# Patient Record
Sex: FEMALE | Race: WHITE | Employment: OTHER | URBAN - NONMETROPOLITAN AREA
[De-identification: names, ages, dates, MRNs, and addresses within clinical notes are randomized per-mention and may not be internally consistent; named-entity substitution may affect disease eponyms.]

---

## 2019-10-03 ENCOUNTER — PROCEDURE VISIT (OUTPATIENT)
Dept: CARDIOLOGY CLINIC | Age: 69
End: 2019-10-03
Payer: MEDICARE

## 2019-10-03 ENCOUNTER — OFFICE VISIT (OUTPATIENT)
Dept: CARDIOLOGY CLINIC | Age: 69
End: 2019-10-03
Payer: MEDICARE

## 2019-10-03 ENCOUNTER — TELEPHONE (OUTPATIENT)
Dept: CARDIOLOGY CLINIC | Age: 69
End: 2019-10-03

## 2019-10-03 VITALS
HEIGHT: 63 IN | HEART RATE: 80 BPM | DIASTOLIC BLOOD PRESSURE: 80 MMHG | WEIGHT: 202.5 LBS | SYSTOLIC BLOOD PRESSURE: 126 MMHG | BODY MASS INDEX: 35.88 KG/M2

## 2019-10-03 DIAGNOSIS — E78.5 HYPERLIPIDEMIA, UNSPECIFIED HYPERLIPIDEMIA TYPE: ICD-10-CM

## 2019-10-03 DIAGNOSIS — E11.9 TYPE 2 DIABETES MELLITUS WITHOUT COMPLICATION, WITHOUT LONG-TERM CURRENT USE OF INSULIN (HCC): ICD-10-CM

## 2019-10-03 DIAGNOSIS — R94.39 ABNORMAL STRESS TEST: Primary | ICD-10-CM

## 2019-10-03 DIAGNOSIS — R01.1 MURMUR, CARDIAC: Primary | ICD-10-CM

## 2019-10-03 DIAGNOSIS — I10 ESSENTIAL HYPERTENSION: ICD-10-CM

## 2019-10-03 LAB
LV EF: 60 %
LVEF MODALITY: NORMAL

## 2019-10-03 PROCEDURE — 99204 OFFICE O/P NEW MOD 45 MIN: CPT | Performed by: INTERNAL MEDICINE

## 2019-10-03 PROCEDURE — 4040F PNEUMOC VAC/ADMIN/RCVD: CPT | Performed by: INTERNAL MEDICINE

## 2019-10-03 PROCEDURE — G8484 FLU IMMUNIZE NO ADMIN: HCPCS | Performed by: INTERNAL MEDICINE

## 2019-10-03 PROCEDURE — 1036F TOBACCO NON-USER: CPT | Performed by: INTERNAL MEDICINE

## 2019-10-03 PROCEDURE — G8417 CALC BMI ABV UP PARAM F/U: HCPCS | Performed by: INTERNAL MEDICINE

## 2019-10-03 PROCEDURE — 93306 TTE W/DOPPLER COMPLETE: CPT | Performed by: INTERNAL MEDICINE

## 2019-10-03 PROCEDURE — 1123F ACP DISCUSS/DSCN MKR DOCD: CPT | Performed by: INTERNAL MEDICINE

## 2019-10-03 PROCEDURE — G8427 DOCREV CUR MEDS BY ELIG CLIN: HCPCS | Performed by: INTERNAL MEDICINE

## 2019-10-03 PROCEDURE — 2022F DILAT RTA XM EVC RTNOPTHY: CPT | Performed by: INTERNAL MEDICINE

## 2019-10-03 PROCEDURE — 1090F PRES/ABSN URINE INCON ASSESS: CPT | Performed by: INTERNAL MEDICINE

## 2019-10-03 PROCEDURE — 3046F HEMOGLOBIN A1C LEVEL >9.0%: CPT | Performed by: INTERNAL MEDICINE

## 2019-10-03 PROCEDURE — G8400 PT W/DXA NO RESULTS DOC: HCPCS | Performed by: INTERNAL MEDICINE

## 2019-10-03 PROCEDURE — 3017F COLORECTAL CA SCREEN DOC REV: CPT | Performed by: INTERNAL MEDICINE

## 2019-10-03 RX ORDER — MAGNESIUM 200 MG
TABLET ORAL
COMMUNITY

## 2019-10-03 RX ORDER — LISINOPRIL AND HYDROCHLOROTHIAZIDE 12.5; 1 MG/1; MG/1
0.5 TABLET ORAL DAILY
COMMUNITY

## 2019-10-03 RX ORDER — ATORVASTATIN CALCIUM 20 MG/1
20 TABLET, FILM COATED ORAL DAILY
Refills: 3 | COMMUNITY
Start: 2019-08-24

## 2019-10-03 RX ORDER — METFORMIN HYDROCHLORIDE 500 MG/1
500 TABLET, EXTENDED RELEASE ORAL DAILY
COMMUNITY
Start: 2018-12-04

## 2019-10-03 RX ORDER — ALBUTEROL SULFATE 2.5 MG/3ML
2.5 SOLUTION RESPIRATORY (INHALATION) EVERY 6 HOURS PRN
COMMUNITY
Start: 2018-10-08 | End: 2022-06-28

## 2019-10-03 RX ORDER — ASPIRIN 81 MG/1
81 TABLET, CHEWABLE ORAL DAILY
COMMUNITY

## 2019-10-03 RX ORDER — ALBUTEROL SULFATE 90 UG/1
2 AEROSOL, METERED RESPIRATORY (INHALATION) EVERY 4 HOURS PRN
COMMUNITY
Start: 2019-01-01

## 2019-10-03 RX ORDER — LORAZEPAM 0.5 MG/1
0.5 TABLET ORAL EVERY 8 HOURS PRN
COMMUNITY
Start: 2018-07-24

## 2019-10-03 RX ORDER — MONTELUKAST SODIUM 10 MG/1
10 TABLET ORAL NIGHTLY
COMMUNITY

## 2019-10-03 RX ORDER — METOPROLOL SUCCINATE 25 MG/1
25 TABLET, EXTENDED RELEASE ORAL DAILY
Refills: 3 | COMMUNITY
Start: 2019-09-09 | End: 2021-05-13 | Stop reason: SDUPTHER

## 2019-10-03 RX ORDER — FEXOFENADINE HCL 180 MG/1
180 TABLET ORAL DAILY PRN
COMMUNITY
Start: 2019-09-09 | End: 2020-08-14 | Stop reason: ALTCHOICE

## 2019-10-03 RX ORDER — BUPROPION HYDROCHLORIDE 150 MG/1
150 TABLET ORAL DAILY
Refills: 3 | COMMUNITY
Start: 2019-08-16 | End: 2020-08-14

## 2019-10-03 SDOH — HEALTH STABILITY: MENTAL HEALTH: HOW OFTEN DO YOU HAVE A DRINK CONTAINING ALCOHOL?: NEVER

## 2019-10-10 ENCOUNTER — HOSPITAL ENCOUNTER (OUTPATIENT)
Dept: CARDIAC CATH/INVASIVE PROCEDURES | Age: 69
Discharge: HOME | End: 2019-10-10
Attending: INTERNAL MEDICINE | Admitting: INTERNAL MEDICINE
Payer: MEDICARE

## 2019-10-10 VITALS — HEIGHT: 63 IN | WEIGHT: 200 LBS | BODY MASS INDEX: 35.44 KG/M2

## 2019-10-10 LAB
EKG ATRIAL RATE: 110 BPM
EKG DIAGNOSIS: NORMAL
EKG P AXIS: 59 DEGREES
EKG P-R INTERVAL: 152 MS
EKG Q-T INTERVAL: 330 MS
EKG QRS DURATION: 72 MS
EKG QTC CALCULATION (BAZETT): 446 MS
EKG R AXIS: 0 DEGREES
EKG T AXIS: 63 DEGREES
EKG VENTRICULAR RATE: 110 BPM
GFR AFRICAN AMERICAN: >60
GFR NON-AFRICAN AMERICAN: >60
GLUCOSE BLD-MCNC: 130 MG/DL (ref 70–99)
HEMOGLOBIN: 12.7 G/DL (ref 12–16)
PERFORMED ON: ABNORMAL
PLATELET # BLD: 227 K/UL (ref 135–450)
POC BUN: 12 MG/DL (ref 7–18)
POC CREATININE: 0.9 MG/DL (ref 0.6–1.2)
POC HEMATOCRIT: 42 % (ref 36–48)
POC POTASSIUM: 3.9 MMOL/L (ref 3.5–5.1)
POC SAMPLE TYPE: ABNORMAL
POC SODIUM: 141 MMOL/L (ref 136–145)
WBC # BLD: 6.1 K/UL (ref 4–11)

## 2019-10-10 PROCEDURE — 85048 AUTOMATED LEUKOCYTE COUNT: CPT

## 2019-10-10 PROCEDURE — 99152 MOD SED SAME PHYS/QHP 5/>YRS: CPT | Performed by: INTERNAL MEDICINE

## 2019-10-10 PROCEDURE — C1894 INTRO/SHEATH, NON-LASER: HCPCS

## 2019-10-10 PROCEDURE — 6360000004 HC RX CONTRAST MEDICATION: Performed by: INTERNAL MEDICINE

## 2019-10-10 PROCEDURE — 93454 CORONARY ARTERY ANGIO S&I: CPT

## 2019-10-10 PROCEDURE — 36415 COLL VENOUS BLD VENIPUNCTURE: CPT

## 2019-10-10 PROCEDURE — 82947 ASSAY GLUCOSE BLOOD QUANT: CPT

## 2019-10-10 PROCEDURE — 93005 ELECTROCARDIOGRAM TRACING: CPT | Performed by: INTERNAL MEDICINE

## 2019-10-10 PROCEDURE — 85049 AUTOMATED PLATELET COUNT: CPT

## 2019-10-10 PROCEDURE — 99153 MOD SED SAME PHYS/QHP EA: CPT

## 2019-10-10 PROCEDURE — 85018 HEMOGLOBIN: CPT

## 2019-10-10 PROCEDURE — C1769 GUIDE WIRE: HCPCS

## 2019-10-10 PROCEDURE — 82565 ASSAY OF CREATININE: CPT

## 2019-10-10 PROCEDURE — 99152 MOD SED SAME PHYS/QHP 5/>YRS: CPT

## 2019-10-10 PROCEDURE — 6360000002 HC RX W HCPCS

## 2019-10-10 PROCEDURE — 2500000003 HC RX 250 WO HCPCS

## 2019-10-10 PROCEDURE — 84295 ASSAY OF SERUM SODIUM: CPT

## 2019-10-10 PROCEDURE — 84132 ASSAY OF SERUM POTASSIUM: CPT

## 2019-10-10 PROCEDURE — 84520 ASSAY OF UREA NITROGEN: CPT

## 2019-10-10 PROCEDURE — 93454 CORONARY ARTERY ANGIO S&I: CPT | Performed by: INTERNAL MEDICINE

## 2019-10-10 PROCEDURE — 85014 HEMATOCRIT: CPT

## 2019-10-10 RX ADMIN — IOPAMIDOL 40 ML: 755 INJECTION, SOLUTION INTRAVENOUS at 12:54

## 2020-05-19 ENCOUNTER — TELEPHONE (OUTPATIENT)
Dept: CARDIOLOGY CLINIC | Age: 70
End: 2020-05-19

## 2020-05-19 NOTE — TELEPHONE ENCOUNTER
Having chest tightness and left upper arm pain x 3 days. Heart is beating in her ear , 178/78, slight headache, no dizziness or SOB. This is worse than the last time and she is asking to see LES this week .  When can he work her in ?
Needs an appointment this week
Spoke to patient will add her Thusday for OV. [facility] is [place] drive. She states the pains , come and go, she \"feels fine now\" but when they occur they are different from the last time.
no

## 2020-05-22 ENCOUNTER — TELEPHONE (OUTPATIENT)
Dept: CARDIOLOGY CLINIC | Age: 70
End: 2020-05-22

## 2020-05-26 ENCOUNTER — TELEPHONE (OUTPATIENT)
Dept: CARDIOLOGY CLINIC | Age: 70
End: 2020-05-26

## 2020-05-27 NOTE — PROGRESS NOTES
[address]   Cardiac Follow Up     Referring Provider:  GERMAN Seaman     Chief Complaint   Patient presents with    Follow-Up from LUISN[facility] SALDA[facility]A    Hypertension    shortness of breath and chest pain    History of Present Illness:  Karen Murrell is a 65 y.o. female with a history of hypertension, hyperlipidemia, and diabetes. She visited the ED approx a week ago after she had been experiencing episodes of chest pain in the upper arm, back, neck and face. Chest is tender to palpitation today. [facility] 10/2019 Normal coronaries. Valve sounds unchanged on exam today. Was told in the past she had a heart murmur 'many years ago'. Denies  palpitations, light-headedness or edema. Unchanged shortness of breath. Past Medical History:   has a past medical history of Asthma, Diabetes mellitus ([address] Hyperlipidemia, and Hypertension. Surgical History:   has a past surgical history that includes Hysterectomy, total abdominal and Elbow surgery. Social History:   reports that she has never smoked. She has never used smokeless tobacco. She reports that she does not drink alcohol. Family History:  family history includes Heart Disease in her brother and father. Home Medications:  Prior to Admission medications    Medication Sig Start Date End Date Taking?  Authorizing Provider   albuterol sulfate HFA (PROVENTIL HFA) 108 (90 Base) MCG/ACT inhaler Inhale 2 puffs into the lungs every 4 hours as needed 1/1/19  Yes Historical Provider, MD   albuterol (PROVENTIL) (2.5 MG/3ML) 0.083% nebulizer solution Inhale 2.5 mg into the lungs every 6 hours as needed 10/8/18 5/28/20 Yes Historical Provider, MD   aspirin 81 MG chewable tablet Take 81 mg by mouth daily   Yes Historical Provider, MD   atorvastatin (LIPITOR) 20 MG tablet Take 20 mg by mouth daily 8/24/19  Yes Historical Provider, MD   fexofenadine (ALLEGRA) 180 MG tablet Take 180 mg by mouth daily as needed 9/9/19 9/8/20 Yes Historical Provider, MD   LORazepam (ATIVAN) 0.5 MG tablet Take 0.5 mg by mouth every 8 hours as needed. 7/24/18  Yes Historical Provider, MD   metFORMIN (GLUCOPHAGE-XR) 500 MG extended release tablet Take 500 mg by mouth daily 12/4/18  Yes Historical Provider, MD   montelukast (SINGULAIR) 10 MG tablet Take 10 mg by mouth nightly   Yes Historical Provider, MD   buPROPion (WELLBUTRIN XL) 150 MG extended release tablet Take 150 mg by mouth daily 8/16/19  Yes Historical Provider, MD   Cyanocobalamin (VITAMIN B-12) 1000 MCG SUBL Place under the tongue every 30 days   Yes Historical Provider, MD   lisinopril-hydrochlorothiazide (PRINZIDE;ZESTORETIC) 10-12.5 MG per tablet Take 0.5 tablets by mouth daily   Yes Historical Provider, MD   metoprolol succinate (TOPROL XL) 25 MG extended release tablet Take 25 mg by mouth daily 9/9/19   Historical Provider, MD        Allergies:  Penicillins and Sulfa antibiotics     Review of Systems:   · Constitutional: there has been no unanticipated weight loss. There's been no change in energy level, sleep pattern, or activity level. · Eyes: No visual changes or diplopia. No scleral icterus. · ENT: No Headaches, hearing loss or vertigo. No mouth sores or sore throat. · Cardiovascular: Reviewed in HPI  · Respiratory: No cough or wheezing, no sputum production. No hematemesis. · Gastrointestinal: No abdominal pain, appetite loss, blood in stools. No change in bowel or bladder habits. · Genitourinary: No dysuria, trouble voiding, or hematuria. · Musculoskeletal:  No gait disturbance, weakness or joint complaints. · Integumentary: No rash or pruritis. · Neurological: No headache, diplopia, change in muscle strength, numbness or tingling. No change in gait, balance, coordination, mood, affect, memory, mentation, behavior. · Psychiatric: No anxiety, no depression. · Endocrine: No malaise, fatigue or temperature intolerance. No excessive thirst, fluid intake, or urination.  No

## 2020-05-28 ENCOUNTER — OFFICE VISIT (OUTPATIENT)
Dept: CARDIOLOGY CLINIC | Age: 70
End: 2020-05-28
Payer: MEDICARE

## 2020-05-28 VITALS
OXYGEN SATURATION: 96 % | HEART RATE: 84 BPM | DIASTOLIC BLOOD PRESSURE: 88 MMHG | TEMPERATURE: 97 F | RESPIRATION RATE: 18 BRPM | BODY MASS INDEX: 34.66 KG/M2 | SYSTOLIC BLOOD PRESSURE: 158 MMHG | HEIGHT: 64 IN | WEIGHT: 203 LBS

## 2020-05-28 PROCEDURE — 99214 OFFICE O/P EST MOD 30 MIN: CPT | Performed by: INTERNAL MEDICINE

## 2020-05-28 PROCEDURE — 3046F HEMOGLOBIN A1C LEVEL >9.0%: CPT | Performed by: INTERNAL MEDICINE

## 2020-05-28 PROCEDURE — 1036F TOBACCO NON-USER: CPT | Performed by: INTERNAL MEDICINE

## 2020-05-28 PROCEDURE — 2022F DILAT RTA XM EVC RTNOPTHY: CPT | Performed by: INTERNAL MEDICINE

## 2020-05-28 PROCEDURE — G8417 CALC BMI ABV UP PARAM F/U: HCPCS | Performed by: INTERNAL MEDICINE

## 2020-05-28 PROCEDURE — 3017F COLORECTAL CA SCREEN DOC REV: CPT | Performed by: INTERNAL MEDICINE

## 2020-05-28 PROCEDURE — 4040F PNEUMOC VAC/ADMIN/RCVD: CPT | Performed by: INTERNAL MEDICINE

## 2020-05-28 PROCEDURE — G8400 PT W/DXA NO RESULTS DOC: HCPCS | Performed by: INTERNAL MEDICINE

## 2020-05-28 PROCEDURE — 1123F ACP DISCUSS/DSCN MKR DOCD: CPT | Performed by: INTERNAL MEDICINE

## 2020-05-28 PROCEDURE — G8427 DOCREV CUR MEDS BY ELIG CLIN: HCPCS | Performed by: INTERNAL MEDICINE

## 2020-05-28 PROCEDURE — 1090F PRES/ABSN URINE INCON ASSESS: CPT | Performed by: INTERNAL MEDICINE

## 2020-08-13 NOTE — PROGRESS NOTES
Take 20 mg by mouth daily 8/24/19  Yes Historical Provider, MD   LORazepam (ATIVAN) 0.5 MG tablet Take 0.5 mg by mouth every 8 hours as needed. 7/24/18  Yes Historical Provider, MD   metFORMIN (GLUCOPHAGE-XR) 500 MG extended release tablet Take 500 mg by mouth daily Takes 0.5 tablet once a day 12/4/18  Yes Historical Provider, MD   montelukast (SINGULAIR) 10 MG tablet Take 10 mg by mouth nightly   Yes Historical Provider, MD   Cyanocobalamin (VITAMIN B-12) 1000 MCG SUBL Place under the tongue every 30 days   Yes Historical Provider, MD   lisinopril-hydrochlorothiazide (PRINZIDE;ZESTORETIC) 10-12.5 MG per tablet Take 0.5 tablets by mouth daily   Yes Historical Provider, MD   metoprolol succinate (TOPROL XL) 25 MG extended release tablet Take 25 mg by mouth daily 9/9/19   Historical Provider, MD        Allergies:  Penicillins and Sulfa antibiotics     Review of Systems:   · Constitutional: there has been no unanticipated weight loss. There's been no change in energy level, sleep pattern, or activity level. · Eyes: No visual changes or diplopia. No scleral icterus. · ENT: No Headaches, hearing loss or vertigo. No mouth sores or sore throat. · Cardiovascular: Reviewed in HPI  · Respiratory: No cough or wheezing, no sputum production. No hematemesis. +sob unchanged    · Gastrointestinal: No abdominal pain, appetite loss, blood in stools. No change in bowel or bladder habits. · Genitourinary: No dysuria, trouble voiding, or hematuria. · Musculoskeletal:  No gait disturbance, weakness or joint complaints. · Integumentary: No rash or pruritis. · Neurological: No headache, diplopia, change in muscle strength, numbness or tingling. No change in gait, balance, coordination, mood, affect, memory, mentation, behavior. · Psychiatric: No anxiety, no depression. · Endocrine: No malaise, fatigue or temperature intolerance. No excessive thirst, fluid intake, or urination. No tremor.   · Hematologic/Lymphatic: No abnormal

## 2020-08-14 ENCOUNTER — OFFICE VISIT (OUTPATIENT)
Dept: CARDIOLOGY CLINIC | Age: 70
End: 2020-08-14
Payer: MEDICARE

## 2020-08-14 VITALS
HEART RATE: 100 BPM | RESPIRATION RATE: 20 BRPM | HEIGHT: 63 IN | SYSTOLIC BLOOD PRESSURE: 136 MMHG | DIASTOLIC BLOOD PRESSURE: 84 MMHG | BODY MASS INDEX: 35.79 KG/M2 | WEIGHT: 202 LBS | TEMPERATURE: 98.1 F | OXYGEN SATURATION: 96 %

## 2020-08-14 PROCEDURE — 1036F TOBACCO NON-USER: CPT | Performed by: INTERNAL MEDICINE

## 2020-08-14 PROCEDURE — 1123F ACP DISCUSS/DSCN MKR DOCD: CPT | Performed by: INTERNAL MEDICINE

## 2020-08-14 PROCEDURE — G8400 PT W/DXA NO RESULTS DOC: HCPCS | Performed by: INTERNAL MEDICINE

## 2020-08-14 PROCEDURE — 1090F PRES/ABSN URINE INCON ASSESS: CPT | Performed by: INTERNAL MEDICINE

## 2020-08-14 PROCEDURE — G8417 CALC BMI ABV UP PARAM F/U: HCPCS | Performed by: INTERNAL MEDICINE

## 2020-08-14 PROCEDURE — 4040F PNEUMOC VAC/ADMIN/RCVD: CPT | Performed by: INTERNAL MEDICINE

## 2020-08-14 PROCEDURE — G8427 DOCREV CUR MEDS BY ELIG CLIN: HCPCS | Performed by: INTERNAL MEDICINE

## 2020-08-14 PROCEDURE — 2022F DILAT RTA XM EVC RTNOPTHY: CPT | Performed by: INTERNAL MEDICINE

## 2020-08-14 PROCEDURE — 99214 OFFICE O/P EST MOD 30 MIN: CPT | Performed by: INTERNAL MEDICINE

## 2020-08-14 PROCEDURE — 93000 ELECTROCARDIOGRAM COMPLETE: CPT | Performed by: INTERNAL MEDICINE

## 2020-08-14 PROCEDURE — 3046F HEMOGLOBIN A1C LEVEL >9.0%: CPT | Performed by: INTERNAL MEDICINE

## 2020-08-14 PROCEDURE — 3017F COLORECTAL CA SCREEN DOC REV: CPT | Performed by: INTERNAL MEDICINE

## 2020-08-14 RX ORDER — LORATADINE 10 MG/1
10 TABLET ORAL DAILY
COMMUNITY
Start: 2020-01-27 | End: 2021-01-26

## 2020-11-09 NOTE — PROGRESS NOTES
[address]   Cardiac Follow Up     Referring Provider:  GERMAN Tavera     Chief Complaint   Patient presents with    6 Month Follow-Up     Pt had echo today. She states she gets occassional CP due to her heart murmur.  Hyperlipidemia   f/up for aortic stenosis    History of Present Illness:  Dameon Angel is a 65 y.o. female with a history of hypertension, hyperlipidemia, and diabetes. Today, she reports she has been feeling well. Denies exertional chest pain, palpitations, light-headedness, edema. No change in activity tolerance. No cardiac complaints. She has unchanged shortness of breath. She has been evaluated previously for shortness of breath and chest pain and doing better     Past Medical History:   has a past medical history of Asthma, Diabetes mellitus ([address] Hyperlipidemia, and Hypertension. Surgical History:   has a past surgical history that includes Hysterectomy, total abdominal and Elbow surgery. Social History:   reports that she has never smoked. She has never used smokeless tobacco. She reports that she does not drink alcohol. Family History:  family history includes Heart Disease in her brother and father. Home Medications:  Prior to Admission medications    Medication Sig Start Date End Date Taking? Authorizing Provider   loratadine (CLARITIN) 10 MG tablet Take 10 mg by mouth daily 1/27/20 1/26/21 Yes Historical Provider, MD   albuterol sulfate HFA (PROVENTIL HFA) 108 (90 Base) MCG/ACT inhaler Inhale 2 puffs into the lungs every 4 hours as needed 1/1/19  Yes Historical Provider, MD   aspirin 81 MG chewable tablet Take 81 mg by mouth daily   Yes Historical Provider, MD   atorvastatin (LIPITOR) 20 MG tablet Take 20 mg by mouth daily 8/24/19  Yes Historical Provider, MD   LORazepam (ATIVAN) 0.5 MG tablet Take 0.5 mg by mouth every 8 hours as needed.  7/24/18  Yes Historical Provider, MD   metFORMIN (GLUCOPHAGE-XR) 500 MG extended release tablet Take 500 mg by mouth daily Takes 0.5 tablet once a day 12/4/18  Yes Historical Provider, MD   montelukast (SINGULAIR) 10 MG tablet Take 10 mg by mouth nightly   Yes Historical Provider, MD   Cyanocobalamin (VITAMIN B-12) 1000 MCG SUBL Place under the tongue every 30 days   Yes Historical Provider, MD   lisinopril-hydrochlorothiazide (PRINZIDE;ZESTORETIC) 10-12.5 MG per tablet Take 0.5 tablets by mouth daily   Yes Historical Provider, MD   albuterol (PROVENTIL) (2.5 MG/3ML) 0.083% nebulizer solution Inhale 2.5 mg into the lungs every 6 hours as needed 10/8/18 8/14/20  Historical Provider, MD   metoprolol succinate (TOPROL XL) 25 MG extended release tablet Take 25 mg by mouth daily 9/9/19   Historical Provider, MD        Allergies:  Penicillins and Sulfa antibiotics     Review of Systems:   · Constitutional: there has been no unanticipated weight loss. There's been no change in energy level, sleep pattern, or activity level. · Eyes: No visual changes or diplopia. No scleral icterus. · ENT: No Headaches, hearing loss or vertigo. No mouth sores or sore throat. · Cardiovascular: Reviewed in HPI  · Respiratory: No cough or wheezing, no sputum production. No hematemesis. +sob unchanged    · Gastrointestinal: No abdominal pain, appetite loss, blood in stools. No change in bowel or bladder habits. · Genitourinary: No dysuria, trouble voiding, or hematuria. · Musculoskeletal:  No gait disturbance, weakness or joint complaints. · Integumentary: No rash or pruritis. · Neurological: No headache, diplopia, change in muscle strength, numbness or tingling. No change in gait, balance, coordination, mood, affect, memory, mentation, behavior. · Psychiatric: No anxiety, no depression. · Endocrine: No malaise, fatigue or temperature intolerance. No excessive thirst, fluid intake, or urination. No tremor. · Hematologic/Lymphatic: No abnormal bruising or bleeding, blood clots or swollen lymph nodes.   · Allergic/Immunologic: No nasal congestion or hives. Physical Examination:    Vitals:    11/10/20 1404   BP: 138/74   Pulse: 76        Wt Readings from Last 1 Encounters:   11/10/20 193 lb (87.5 kg)     NAD  Skin:good turgor,intact without lesions  HEENT: EOMI ,normal  Neck:no JVD    Constitutional and General Appearance:   Respiratory:  · Normal excursion and expansion without use of accessory muscles  · Resp Auscultation: Normal breath sounds without dullness  Cardiovascular:  · The apical impulses not displaced  · II/VI BRENT   · Cervical veins are not engorged  · The carotid upstroke is normal in amplitude and contour without delay or bruit  · Peripheral pulses are symmetrical and full  · There is no clubbing, cyanosis of the extremities. · BLE edema   · Femoral Arteries: 2+ and equal  · Pedal Pulses: 2+ and equal   Abdomen:  · No masses or tenderness  · Liver/Spleen: No Abnormalities Noted  Neurological/Psychiatric:  · Alert and oriented in all spheres  · Moves all extremities well  · Exhibits normal gait balance and coordination  · No abnormalities of mood, affect, memory, mentation, or behavior are noted    9/11/19  Interpetation Summary:  o Duke Treadmill Score is 3 which indicates intermediate risk. o Negative ECG for ischemia with graded exercise test.   o Hypertensive blood pressure response. (Male >210 mmHg, Female >190 mmHg). Echo 11/10/20  Normal left ventricle size and systolic function with an estimated ejection   fraction of 60%. No regional wall motion abnormalities are seen. There is mild concentric left ventricular hypertrophy. E/e\"= 13. Diastolic filling parameters suggests grade I diastolic   dysfunction. Individual aortic valve leaflets are not clearly visualized. A bicuspid aortic valve cannot be excluded. The aortic valve area is calculated at 1.68 cm2 with a maximum pressure   gradient of 23 mmHg and a mean pressure gradient of 13 mmHg. This is c/w   mild aortic stenosis.    No evidence of aortic valve regurgitation. Trivial tricuspid regurgitation. PASP 24mmHg. IVC size is normal (<2.1cm) and collapses > 50% with respiration consistent   with normal RA pressure (3mmHg). Assessment:         [facility] 10/2019 Normal coronaries. Essential hypertension - Controlled-Blood pressure 138/74, pulse 76, height 5' 3\" (1.6 m), weight 193 lb (87.5 kg). Hyperlipidemia- Controlled- Lipitor 20 mg     Type 2 diabetes mellitus without complication, without long-term current use of insulin ([facility])   9/9/19> A1C 6.4- MetFORMIN 250 mg daily           Systolic murmur with echo showing a likely bicuspid AV- mild AS per Echo     Plan: Echo 11/10/20 read by me in office today and results discussed with the patient. Lab results reviewed this visit and discussed. No med changes. Continue risk factor modifications. Call for any change in symptoms. Return for regular follow up in 6 months. I appreciate the opportunity of cooperating in the care of this individual.    Antonella Gaspar M.D., [facility] [place]      Patient's problem list, medications, allergies, past medical, surgical, social and family histories were reviewed and updated as appropriate. Scribe's attestation: This note was scribed in the presence of Dr. Constantin Gaspar MD, by Sandro Beatty RN. The scribe's documentation has been prepared under my direction and personally reviewed by me in its entirety. I confirm that the note above accurately reflects all work, treatment, procedures, and medical decision making performed by me.

## 2020-11-10 ENCOUNTER — PROCEDURE VISIT (OUTPATIENT)
Dept: CARDIOLOGY CLINIC | Age: 70
End: 2020-11-10
Payer: MEDICARE

## 2020-11-10 ENCOUNTER — OFFICE VISIT (OUTPATIENT)
Dept: CARDIOLOGY CLINIC | Age: 70
End: 2020-11-10
Payer: MEDICARE

## 2020-11-10 VITALS
SYSTOLIC BLOOD PRESSURE: 138 MMHG | HEART RATE: 76 BPM | DIASTOLIC BLOOD PRESSURE: 74 MMHG | BODY MASS INDEX: 34.2 KG/M2 | WEIGHT: 193 LBS | HEIGHT: 63 IN

## 2020-11-10 LAB
LV EF: 60 %
LVEF MODALITY: NORMAL

## 2020-11-10 PROCEDURE — 3046F HEMOGLOBIN A1C LEVEL >9.0%: CPT | Performed by: INTERNAL MEDICINE

## 2020-11-10 PROCEDURE — 1036F TOBACCO NON-USER: CPT | Performed by: INTERNAL MEDICINE

## 2020-11-10 PROCEDURE — G8400 PT W/DXA NO RESULTS DOC: HCPCS | Performed by: INTERNAL MEDICINE

## 2020-11-10 PROCEDURE — G8417 CALC BMI ABV UP PARAM F/U: HCPCS | Performed by: INTERNAL MEDICINE

## 2020-11-10 PROCEDURE — G8427 DOCREV CUR MEDS BY ELIG CLIN: HCPCS | Performed by: INTERNAL MEDICINE

## 2020-11-10 PROCEDURE — 2022F DILAT RTA XM EVC RTNOPTHY: CPT | Performed by: INTERNAL MEDICINE

## 2020-11-10 PROCEDURE — 1090F PRES/ABSN URINE INCON ASSESS: CPT | Performed by: INTERNAL MEDICINE

## 2020-11-10 PROCEDURE — 3017F COLORECTAL CA SCREEN DOC REV: CPT | Performed by: INTERNAL MEDICINE

## 2020-11-10 PROCEDURE — 93306 TTE W/DOPPLER COMPLETE: CPT | Performed by: INTERNAL MEDICINE

## 2020-11-10 PROCEDURE — 1123F ACP DISCUSS/DSCN MKR DOCD: CPT | Performed by: INTERNAL MEDICINE

## 2020-11-10 PROCEDURE — G8484 FLU IMMUNIZE NO ADMIN: HCPCS | Performed by: INTERNAL MEDICINE

## 2020-11-10 PROCEDURE — 99214 OFFICE O/P EST MOD 30 MIN: CPT | Performed by: INTERNAL MEDICINE

## 2020-11-10 PROCEDURE — 4040F PNEUMOC VAC/ADMIN/RCVD: CPT | Performed by: INTERNAL MEDICINE

## 2021-01-19 ENCOUNTER — TELEPHONE (OUTPATIENT)
Dept: CARDIOLOGY CLINIC | Age: 71
End: 2021-01-19

## 2021-01-19 NOTE — TELEPHONE ENCOUNTER
Yes to get the vaccine. Any of our patients that call in with that question should get the vaccine. We are unable to arrange for patients to get the vaccine.

## 2021-01-19 NOTE — TELEPHONE ENCOUNTER
Spoke with the patient and advised her per LES that is okay . She voiced understanding .  Call complete

## 2021-05-13 ENCOUNTER — OFFICE VISIT (OUTPATIENT)
Dept: CARDIOLOGY CLINIC | Age: 71
End: 2021-05-13
Payer: MEDICARE

## 2021-05-13 VITALS
RESPIRATION RATE: 20 BRPM | SYSTOLIC BLOOD PRESSURE: 138 MMHG | HEIGHT: 64 IN | BODY MASS INDEX: 32.61 KG/M2 | DIASTOLIC BLOOD PRESSURE: 66 MMHG | WEIGHT: 191 LBS | OXYGEN SATURATION: 96 % | HEART RATE: 112 BPM

## 2021-05-13 DIAGNOSIS — R00.0 TACHYCARDIA: Primary | ICD-10-CM

## 2021-05-13 PROCEDURE — G8417 CALC BMI ABV UP PARAM F/U: HCPCS | Performed by: INTERNAL MEDICINE

## 2021-05-13 PROCEDURE — 99214 OFFICE O/P EST MOD 30 MIN: CPT | Performed by: INTERNAL MEDICINE

## 2021-05-13 PROCEDURE — 1090F PRES/ABSN URINE INCON ASSESS: CPT | Performed by: INTERNAL MEDICINE

## 2021-05-13 PROCEDURE — 1123F ACP DISCUSS/DSCN MKR DOCD: CPT | Performed by: INTERNAL MEDICINE

## 2021-05-13 PROCEDURE — G8400 PT W/DXA NO RESULTS DOC: HCPCS | Performed by: INTERNAL MEDICINE

## 2021-05-13 PROCEDURE — 3017F COLORECTAL CA SCREEN DOC REV: CPT | Performed by: INTERNAL MEDICINE

## 2021-05-13 PROCEDURE — 4040F PNEUMOC VAC/ADMIN/RCVD: CPT | Performed by: INTERNAL MEDICINE

## 2021-05-13 PROCEDURE — 1036F TOBACCO NON-USER: CPT | Performed by: INTERNAL MEDICINE

## 2021-05-13 PROCEDURE — G8427 DOCREV CUR MEDS BY ELIG CLIN: HCPCS | Performed by: INTERNAL MEDICINE

## 2021-05-13 RX ORDER — METOPROLOL SUCCINATE 25 MG/1
25 TABLET, EXTENDED RELEASE ORAL DAILY
Qty: 10 TABLET | Refills: 1 | Status: SHIPPED | OUTPATIENT
Start: 2021-05-13 | End: 2021-07-16

## 2021-05-13 NOTE — PROGRESS NOTES
(GLUCOPHAGE-XR) 500 MG extended release tablet Take 500 mg by mouth daily Takes 0.5 tablet once a day 12/4/18  Yes Historical Provider, MD   montelukast (SINGULAIR) 10 MG tablet Take 10 mg by mouth nightly   Yes Historical Provider, MD   Cyanocobalamin (VITAMIN B-12) 1000 MCG SUBL Place under the tongue every 30 days   Yes Historical Provider, MD   lisinopril-hydrochlorothiazide (PRINZIDE;ZESTORETIC) 10-12.5 MG per tablet Take 0.5 tablets by mouth daily   Yes Historical Provider, MD   metoprolol succinate (TOPROL XL) 25 MG extended release tablet Take 25 mg by mouth daily 9/9/19   Historical Provider, MD        Allergies:  Penicillins and Sulfa antibiotics     Review of Systems:   · Constitutional: there has been no unanticipated weight loss. There's been no change in energy level, sleep pattern, or activity level. · Eyes: No visual changes or diplopia. No scleral icterus. · ENT: No Headaches, hearing loss or vertigo. No mouth sores or sore throat. · Cardiovascular: Reviewed in HPI  · Respiratory: No cough or wheezing, no sputum production. No hematemesis. +sob unchanged    · Gastrointestinal: No abdominal pain, appetite loss, blood in stools. No change in bowel or bladder habits. · Genitourinary: No dysuria, trouble voiding, or hematuria. · Musculoskeletal:  No gait disturbance, weakness or joint complaints. · Integumentary: No rash or pruritis. · Neurological: No headache, diplopia, change in muscle strength, numbness or tingling. No change in gait, balance, coordination, mood, affect, memory, mentation, behavior. · Psychiatric: No anxiety, no depression. · Endocrine: No malaise, fatigue or temperature intolerance. No excessive thirst, fluid intake, or urination. No tremor. · Hematologic/Lymphatic: No abnormal bruising or bleeding, blood clots or swollen lymph nodes. · Allergic/Immunologic: No nasal congestion or hives.     Physical Examination:    Vitals:    05/13/21 1330   BP: 138/66   Pulse: 112 normal (<2.1cm) and collapses > 50% with respiration consistent   with normal RA pressure (3mmHg). Assessment:         Tachycardia/SOB  Take Toprol XL 25mg daily for 10 days       Essential hypertension - Controlled-Blood pressure 138/66, pulse 112, resp. rate 20, height 5' 3.5\" (1.613 m), weight 191 lb (86.6 kg), SpO2 96 %. [facility] 10/2019 Normal coronaries. Hyperlipidemia- Controlled- Lipitor 20 mg     Type 2 diabetes mellitus without complication, without long-term current use of insulin ([facility])   9/9/19> A1C 6.4- MetFORMIN 250 mg daily               Systolic murmur with echo showing a likely bicuspid AV- mild AS per Echo 11/10/20    Plan:    Lab results reviewed this visit and discussed. Take Toprol XL 25mg daily for 10 days   Continue risk factor modifications. Call for any change in symptoms. Return for regular follow up in 1 month. I appreciate the opportunity of cooperating in the care of this individual.    Osie Corado. Tarik Georges M.D., [facility] [place]      Patient's problem list, medications, allergies, past medical, surgical, social and family histories were reviewed and updated as appropriate. Scribe's attestation: This note was scribed in the presence of Dr. Tarik Georges MD, by Kathleen Collado RN. The scribe's documentation has been prepared under my direction and personally reviewed by me in its entirety. I confirm that the note above accurately reflects all work, treatment, procedures, and medical decision making performed by me.

## 2021-07-13 NOTE — PROGRESS NOTES
[facility]   Cardiac Follow Up     Referring Provider:  GERMAN Walters     Chief Complaint   Patient presents with    Hypertension    Chest Pain   f/up for aortic stenosis    History of Present Illness:  Danie Garcia is a 66 y.o. female with a history of hypertension, hyperlipidemia, and diabetes. Today, she reports she has had increased shortness of breath  and chest pain . Chest Pain- reports more severe today- first started after bending and picking up the remote. Biofreeze did not help. Valve does not sound changed. Her lungs sound clear. She has been evaluated previously for shortness of breath and chest pain, [facility] 10/2019 Normal coronaries. She has been fighting a upper respiratory infection. Recently completed prednisone. Has been having lower extremity edema. Has tried Ibuprofen for pain w/ relief. Past Medical History:   has a past medical history of Asthma, Diabetes mellitus ([address] Hyperlipidemia, and Hypertension. Surgical History:   has a past surgical history that includes Hysterectomy, total abdominal and Elbow surgery. Social History:   reports that she has never smoked. She has never used smokeless tobacco. She reports that she does not drink alcohol. Family History:  family history includes Heart Disease in her brother and father. Home Medications:  Prior to Admission medications    Medication Sig Start Date End Date Taking? Authorizing Provider   albuterol sulfate HFA (PROVENTIL HFA) 108 (90 Base) MCG/ACT inhaler Inhale 2 puffs into the lungs every 4 hours as needed 1/1/19  Yes Historical Provider, MD   aspirin 81 MG chewable tablet Take 81 mg by mouth daily   Yes Historical Provider, MD   atorvastatin (LIPITOR) 20 MG tablet Take 20 mg by mouth daily 8/24/19  Yes Historical Provider, MD   LORazepam (ATIVAN) 0.5 MG tablet Take 0.5 mg by mouth every 8 hours as needed.  7/24/18  Yes Historical Provider, MD   metFORMIN (GLUCOPHAGE-XR) 500 MG extended release tablet Take 500 mg by mouth daily Takes 0.5 tablet once a day 12/4/18  Yes Historical Provider, MD   montelukast (SINGULAIR) 10 MG tablet Take 10 mg by mouth nightly   Yes Historical Provider, MD   Cyanocobalamin (VITAMIN B-12) 1000 MCG SUBL Place under the tongue every 30 days   Yes Historical Provider, MD   lisinopril-hydrochlorothiazide (PRINZIDE;ZESTORETIC) 10-12.5 MG per tablet Take 0.5 tablets by mouth daily Only takes a 1/4 tab. Yes Historical Provider, MD   albuterol (PROVENTIL) (2.5 MG/3ML) 0.083% nebulizer solution Inhale 2.5 mg into the lungs every 6 hours as needed 10/8/18 5/13/21  Historical Provider, MD        Allergies:  Penicillins and Sulfa antibiotics     Review of Systems:   · Constitutional: there has been no unanticipated weight loss. There's been no change in energy level, sleep pattern, or activity level. · Eyes: No visual changes or diplopia. No scleral icterus. · ENT: No Headaches, hearing loss or vertigo. No mouth sores or sore throat. · Cardiovascular: Reviewed in HPI  · Respiratory: No cough or wheezing, no sputum production. No hematemesis. +sob unchanged    · Gastrointestinal: No abdominal pain, appetite loss, blood in stools. No change in bowel or bladder habits. · Genitourinary: No dysuria, trouble voiding, or hematuria. · Musculoskeletal:  No gait disturbance, weakness or joint complaints. · Integumentary: No rash or pruritis. · Neurological: No headache, diplopia, change in muscle strength, numbness or tingling. No change in gait, balance, coordination, mood, affect, memory, mentation, behavior. · Psychiatric: No anxiety, no depression. · Endocrine: No malaise, fatigue or temperature intolerance. No excessive thirst, fluid intake, or urination. No tremor. · Hematologic/Lymphatic: No abnormal bruising or bleeding, blood clots or swollen lymph nodes. · Allergic/Immunologic: No nasal congestion or hives.     Physical Examination:    Vitals:    07/16/21 1140   BP: 138/74   Pulse: 84   SpO2: 97%        Wt Readings from Last 1 Encounters:   07/16/21 196 lb (88.9 kg)     NAD  Skin:good turgor,intact without lesions  HEENT: EOMI ,normal  Neck:no JVD    Constitutional and General Appearance:   Respiratory:  · Normal excursion and expansion without use of accessory muscles  · Resp Auscultation: Normal breath sounds without dullness  Cardiovascular:  · The apical impulses not displaced  · II/VI BRENT   · Cervical veins are not engorged  · The carotid upstroke is normal in amplitude and contour without delay or bruit  · Peripheral pulses are symmetrical and full  · There is no clubbing, cyanosis of the extremities. · BLE edema   · Femoral Arteries: 2+ and equal  · Pedal Pulses: 2+ and equal   Abdomen:  · No masses or tenderness  · Liver/Spleen: No Abnormalities Noted  Neurological/Psychiatric:  · Alert and oriented in all spheres  · Moves all extremities well  · Exhibits normal gait balance and coordination  · No abnormalities of mood, affect, memory, mentation, or behavior are noted    9/11/19  Interpetation Summary:  o Duke Treadmill Score is 3 which indicates intermediate risk. o Negative ECG for ischemia with graded exercise test.   o Hypertensive blood pressure response. (Male >210 mmHg, Female >190 mmHg). Echo 11/10/20  Normal left ventricle size and systolic function with an estimated ejection   fraction of 60%. No regional wall motion abnormalities are seen. There is mild concentric left ventricular hypertrophy. E/e\"= 13. Diastolic filling parameters suggests grade I diastolic   dysfunction. Individual aortic valve leaflets are not clearly visualized. A bicuspid aortic valve cannot be excluded. The aortic valve area is calculated at 1.68 cm2 with a maximum pressure   gradient of 23 mmHg and a mean pressure gradient of 13 mmHg. This is c/w   mild aortic stenosis. No evidence of aortic valve regurgitation. Trivial tricuspid regurgitation.  PASP 24mmHg. IVC size is normal (<2.1cm) and collapses > 50% with respiration consistent   with normal RA pressure (3mmHg). Assessment:         Tachycardia  Took Toprol XL 25mg daily for 10 days       Essential hypertension - Controlled-Blood pressure 138/74, pulse 84, height 5' 3\" (1.6 m), weight 196 lb (88.9 kg), SpO2 97 %. [facility] 10/2019 Normal coronaries. Hyperlipidemia- Controlled- Lipitor 20 mg     Type 2 diabetes mellitus without complication, without long-term current use of insulin ([facility])   9/9/19> A1C 6.4- MetFORMIN 250 mg daily               Systolic murmur with echo showing a likely bicuspid AV- mild AS per Echo 11/10/20    Chest Pain/SOB- reports more severe today- first started after bending and picking up the remote. She has been fighting a upper respiratory  infection. Recently completed prednisone. Plan:    Lab results reviewed this visit and discussed. Flexeril daily for 3 days- then PRN   Continue risk factor modifications. Call for any change in symptoms. Return for regular follow up in 6 months w/echo        I appreciate the opportunity of cooperating in the care of this individual.    Judge Santosh Kumar M.D., [facility] [place] [facility]      Patient's problem list, medications, allergies, past medical, surgical, social and family histories were reviewed and updated as appropriate. Scribe's attestation: This note was scribed in the presence of Dr. Karthik Kumar MD, by Ellie Bobo RN. The scribe's documentation has been prepared under my direction and personally reviewed by me in its entirety. I confirm that the note above accurately reflects all work, treatment, procedures, and medical decision making performed by me.

## 2021-07-16 ENCOUNTER — OFFICE VISIT (OUTPATIENT)
Dept: CARDIOLOGY CLINIC | Age: 71
End: 2021-07-16
Payer: MEDICARE

## 2021-07-16 VITALS
SYSTOLIC BLOOD PRESSURE: 138 MMHG | HEART RATE: 84 BPM | HEIGHT: 63 IN | WEIGHT: 196 LBS | BODY MASS INDEX: 34.73 KG/M2 | DIASTOLIC BLOOD PRESSURE: 74 MMHG | OXYGEN SATURATION: 97 %

## 2021-07-16 DIAGNOSIS — R00.0 TACHYCARDIA: Primary | ICD-10-CM

## 2021-07-16 DIAGNOSIS — I10 ESSENTIAL HYPERTENSION: ICD-10-CM

## 2021-07-16 DIAGNOSIS — R01.1 MURMUR, CARDIAC: ICD-10-CM

## 2021-07-16 DIAGNOSIS — E11.9 TYPE 2 DIABETES MELLITUS WITHOUT COMPLICATION, WITHOUT LONG-TERM CURRENT USE OF INSULIN (HCC): ICD-10-CM

## 2021-07-16 DIAGNOSIS — R06.02 SOB (SHORTNESS OF BREATH): ICD-10-CM

## 2021-07-16 DIAGNOSIS — E78.5 HYPERLIPIDEMIA, UNSPECIFIED HYPERLIPIDEMIA TYPE: ICD-10-CM

## 2021-07-16 PROCEDURE — 4040F PNEUMOC VAC/ADMIN/RCVD: CPT | Performed by: INTERNAL MEDICINE

## 2021-07-16 PROCEDURE — 3017F COLORECTAL CA SCREEN DOC REV: CPT | Performed by: INTERNAL MEDICINE

## 2021-07-16 PROCEDURE — G8400 PT W/DXA NO RESULTS DOC: HCPCS | Performed by: INTERNAL MEDICINE

## 2021-07-16 PROCEDURE — G8417 CALC BMI ABV UP PARAM F/U: HCPCS | Performed by: INTERNAL MEDICINE

## 2021-07-16 PROCEDURE — 99214 OFFICE O/P EST MOD 30 MIN: CPT | Performed by: INTERNAL MEDICINE

## 2021-07-16 PROCEDURE — 2022F DILAT RTA XM EVC RTNOPTHY: CPT | Performed by: INTERNAL MEDICINE

## 2021-07-16 PROCEDURE — G8427 DOCREV CUR MEDS BY ELIG CLIN: HCPCS | Performed by: INTERNAL MEDICINE

## 2021-07-16 PROCEDURE — 3046F HEMOGLOBIN A1C LEVEL >9.0%: CPT | Performed by: INTERNAL MEDICINE

## 2021-07-16 PROCEDURE — 1090F PRES/ABSN URINE INCON ASSESS: CPT | Performed by: INTERNAL MEDICINE

## 2021-07-16 PROCEDURE — 1036F TOBACCO NON-USER: CPT | Performed by: INTERNAL MEDICINE

## 2021-07-16 PROCEDURE — 1123F ACP DISCUSS/DSCN MKR DOCD: CPT | Performed by: INTERNAL MEDICINE

## 2021-07-16 RX ORDER — CYCLOBENZAPRINE HCL 10 MG
10 TABLET ORAL NIGHTLY PRN
Qty: 10 TABLET | Refills: 1 | Status: SHIPPED | OUTPATIENT
Start: 2021-07-16 | End: 2021-07-26

## 2022-02-07 PROBLEM — E78.5 HYPERLIPIDEMIA: Status: ACTIVE | Noted: 2022-02-07

## 2022-03-07 NOTE — PROGRESS NOTES
[address]   Cardiac Follow Up     Referring Provider:  GERMAN Dale     No chief complaint on file. f/up for aortic stenosis    History of Present Illness:  Ninfa Villela is a 66 y.o. female with a history of hypertension, hyperlipidemia, and diabetes. [facility] 10/2019 with normal coronaries. She had covid pna Dec 26, she was admitted twice total- she got up to 11L O2. She currently on room air but does have portable O2 in case she needs it. She was vaccinated, no booster yet. Today she here after her echo. She reports that her memory is worse since having covid. She says that sometimes she is sitting not doing anything exertional and \"gasps for air\" she does not feel short of breath, just has the feeling of gasping. This lasts only a few seconds and she feels better. Past Medical History:   has a past medical history of Asthma, Diabetes mellitus ([address] Hyperlipidemia, and Hypertension. Surgical History:   has a past surgical history that includes Hysterectomy, total abdominal and Elbow surgery. Social History:   reports that she has never smoked. She has never used smokeless tobacco. She reports that she does not drink alcohol. Family History:  family history includes Heart Disease in her brother and father. Home Medications:  Prior to Admission medications    Medication Sig Start Date End Date Taking?  Authorizing Provider   albuterol sulfate HFA (PROVENTIL HFA) 108 (90 Base) MCG/ACT inhaler Inhale 2 puffs into the lungs every 4 hours as needed 1/1/19   Historical Provider, MD   albuterol (PROVENTIL) (2.5 MG/3ML) 0.083% nebulizer solution Inhale 2.5 mg into the lungs every 6 hours as needed 10/8/18 5/13/21  Historical Provider, MD   aspirin 81 MG chewable tablet Take 81 mg by mouth daily    Historical Provider, MD   atorvastatin (LIPITOR) 20 MG tablet Take 20 mg by mouth daily 8/24/19   Historical Provider, MD   LORazepam (ATIVAN) 0.5 MG tablet Take 0.5 mg by mouth every 8 hours as needed. 7/24/18   Historical Provider, MD   metFORMIN (GLUCOPHAGE-XR) 500 MG extended release tablet Take 500 mg by mouth daily Takes 0.5 tablet once a day 12/4/18   Historical Provider, MD   montelukast (SINGULAIR) 10 MG tablet Take 10 mg by mouth nightly    Historical Provider, MD   Cyanocobalamin (VITAMIN B-12) 1000 MCG SUBL Place under the tongue every 30 days    Historical Provider, MD   lisinopril-hydrochlorothiazide (PRINZIDE;ZESTORETIC) 10-12.5 MG per tablet Take 0.5 tablets by mouth daily Only takes a 1/4 tab. Historical Provider, MD        Allergies:  Penicillins and Sulfa antibiotics     Review of Systems:   · Constitutional: there has been no unanticipated weight loss. There's been no change in energy level, sleep pattern, or activity level. · Eyes: No visual changes or diplopia. No scleral icterus. · ENT: No Headaches, hearing loss or vertigo. No mouth sores or sore throat. · Cardiovascular: Reviewed in HPI  · Respiratory: No cough or wheezing, no sputum production. No hematemesis. +sob unchanged    · Gastrointestinal: No abdominal pain, appetite loss, blood in stools. No change in bowel or bladder habits. · Genitourinary: No dysuria, trouble voiding, or hematuria. · Musculoskeletal:  No gait disturbance, weakness or joint complaints. · Integumentary: No rash or pruritis. · Neurological: No headache, diplopia, change in muscle strength, numbness or tingling. No change in gait, balance, coordination, mood, affect, memory, mentation, behavior. · Psychiatric: No anxiety, no depression. · Endocrine: No malaise, fatigue or temperature intolerance. No excessive thirst, fluid intake, or urination. No tremor. · Hematologic/Lymphatic: No abnormal bruising or bleeding, blood clots or swollen lymph nodes. · Allergic/Immunologic: No nasal congestion or hives. Physical Examination:    There were no vitals filed for this visit.      Wt Readings from Last 1 Encounters: 07/16/21 196 lb (88.9 kg)     NAD  Skin:good turgor,intact without lesions  HEENT: EOMI ,normal  Neck:no JVD    Constitutional and General Appearance:   Respiratory:  · Normal excursion and expansion without use of accessory muscles  · Resp Auscultation: Normal breath sounds without dullness  Cardiovascular:  · The apical impulses not displaced  · II/VI BRENT   · Cervical veins are not engorged  · The carotid upstroke is normal in amplitude and contour without delay or bruit  · Peripheral pulses are symmetrical and full  · There is no clubbing, cyanosis of the extremities. · BLE edema   · Femoral Arteries: 2+ and equal  · Pedal Pulses: 2+ and equal   Abdomen:  · No masses or tenderness  · Liver/Spleen: No Abnormalities Noted  Neurological/Psychiatric:  · Alert and oriented in all spheres  · Moves all extremities well  · Exhibits normal gait balance and coordination  · No abnormalities of mood, affect, memory, mentation, or behavior are noted    9/11/19  Interpetation Summary:  o Duke Treadmill Score is 3 which indicates intermediate risk. o Negative ECG for ischemia with graded exercise test.   o Hypertensive blood pressure response. (Male >210 mmHg, Female >190 mmHg). Echo 11/10/20  Normal left ventricle size and systolic function with an estimated ejection fraction of 60%. No regional wall motion abnormalities are seen. There is mild concentric left ventricular hypertrophy. E/e\"= 13. Diastolic filling parameters suggests grade I diastolic   dysfunction. Individual aortic valve leaflets are not clearly visualized. A bicuspid aortic valve cannot be excluded. The aortic valve area is calculated at 1.68 cm2 with a maximum pressure gradient of 23 mmHg and a mean pressure gradient of 13 mmHg. This is c/w mild aortic stenosis. No evidence of aortic valve regurgitation. Trivial tricuspid regurgitation. PASP 24mmHg.    IVC size is normal (<2.1cm) and collapses > 50% with respiration consistent with normal RA pressure (3mmHg). Assessment:     Tachycardia  Took Toprol XL 25mg daily for 10 days       Essential hypertension - Controlled-There were no vitals taken for this visit. [facility] 10/2019 Normal coronaries. Hyperlipidemia- Controlled- Lipitor 20 mg   3/2021   HDL 38 LDL 53      Type 2 diabetes mellitus without complication, without long-term current use of insulin ([facility])   9/9/19> A1C 6.4- MetFORMIN 250 mg daily               Systolic murmur with echo showing a likely bicuspid AV- mild AS per Echo 11/10/20  Echo today- no change in valve              SOB post covid pna- wears O2 as needed. Considerable oxygen requirement at time of acute illness  Has Home care nurse that comes to see her        Rosalee Mondragon has a stable cardiac status. Cardiac test and lab results personally reviewed by me during this office visit and discussed. No med changes. Continue risk factor modifications. Call for any change in symptoms. Return for regular follow up in 3 months. I appreciate the opportunity of cooperating in the care of this individual.    Devan Lee. Samantha Pepper M.D., [facility]      Patient's problem list, medications, allergies, past medical, surgical, social and family histories were reviewed and updated as appropriate. Scribe's Attestation: This note was scribed in the presence of Bre Cadet M.D., [facility] by Yrn Chan RN. The scribe's documentation has been prepared under my direction and personally reviewed by me in its entirety. I confirm that the note above accurately reflects all work, treatment, procedures, and medical decision making performed by me.

## 2022-03-08 ENCOUNTER — OFFICE VISIT (OUTPATIENT)
Dept: CARDIOLOGY CLINIC | Age: 72
End: 2022-03-08
Payer: MEDICARE

## 2022-03-08 ENCOUNTER — PROCEDURE VISIT (OUTPATIENT)
Dept: CARDIOLOGY CLINIC | Age: 72
End: 2022-03-08
Payer: MEDICARE

## 2022-03-08 VITALS
HEIGHT: 63 IN | BODY MASS INDEX: 32.96 KG/M2 | WEIGHT: 186 LBS | DIASTOLIC BLOOD PRESSURE: 88 MMHG | HEART RATE: 117 BPM | SYSTOLIC BLOOD PRESSURE: 132 MMHG | OXYGEN SATURATION: 98 %

## 2022-03-08 DIAGNOSIS — E78.5 HYPERLIPIDEMIA, UNSPECIFIED HYPERLIPIDEMIA TYPE: ICD-10-CM

## 2022-03-08 DIAGNOSIS — R06.02 SOB (SHORTNESS OF BREATH): ICD-10-CM

## 2022-03-08 DIAGNOSIS — I10 ESSENTIAL HYPERTENSION: Primary | ICD-10-CM

## 2022-03-08 DIAGNOSIS — R01.1 MURMUR, CARDIAC: ICD-10-CM

## 2022-03-08 LAB
LV EF: 65 %
LVEF MODALITY: NORMAL

## 2022-03-08 PROCEDURE — 4040F PNEUMOC VAC/ADMIN/RCVD: CPT | Performed by: INTERNAL MEDICINE

## 2022-03-08 PROCEDURE — 99214 OFFICE O/P EST MOD 30 MIN: CPT | Performed by: INTERNAL MEDICINE

## 2022-03-08 PROCEDURE — G8484 FLU IMMUNIZE NO ADMIN: HCPCS | Performed by: INTERNAL MEDICINE

## 2022-03-08 PROCEDURE — G8417 CALC BMI ABV UP PARAM F/U: HCPCS | Performed by: INTERNAL MEDICINE

## 2022-03-08 PROCEDURE — 93306 TTE W/DOPPLER COMPLETE: CPT | Performed by: INTERNAL MEDICINE

## 2022-03-08 PROCEDURE — G8427 DOCREV CUR MEDS BY ELIG CLIN: HCPCS | Performed by: INTERNAL MEDICINE

## 2022-03-08 PROCEDURE — 93000 ELECTROCARDIOGRAM COMPLETE: CPT | Performed by: INTERNAL MEDICINE

## 2022-03-08 PROCEDURE — 1090F PRES/ABSN URINE INCON ASSESS: CPT | Performed by: INTERNAL MEDICINE

## 2022-03-08 PROCEDURE — 1036F TOBACCO NON-USER: CPT | Performed by: INTERNAL MEDICINE

## 2022-03-08 PROCEDURE — 3017F COLORECTAL CA SCREEN DOC REV: CPT | Performed by: INTERNAL MEDICINE

## 2022-03-08 PROCEDURE — G8400 PT W/DXA NO RESULTS DOC: HCPCS | Performed by: INTERNAL MEDICINE

## 2022-03-08 PROCEDURE — 1123F ACP DISCUSS/DSCN MKR DOCD: CPT | Performed by: INTERNAL MEDICINE

## 2022-03-08 NOTE — PATIENT INSTRUCTIONS
Recommend booster 6 months from having Covid  Your echo shows no changes from last echo  Follow up in 3 months

## 2022-06-21 NOTE — PROGRESS NOTES
[facility]   Cardiac Follow Up     Referring Provider:  GERMAN Recinos     Chief Complaint   Patient presents with    Hypertension    Hyperlipidemia    Shortness of Breath    Chest Pain    3 Month Follow-Up   f/up for aortic stenosis    History of Present Illness:  Nancy Carranza is a 74 y.o. female her for a 3 month follow up appointment. She has a history of hypertension, hyperlipidemia, and diabetes. [facility] 10/2019 with normal coronaries. She had covid pna Dec 26, she was admitted twice total- she got up to 11L O2. She currently on room air but does have portable O2 in case she needs it. She was vaccinated, no booster yet. Today she states she has been having a lot of chest pain and shortness of breath. She is excited because her grand daughter has gone into labor with a little girl. She states using her inhaler has not helped. It is sore to the touch, chest wall. She takes Tylenol and it helps some. She states she sits more since this happened. She can have the pain while she is at rest, she has to lay down when it starts. She had to stop exercising for 4 days ago due to the pain. She has been doing the exercises the physical therapist taught her. Past Medical History:   has a past medical history of Asthma, Diabetes mellitus ([address] Hyperlipidemia, and Hypertension. Surgical History:   has a past surgical history that includes Hysterectomy, total abdominal and Elbow surgery. Social History:   reports that she has never smoked. She has never used smokeless tobacco. She reports that she does not drink alcohol and does not use drugs. Family History:  family history includes Heart Disease in her brother and father. Home Medications:  Prior to Admission medications    Medication Sig Start Date End Date Taking?  Authorizing Provider   BIOTIN PO Take by mouth   Yes Historical Provider, MD   Multiple Vitamins-Minerals (STRESS B-COMPLEX/C/ZINC) TABS TAKE 1 TABLET BY MOUTH ONCE DAILY 5/1/22  Yes Historical Provider, MD   loratadine (CLARITIN) 10 MG tablet TAKE 1 TABLET BY MOUTH EVERY DAY 5/5/22  Yes Historical Provider, MD   albuterol sulfate HFA (PROVENTIL HFA) 108 (90 Base) MCG/ACT inhaler Inhale 2 puffs into the lungs every 4 hours as needed 1/1/19  Yes Historical Provider, MD   albuterol (PROVENTIL) (2.5 MG/3ML) 0.083% nebulizer solution Inhale 2.5 mg into the lungs every 6 hours as needed 10/8/18 6/28/22 Yes Historical Provider, MD   aspirin 81 MG chewable tablet Take 81 mg by mouth daily   Yes Historical Provider, MD   atorvastatin (LIPITOR) 20 MG tablet Take 20 mg by mouth daily 8/24/19  Yes Historical Provider, MD   LORazepam (ATIVAN) 0.5 MG tablet Take 0.5 mg by mouth every 8 hours as needed. 7/24/18  Yes Historical Provider, MD   metFORMIN (GLUCOPHAGE-XR) 500 MG extended release tablet Take 500 mg by mouth daily Takes 0.5 tablet once a day 12/4/18  Yes Historical Provider, MD   montelukast (SINGULAIR) 10 MG tablet Take 10 mg by mouth nightly   Yes Historical Provider, MD   Cyanocobalamin (VITAMIN B-12) 1000 MCG SUBL Place under the tongue every 30 days   Yes Historical Provider, MD   lisinopril-hydrochlorothiazide (PRINZIDE;ZESTORETIC) 10-12.5 MG per tablet Take 0.5 tablets by mouth daily Only takes a 1/4 tab. Yes Historical Provider, MD        Allergies:  Penicillins and Sulfa antibiotics     Review of Systems:   · Constitutional: there has been no unanticipated weight loss. There's been no change in energy level, sleep pattern, or activity level. · Eyes: No visual changes or diplopia. No scleral icterus. · ENT: No Headaches, hearing loss or vertigo. No mouth sores or sore throat. · Cardiovascular: Reviewed in HPI  · Respiratory: No cough or wheezing, no sputum production. No hematemesis. +sob unchanged    · Gastrointestinal: No abdominal pain, appetite loss, blood in stools. No change in bowel or bladder habits.   · Genitourinary: No dysuria, trouble voiding, or hematuria. · Musculoskeletal:  No gait disturbance, weakness or joint complaints. · Integumentary: No rash or pruritis. · Neurological: No headache, diplopia, change in muscle strength, numbness or tingling. No change in gait, balance, coordination, mood, affect, memory, mentation, behavior. · Psychiatric: No anxiety, no depression. · Endocrine: No malaise, fatigue or temperature intolerance. No excessive thirst, fluid intake, or urination. No tremor. · Hematologic/Lymphatic: No abnormal bruising or bleeding, blood clots or swollen lymph nodes. · Allergic/Immunologic: No nasal congestion or hives. Physical Examination:    Vitals:    06/28/22 1103   BP: 130/82   Pulse: 95   SpO2: 98%        Wt Readings from Last 1 Encounters:   06/28/22 181 lb 9.6 oz (82.4 kg)     NAD  Skin:good turgor,intact without lesions  HEENT: EOMI ,normal  Neck:no JVD    Constitutional and General Appearance:   Respiratory:  · Normal excursion and expansion without use of accessory muscles  · Resp Auscultation: Normal breath sounds without dullness  Cardiovascular:  · The apical impulses not displaced  · II/VI BRENT   · Cervical veins are not engorged  · The carotid upstroke is normal in amplitude and contour without delay or bruit  · Peripheral pulses are symmetrical and full  · There is no clubbing, cyanosis of the extremities. · BLE edema   · Femoral Arteries: 2+ and equal  · Pedal Pulses: 2+ and equal   Abdomen:  · No masses or tenderness  · Liver/Spleen: No Abnormalities Noted  Neurological/Psychiatric:  · Alert and oriented in all spheres  · Moves all extremities well  · Exhibits normal gait balance and coordination  · No abnormalities of mood, affect, memory, mentation, or behavior are noted    9/11/19  Interpetation Summary:  o Duke Treadmill Score is 3 which indicates intermediate risk. o Negative ECG for ischemia with graded exercise test.   o Hypertensive blood pressure response.  (Male >210 mmHg, Female >190 I appreciate the opportunity of cooperating in the care of this individual.    Sandra Poole. Octavio Fraser M.D., [facility]      Patient's problem list, medications, allergies, past medical, surgical, social and family histories were reviewed and updated as appropriate. Scribe's Attestation: This note was scribed in the presence of Layton Lee M.D., by Racquel Keenan RN. The scribe's documentation has been prepared under my direction and personally reviewed by me in its entirety. I confirm that the note above accurately reflects all work, treatment, procedures, and medical decision making performed by me.

## 2022-06-28 ENCOUNTER — OFFICE VISIT (OUTPATIENT)
Dept: CARDIOLOGY CLINIC | Age: 72
End: 2022-06-28
Payer: MEDICARE

## 2022-06-28 VITALS
HEIGHT: 63 IN | OXYGEN SATURATION: 98 % | DIASTOLIC BLOOD PRESSURE: 82 MMHG | WEIGHT: 181.6 LBS | SYSTOLIC BLOOD PRESSURE: 130 MMHG | BODY MASS INDEX: 32.18 KG/M2 | HEART RATE: 95 BPM

## 2022-06-28 DIAGNOSIS — R00.0 TACHYCARDIA: Primary | ICD-10-CM

## 2022-06-28 DIAGNOSIS — E11.9 TYPE 2 DIABETES MELLITUS WITHOUT COMPLICATION, WITHOUT LONG-TERM CURRENT USE OF INSULIN (HCC): ICD-10-CM

## 2022-06-28 DIAGNOSIS — E78.5 HYPERLIPIDEMIA, UNSPECIFIED HYPERLIPIDEMIA TYPE: ICD-10-CM

## 2022-06-28 DIAGNOSIS — R01.1 HEART MURMUR, SYSTOLIC: ICD-10-CM

## 2022-06-28 DIAGNOSIS — I10 ESSENTIAL HYPERTENSION: ICD-10-CM

## 2022-06-28 PROCEDURE — G8400 PT W/DXA NO RESULTS DOC: HCPCS | Performed by: INTERNAL MEDICINE

## 2022-06-28 PROCEDURE — 1036F TOBACCO NON-USER: CPT | Performed by: INTERNAL MEDICINE

## 2022-06-28 PROCEDURE — 93000 ELECTROCARDIOGRAM COMPLETE: CPT | Performed by: INTERNAL MEDICINE

## 2022-06-28 PROCEDURE — G8427 DOCREV CUR MEDS BY ELIG CLIN: HCPCS | Performed by: INTERNAL MEDICINE

## 2022-06-28 PROCEDURE — G8417 CALC BMI ABV UP PARAM F/U: HCPCS | Performed by: INTERNAL MEDICINE

## 2022-06-28 PROCEDURE — 1090F PRES/ABSN URINE INCON ASSESS: CPT | Performed by: INTERNAL MEDICINE

## 2022-06-28 PROCEDURE — 3017F COLORECTAL CA SCREEN DOC REV: CPT | Performed by: INTERNAL MEDICINE

## 2022-06-28 PROCEDURE — 99214 OFFICE O/P EST MOD 30 MIN: CPT | Performed by: INTERNAL MEDICINE

## 2022-06-28 PROCEDURE — 3046F HEMOGLOBIN A1C LEVEL >9.0%: CPT | Performed by: INTERNAL MEDICINE

## 2022-06-28 PROCEDURE — 1123F ACP DISCUSS/DSCN MKR DOCD: CPT | Performed by: INTERNAL MEDICINE

## 2022-06-28 PROCEDURE — 2022F DILAT RTA XM EVC RTNOPTHY: CPT | Performed by: INTERNAL MEDICINE

## 2022-06-28 RX ORDER — LORATADINE 10 MG/1
TABLET ORAL
COMMUNITY
Start: 2022-05-05

## 2022-06-28 NOTE — PATIENT INSTRUCTIONS
Try Motrin twice a day for 3 days, you could try Bio freeze to help with muscle inflammation. Call for an appointment if the pain gets worst or doesn't improve with the Motrin or Bio Freeze.   Follow up appointment 6 months

## 2022-11-25 NOTE — PROGRESS NOTES
[facility]   Cardiac Follow Up     Referring Provider:  GERMAN Sneed     Chief Complaint   Patient presents with    Hypertension    Hyperlipidemia     f/up for aortic stenosis    History of Present Illness:  Rickey Arrieta is a 74 y.o. female her for a 3 month follow up appointment. She has a history of hypertension, hyperlipidemia, and diabetes. [facility] 10/2019 with normal coronaries. She had covid pna Dec 26, she was admitted twice total- she got up to 11L O2. She currently on room air but does have portable O2 in case she needs it. She was vaccinated, no booster yet. Today she is here for 6 mos follow up. She reports some sob and states she's been diagnosed with a restrictive lung disease, post covid. Pt denies exertional chest pain, PND, palpitations, light-headedness, edema. Past Medical History:   has a past medical history of Asthma, Diabetes mellitus ([address] Hyperlipidemia, and Hypertension. Surgical History:   has a past surgical history that includes Hysterectomy, total abdominal and Elbow surgery. Social History:   reports that she has never smoked. She has never used smokeless tobacco. She reports that she does not drink alcohol and does not use drugs. Family History:  family history includes Heart Disease in her brother and father. Home Medications:  Prior to Admission medications    Medication Sig Start Date End Date Taking?  Authorizing Provider   BIOTIN PO Take by mouth   Yes Historical Provider, MD   Multiple Vitamins-Minerals (STRESS B-COMPLEX/C/ZINC) TABS TAKE 1 TABLET BY MOUTH ONCE DAILY 5/1/22  Yes Historical Provider, MD   loratadine (CLARITIN) 10 MG tablet TAKE 1 TABLET BY MOUTH EVERY DAY 5/5/22  Yes Historical Provider, MD   albuterol sulfate HFA (PROVENTIL;VENTOLIN;PROAIR) 108 (90 Base) MCG/ACT inhaler Inhale 2 puffs into the lungs every 4 hours as needed 1/1/19  Yes Historical Provider, MD   albuterol (PROVENTIL) (2.5 MG/3ML) 0.083% nebulizer solution Inhale 2.5 mg into the lungs every 6 hours as needed 10/8/18 12/13/22 Yes Historical Provider, MD   aspirin 81 MG chewable tablet Take 81 mg by mouth daily   Yes Historical Provider, MD   atorvastatin (LIPITOR) 20 MG tablet Take 20 mg by mouth daily 8/24/19  Yes Historical Provider, MD   LORazepam (ATIVAN) 0.5 MG tablet Take 0.5 mg by mouth every 8 hours as needed. 7/24/18  Yes Historical Provider, MD   metFORMIN (GLUCOPHAGE-XR) 500 MG extended release tablet Take 500 mg by mouth daily Takes 0.5 tablet once a day 12/4/18  Yes Historical Provider, MD   montelukast (SINGULAIR) 10 MG tablet Take 10 mg by mouth nightly   Yes Historical Provider, MD   Cyanocobalamin (VITAMIN B-12) 1000 MCG SUBL Place under the tongue every 30 days   Yes Historical Provider, MD   lisinopril-hydrochlorothiazide (PRINZIDE;ZESTORETIC) 10-12.5 MG per tablet Take 0.5 tablets by mouth daily   Yes Historical Provider, MD        Allergies:  Penicillins and Sulfa antibiotics     Review of Systems:   Constitutional: there has been no unanticipated weight loss. There's been no change in energy level, sleep pattern, or activity level. Eyes: No visual changes or diplopia. No scleral icterus. ENT: No Headaches, hearing loss or vertigo. No mouth sores or sore throat. Cardiovascular: Reviewed in HPI  Respiratory: No cough or wheezing, no sputum production. No hematemesis. +sob unchanged    Gastrointestinal: No abdominal pain, appetite loss, blood in stools. No change in bowel or bladder habits. Genitourinary: No dysuria, trouble voiding, or hematuria. Musculoskeletal:  No gait disturbance, weakness or joint complaints. Integumentary: No rash or pruritis. Neurological: No headache, diplopia, change in muscle strength, numbness or tingling. No change in gait, balance, coordination, mood, affect, memory, mentation, behavior. Psychiatric: No anxiety, no depression. Endocrine: No malaise, fatigue or temperature intolerance.  No excessive thirst, fluid intake, or urination. No tremor. Hematologic/Lymphatic: No abnormal bruising or bleeding, blood clots or swollen lymph nodes. Allergic/Immunologic: No nasal congestion or hives. Physical Examination:    Vitals:    12/13/22 1102   BP: 128/70   Pulse: 90   SpO2: 97%          Wt Readings from Last 1 Encounters:   12/13/22 185 lb (83.9 kg)     NAD  Skin:good turgor,intact without lesions  HEENT: EOMI ,normal  Neck:no JVD    Constitutional and General Appearance:   Respiratory:  Normal excursion and expansion without use of accessory muscles  Resp Auscultation: Normal breath sounds without dullness  Cardiovascular: The apical impulses not displaced  II/VI BRENT   Cervical veins are not engorged  The carotid upstroke is normal in amplitude and contour without delay or bruit  Peripheral pulses are symmetrical and full  There is no clubbing, cyanosis of the extremities. BLE edema   Femoral Arteries: 2+ and equal  Pedal Pulses: 2+ and equal   Abdomen:  No masses or tenderness  Liver/Spleen: No Abnormalities Noted  Neurological/Psychiatric:  Alert and oriented in all spheres  Moves all extremities well  Exhibits normal gait balance and coordination  No abnormalities of mood, affect, memory, mentation, or behavior are noted    9/11/19  Interpetation Summary:  o Duke Treadmill Score is 3 which indicates intermediate risk. o Negative ECG for ischemia with graded exercise test.   o Hypertensive blood pressure response. (Male >210 mmHg, Female >190 mmHg). Echo 11/10/20  Normal left ventricle size and systolic function with an estimated ejection fraction of 60%. No regional wall motion abnormalities are seen. There is mild concentric left ventricular hypertrophy. E/e\"= 13. Diastolic filling parameters suggests grade I diastolic   dysfunction. Individual aortic valve leaflets are not clearly visualized. A bicuspid aortic valve cannot be excluded.    The aortic valve area is calculated at 1.68 cm2 with a maximum pressure gradient of 23 mmHg and a mean pressure gradient of 13 mmHg. This is c/w mild aortic stenosis. No evidence of aortic valve regurgitation. Trivial tricuspid regurgitation. PASP 24mmHg. IVC size is normal (<2.1cm) and collapses > 50% with respiration consistent with normal RA pressure (3mmHg). Echo 3/8/22  Summary  Normal left ventricle size and systolic function with an estimated ejection fraction of 65%. No regional wall motion abnormalities are  seen. There is mild concentric left ventricular hypertrophy. E/e\"= 12. Tachycardia during the exam.  Trivial mitral regurgitation. The aortic valve area is calculated at 1.5 cm2 with a maximum pressure gradient of 22 mmHg and a mean pressure gradient of 13  mmHg. This is c/w mild aortic stenosis. Mild aortic regurgitation. Trivial tricuspid regurgitation. RVSP 28mmHg. IVC size is normal (<2.1cm) and collapses > 50% with respiration consistent with normal RA pressure (3mmHg). Assessment:    1 Tachycardia  HR today 90  Took Toprol XL 25mg daily for 10 days      2 Essential hypertension -   Controlled-  Blood pressure 128/70, pulse 90, height 5' 3\" (1.6 m), weight 185 lb (83.9 kg), SpO2 97 %. [facility] 10/2019 Normal coronaries. 3 Hyperlipidemia-   Controlled  Continue Lipitor 20 mg   3/2021   HDL 38 LDL 53  10/12/21  TG 91 HDL 41 LDL 57    [facility] 10/2019 Normal coronaries. 4 Type 2 diabetes mellitus without complication, without long-term current use of insulin ([facility] [address]   9/9/19> A1C 6.4- MetFORMIN 250 mg daily   7/21/22 A1C 6.4  Continue on MetFORMIN 500 mg daily     5       Systolic murmur with echo showing a likely bicuspid AV-             mild AS per Echo 11/10/20            Echo 3/28/22 > results above, mild aortic stenosis. Mild aortic regurgitation            Echo with next ov     6         SOB post covid pna- wears O2 as needed.  Considerable oxygen requirement at time of acute illness  Has Home care nurse that comes to see her. 7/21/22 CXR >  Significant improvement of pulmonary infiltrates seen on 1/4/2022 exam.    Chronic noncardiac chest pain with flare > motrin prn , biofreeze recommended      Plan  Cardiac test and lab results personally reviewed by me during this office visit and discussed. Echocardiogram with next office visit. Continue risk factor modifications. Call for any change in symptoms, call to report any changes in shortness of breath or development of chest pain with activity. Follow up in 6 mos        I appreciate the opportunity of cooperating in the care of this individual.    Neal Milligan. Any Turcios M.D., [facility]      Patient's problem list, medications, allergies, past medical, surgical, social and family histories were reviewed and updated as appropriate. Scribe's attestation: This note was scribed in the presence of Dr Any Turcios by Skylar Rojas RN. The scribe's documentation has been prepared under my direction and personally reviewed by me in its entirety. I confirm that the note above accurately reflects all work, treatment, procedures, and medical decision making performed by me.

## 2022-12-13 ENCOUNTER — OFFICE VISIT (OUTPATIENT)
Dept: CARDIOLOGY CLINIC | Age: 72
End: 2022-12-13
Payer: MEDICARE

## 2022-12-13 VITALS
HEART RATE: 90 BPM | SYSTOLIC BLOOD PRESSURE: 128 MMHG | DIASTOLIC BLOOD PRESSURE: 70 MMHG | BODY MASS INDEX: 32.78 KG/M2 | WEIGHT: 185 LBS | OXYGEN SATURATION: 97 % | HEIGHT: 63 IN

## 2022-12-13 DIAGNOSIS — R00.0 TACHYCARDIA: Primary | ICD-10-CM

## 2022-12-13 DIAGNOSIS — R06.02 SOB (SHORTNESS OF BREATH): ICD-10-CM

## 2022-12-13 DIAGNOSIS — I10 ESSENTIAL HYPERTENSION: ICD-10-CM

## 2022-12-13 DIAGNOSIS — R01.1 HEART MURMUR, SYSTOLIC: ICD-10-CM

## 2022-12-13 DIAGNOSIS — E11.9 TYPE 2 DIABETES MELLITUS WITHOUT COMPLICATION, WITHOUT LONG-TERM CURRENT USE OF INSULIN (HCC): ICD-10-CM

## 2022-12-13 DIAGNOSIS — E78.5 HYPERLIPIDEMIA, UNSPECIFIED HYPERLIPIDEMIA TYPE: ICD-10-CM

## 2022-12-13 PROCEDURE — 1036F TOBACCO NON-USER: CPT | Performed by: INTERNAL MEDICINE

## 2022-12-13 PROCEDURE — 2022F DILAT RTA XM EVC RTNOPTHY: CPT | Performed by: INTERNAL MEDICINE

## 2022-12-13 PROCEDURE — G8484 FLU IMMUNIZE NO ADMIN: HCPCS | Performed by: INTERNAL MEDICINE

## 2022-12-13 PROCEDURE — G8417 CALC BMI ABV UP PARAM F/U: HCPCS | Performed by: INTERNAL MEDICINE

## 2022-12-13 PROCEDURE — 3074F SYST BP LT 130 MM HG: CPT | Performed by: INTERNAL MEDICINE

## 2022-12-13 PROCEDURE — G8400 PT W/DXA NO RESULTS DOC: HCPCS | Performed by: INTERNAL MEDICINE

## 2022-12-13 PROCEDURE — 3078F DIAST BP <80 MM HG: CPT | Performed by: INTERNAL MEDICINE

## 2022-12-13 PROCEDURE — G8427 DOCREV CUR MEDS BY ELIG CLIN: HCPCS | Performed by: INTERNAL MEDICINE

## 2022-12-13 PROCEDURE — 1090F PRES/ABSN URINE INCON ASSESS: CPT | Performed by: INTERNAL MEDICINE

## 2022-12-13 PROCEDURE — 3046F HEMOGLOBIN A1C LEVEL >9.0%: CPT | Performed by: INTERNAL MEDICINE

## 2022-12-13 PROCEDURE — 3017F COLORECTAL CA SCREEN DOC REV: CPT | Performed by: INTERNAL MEDICINE

## 2022-12-13 PROCEDURE — 99214 OFFICE O/P EST MOD 30 MIN: CPT | Performed by: INTERNAL MEDICINE

## 2022-12-13 PROCEDURE — 1123F ACP DISCUSS/DSCN MKR DOCD: CPT | Performed by: INTERNAL MEDICINE

## 2022-12-13 NOTE — PATIENT INSTRUCTIONS
Echocardiogram with next office visit  Continue risk factor modifications. Call for any change in symptoms, call to report any changes in shortness of breath or development of chest pain with activity.     Follow up in 6 mos with echo

## 2023-02-08 ENCOUNTER — TELEPHONE (OUTPATIENT)
Dept: CARDIOLOGY CLINIC | Age: 73
End: 2023-02-08

## 2023-02-08 NOTE — TELEPHONE ENCOUNTER
Pt called to schedule an appt w/les as Pt is experiencing chest pain, sob,  The pain is everyday and it feels different. Pt does not want to wait until 03/14 in [place]. Please see where the Pt can be scheduled in at. Please advise.   Thank you

## 2023-02-08 NOTE — TELEPHONE ENCOUNTER
On Sunday, she had diarrhea, nausea,dry heaving, associated with fever and chills. This has since resolved. Since she has been having more frequent  deep, sharp,midsternal chest pain, lasting a few minutes, alleviated by standing up, changing position, and walking around. Unchanged exertional shortness of breath,no rapid heart rate, palpitations, radiation, diaphoresis, dizziness, or syncope. Not exercising due to weather constraint. Does not feel dehydrated has been drinking a lot. This is the \"same thing that has been going on for years but is more pronounced\"   ( she had a documented angiogram that was normal)  Advised only way to be sure is to be evaluated in the ER.  Patient does not want to go to er, but if it comes back will go to the ER   Will send to Dr Aldo Aguayo as chencho Bullock

## 2023-02-09 NOTE — TELEPHONE ENCOUNTER
I spoke to her . Most likely flair of chronic chest pain,costochondritis.  She will see Dr Caroline Eli next week

## 2023-02-13 NOTE — PROGRESS NOTES
[address]  2/16/23  Referring: Dr. Esmer Blankenship CONSULT/CHIEF COMPLAINT/HPI     Reason for visit/ Chief complaint  New Patient  Chest Pain   HPI Forest Kurtz is a 74 y.o. seen for chest pain. She is a patient of Dr Tammy Puente, last seen 12/13/22. LHC done 10/2019 revealed normal coronaries. She has been diagnosed with restrictive lung disease post Covid and is following with pulmonologist. She saw pulmonologist 2/10/23 and uses inhalers. She has diabetes, HTN, HLD. Sarah Montenegro enjoys gardening and spending time with her children and grandchildren. She states she had been having chest pain across her chest. Dr Tammy Puente had recommended Ibuprofen previously. She states the left side of her chest is sore and tender. She has pain when she sneezes, coughs, bends over. She denies any rash. She cannot lie on her left side due to soreness. She has been taking 800mg Ibuprofen daily without much relief of pain. Sarah Montenegro denies palpitations, dizziness, or edema. She states she does not have a lot of energy which has been ongoing for a few weeks. Patient is adherent with medications and is tolerating them well without side effects     HISTORY/ALLERGIES/ROS     MedHx:  has a past medical history of Asthma, Diabetes mellitus ([address] Hyperlipidemia, and Hypertension. SurgHx:  has a past surgical history that includes Hysterectomy, total abdominal and Elbow surgery. SocHx:  reports that she has never smoked. She has never used smokeless tobacco. She reports that she does not drink alcohol and does not use drugs. FamHx: No family history of premature coronary artery disease, sudden death, or aneurysm  Allergies: Penicillins and Sulfa antibiotics   ROS:   Review of Systems   Constitutional:  Positive for fatigue. Cardiovascular:  Positive for chest pain. All other systems reviewed and are negative. MEDICATIONS      Prior to Admission medications    Medication Sig Start Date End Date Taking? Authorizing Provider   BIOTIN PO Take by mouth   Yes Historical Provider, MD   Multiple Vitamins-Minerals (STRESS B-COMPLEX/C/ZINC) TABS TAKE 1 TABLET BY MOUTH ONCE DAILY 5/1/22  Yes Historical Provider, MD   loratadine (CLARITIN) 10 MG tablet TAKE 1 TABLET BY MOUTH EVERY DAY 5/5/22  Yes Historical Provider, MD   aspirin 81 MG chewable tablet Take 81 mg by mouth daily   Yes Historical Provider, MD   atorvastatin (LIPITOR) 20 MG tablet Take 20 mg by mouth daily 8/24/19  Yes Historical Provider, MD   LORazepam (ATIVAN) 0.5 MG tablet Take 0.5 mg by mouth every 8 hours as needed.  7/24/18  Yes Historical Provider, MD   metFORMIN (GLUCOPHAGE-XR) 500 MG extended release tablet Take 500 mg by mouth daily Takes 0.5 tablet once a day 12/4/18  Yes Historical Provider, MD   montelukast (SINGULAIR) 10 MG tablet Take 10 mg by mouth nightly   Yes Historical Provider, MD   lisinopril-hydrochlorothiazide (PRINZIDE;ZESTORETIC) 10-12.5 MG per tablet Take 0.5 tablets by mouth daily   Yes Historical Provider, MD   albuterol sulfate HFA (PROVENTIL;VENTOLIN;PROAIR) 108 (90 Base) MCG/ACT inhaler Inhale 2 puffs into the lungs every 4 hours as needed 1/1/19   Historical Provider, MD   albuterol (PROVENTIL) (2.5 MG/3ML) 0.083% nebulizer solution Inhale 2.5 mg into the lungs every 6 hours as needed 10/8/18 12/13/22  Historical Provider, MD   Cyanocobalamin (VITAMIN B-12) 1000 MCG SUBL Place under the tongue every 30 days    Historical Provider, MD       PHYSICAL EXAM        Vitals:    02/16/23 1311   BP: 130/80   Pulse: 99   SpO2: 97%    Weight: 185 lb (83.9 kg)     Gen Alert, cooperative, no distress Heart  Regular rate and rhythm,  1/6 systolic murmur   Head Normocephalic, atraumatic, no abnormalities Abd  Soft, NT, +BS, no mass, no OM   Eyes PERRLA, conj/corn clear Ext  Ext nl, AT, no C/C, no edema   Nose Nares normal, no drain age, Non-tender Pulse 2+ and symmetric   Throat Lips, mucosa, tongue normal Skin Color/text/turg nl, no rash/lesions   Neck S/S, TM, NT, no bruit Psych Nl mood and affect   Lung CTA-B, unlabored, no DTP     Ch wall +tender       LABS and Imaging     Relevant and available CV data reviewed  Echo 3/8/22:  Normal left ventricle size and systolic function with an estimated ejection fraction of 65%. No regional wall motion abnormalities are seen. There is mild concentric left ventricular hypertrophy. E/e\"= 12. Tachycardia during the exam.Trivial mitral regurgitation. The aortic valve area is calculated at 1.5 cm2 with a maximum pressure gradient of 22 mmHg and a mean pressure gradient of 13mmHg. This is c/w mild aortic stenosis. Mild aortic regurgitation. Trivial tricuspid regurgitation. RVSP 28mmHg. IVC size is normal (<2.1cm) and collapses > 50% with respiration consistent with normal RA pressure (3mmHg). Cath: 10/2019: normal coronaries    Holter:none  EKG personally interpreted: sinus rhythm, left riley axis, nonspecific st-t wave changes  Stress 9/11/19:  Interpetation Summary:  o Duke Treadmill Score is 3 which indicates intermediate risk. o Negative ECG for ischemia with graded exercise test.   o Hypertensive blood pressure response. (Male >210 mmHg, Female >190 mmHg). Moderate Risk  Moderate Complexity/Medical Decision Making  Outside/Care everywhere records Reviewed  Labs Reviewed  Prior Imaging, ekg, cath, echo reviewed when available  Medications reviewed  Old Notes reviewed  ASSESSMENT AND PLAN     1. Non-cardiac chest pain  - positional, reproducible  - new problem  Plan: recommend lidocaine patches OTC, ice, and Meloxicam daily x7 days     2. Essential HTN  ~b/p 130/80  - new problem  Plan:  -continue Lisinopril. HCT 0.5 tab 10/12.5mg      3.mixed Hyperlipidemia  ~ Lipitor 20mg daily  -stable  Plan: continue statin    Patient counseled on lifestyle modification, diet, and exercise. Follow Up with Dr Rose Smith in June     Dr. Mary Crow attestation:  This note was scribed in the presence of Dr Donaldo Lr MD by Shaunna Stewart RN. Physician Attestation  The scribe for and in the presence of allie Sharif DO). The scribe may have prepopulated components of this note with my historical  intellectual property under my direct supervision. Any additions to this intellectual property were performed in my presence and at my direction.   Furthermore, the content and accuracy of this note have been reviewed by allie Sharif DO).  2/16/2023 1:28 PM

## 2023-02-16 ENCOUNTER — OFFICE VISIT (OUTPATIENT)
Dept: CARDIOLOGY CLINIC | Age: 73
End: 2023-02-16

## 2023-02-16 VITALS
BODY MASS INDEX: 32.78 KG/M2 | SYSTOLIC BLOOD PRESSURE: 130 MMHG | WEIGHT: 185 LBS | HEIGHT: 63 IN | DIASTOLIC BLOOD PRESSURE: 80 MMHG | OXYGEN SATURATION: 97 % | HEART RATE: 99 BPM

## 2023-02-16 DIAGNOSIS — R07.2 PRECORDIAL PAIN: ICD-10-CM

## 2023-02-16 DIAGNOSIS — E78.2 MIXED HYPERLIPIDEMIA: ICD-10-CM

## 2023-02-16 DIAGNOSIS — I10 ESSENTIAL HYPERTENSION: Primary | ICD-10-CM

## 2023-02-16 PROBLEM — R07.89 OTHER CHEST PAIN: Status: ACTIVE | Noted: 2023-02-16

## 2023-02-16 RX ORDER — MELOXICAM 15 MG/1
15 TABLET ORAL DAILY PRN
Qty: 30 TABLET | Refills: 0 | Status: SHIPPED | OUTPATIENT
Start: 2023-02-16

## 2023-02-16 NOTE — Clinical Note
I saw burt while you were out. I gave her a short course of meloxicam and lidocaine patches for reproducible chest pain. Thanks!   Vibha Gallo

## 2023-02-16 NOTE — LETTER
[address] [facility]  [address]  Phone: [phone]  Fax: [address], DO    February 26, 2023     [address] [place] [address]    Patient: Jere Varner   MR Number: [id number]   YOB: 1950   Date of Visit: 2/16/2023       Dear [address] [place] [address]: Thank you for referring Jere Varner to me for evaluation/treatment. Below are the relevant portions of my assessment and plan of care. If you have questions, please do not hesitate to call me. I look forward to following Marcela Wong along with you.     Sincerely,      Gema Pettit, DO

## 2023-04-26 ENCOUNTER — HOSPITAL ENCOUNTER (OUTPATIENT)
Dept: PHYSICAL THERAPY | Age: 73
Setting detail: THERAPIES SERIES
Discharge: HOME | End: 2023-04-26

## 2023-04-26 NOTE — FLOWSHEET NOTE
[facility]    Physical Therapy  Cancellation/No-show Note  Patient Name:  Jeremías Lyon  :  1950   Date:  2023  Cancelled visits to date: 1  No-shows to date: 0    For today's appointment patient:  []  Cancelled  [x]  Rescheduled appointment  []  No-show     Reason given by patient:  [x]  Patient ill  []  Conflicting appointment  []  No transportation    []  Conflict with work  []  No reason given  []  Other:     Comments:      Electronically signed by:  Michelle Calhoun PT

## 2023-06-27 ENCOUNTER — PROCEDURE VISIT (OUTPATIENT)
Dept: CARDIOLOGY CLINIC | Age: 73
End: 2023-06-27

## 2023-06-27 ENCOUNTER — OFFICE VISIT (OUTPATIENT)
Dept: CARDIOLOGY CLINIC | Age: 73
End: 2023-06-27
Payer: MEDICARE

## 2023-06-27 VITALS
BODY MASS INDEX: 33.08 KG/M2 | SYSTOLIC BLOOD PRESSURE: 138 MMHG | WEIGHT: 186.7 LBS | OXYGEN SATURATION: 97 % | HEIGHT: 63 IN | DIASTOLIC BLOOD PRESSURE: 78 MMHG | HEART RATE: 91 BPM

## 2023-06-27 DIAGNOSIS — R00.0 TACHYCARDIA: Primary | ICD-10-CM

## 2023-06-27 DIAGNOSIS — R01.1 HEART MURMUR, SYSTOLIC: ICD-10-CM

## 2023-06-27 DIAGNOSIS — E11.9 TYPE 2 DIABETES MELLITUS WITHOUT COMPLICATION, WITHOUT LONG-TERM CURRENT USE OF INSULIN (HCC): ICD-10-CM

## 2023-06-27 DIAGNOSIS — R06.02 SOB (SHORTNESS OF BREATH): ICD-10-CM

## 2023-06-27 DIAGNOSIS — E78.2 MIXED HYPERLIPIDEMIA: ICD-10-CM

## 2023-06-27 DIAGNOSIS — I10 ESSENTIAL HYPERTENSION: ICD-10-CM

## 2023-06-27 LAB
LV EF: 60 %
LVEF MODALITY: NORMAL

## 2023-06-27 PROCEDURE — G8427 DOCREV CUR MEDS BY ELIG CLIN: HCPCS | Performed by: INTERNAL MEDICINE

## 2023-06-27 PROCEDURE — 1036F TOBACCO NON-USER: CPT | Performed by: INTERNAL MEDICINE

## 2023-06-27 PROCEDURE — 3078F DIAST BP <80 MM HG: CPT | Performed by: INTERNAL MEDICINE

## 2023-06-27 PROCEDURE — 2022F DILAT RTA XM EVC RTNOPTHY: CPT | Performed by: INTERNAL MEDICINE

## 2023-06-27 PROCEDURE — G8417 CALC BMI ABV UP PARAM F/U: HCPCS | Performed by: INTERNAL MEDICINE

## 2023-06-27 PROCEDURE — G8400 PT W/DXA NO RESULTS DOC: HCPCS | Performed by: INTERNAL MEDICINE

## 2023-06-27 PROCEDURE — 3074F SYST BP LT 130 MM HG: CPT | Performed by: INTERNAL MEDICINE

## 2023-06-27 PROCEDURE — 3046F HEMOGLOBIN A1C LEVEL >9.0%: CPT | Performed by: INTERNAL MEDICINE

## 2023-06-27 PROCEDURE — 1123F ACP DISCUSS/DSCN MKR DOCD: CPT | Performed by: INTERNAL MEDICINE

## 2023-06-27 PROCEDURE — 3017F COLORECTAL CA SCREEN DOC REV: CPT | Performed by: INTERNAL MEDICINE

## 2023-06-27 PROCEDURE — 1090F PRES/ABSN URINE INCON ASSESS: CPT | Performed by: INTERNAL MEDICINE

## 2023-06-27 PROCEDURE — 99214 OFFICE O/P EST MOD 30 MIN: CPT | Performed by: INTERNAL MEDICINE

## 2023-11-20 PROBLEM — R06.02 SOB (SHORTNESS OF BREATH): Status: ACTIVE | Noted: 2023-11-20

## 2023-12-15 ENCOUNTER — OFFICE VISIT (OUTPATIENT)
Dept: CARDIOLOGY CLINIC | Age: 73
End: 2023-12-15

## 2023-12-15 VITALS
SYSTOLIC BLOOD PRESSURE: 138 MMHG | OXYGEN SATURATION: 98 % | HEART RATE: 108 BPM | WEIGHT: 187.9 LBS | BODY MASS INDEX: 33.29 KG/M2 | HEIGHT: 63 IN | DIASTOLIC BLOOD PRESSURE: 72 MMHG

## 2023-12-15 DIAGNOSIS — E78.2 MIXED HYPERLIPIDEMIA: ICD-10-CM

## 2023-12-15 DIAGNOSIS — R06.02 SOB (SHORTNESS OF BREATH): ICD-10-CM

## 2023-12-15 DIAGNOSIS — I10 ESSENTIAL HYPERTENSION: ICD-10-CM

## 2023-12-15 DIAGNOSIS — R01.1 HEART MURMUR, SYSTOLIC: Primary | ICD-10-CM

## 2023-12-15 DIAGNOSIS — E11.9 TYPE 2 DIABETES MELLITUS WITHOUT COMPLICATION, WITHOUT LONG-TERM CURRENT USE OF INSULIN (HCC): ICD-10-CM

## 2023-12-15 DIAGNOSIS — R00.0 TACHYCARDIA: ICD-10-CM

## 2024-04-15 ENCOUNTER — APPOINTMENT (OUTPATIENT)
Dept: CT IMAGING | Age: 74
DRG: 326 | End: 2024-04-15
Payer: MEDICARE

## 2024-04-15 ENCOUNTER — HOSPITAL ENCOUNTER (INPATIENT)
Age: 74
LOS: 21 days | Discharge: NURSING, REHAB OR LONG-TERM CARE | DRG: 326 | End: 2024-05-07
Attending: EMERGENCY MEDICINE | Admitting: STUDENT IN AN ORGANIZED HEALTH CARE EDUCATION/TRAINING PROGRAM
Payer: MEDICARE

## 2024-04-15 DIAGNOSIS — K44.9 HIATAL HERNIA: Primary | ICD-10-CM

## 2024-04-15 DIAGNOSIS — R10.84 GENERALIZED ABDOMINAL PAIN: ICD-10-CM

## 2024-04-15 DIAGNOSIS — K80.20 GALL STONES: ICD-10-CM

## 2024-04-15 DIAGNOSIS — Z87.19 S/P HERNIA REPAIR: ICD-10-CM

## 2024-04-15 DIAGNOSIS — R82.71 BACTERIURIA: ICD-10-CM

## 2024-04-15 DIAGNOSIS — Z98.890 S/P HERNIA REPAIR: ICD-10-CM

## 2024-04-15 PROBLEM — E66.9 OBESITY: Status: ACTIVE | Noted: 2024-04-15

## 2024-04-15 PROBLEM — K57.90 DIVERTICULOSIS: Status: ACTIVE | Noted: 2024-04-15

## 2024-04-15 PROBLEM — Z90.710 HISTORY OF HYSTERECTOMY: Status: ACTIVE | Noted: 2024-04-15

## 2024-04-15 PROBLEM — R00.0 TACHYCARDIA: Status: RESOLVED | Noted: 2022-06-28 | Resolved: 2024-04-15

## 2024-04-15 PROBLEM — R07.89 OTHER CHEST PAIN: Status: RESOLVED | Noted: 2023-02-16 | Resolved: 2024-04-15

## 2024-04-15 PROBLEM — R06.02 SOB (SHORTNESS OF BREATH): Status: RESOLVED | Noted: 2023-11-20 | Resolved: 2024-04-15

## 2024-04-15 LAB
ALBUMIN SERPL-MCNC: 3.8 G/DL (ref 3.4–5)
ALBUMIN/GLOB SERPL: 1.2 {RATIO} (ref 1.1–2.2)
ALP SERPL-CCNC: 79 U/L (ref 40–129)
ALT SERPL-CCNC: 15 U/L (ref 10–40)
ANION GAP SERPL CALCULATED.3IONS-SCNC: 14 MMOL/L (ref 3–16)
AST SERPL-CCNC: 19 U/L (ref 15–37)
BACTERIA URNS QL MICRO: ABNORMAL /HPF
BASOPHILS # BLD: 0 K/UL (ref 0–0.2)
BASOPHILS NFR BLD: 0.4 %
BILIRUB SERPL-MCNC: 0.5 MG/DL (ref 0–1)
BILIRUB UR QL STRIP.AUTO: NEGATIVE
BUN SERPL-MCNC: 13 MG/DL (ref 7–20)
CALCIUM SERPL-MCNC: 9.6 MG/DL (ref 8.3–10.6)
CHLORIDE SERPL-SCNC: 104 MMOL/L (ref 99–110)
CLARITY UR: CLEAR
CO2 SERPL-SCNC: 20 MMOL/L (ref 21–32)
COLOR UR: YELLOW
CREAT SERPL-MCNC: 0.8 MG/DL (ref 0.6–1.2)
DEPRECATED RDW RBC AUTO: 14.7 % (ref 12.4–15.4)
EOSINOPHIL # BLD: 0 K/UL (ref 0–0.6)
EOSINOPHIL NFR BLD: 0.6 %
EPI CELLS #/AREA URNS AUTO: 1 /HPF (ref 0–5)
GFR SERPLBLD CREATININE-BSD FMLA CKD-EPI: 76 ML/MIN/{1.73_M2}
GLUCOSE SERPL-MCNC: 110 MG/DL (ref 70–99)
GLUCOSE UR STRIP.AUTO-MCNC: NEGATIVE MG/DL
HCT VFR BLD AUTO: 41.9 % (ref 36–48)
HGB BLD-MCNC: 13.9 G/DL (ref 12–16)
HGB UR QL STRIP.AUTO: NEGATIVE
HYALINE CASTS #/AREA URNS AUTO: 0 /LPF (ref 0–8)
KETONES UR STRIP.AUTO-MCNC: NEGATIVE MG/DL
LACTATE BLDV-SCNC: 1.5 MMOL/L (ref 0.4–1.9)
LEUKOCYTE ESTERASE UR QL STRIP.AUTO: ABNORMAL
LIPASE SERPL-CCNC: 59 U/L (ref 13–60)
LYMPHOCYTES # BLD: 2.1 K/UL (ref 1–5.1)
LYMPHOCYTES NFR BLD: 27.9 %
MCH RBC QN AUTO: 28.8 PG (ref 26–34)
MCHC RBC AUTO-ENTMCNC: 33.2 G/DL (ref 31–36)
MCV RBC AUTO: 86.7 FL (ref 80–100)
MONOCYTES # BLD: 0.5 K/UL (ref 0–1.3)
MONOCYTES NFR BLD: 7 %
NEUTROPHILS # BLD: 4.9 K/UL (ref 1.7–7.7)
NEUTROPHILS NFR BLD: 64.1 %
NITRITE UR QL STRIP.AUTO: POSITIVE
PH UR STRIP.AUTO: 6 [PH] (ref 5–8)
PLATELET # BLD AUTO: 245 K/UL (ref 135–450)
PMV BLD AUTO: 7.9 FL (ref 5–10.5)
POTASSIUM SERPL-SCNC: 3.8 MMOL/L (ref 3.5–5.1)
PROT SERPL-MCNC: 7 G/DL (ref 6.4–8.2)
PROT UR STRIP.AUTO-MCNC: NEGATIVE MG/DL
RBC # BLD AUTO: 4.84 M/UL (ref 4–5.2)
RBC CLUMPS #/AREA URNS AUTO: 2 /HPF (ref 0–4)
SODIUM SERPL-SCNC: 138 MMOL/L (ref 136–145)
SP GR UR STRIP.AUTO: 1.01 (ref 1–1.03)
UA COMPLETE W REFLEX CULTURE PNL UR: ABNORMAL
UA DIPSTICK W REFLEX MICRO PNL UR: YES
URN SPEC COLLECT METH UR: ABNORMAL
UROBILINOGEN UR STRIP-ACNC: 1 E.U./DL
WBC # BLD AUTO: 7.7 K/UL (ref 4–11)
WBC #/AREA URNS AUTO: 6 /HPF (ref 0–5)

## 2024-04-15 PROCEDURE — 96365 THER/PROPH/DIAG IV INF INIT: CPT

## 2024-04-15 PROCEDURE — 93005 ELECTROCARDIOGRAM TRACING: CPT | Performed by: EMERGENCY MEDICINE

## 2024-04-15 PROCEDURE — 83605 ASSAY OF LACTIC ACID: CPT

## 2024-04-15 PROCEDURE — 83690 ASSAY OF LIPASE: CPT

## 2024-04-15 PROCEDURE — 99285 EMERGENCY DEPT VISIT HI MDM: CPT

## 2024-04-15 PROCEDURE — 74177 CT ABD & PELVIS W/CONTRAST: CPT

## 2024-04-15 PROCEDURE — 81001 URINALYSIS AUTO W/SCOPE: CPT

## 2024-04-15 PROCEDURE — 85025 COMPLETE CBC W/AUTO DIFF WBC: CPT

## 2024-04-15 PROCEDURE — 2580000003 HC RX 258: Performed by: PHYSICIAN ASSISTANT

## 2024-04-15 PROCEDURE — 6360000004 HC RX CONTRAST MEDICATION: Performed by: PHYSICIAN ASSISTANT

## 2024-04-15 PROCEDURE — 6360000002 HC RX W HCPCS: Performed by: PHYSICIAN ASSISTANT

## 2024-04-15 PROCEDURE — 80053 COMPREHEN METABOLIC PANEL: CPT

## 2024-04-15 RX ORDER — SODIUM CHLORIDE 9 MG/ML
INJECTION, SOLUTION INTRAVENOUS CONTINUOUS
Status: DISCONTINUED | OUTPATIENT
Start: 2024-04-15 | End: 2024-04-20 | Stop reason: ALTCHOICE

## 2024-04-15 RX ADMIN — IOPAMIDOL 75 ML: 755 INJECTION, SOLUTION INTRAVENOUS at 19:48

## 2024-04-15 RX ADMIN — CEFTRIAXONE SODIUM 1000 MG: 1 INJECTION, POWDER, FOR SOLUTION INTRAMUSCULAR; INTRAVENOUS at 22:33

## 2024-04-15 ASSESSMENT — PAIN - FUNCTIONAL ASSESSMENT: PAIN_FUNCTIONAL_ASSESSMENT: 0-10

## 2024-04-15 ASSESSMENT — PAIN SCALES - GENERAL: PAINLEVEL_OUTOF10: 5

## 2024-04-15 NOTE — ED PROVIDER NOTES
[facility] EMERGENCY DEPARTMENT  EMERGENCY DEPARTMENT ENCOUNTER        Pt Name: Sonja Mcmahan  MRN: [id number]  Birthdate 1950  Date of evaluation: 4/15/2024  Provider: Muna Zhao PA-C  PCP: Abiodun Amanda PA  Note Started: 5:48 PM EDT 4/15/24       I have seen and evaluated this patient with my supervising physician Molly Moncada,*.      CHIEF COMPLAINT       Chief Complaint   Patient presents with    Abdominal Pain     Pt here with daughter, per daughter pt was seen at GI doctor today (Dr. Marin) and told pt to go to ER for direct admit for hiatal hernia, also per daughter, states that \"the top of her stomach is twisted\" pt aslo c/o SOB        HISTORY OF PRESENT ILLNESS: 1 or more Elements     History From: patient, daughter   Limitations to history : None    Sonja Mcmahan is a 73 y.o. female who presents after being sent in by GI for admission.  Patient was diagnosed with a large sliding hiatal hernia a couple of weeks ago, has followed up with PCP and has seen GI.  They are concerned that her stomach may be twisted and she may have a bowel obstruction.  Daughter reports that she has not been able to eat anything and has had weight loss as well.  Has been complaining of upper abdominal pain for about 4 weeks.  GI was wanting additional imaging and admission.  No other new or worsening symptoms.    Nursing Notes were all reviewed and agreed with or any disagreements were addressed in the HPI.    REVIEW OF SYSTEMS :      Review of Systems   Constitutional:  Positive for appetite change. Negative for chills and fever.   HENT:  Negative for congestion and rhinorrhea.    Respiratory:  Negative for cough, shortness of breath and wheezing.    Cardiovascular:  Negative for chest pain.   Gastrointestinal:  Positive for abdominal pain and nausea. Negative for diarrhea and vomiting.   Genitourinary:  Negative for difficulty urinating, dysuria and hematuria.  of Asthma, Diabetes mellitus (HCC), Hyperlipidemia, and Hypertension.    CONSULTS: (Who and What was discussed)  None      Social Determinants Significantly Affecting Health : None    Records Reviewed (External and Source) GI note from earlier today, ER note from last week with results and PCP note    CC/HPI Summary, DDx, ED Course, and Reassessment: Patient presents after being sent in by GI for admission.  On exam, she is resting comfortably in the bed in no acute distress toxic.  Vitals are stable and she is afebrile.  Lungs are clear to auscultation bilaterally.  She does have some epigastric abdominal tenderness with no rigidity or distention.  No peritoneal signs.  Please see attending note for EKG interpretation.  She will be reevaluated.    Disposition Considerations (tests considered but not done, Admit vs D/C, Shared Decision Making, Pt Expectation of Test or Tx.): CBC CMP are unremarkable.  No white count.  Lipase 59.  Urinalysis is positive for nitrates, 4+ bacteria 6 white blood cells.  Note is Rocephin.  CT abdomen pelvis with IV and oral contrast was ordered and results pending at end of shift.  He is attending outpatient information regarding his results and reevaluation patient disposition      I am the Primary Clinician of Record.  FINAL IMPRESSION      1. Hiatal hernia          DISPOSITION/PLAN     DISPOSITION        PATIENT REFERRED TO:  No follow-up provider specified.    DISCHARGE MEDICATIONS:  New Prescriptions    No medications on file       DISCONTINUED MEDICATIONS:  Discontinued Medications    No medications on file              (Please note that portions of this note were completed with a voice recognition program.  Efforts were made to edit the dictations but occasionally words are mis-transcribed.)    Muna Zhao PA-C (electronically signed)            Muna Zhao PA-C  04/15/24 2040

## 2024-04-15 NOTE — ED PROVIDER NOTES
[facility] Emergency Department      Pt Name: Sonja Mcmahan  MRN: [id number]  Birthdate 1950  Date of evaluation: 4/15/2024  Provider: PHILLIP EDWARDS MD  I personally saw Sonja Mcmahan and made and approved the management plan with the advanced practice provider.  I take responsibility for the patient management.     HPI: Sonja Mcmahan presented with   Chief Complaint   Patient presents with    Abdominal Pain     Pt here with daughter, per daughter pt was seen at GI doctor today (Dr. Marin) and told pt to go to ER for direct admit for hiatal hernia, also per daughter, states that \"the top of her stomach is twisted\" pt aslo c/o SOB      Sonja Mcmahan has a past medical history of Asthma, Diabetes mellitus (HCC), Hyperlipidemia, and Hypertension.  She has a past surgical history that includes Hysterectomy, total abdominal and Elbow surgery.    No current facility-administered medications on file prior to encounter.     Current Outpatient Medications on File Prior to Encounter   Medication Sig Dispense Refill    BIOTIN PO Take by mouth      Multiple Vitamins-Minerals (STRESS B-COMPLEX/C/ZINC) TABS TAKE 1 TABLET BY MOUTH ONCE DAILY      loratadine (CLARITIN) 10 MG tablet TAKE 1 TABLET BY MOUTH EVERY DAY      albuterol sulfate HFA (PROVENTIL;VENTOLIN;PROAIR) 108 (90 Base) MCG/ACT inhaler Inhale 2 puffs into the lungs every 4 hours as needed      albuterol (PROVENTIL) (2.5 MG/3ML) 0.083% nebulizer solution Inhale 3 mLs into the lungs every 6 hours as needed      aspirin 81 MG chewable tablet Take 1 tablet by mouth daily      atorvastatin (LIPITOR) 20 MG tablet Take 1 tablet by mouth daily  3    LORazepam (ATIVAN) 0.5 MG tablet Take 1 tablet by mouth every 8 hours as needed.      montelukast (SINGULAIR) 10 MG tablet Take 1 tablet by mouth nightly      Cyanocobalamin (VITAMIN B-12) 1000 MCG SUBL Place under the tongue every 30 days      lisinopril-hydrochlorothiazide (PRINZIDE;ZESTORETIC) 10-12.5 MG per

## 2024-04-16 ENCOUNTER — ANESTHESIA EVENT (OUTPATIENT)
Dept: ENDOSCOPY | Age: 74
End: 2024-04-16
Payer: MEDICARE

## 2024-04-16 PROBLEM — R10.9 INTRACTABLE ABDOMINAL PAIN: Status: ACTIVE | Noted: 2024-04-16

## 2024-04-16 LAB
DEPRECATED RDW RBC AUTO: 14.3 % (ref 12.4–15.4)
EKG ATRIAL RATE: 104 BPM
EKG DIAGNOSIS: NORMAL
EKG P AXIS: 56 DEGREES
EKG P-R INTERVAL: 154 MS
EKG Q-T INTERVAL: 326 MS
EKG QRS DURATION: 74 MS
EKG QTC CALCULATION (BAZETT): 428 MS
EKG R AXIS: -12 DEGREES
EKG T AXIS: 93 DEGREES
EKG VENTRICULAR RATE: 104 BPM
GLUCOSE BLD-MCNC: 101 MG/DL (ref 70–99)
GLUCOSE BLD-MCNC: 107 MG/DL (ref 70–99)
GLUCOSE BLD-MCNC: 108 MG/DL (ref 70–99)
GLUCOSE BLD-MCNC: 110 MG/DL (ref 70–99)
GLUCOSE BLD-MCNC: 86 MG/DL (ref 70–99)
HCT VFR BLD AUTO: 35.3 % (ref 36–48)
HGB BLD-MCNC: 12 G/DL (ref 12–16)
MCH RBC QN AUTO: 29 PG (ref 26–34)
MCHC RBC AUTO-ENTMCNC: 34 G/DL (ref 31–36)
MCV RBC AUTO: 85.5 FL (ref 80–100)
PERFORMED ON: ABNORMAL
PERFORMED ON: NORMAL
PLATELET # BLD AUTO: 198 K/UL (ref 135–450)
PMV BLD AUTO: 7.6 FL (ref 5–10.5)
RBC # BLD AUTO: 4.13 M/UL (ref 4–5.2)
WBC # BLD AUTO: 6.9 K/UL (ref 4–11)

## 2024-04-16 PROCEDURE — A9502 TC99M TETROFOSMIN: HCPCS | Performed by: INTERNAL MEDICINE

## 2024-04-16 PROCEDURE — 85027 COMPLETE CBC AUTOMATED: CPT

## 2024-04-16 PROCEDURE — 3430000000 HC RX DIAGNOSTIC RADIOPHARMACEUTICAL: Performed by: INTERNAL MEDICINE

## 2024-04-16 PROCEDURE — 93017 CV STRESS TEST TRACING ONLY: CPT | Performed by: INTERNAL MEDICINE

## 2024-04-16 PROCEDURE — 6370000000 HC RX 637 (ALT 250 FOR IP): Performed by: STUDENT IN AN ORGANIZED HEALTH CARE EDUCATION/TRAINING PROGRAM

## 2024-04-16 PROCEDURE — 93010 ELECTROCARDIOGRAM REPORT: CPT | Performed by: INTERNAL MEDICINE

## 2024-04-16 PROCEDURE — 1200000000 HC SEMI PRIVATE

## 2024-04-16 PROCEDURE — 78452 HT MUSCLE IMAGE SPECT MULT: CPT

## 2024-04-16 PROCEDURE — 36415 COLL VENOUS BLD VENIPUNCTURE: CPT

## 2024-04-16 PROCEDURE — 6360000002 HC RX W HCPCS: Performed by: INTERNAL MEDICINE

## 2024-04-16 PROCEDURE — 2580000003 HC RX 258: Performed by: PHYSICIAN ASSISTANT

## 2024-04-16 PROCEDURE — 94760 N-INVAS EAR/PLS OXIMETRY 1: CPT

## 2024-04-16 PROCEDURE — 2580000003 HC RX 258: Performed by: STUDENT IN AN ORGANIZED HEALTH CARE EDUCATION/TRAINING PROGRAM

## 2024-04-16 PROCEDURE — 99223 1ST HOSP IP/OBS HIGH 75: CPT | Performed by: INTERNAL MEDICINE

## 2024-04-16 PROCEDURE — 6360000002 HC RX W HCPCS: Performed by: PHYSICIAN ASSISTANT

## 2024-04-16 RX ORDER — ONDANSETRON 4 MG/1
4 TABLET, ORALLY DISINTEGRATING ORAL EVERY 8 HOURS PRN
Status: DISCONTINUED | OUTPATIENT
Start: 2024-04-16 | End: 2024-05-07 | Stop reason: HOSPADM

## 2024-04-16 RX ORDER — ACETAMINOPHEN 650 MG/1
650 SUPPOSITORY RECTAL EVERY 6 HOURS PRN
Status: DISCONTINUED | OUTPATIENT
Start: 2024-04-16 | End: 2024-05-07 | Stop reason: HOSPADM

## 2024-04-16 RX ORDER — REGADENOSON 0.08 MG/ML
0.4 INJECTION, SOLUTION INTRAVENOUS
Status: COMPLETED | OUTPATIENT
Start: 2024-04-16 | End: 2024-04-16

## 2024-04-16 RX ORDER — PANTOPRAZOLE SODIUM 40 MG/1
40 TABLET, DELAYED RELEASE ORAL NIGHTLY
Status: DISCONTINUED | OUTPATIENT
Start: 2024-04-16 | End: 2024-04-17

## 2024-04-16 RX ORDER — AMINOPHYLLINE 25 MG/ML
100 INJECTION, SOLUTION INTRAVENOUS ONCE
Status: COMPLETED | OUTPATIENT
Start: 2024-04-16 | End: 2024-04-16

## 2024-04-16 RX ORDER — NEPHROCAP 1 MG
1 CAP ORAL DAILY
Status: DISCONTINUED | OUTPATIENT
Start: 2024-04-16 | End: 2024-05-07 | Stop reason: HOSPADM

## 2024-04-16 RX ORDER — LISINOPRIL AND HYDROCHLOROTHIAZIDE 12.5; 1 MG/1; MG/1
0.5 TABLET ORAL DAILY
Status: DISCONTINUED | OUTPATIENT
Start: 2024-04-16 | End: 2024-04-16 | Stop reason: CLARIF

## 2024-04-16 RX ORDER — SODIUM CHLORIDE 0.9 % (FLUSH) 0.9 %
5-40 SYRINGE (ML) INJECTION EVERY 12 HOURS SCHEDULED
Status: DISCONTINUED | OUTPATIENT
Start: 2024-04-16 | End: 2024-05-07 | Stop reason: HOSPADM

## 2024-04-16 RX ORDER — LISINOPRIL 10 MG/1
10 TABLET ORAL NIGHTLY
Status: DISCONTINUED | OUTPATIENT
Start: 2024-04-16 | End: 2024-05-02

## 2024-04-16 RX ORDER — ATORVASTATIN CALCIUM 20 MG/1
20 TABLET, FILM COATED ORAL NIGHTLY
Status: DISCONTINUED | OUTPATIENT
Start: 2024-04-16 | End: 2024-05-07 | Stop reason: HOSPADM

## 2024-04-16 RX ORDER — HYDROCHLOROTHIAZIDE 25 MG/1
12.5 TABLET ORAL DAILY
Status: DISCONTINUED | OUTPATIENT
Start: 2024-04-16 | End: 2024-04-16

## 2024-04-16 RX ORDER — POLYETHYLENE GLYCOL 3350 17 G/17G
17 POWDER, FOR SOLUTION ORAL DAILY PRN
Status: DISCONTINUED | OUTPATIENT
Start: 2024-04-16 | End: 2024-05-07 | Stop reason: HOSPADM

## 2024-04-16 RX ORDER — MONTELUKAST SODIUM 10 MG/1
10 TABLET ORAL NIGHTLY
Status: DISCONTINUED | OUTPATIENT
Start: 2024-04-16 | End: 2024-05-07 | Stop reason: HOSPADM

## 2024-04-16 RX ORDER — LISINOPRIL 10 MG/1
10 TABLET ORAL DAILY
Status: DISCONTINUED | OUTPATIENT
Start: 2024-04-16 | End: 2024-04-16

## 2024-04-16 RX ORDER — ATORVASTATIN CALCIUM 20 MG/1
20 TABLET, FILM COATED ORAL DAILY
Status: DISCONTINUED | OUTPATIENT
Start: 2024-04-16 | End: 2024-04-16

## 2024-04-16 RX ORDER — ACETAMINOPHEN 325 MG/1
650 TABLET ORAL EVERY 6 HOURS PRN
Status: DISCONTINUED | OUTPATIENT
Start: 2024-04-16 | End: 2024-05-07 | Stop reason: HOSPADM

## 2024-04-16 RX ORDER — PANTOPRAZOLE SODIUM 40 MG/1
40 TABLET, DELAYED RELEASE ORAL
Status: DISCONTINUED | OUTPATIENT
Start: 2024-04-16 | End: 2024-04-16

## 2024-04-16 RX ORDER — ONDANSETRON 2 MG/ML
4 INJECTION INTRAMUSCULAR; INTRAVENOUS EVERY 6 HOURS PRN
Status: DISCONTINUED | OUTPATIENT
Start: 2024-04-16 | End: 2024-05-07 | Stop reason: HOSPADM

## 2024-04-16 RX ORDER — SODIUM CHLORIDE 9 MG/ML
INJECTION, SOLUTION INTRAVENOUS PRN
Status: DISCONTINUED | OUTPATIENT
Start: 2024-04-16 | End: 2024-05-07 | Stop reason: HOSPADM

## 2024-04-16 RX ORDER — OXYCODONE HYDROCHLORIDE 5 MG/1
5 TABLET ORAL EVERY 4 HOURS PRN
Status: DISCONTINUED | OUTPATIENT
Start: 2024-04-16 | End: 2024-05-07 | Stop reason: HOSPADM

## 2024-04-16 RX ORDER — MAGNESIUM 200 MG
1 TABLET ORAL
Status: DISCONTINUED | OUTPATIENT
Start: 2024-04-16 | End: 2024-04-16

## 2024-04-16 RX ORDER — MAGNESIUM SULFATE IN WATER 40 MG/ML
2000 INJECTION, SOLUTION INTRAVENOUS PRN
Status: DISCONTINUED | OUTPATIENT
Start: 2024-04-16 | End: 2024-05-07 | Stop reason: HOSPADM

## 2024-04-16 RX ORDER — POTASSIUM CHLORIDE 20 MEQ/1
40 TABLET, EXTENDED RELEASE ORAL PRN
Status: DISCONTINUED | OUTPATIENT
Start: 2024-04-16 | End: 2024-05-07 | Stop reason: HOSPADM

## 2024-04-16 RX ORDER — HYDROCHLOROTHIAZIDE 25 MG/1
12.5 TABLET ORAL DAILY
Status: DISCONTINUED | OUTPATIENT
Start: 2024-04-16 | End: 2024-04-30

## 2024-04-16 RX ORDER — LORAZEPAM 0.5 MG/1
0.5 TABLET ORAL EVERY 8 HOURS PRN
Status: DISCONTINUED | OUTPATIENT
Start: 2024-04-16 | End: 2024-05-07 | Stop reason: HOSPADM

## 2024-04-16 RX ORDER — DIPHENHYDRAMINE HYDROCHLORIDE 25 MG/1
1 TABLET ORAL DAILY
Status: DISCONTINUED | OUTPATIENT
Start: 2024-04-16 | End: 2024-04-16 | Stop reason: RX

## 2024-04-16 RX ORDER — POTASSIUM CHLORIDE 7.45 MG/ML
10 INJECTION INTRAVENOUS PRN
Status: DISCONTINUED | OUTPATIENT
Start: 2024-04-16 | End: 2024-05-07 | Stop reason: HOSPADM

## 2024-04-16 RX ORDER — SODIUM CHLORIDE 0.9 % (FLUSH) 0.9 %
5-40 SYRINGE (ML) INJECTION PRN
Status: DISCONTINUED | OUTPATIENT
Start: 2024-04-16 | End: 2024-05-07 | Stop reason: HOSPADM

## 2024-04-16 RX ADMIN — TETROFOSMIN 10 MILLICURIE: 1.38 INJECTION, POWDER, LYOPHILIZED, FOR SOLUTION INTRAVENOUS at 11:41

## 2024-04-16 RX ADMIN — ATORVASTATIN CALCIUM 20 MG: 20 TABLET, FILM COATED ORAL at 20:24

## 2024-04-16 RX ADMIN — ATORVASTATIN CALCIUM 20 MG: 20 TABLET, FILM COATED ORAL at 01:38

## 2024-04-16 RX ADMIN — ACETAMINOPHEN 650 MG: 325 TABLET ORAL at 01:38

## 2024-04-16 RX ADMIN — TETROFOSMIN 30 MILLICURIE: 1.38 INJECTION, POWDER, LYOPHILIZED, FOR SOLUTION INTRAVENOUS at 12:43

## 2024-04-16 RX ADMIN — HYDROCHLOROTHIAZIDE 12.5 MG: 25 TABLET ORAL at 20:24

## 2024-04-16 RX ADMIN — SODIUM CHLORIDE: 9 INJECTION, SOLUTION INTRAVENOUS at 01:23

## 2024-04-16 RX ADMIN — PANTOPRAZOLE SODIUM 40 MG: 40 TABLET, DELAYED RELEASE ORAL at 01:38

## 2024-04-16 RX ADMIN — AMINOPHYLLINE 100 MG: 25 INJECTION, SOLUTION INTRAVENOUS at 12:35

## 2024-04-16 RX ADMIN — LISINOPRIL 10 MG: 10 TABLET ORAL at 01:38

## 2024-04-16 RX ADMIN — MONTELUKAST SODIUM 10 MG: 10 TABLET, FILM COATED ORAL at 20:24

## 2024-04-16 RX ADMIN — Medication 10 ML: at 20:30

## 2024-04-16 RX ADMIN — ACETAMINOPHEN 650 MG: 325 TABLET ORAL at 20:29

## 2024-04-16 RX ADMIN — LORAZEPAM 0.5 MG: 0.5 TABLET ORAL at 20:24

## 2024-04-16 RX ADMIN — CEFTRIAXONE SODIUM 1000 MG: 1 INJECTION, POWDER, FOR SOLUTION INTRAMUSCULAR; INTRAVENOUS at 20:34

## 2024-04-16 RX ADMIN — Medication 10 ML: at 08:15

## 2024-04-16 RX ADMIN — MONTELUKAST SODIUM 10 MG: 10 TABLET, FILM COATED ORAL at 01:38

## 2024-04-16 RX ADMIN — LORAZEPAM 0.5 MG: 0.5 TABLET ORAL at 01:38

## 2024-04-16 RX ADMIN — REGADENOSON 0.4 MG: 0.08 INJECTION, SOLUTION INTRAVENOUS at 12:27

## 2024-04-16 RX ADMIN — HYDROCHLOROTHIAZIDE 12.5 MG: 25 TABLET ORAL at 01:38

## 2024-04-16 RX ADMIN — NEPHROCAP 1 MG: 1 CAP ORAL at 08:15

## 2024-04-16 RX ADMIN — PANTOPRAZOLE SODIUM 40 MG: 40 TABLET, DELAYED RELEASE ORAL at 20:24

## 2024-04-16 RX ADMIN — LISINOPRIL 10 MG: 10 TABLET ORAL at 20:25

## 2024-04-16 ASSESSMENT — PAIN DESCRIPTION - DESCRIPTORS
DESCRIPTORS: DISCOMFORT
DESCRIPTORS: DISCOMFORT

## 2024-04-16 ASSESSMENT — PAIN DESCRIPTION - LOCATION
LOCATION: ABDOMEN
LOCATION: ABDOMEN
LOCATION: ABDOMEN;CHEST

## 2024-04-16 ASSESSMENT — PAIN DESCRIPTION - ORIENTATION
ORIENTATION: MID
ORIENTATION: MID

## 2024-04-16 ASSESSMENT — PAIN SCALES - GENERAL
PAINLEVEL_OUTOF10: 5
PAINLEVEL_OUTOF10: 6
PAINLEVEL_OUTOF10: 7
PAINLEVEL_OUTOF10: 5
PAINLEVEL_OUTOF10: 3

## 2024-04-16 ASSESSMENT — PAIN DESCRIPTION - PAIN TYPE: TYPE: ACUTE PAIN

## 2024-04-16 ASSESSMENT — PAIN SCALES - WONG BAKER: WONGBAKER_NUMERICALRESPONSE: HURTS A LITTLE BIT

## 2024-04-16 NOTE — CONSULTS
Gastroenterology Consult Note        Patient: Sonja Mcmahan  : 1950  Acct#:      Date:  2024      1. Hiatal hernia    2. Bacteriuria    3. Gall stones        Subjective:       History of Present Illness  Patient is a 73 y.o.  female admitted with Hiatal hernia [K44.9]  Gall stones [K80.20]  Bacteriuria [R82.71]  Intractable abdominal pain [R10.9] who is seen in consult for hiatal hernia.   History of asthma, hypertension, hyperlipidemia, DM, GERD.  She has had 3-week history of lower chest pain and upper abdominal pain.  The abdominal pain is in the epigastrium and radiates to the bilateral sides.  The lower chest pain feels like a bloating sensation.  Both pains are constant but get worse with eating.  Has been eating less due to this and has lost 12 pounds unintentionally.  Also has shortness of breath which she feels is related to the pain but this gets worse with walking across the room.  Does report some dysphagia.  Has to chew her food really well and then can swallow okay.  Otherwise feels like things will sit in the lower chest with eating.  Has had nausea in the mornings.  No vomiting.  Was started on Prilosec 3 weeks ago which has helped her symptoms somewhat.  No black or bloody bowel movements.  Has had a change in her bowel movements lately after her p.o. intake has decreased..  Stools can be runny or soft.    Takes baby aspirin daily.  Takes ibuprofen as needed, maybe once a week or so.      Past Medical History:   Diagnosis Date    Asthma     Diabetes mellitus (HCC)     Hyperlipidemia     Hypertension       Past Surgical History:   Procedure Laterality Date    ELBOW SURGERY      HYSTERECTOMY, TOTAL ABDOMINAL (CERVIX REMOVED)        Past Endoscopic History: Remote colonoscopy.  No prior EGD.      Admission Meds  No current facility-administered medications on file prior to encounter.     Current Outpatient Medications on File Prior to Encounter   Medication Sig Dispense

## 2024-04-16 NOTE — PROGRESS NOTES
V2.0    [facility] [place]      Name:  Sonja Mcmahan /Age/Sex: 1950  (73 y.o. female)   MRN & CSN:  [id number] & [id number] Encounter Date/Time: 2024 3:08 PM EDT   Location:  [facility]-5568/5568-01 PCP: Abiodun Amanda PA     Attending:Kemi Rosa MD       Hospital Day: 2    Assessment and Recommendations   Sonja Mcmahan is a 73 y.o. female who presents with Hiatal hernia      Plan:     Hital hernia   With abdominal pain   Npo  Ppi  EGD planned once cardiac opitimized    Gallstone without any cholecystitis  Gen surg consult    HTN    Diverticulosis of sigmoid colon   No signs of infection     Hx of biscupid valve and  moder AS  With chest pain on exertion  Planned stress test per cardiology for medical optimization        Diet Diet NPO  Diet NPO Exceptions are: Sips of Water with Meds   DVT Prophylaxis    Code Status Full Code   Disposition          Personally reviewed Lab Studies and Imaging         Subjective:     Chief Complaint:     Sonja Mcmahan is a 73 y.o. female who presents with       Review of Systems:      Pertinent positives and negatives discussed in HPI    Objective:     Intake/Output Summary (Last 24 hours) at 2024 1508  Last data filed at 2024 1016  Gross per 24 hour   Intake 70 ml   Output 300 ml   Net -230 ml      Vitals:   Vitals:    24 0147 24 0415 24 0730 24 1430   BP:  (!) 105/54 114/72 112/78   Pulse:  86 73 79   Resp:  16 17 18   Temp:  97.9 °F (36.6 °C) 98 °F (36.7 °C) 98.3 °F (36.8 °C)   TempSrc:  Oral Oral Oral   SpO2:  94% 92% 92%   Weight: 79.2 kg (174 lb 9.6 oz)      Height: 1.6 m (5' 3\")            Physical Exam:      General: NAD  Eyes: EOMI  ENT: neck supple  Cardiovascular: Regular rate.  Respiratory: Clear to auscultation  Gastrointestinal: Soft, non tender  Genitourinary: no suprapubic tenderness  Musculoskeletal: No edema  Skin: warm, dry  Neuro: Alert.  Psych: Mood appropriate.         Medications:   Medications:

## 2024-04-16 NOTE — H&P
Hospitalist History and Physical      PCP: Abiodun Amanda PA    Date of Admission: 4/15/2024     Anticipated Discharge Day/Date - 4/17/24    Anticipated Discharge Location:  [x]Home  []HHC  []SNF  []Acute Rehab  []Hospice    Assessment/Plan:      Hiatal hernia    Large hiatal hernia. Intractable abd pain and CT shows large hiatal hernia, diffuse diverticulosis especially of sigmoid. History hysterectomy. GI and surgery in consult.  Preoperative cardiac risk is low for abdominal procedure per RCRI 0. EF 60 on echo 2023.  Obesity Body mass index is 33.13 kg/m².    Other chronic medical conditions reviewed and managed.    DVT Prophylaxis:  []PPx LMWH  []SQ Heparin  [x]IPC/SCDs  []Eliquis  []Xarelto  []Coumadin  []Other -        Diet: ADULT DIET; Full Liquid  Code Status: Full Code      75 Minutes were spent solely for medical decision making for this patient including documentation, ordering and interpreting labs imaging and studies, independently reviewing the chart as well as discussing plan of care with the family patient ancillary staff and coordinating their care.    Chief Complaint, History of Present Illness, Review of Systems       Chief Complaint   intractable abdominal pain    History of Present Illness   Sonja Mcmahan is a 73 y.o. female with a history of hiatal hernia, obesity, GERD who presented by bequest of GI Dr Marin after c/o dyspnea and intractable abdominal pain. Has been unable to tolerate po and patient and daughter think weight loss of unknown amount. Abdominal pain present over the past month. GI sent her to ED for abdominal imaging. Patient denies vomiting, last BM unformed this am related to poor po intake. Admitted for further eval and care.  Patient and daughter endorse frustration with ED weight, request something for GI pain and that Protonix and Tylenol usually help.  States that she may leave AMA.  I recommended against this given her primary complaint.  Has  details.    Infusion Medications    sodium chloride      sodium chloride       Scheduled Medications    atorvastatin  20 mg Oral Daily    Vitamin B-12  1 capsule SubLINGual Q30 Days    lisinopril-hydroCHLOROthiazide  0.5 tablet Oral Daily    montelukast  10 mg Oral Nightly    Stress B-Complex/C/Zinc  1 tablet Oral Daily    sodium chloride flush  5-40 mL IntraVENous 2 times per day    cefTRIAXone (ROCEPHIN) IV  1,000 mg IntraVENous Q24H     PRN Meds:       Intake/Output Summary (Last 24 hours) at 4/16/2024 0044  Last data filed at 4/15/2024 2303  Gross per 24 hour   Intake 50 ml   Output --   Net 50 ml       Physical Exam Performed:      BP (!) 117/93   Pulse 91   Temp 97.4 °F (36.3 °C) (Oral)   Resp 18   Wt 84.8 kg (187 lb)   SpO2 97%   BMI 33.13 kg/m²     General appearance:  Well appearing  female. No apparent distress, on room air  Respiratory:  Normal respiratory effort. No increased work of breathing. No wheezes rhonchi rales.  Cardiovascular:  Regular rate and rhythm. No murmurs gallops rubs.  Abdomen: Obese soft, non-tender.  Proximal epigastric distention. normoactive bowel sounds.  Musculoskelatal:  No edema. Normal ROM.  Neurologic:  Non-focal. A&O x3  Psychiatric:  Alert and oriented      Labs:  Personally reviewed and interpreted for clinical significance.     Recent Labs     04/15/24  1756   WBC 7.7   HGB 13.9   HCT 41.9        Recent Labs     04/15/24  1756      K 3.8      CO2 20*   BUN 13   CREATININE 0.8   CALCIUM 9.6     Recent Labs     04/15/24  1756   AST 19   ALT 15   BILITOT 0.5   ALKPHOS 79     No results for input(s): \"INR\" in the last 72 hours.  No results for input(s): \"CKTOTAL\", \"TROPHS\" in the last 72 hours.    Imaging: Personally reviewed and interpreted for clinical significance.     CT ABDOMEN PELVIS W IV CONTRAST Additional Contrast? Oral    Result Date: 4/15/2024  EXAMINATION: CT OF THE ABDOMEN AND PELVIS WITH CONTRAST 4/15/2024 7:43 pm TECHNIQUE: CT

## 2024-04-16 NOTE — PROGRESS NOTES
4 Eyes Skin Assessment     NAME:  Sonja Mcmahan  YOB: 1950  MEDICAL RECORD NUMBER:  [id number]    The patient is being assessed for  Admission    I agree that at least one RN has performed a thorough Head to Toe Skin Assessment on the patient. ALL assessment sites listed below have been assessed.      Areas assessed by both nurses:    Head, Face, Ears, Shoulders, Back, Chest, Arms, Elbows, Hands, Sacrum. Buttock, Coccyx, Ischium, and Legs. Feet and Heels        Does the Patient have a Wound? No noted wound(s)       Rakesh Prevention initiated by RN: Yes  Wound Care Orders initiated by RN: No    Pressure Injury (Stage 3,4, Unstageable, DTI, NWPT, and Complex wounds) if present, place Wound referral order by RN under : No    New Ostomies, if present place, Ostomy referral order under : No     Nurse 1 eSignature: Electronically signed by ROSARIO MALDONADO RN on 4/16/24 at 5:48 AM EDT    **SHARE this note so that the co-signing nurse can place an eSignature**    Nurse 2 eSignature: Electronically signed by JA CRUZ RN on 4/16/24 at 7:05 AM EDT

## 2024-04-16 NOTE — CONSULTS
[facility]  Cardiology Note  [phone]    Chief Complaint   Patient presents with    Abdominal Pain     Pt here with daughter, per daughter pt was seen at GI doctor today (Dr. Marin) and told pt to go to ER for direct admit for hiatal hernia, also per daughter, states that \"the top of her stomach is twisted\" pt aslo c/o SOB       History of Present Illness:  Sonja Mcmahan is a 73 y.o. patient PMHx likely bicuspid AV, moderate AS, HTN who presented to the hospital with complaints of dyspnea and abdominal pain.     Patient has upcoming EGD for further evaluation and cardiac risk assessment is requested.     Patient has limited exertional capacity. She can walk approximately one hundred yards before stopping to rest due to chest discomfort and shortness of breath which she attributes to her hernia. She does in place exercises occasionally at home for leg strength but does so for only a few minutes at a time. Ambulates with the assistance of a walker with a seat that she uses to rest on while walking.     Past Medical History:   has a past medical history of Asthma, Diabetes mellitus (HCC), Hyperlipidemia, and Hypertension.    Surgical History:   has a past surgical history that includes Hysterectomy, total abdominal and Elbow surgery.     Social History:   reports that she has never smoked. She has never used smokeless tobacco. She reports that she does not drink alcohol and does not use drugs.     Family History:  Family History   Problem Relation Age of Onset    Heart Disease Father     Heart Disease Brother      Home Medications:  Were reviewed and are listed in nursing record. and/or listed below  Prior to Admission medications    Medication Sig Start Date End Date Taking? Authorizing Provider   BIOTIN PO Take by mouth    Meenakshi Martinez MD   Multiple Vitamins-Minerals (STRESS B-COMPLEX/C/ZINC) TABS TAKE 1 TABLET BY MOUTH ONCE DAILY 5/1/22   Meenakshi Martinez MD   loratadine (CLARITIN) 10  oz)       GEN:   AxO x3  ENT:   normal ocular range of motion  LUNGS:  clear bilaterally  CV:   Regular rate and rhythm  II/VI BRENT at RSB  No rubs, murmurs, gallops  No edema  No JVD  Peripheral pulses 2+  ABD:   Non-tender, non-distended  EXTRM:  atraumatic  SKIN:   normal color, turgur  PSYCH:  normal mood and affect  NEURO:  Normal gross motor function and sensation     Labs  CBC:   Lab Results   Component Value Date/Time    WBC 6.9 04/16/2024 05:34 AM    RBC 4.13 04/16/2024 05:34 AM    HGB 12.0 04/16/2024 05:34 AM    HCT 35.3 04/16/2024 05:34 AM    MCV 85.5 04/16/2024 05:34 AM    RDW 14.3 04/16/2024 05:34 AM     04/16/2024 05:34 AM     CMP:    Lab Results   Component Value Date/Time     04/15/2024 05:56 PM    K 3.8 04/15/2024 05:56 PM     04/15/2024 05:56 PM    CO2 20 04/15/2024 05:56 PM    BUN 13 04/15/2024 05:56 PM    CREATININE 0.8 04/15/2024 05:56 PM    GFRAA >60 10/10/2019 10:01 AM    AGRATIO 1.2 04/15/2024 05:56 PM    LABGLOM 76 04/15/2024 05:56 PM    GLUCOSE 110 04/15/2024 05:56 PM    PROT 7.0 04/15/2024 05:56 PM    CALCIUM 9.6 04/15/2024 05:56 PM    BILITOT 0.5 04/15/2024 05:56 PM    ALKPHOS 79 04/15/2024 05:56 PM    AST 19 04/15/2024 05:56 PM    ALT 15 04/15/2024 05:56 PM     PT/INR:  No results found for: \"PTINR\"  No results found for: \"CKTOTAL\", \"CKMB\", \"CKMBINDEX\", \"TROPONINI\"    EKG:  I have reviewed EKG with the following interpretation:  Impression:    NSR non-specific ST changes    Echo:   6/2023  Summary   Normal left ventricle size and systolic function with an estimated ejection   fraction of 60%. No regional wall motion abnormalities are seen.   There is mild concentric left ventricular hypertrophy.   E/e\"= 17. Diastolic filling parameters suggests grade I diastolic   dysfunction.   The aortic valve area is calculated at 1.3 cm2 with a maximum pressure   gradient of 25 mmHg and a mean pressure gradient of 16 mmHg. This is c/w   mild aortic stenosis.   Mild aortic

## 2024-04-16 NOTE — CARE COORDINATION
Discharge Planning:     (CM) reviewed the patient's chart to assess needs. Patient's Readmission Risk Score is 9%. Patient's medical insurance is Medicare Part A and B and [employer]. Patient's PCP is Abiodun Aamnda. No needs anticipated, at this time. CM team to follow. Staff to inform CM if additional discharge needs arise.          Electronically signed by ERNA Dodge on 4/16/2024 at 8:17 AM

## 2024-04-16 NOTE — ED NOTES
How does patient ambulate?   []Low Fall Risk (ambulates by themselves without support)  [x]Stand by assist   []Contact Guard   []Front wheel walker  []Wheelchair   []Steady  []Bed bound  []History of Lower Extremity Amputation  []Unknown, did not assess in the emergency department   How does patient take pills?  []Whole with Water  []Crushed in applesauce  []Crushed in pudding  []Other  [x]Unknown no oral medications were given in the ED  Is patient alert?   [x]Alert  []Drowsy but responds to voice  []Doesn't respond to voice but responds to painful stimuli  []Unresponsive  Is patient oriented?   [x]To person  [x]To place  [x]To time  [x]To situation  []Confused  []Agitated  [x]Follows commands  If patient is disoriented or from a Skill Nursing Facility has family been notified of admission?   []Yes   []No  Patient belongings?   [x]Cell phone  []Wallet   []Dentures  [x]Clothing  Any specific patient or family belongings/needs/dynamics?   N/a    Miscellaneous comments/pending orders?  N/a     If there are any additional questions please reach out to the Emergency Department.

## 2024-04-16 NOTE — PLAN OF CARE
Problem: Pain  Goal: Verbalizes/displays adequate comfort level or baseline comfort level  4/16/2024 0814 by Mohini Rey, RN  Outcome: Progressing  4/16/2024 0548 by Lilly Russ, RN  Outcome: Progressing

## 2024-04-17 ENCOUNTER — ANESTHESIA (OUTPATIENT)
Dept: ENDOSCOPY | Age: 74
End: 2024-04-17
Payer: MEDICARE

## 2024-04-17 LAB
ANION GAP SERPL CALCULATED.3IONS-SCNC: 9 MMOL/L (ref 3–16)
BUN SERPL-MCNC: 12 MG/DL (ref 7–20)
CALCIUM SERPL-MCNC: 9.2 MG/DL (ref 8.3–10.6)
CHLORIDE SERPL-SCNC: 106 MMOL/L (ref 99–110)
CO2 SERPL-SCNC: 24 MMOL/L (ref 21–32)
CREAT SERPL-MCNC: 0.9 MG/DL (ref 0.6–1.2)
DEPRECATED RDW RBC AUTO: 14.9 % (ref 12.4–15.4)
GFR SERPLBLD CREATININE-BSD FMLA CKD-EPI: 66 ML/MIN/{1.73_M2}
GLUCOSE BLD-MCNC: 101 MG/DL (ref 70–99)
GLUCOSE BLD-MCNC: 101 MG/DL (ref 70–99)
GLUCOSE BLD-MCNC: 112 MG/DL (ref 70–99)
GLUCOSE BLD-MCNC: 80 MG/DL (ref 70–99)
GLUCOSE BLD-MCNC: 95 MG/DL (ref 70–99)
GLUCOSE SERPL-MCNC: 99 MG/DL (ref 70–99)
HCT VFR BLD AUTO: 36.1 % (ref 36–48)
HGB BLD-MCNC: 11.7 G/DL (ref 12–16)
MCH RBC QN AUTO: 27.9 PG (ref 26–34)
MCHC RBC AUTO-ENTMCNC: 32.5 G/DL (ref 31–36)
MCV RBC AUTO: 85.9 FL (ref 80–100)
PERFORMED ON: ABNORMAL
PERFORMED ON: NORMAL
PERFORMED ON: NORMAL
PLATELET # BLD AUTO: 202 K/UL (ref 135–450)
PMV BLD AUTO: 8 FL (ref 5–10.5)
POTASSIUM SERPL-SCNC: 3.9 MMOL/L (ref 3.5–5.1)
RBC # BLD AUTO: 4.2 M/UL (ref 4–5.2)
SODIUM SERPL-SCNC: 139 MMOL/L (ref 136–145)
WBC # BLD AUTO: 7.1 K/UL (ref 4–11)

## 2024-04-17 PROCEDURE — 80048 BASIC METABOLIC PNL TOTAL CA: CPT

## 2024-04-17 PROCEDURE — 6370000000 HC RX 637 (ALT 250 FOR IP): Performed by: STUDENT IN AN ORGANIZED HEALTH CARE EDUCATION/TRAINING PROGRAM

## 2024-04-17 PROCEDURE — 1200000000 HC SEMI PRIVATE

## 2024-04-17 PROCEDURE — 36415 COLL VENOUS BLD VENIPUNCTURE: CPT

## 2024-04-17 PROCEDURE — 2580000003 HC RX 258: Performed by: PHYSICIAN ASSISTANT

## 2024-04-17 PROCEDURE — 99232 SBSQ HOSP IP/OBS MODERATE 35: CPT | Performed by: INTERNAL MEDICINE

## 2024-04-17 PROCEDURE — 93306 TTE W/DOPPLER COMPLETE: CPT

## 2024-04-17 PROCEDURE — 2580000003 HC RX 258: Performed by: STUDENT IN AN ORGANIZED HEALTH CARE EDUCATION/TRAINING PROGRAM

## 2024-04-17 PROCEDURE — 6360000002 HC RX W HCPCS: Performed by: PHYSICIAN ASSISTANT

## 2024-04-17 PROCEDURE — 6370000000 HC RX 637 (ALT 250 FOR IP): Performed by: PHYSICIAN ASSISTANT

## 2024-04-17 PROCEDURE — 85027 COMPLETE CBC AUTOMATED: CPT

## 2024-04-17 PROCEDURE — 6360000002 HC RX W HCPCS: Performed by: STUDENT IN AN ORGANIZED HEALTH CARE EDUCATION/TRAINING PROGRAM

## 2024-04-17 RX ORDER — PANTOPRAZOLE SODIUM 40 MG/1
40 TABLET, DELAYED RELEASE ORAL
Status: DISCONTINUED | OUTPATIENT
Start: 2024-04-17 | End: 2024-04-24

## 2024-04-17 RX ADMIN — OXYCODONE HYDROCHLORIDE 5 MG: 5 TABLET ORAL at 01:53

## 2024-04-17 RX ADMIN — MONTELUKAST SODIUM 10 MG: 10 TABLET, FILM COATED ORAL at 20:15

## 2024-04-17 RX ADMIN — LORAZEPAM 0.5 MG: 0.5 TABLET ORAL at 21:47

## 2024-04-17 RX ADMIN — HYDROCHLOROTHIAZIDE 12.5 MG: 25 TABLET ORAL at 20:15

## 2024-04-17 RX ADMIN — PANTOPRAZOLE SODIUM 40 MG: 40 TABLET, DELAYED RELEASE ORAL at 13:13

## 2024-04-17 RX ADMIN — NEPHROCAP 1 MG: 1 CAP ORAL at 13:13

## 2024-04-17 RX ADMIN — ONDANSETRON 4 MG: 2 INJECTION INTRAMUSCULAR; INTRAVENOUS at 06:10

## 2024-04-17 RX ADMIN — LORAZEPAM 0.5 MG: 0.5 TABLET ORAL at 09:49

## 2024-04-17 RX ADMIN — LISINOPRIL 10 MG: 10 TABLET ORAL at 20:15

## 2024-04-17 RX ADMIN — CEFTRIAXONE SODIUM 1000 MG: 1 INJECTION, POWDER, FOR SOLUTION INTRAMUSCULAR; INTRAVENOUS at 20:22

## 2024-04-17 RX ADMIN — Medication 10 ML: at 19:40

## 2024-04-17 RX ADMIN — SODIUM CHLORIDE: 9 INJECTION, SOLUTION INTRAVENOUS at 19:40

## 2024-04-17 RX ADMIN — ATORVASTATIN CALCIUM 20 MG: 20 TABLET, FILM COATED ORAL at 20:15

## 2024-04-17 RX ADMIN — Medication 10 ML: at 09:50

## 2024-04-17 ASSESSMENT — PAIN DESCRIPTION - LOCATION: LOCATION: ABDOMEN

## 2024-04-17 ASSESSMENT — PAIN DESCRIPTION - ORIENTATION: ORIENTATION: MID

## 2024-04-17 ASSESSMENT — PAIN SCALES - GENERAL
PAINLEVEL_OUTOF10: 8
PAINLEVEL_OUTOF10: 2

## 2024-04-17 ASSESSMENT — PAIN DESCRIPTION - DESCRIPTORS: DESCRIPTORS: DISCOMFORT

## 2024-04-17 ASSESSMENT — PAIN SCALES - WONG BAKER: WONGBAKER_NUMERICALRESPONSE: HURTS A LITTLE BIT

## 2024-04-17 NOTE — PROGRESS NOTES
[facility]  Cardiology Note  [phone]    Chief Complaint   Patient presents with    Abdominal Pain     Pt here with daughter, per daughter pt was seen at GI doctor today (Dr. Marin) and told pt to go to ER for direct admit for hiatal hernia, also per daughter, states that \"the top of her stomach is twisted\" pt aslo c/o SOB       History of Present Illness:  Sonja Mcmahan is a 73 y.o. patient PMHx likely bicuspid AV, moderate AS, HTN who presented to the hospital with complaints of dyspnea and abdominal pain.     No acute events overnight. Stress test negative yesterday    Past Medical History:   has a past medical history of Asthma, Diabetes mellitus (HCC), Hyperlipidemia, and Hypertension.    Surgical History:   has a past surgical history that includes Hysterectomy, total abdominal and Elbow surgery.     Social History:   reports that she has never smoked. She has never used smokeless tobacco. She reports that she does not drink alcohol and does not use drugs.     Family History:  Family History   Problem Relation Age of Onset    Heart Disease Father     Heart Disease Brother      Home Medications:  Were reviewed and are listed in nursing record. and/or listed below  Prior to Admission medications    Medication Sig Start Date End Date Taking? Authorizing Provider   BIOTIN PO Take by mouth    Meenakshi Martinez MD   Multiple Vitamins-Minerals (STRESS B-COMPLEX/C/ZINC) TABS TAKE 1 TABLET BY MOUTH ONCE DAILY 5/1/22   Meenakshi Martinez MD   loratadine (CLARITIN) 10 MG tablet TAKE 1 TABLET BY MOUTH EVERY DAY 5/5/22   Meenakshi Martinez MD   albuterol sulfate HFA (PROVENTIL;VENTOLIN;PROAIR) 108 (90 Base) MCG/ACT inhaler Inhale 2 puffs into the lungs every 4 hours as needed for Shortness of Breath 1/1/19   Meenakshi Martinez MD   albuterol (PROVENTIL) (2.5 MG/3ML) 0.083% nebulizer solution Inhale 3 mLs into the lungs every 6 hours as needed 10/8/18 12/15/23  Meenakshi Martinez MD  ondansetron (ZOFRAN) injection 4 mg  4 mg IntraVENous Q6H PRN Christy, Kamilah H, DO   4 mg at 04/17/24 0610    polyethylene glycol (GLYCOLAX) packet 17 g  17 g Oral Daily PRN Christy, Kamilah H, DO        acetaminophen (TYLENOL) tablet 650 mg  650 mg Oral Q6H PRN Christy, Kamilah H, DO   650 mg at 04/16/24 2029    Or    acetaminophen (TYLENOL) suppository 650 mg  650 mg Rectal Q6H PRN Christy, Kamilah H, DO        oxyCODONE (ROXICODONE) immediate release tablet 5 mg  5 mg Oral Q4H PRN Christy, Kamilah H, DO   5 mg at 04/17/24 0153    pantoprazole (PROTONIX) tablet 40 mg  40 mg Oral Nightly Christy, Kamilah H, DO   40 mg at 04/16/24 2024    atorvastatin (LIPITOR) tablet 20 mg  20 mg Oral Nightly Christy, Kamilah H, DO   20 mg at 04/16/24 2024    lisinopril (PRINIVIL;ZESTRIL) tablet 10 mg  10 mg Oral Nightly Christy, Kamilah H, DO   10 mg at 04/16/24 2025    And    hydroCHLOROthiazide (HYDRODIURIL) tablet 12.5 mg  12.5 mg Oral Daily Christy, Kamilah H, DO   12.5 mg at 04/16/24 2024    cefTRIAXone (ROCEPHIN) 1,000 mg in sodium chloride 0.9 % 50 mL IVPB (mini-bag)  1,000 mg IntraVENous Q24H Muna Zhao PA-C   Stopped at 04/16/24 2110    0.9 % sodium chloride infusion   IntraVENous Continuous Christy, Kamilah H, DO 75 mL/hr at 04/16/24 0123 New Bag at 04/16/24 0123      Allergies:  Penicillins and Sulfa antibiotics     Review of Systems:   14 point ROS negative unless otherwise noted in HPI    Physical Examination:    Vitals:    04/17/24 0823   BP: 119/75   Pulse: 77   Resp: 14   Temp: 97.7 °F (36.5 °C)   SpO2: 95%    Weight - Scale: 79.2 kg (174 lb 9.6 oz)       GEN:   AxO x3  ENT:   normal ocular range of motion  LUNGS:  clear bilaterally  CV:   Regular rate and rhythm  II/VI BRENT at RSB  No rubs, murmurs, gallops  No edema  No JVD  Peripheral pulses 2+  ABD:   Non-tender, non-distended  EXTRM:  atraumatic  SKIN:   normal color, turgur  PSYCH:  normal mood and affect  NEURO:  Normal gross motor function and sensation

## 2024-04-17 NOTE — PROGRESS NOTES
Gastroenterology Progress Note      Sonja Mcmahan is a 73 y.o. female patient.  1. Hiatal hernia    2. Bacteriuria    3. Gall stones        SUBJECTIVE: Has less chest and epigastric pain since she has been n.p.o.        Physical    VITALS:  /67   Pulse 81   Temp 97.5 °F (36.4 °C) (Oral)   Resp 18   Ht 1.6 m (5' 3\")   Wt 79.2 kg (174 lb 9.6 oz)   SpO2 97%   BMI 30.93 kg/m²   TEMPERATURE:  Current - Temp: 97.5 °F (36.4 °C); Max - Temp  Av °F (36.7 °C)  Min: 97.3 °F (36.3 °C)  Max: 99 °F (37.2 °C)    NAD  RRR, Nl s1s2  Lungs CTA Bilaterally, normal effort  Abdomen soft, ND, NT, no HSM, Bowel sounds normal       Data    Data Review:    Recent Labs     04/15/24  1756 24  0534 24  0550   WBC 7.7 6.9 7.1   HGB 13.9 12.0 11.7*   HCT 41.9 35.3* 36.1   MCV 86.7 85.5 85.9    198 202     Recent Labs     04/15/24  1756 24  0550    139   K 3.8 3.9    106   CO2 20* 24   BUN 13 12   CREATININE 0.8 0.9     Recent Labs     04/15/24  1756   AST 19   ALT 15   BILITOT 0.5   ALKPHOS 79     Recent Labs     04/15/24  1756   LIPASE 59.0     No results for input(s): \"PROTIME\", \"INR\" in the last 72 hours.  No results for input(s): \"PTT\" in the last 72 hours.           ASSESSMENT / PLAN      Lower chest and upper abdominal pain -for 3 weeks.  Pain is constant but worse with p.o. intake.  Also some shortness of breath.  Labs okay as above.  No cardiac source of her pain per cardiology eval.  CT showed large hiatal hernia as well as distended gallbladder with gallstones.  Her symptoms may be from her hiatal hernia.  Gallstones potentially could cause the symptoms.  Has been n.p.o. today and had less pain while n.p.o.  -Full liquid diet today  -N.p.o. midnight  -PPI  -EGD tomorrow     Discussed with Dr. Markos Garcia, PA-C  [facility]astro [facility]    I have personally performed a face to face diagnostic evaluation on this patient.  I have interviewed and examined the patient and

## 2024-04-17 NOTE — PROGRESS NOTES
ADVANCED CARE PLANNING    Name:Sonja Mcmahan       :  1950              MRN:  [id number]      Purpose of Encounter: Advanced care planning in light of problem listed above   Parties in attendance: :Kemi Foss MD, Family members:  Decisional Capacity:Yes    Diagnosis: Principal Problem:    Hiatal hernia  Active Problems:    Gastroesophageal reflux disease with esophagitis    Hyperlipidemia    Obesity    Diverticulosis    History of hysterectomy    Intractable abdominal pain  Resolved Problems:    * No resolved hospital problems. *    Patients Medical Story:Presented with worsening symptom of dx above. With at risk for life threatening event. Procedure and testing as noted in progress noted. We discussed patient long term goal and also wishes and aggressive care. Discussed in detail about code status and what it means with detailed explanation.   Goals of Care Determinations: Patient wishes to focus on full code with aggressive care, CPR, intubation long term vent and facility as well.   Plan: Will notify Abiodun Amanda PA of change in care plan. Will look at further interventions as needed.   Code Status: At this time patient wishes to be Full Code  Time Spent with Patient: 21 minutes      Electronically signed by Kemi Rosa MD on 2024 at 2:06 PM  Thank you Abiodun Amanda PA for the opportunity to be involved in this patient's care.

## 2024-04-17 NOTE — PLAN OF CARE
Problem: Chronic Conditions and Co-morbidities  Goal: Patient's chronic conditions and co-morbidity symptoms are monitored and maintained or improved  Outcome: Not Progressing     Problem: Pain  Goal: Verbalizes/displays adequate comfort level or baseline comfort level  Outcome: Progressing     Problem: Chronic Conditions and Co-morbidities  Goal: Patient's chronic conditions and co-morbidity symptoms are monitored and maintained or improved  Outcome: Not Progressing

## 2024-04-17 NOTE — PROGRESS NOTES
V2.0    [facility] [place]      Name:  Sonja Mcmahan /Age/Sex: 1950  (73 y.o. female)   MRN & CSN:  [id number] & [id number] Encounter Date/Time: 2024 3:08 PM EDT   Location:  [facility]-5568/5568-01 PCP: Abiodun Amanda PA     Attending:Kemi Rosa MD       Hospital Day: 3    Assessment and Recommendations   Sonja Mcmahan is a 73 y.o. female who presents with Hiatal hernia      Plan:     Hital hernia   With abdominal pain   Npo  Ppi  EGD       Gallstone without any cholecystitis  Gen surg consult    HTN    Diverticulosis of sigmoid colon   No signs of infection     Hx of biscupid valve and  moder AS  With chest pain on exertion  Optimized for procedure           Diet Diet NPO Exceptions are: Sips of Water with Meds  Diet NPO Exceptions are: Sips of Water with Meds   DVT Prophylaxis    Code Status Full Code   Disposition          Personally reviewed Lab Studies and Imaging         Subjective:     Chief Complaint:     Sonja Mcmahan is a 73 y.o. female who presents with       Review of Systems:      Pertinent positives and negatives discussed in HPI    Objective:     Intake/Output Summary (Last 24 hours) at 2024 1405  Last data filed at 2024 0950  Gross per 24 hour   Intake 20 ml   Output 300 ml   Net -280 ml      Vitals:   Vitals:    24 0900 24 1224 24 1251 24 1330   BP: (!) 144/66 (!) 183/74 123/77 111/67   Pulse: 84 85 83 81   Resp: 18 18 18    Temp: 98.3 °F (36.8 °C) 97.7 °F (36.5 °C)  97.5 °F (36.4 °C)   TempSrc: Oral Oral  Oral   SpO2: 94%  95% 97%   Weight:       Height:             Physical Exam:      General: NAD  Eyes: EOMI  ENT: neck supple  Cardiovascular: Regular rate.  Respiratory: Clear to auscultation  Gastrointestinal: Soft, non tender  Genitourinary: no suprapubic tenderness  Musculoskeletal: No edema  Skin: warm, dry  Neuro: Alert.  Psych: Mood appropriate.         Medications:   Medications:    pantoprazole  40 mg Oral QAM AC    montelukast   10 mg Oral Nightly    Virt-Caps  1 capsule Oral Daily    sodium chloride flush  5-40 mL IntraVENous 2 times per day    atorvastatin  20 mg Oral Nightly    lisinopril  10 mg Oral Nightly    And    hydroCHLOROthiazide  12.5 mg Oral Daily    cefTRIAXone (ROCEPHIN) IV  1,000 mg IntraVENous Q24H      Infusions:    sodium chloride      sodium chloride 75 mL/hr at 04/16/24 0123     PRN Meds: LORazepam, 0.5 mg, Q8H PRN  sodium chloride flush, 5-40 mL, PRN  sodium chloride, , PRN  potassium chloride, 40 mEq, PRN   Or  potassium alternative oral replacement, 40 mEq, PRN   Or  potassium chloride, 10 mEq, PRN  magnesium sulfate, 2,000 mg, PRN  ondansetron, 4 mg, Q8H PRN   Or  ondansetron, 4 mg, Q6H PRN  polyethylene glycol, 17 g, Daily PRN  acetaminophen, 650 mg, Q6H PRN   Or  acetaminophen, 650 mg, Q6H PRN  oxyCODONE, 5 mg, Q4H PRN        Labs and Imaging   CT ABDOMEN PELVIS W IV CONTRAST Additional Contrast? Oral    Result Date: 4/15/2024  EXAMINATION: CT OF THE ABDOMEN AND PELVIS WITH CONTRAST 4/15/2024 7:43 pm TECHNIQUE: CT of the abdomen and pelvis was performed with the administration of intravenous contrast. Multiplanar reformatted images are provided for review. Automated exposure control, iterative reconstruction, and/or weight based adjustment of the mA/kV was utilized to reduce the radiation dose to as low as reasonably achievable. COMPARISON: None. HISTORY: ORDERING SYSTEM PROVIDED HISTORY: eval for obstruction TECHNOLOGIST PROVIDED HISTORY: Reason for exam:->eval for obstruction Additional Contrast?->Oral Decision Support Exception - unselect if not a suspected or confirmed emergency medical condition->Emergency Medical Condition (MA) Reason for Exam: eval for obstruction FINDINGS: Lower Chest: There are linear reticulations peripherally in the lung bases most likely representing a combination of scarring and atelectasis.  There is no pleural effusion. Organs: The liver is normal in size with smooth contours and

## 2024-04-18 PROBLEM — K80.20 GALL STONES: Status: ACTIVE | Noted: 2024-04-18

## 2024-04-18 LAB
ANION GAP SERPL CALCULATED.3IONS-SCNC: 10 MMOL/L (ref 3–16)
BUN SERPL-MCNC: 8 MG/DL (ref 7–20)
CALCIUM SERPL-MCNC: 9.3 MG/DL (ref 8.3–10.6)
CHLORIDE SERPL-SCNC: 108 MMOL/L (ref 99–110)
CO2 SERPL-SCNC: 24 MMOL/L (ref 21–32)
CREAT SERPL-MCNC: 0.8 MG/DL (ref 0.6–1.2)
DEPRECATED RDW RBC AUTO: 14.9 % (ref 12.4–15.4)
GFR SERPLBLD CREATININE-BSD FMLA CKD-EPI: 76 ML/MIN/{1.73_M2}
GLUCOSE BLD-MCNC: 105 MG/DL (ref 70–99)
GLUCOSE BLD-MCNC: 120 MG/DL (ref 70–99)
GLUCOSE BLD-MCNC: 75 MG/DL (ref 70–99)
GLUCOSE BLD-MCNC: 94 MG/DL (ref 70–99)
GLUCOSE BLD-MCNC: 99 MG/DL (ref 70–99)
GLUCOSE SERPL-MCNC: 107 MG/DL (ref 70–99)
HCT VFR BLD AUTO: 36.8 % (ref 36–48)
HGB BLD-MCNC: 12 G/DL (ref 12–16)
MCH RBC QN AUTO: 28 PG (ref 26–34)
MCHC RBC AUTO-ENTMCNC: 32.7 G/DL (ref 31–36)
MCV RBC AUTO: 85.8 FL (ref 80–100)
PERFORMED ON: ABNORMAL
PERFORMED ON: ABNORMAL
PERFORMED ON: NORMAL
PLATELET # BLD AUTO: 192 K/UL (ref 135–450)
PMV BLD AUTO: 8 FL (ref 5–10.5)
POTASSIUM SERPL-SCNC: 3.8 MMOL/L (ref 3.5–5.1)
RBC # BLD AUTO: 4.29 M/UL (ref 4–5.2)
SODIUM SERPL-SCNC: 142 MMOL/L (ref 136–145)
WBC # BLD AUTO: 6.1 K/UL (ref 4–11)

## 2024-04-18 PROCEDURE — 6370000000 HC RX 637 (ALT 250 FOR IP): Performed by: STUDENT IN AN ORGANIZED HEALTH CARE EDUCATION/TRAINING PROGRAM

## 2024-04-18 PROCEDURE — 0W3P8ZZ CONTROL BLEEDING IN GASTROINTESTINAL TRACT, VIA NATURAL OR ARTIFICIAL OPENING ENDOSCOPIC: ICD-10-PCS | Performed by: INTERNAL MEDICINE

## 2024-04-18 PROCEDURE — 3700000000 HC ANESTHESIA ATTENDED CARE: Performed by: INTERNAL MEDICINE

## 2024-04-18 PROCEDURE — 2709999900 HC NON-CHARGEABLE SUPPLY: Performed by: INTERNAL MEDICINE

## 2024-04-18 PROCEDURE — 3609017700 HC EGD DILATION GASTRIC/DUODENAL STRICTURE: Performed by: INTERNAL MEDICINE

## 2024-04-18 PROCEDURE — 3700000001 HC ADD 15 MINUTES (ANESTHESIA): Performed by: INTERNAL MEDICINE

## 2024-04-18 PROCEDURE — 0DB58ZX EXCISION OF ESOPHAGUS, VIA NATURAL OR ARTIFICIAL OPENING ENDOSCOPIC, DIAGNOSTIC: ICD-10-PCS | Performed by: INTERNAL MEDICINE

## 2024-04-18 PROCEDURE — 6360000002 HC RX W HCPCS: Performed by: NURSE ANESTHETIST, CERTIFIED REGISTERED

## 2024-04-18 PROCEDURE — 36415 COLL VENOUS BLD VENIPUNCTURE: CPT

## 2024-04-18 PROCEDURE — 2580000003 HC RX 258: Performed by: INTERNAL MEDICINE

## 2024-04-18 PROCEDURE — 2720000010 HC SURG SUPPLY STERILE: Performed by: INTERNAL MEDICINE

## 2024-04-18 PROCEDURE — 6370000000 HC RX 637 (ALT 250 FOR IP): Performed by: INTERNAL MEDICINE

## 2024-04-18 PROCEDURE — 7100000000 HC PACU RECOVERY - FIRST 15 MIN: Performed by: INTERNAL MEDICINE

## 2024-04-18 PROCEDURE — 85027 COMPLETE CBC AUTOMATED: CPT

## 2024-04-18 PROCEDURE — 2580000003 HC RX 258: Performed by: STUDENT IN AN ORGANIZED HEALTH CARE EDUCATION/TRAINING PROGRAM

## 2024-04-18 PROCEDURE — 1200000000 HC SEMI PRIVATE

## 2024-04-18 PROCEDURE — 0DB68ZZ EXCISION OF STOMACH, VIA NATURAL OR ARTIFICIAL OPENING ENDOSCOPIC: ICD-10-PCS | Performed by: INTERNAL MEDICINE

## 2024-04-18 PROCEDURE — 99222 1ST HOSP IP/OBS MODERATE 55: CPT | Performed by: SURGERY

## 2024-04-18 PROCEDURE — 3609012400 HC EGD TRANSORAL BIOPSY SINGLE/MULTIPLE: Performed by: INTERNAL MEDICINE

## 2024-04-18 PROCEDURE — APPNB30 APP NON BILLABLE TIME 0-30 MINS: Performed by: NURSE PRACTITIONER

## 2024-04-18 PROCEDURE — 3609013500 HC EGD REMOVAL TUMOR POLYP/OTHER LESION SNARE TECH: Performed by: INTERNAL MEDICINE

## 2024-04-18 PROCEDURE — 2500000003 HC RX 250 WO HCPCS: Performed by: NURSE ANESTHETIST, CERTIFIED REGISTERED

## 2024-04-18 PROCEDURE — 80048 BASIC METABOLIC PNL TOTAL CA: CPT

## 2024-04-18 PROCEDURE — 7100000001 HC PACU RECOVERY - ADDTL 15 MIN: Performed by: INTERNAL MEDICINE

## 2024-04-18 PROCEDURE — 0DB68ZX EXCISION OF STOMACH, VIA NATURAL OR ARTIFICIAL OPENING ENDOSCOPIC, DIAGNOSTIC: ICD-10-PCS | Performed by: INTERNAL MEDICINE

## 2024-04-18 PROCEDURE — 88305 TISSUE EXAM BY PATHOLOGIST: CPT

## 2024-04-18 PROCEDURE — 6360000002 HC RX W HCPCS: Performed by: INTERNAL MEDICINE

## 2024-04-18 RX ORDER — LIDOCAINE HYDROCHLORIDE 20 MG/ML
INJECTION, SOLUTION INFILTRATION; PERINEURAL PRN
Status: DISCONTINUED | OUTPATIENT
Start: 2024-04-18 | End: 2024-04-18 | Stop reason: SDUPTHER

## 2024-04-18 RX ORDER — PROPOFOL 10 MG/ML
INJECTION, EMULSION INTRAVENOUS CONTINUOUS PRN
Status: DISCONTINUED | OUTPATIENT
Start: 2024-04-18 | End: 2024-04-18 | Stop reason: SDUPTHER

## 2024-04-18 RX ORDER — DEXMEDETOMIDINE HYDROCHLORIDE 100 UG/ML
INJECTION, SOLUTION INTRAVENOUS PRN
Status: DISCONTINUED | OUTPATIENT
Start: 2024-04-18 | End: 2024-04-18 | Stop reason: SDUPTHER

## 2024-04-18 RX ADMIN — MONTELUKAST SODIUM 10 MG: 10 TABLET, FILM COATED ORAL at 20:41

## 2024-04-18 RX ADMIN — LORAZEPAM 0.5 MG: 0.5 TABLET ORAL at 22:01

## 2024-04-18 RX ADMIN — NEPHROCAP 1 MG: 1 CAP ORAL at 12:31

## 2024-04-18 RX ADMIN — SODIUM CHLORIDE: 9 INJECTION, SOLUTION INTRAVENOUS at 18:21

## 2024-04-18 RX ADMIN — Medication 10 ML: at 20:41

## 2024-04-18 RX ADMIN — HYDROCHLOROTHIAZIDE 12.5 MG: 25 TABLET ORAL at 20:41

## 2024-04-18 RX ADMIN — DEXMEDETOMIDINE HYDROCHLORIDE 10 MCG: 100 INJECTION, SOLUTION INTRAVENOUS at 08:10

## 2024-04-18 RX ADMIN — PROPOFOL 60 MG: 10 INJECTION, EMULSION INTRAVENOUS at 08:13

## 2024-04-18 RX ADMIN — LIDOCAINE HYDROCHLORIDE 100 MG: 20 INJECTION, SOLUTION INFILTRATION; PERINEURAL at 08:12

## 2024-04-18 RX ADMIN — CEFTRIAXONE SODIUM 1000 MG: 1 INJECTION, POWDER, FOR SOLUTION INTRAMUSCULAR; INTRAVENOUS at 20:45

## 2024-04-18 RX ADMIN — DEXMEDETOMIDINE HYDROCHLORIDE 6 MCG: 100 INJECTION, SOLUTION INTRAVENOUS at 08:18

## 2024-04-18 RX ADMIN — Medication 10 ML: at 12:33

## 2024-04-18 RX ADMIN — PROPOFOL 20 MG: 10 INJECTION, EMULSION INTRAVENOUS at 08:30

## 2024-04-18 RX ADMIN — PROPOFOL 120 MCG/KG/MIN: 10 INJECTION, EMULSION INTRAVENOUS at 08:12

## 2024-04-18 RX ADMIN — ATORVASTATIN CALCIUM 20 MG: 20 TABLET, FILM COATED ORAL at 20:41

## 2024-04-18 RX ADMIN — DEXMEDETOMIDINE HYDROCHLORIDE 4 MCG: 100 INJECTION, SOLUTION INTRAVENOUS at 08:14

## 2024-04-18 ASSESSMENT — PAIN DESCRIPTION - DESCRIPTORS: DESCRIPTORS: DISCOMFORT

## 2024-04-18 ASSESSMENT — PAIN SCALES - GENERAL: PAINLEVEL_OUTOF10: 0

## 2024-04-18 ASSESSMENT — PAIN DESCRIPTION - LOCATION: LOCATION: THROAT

## 2024-04-18 ASSESSMENT — PAIN - FUNCTIONAL ASSESSMENT: PAIN_FUNCTIONAL_ASSESSMENT: ACTIVITIES ARE NOT PREVENTED

## 2024-04-18 ASSESSMENT — PAIN DESCRIPTION - PAIN TYPE: TYPE: ACUTE PAIN

## 2024-04-18 NOTE — PROGRESS NOTES
Shift assessment completed. Routine vitals obtained and stable. Scheduled medications given. Patient is awake, alert and oriented. Respirations are easy and unlabored. Ambulates independently in room. Patient does not appear to be in distress, resting comfortably in bed at this time. Call light within reach.

## 2024-04-18 NOTE — PROGRESS NOTES
Nutrition Note    RECOMMENDATIONS  PO Diet: Advancement per MD  ONS: Offer Ensure Clear BID  Nutrition Support: None    Nursing: Please obtain actual weight     ASSESSMENT   Pt unavailable upon RD visit x 2 today. All information obtained from chart review. Pt triggered positive nursing nutrition screen for MST 2. Pt with abdominal pain that was worse with eating over the past 3 weeks and reports decreased PO intake during that time. Pt reports 12 lb unintentional weight loss. Current weight is stated. Do note 13 lb (7%) loss since last documented weight of 187 lbs in December 2023 if current weight is correct. Recommend to obtain actual weight. Pt s/p EGD this morning and is currently on a clear liquid diet. Will offer Ensure Clear BID for additional nutrition.       Malnutrition Status: Insufficient data    NUTRITION DIAGNOSIS   Inadequate protein-energy intake related to inadequate protein-energy intake as evidenced by NPO or clear liquid status due to medical condition    Goals: PO intake 50% or greater, prior to discharge     NUTRITION RELATED FINDINGS  Objective: No edema noted. LBM 4/16.  Wounds: None    CURRENT NUTRITION THERAPIES  ADULT DIET; Clear Liquid     PO Intake: Unable to assess   PO Supplement Intake:None Ordered    ANTHROPOMETRICS  Current Height: 160 cm (5' 3\")  Current Weight - Scale: 78.5 kg (173 lb)    Ideal Body Weight (IBW): 115 lbs  (52 kg)        BMI: 30.7    EDUCATION  Education not indicated     The patient will be monitored per nutrition standards of care. Consult dietitian if additional nutrition interventions are needed prior to RD reassessment.     Camelia Powers MS, RD, LD    Contact: [phone]

## 2024-04-18 NOTE — PROGRESS NOTES
Pt stable and able to be transferred from PACU to room 5568. A&O , VSS, with no complaints at this time. 5T called and notified that pt is being transferred out of PACU and to room.

## 2024-04-18 NOTE — PLAN OF CARE
Problem: Pain  Goal: Verbalizes/displays adequate comfort level or baseline comfort level  Outcome: Progressing     Problem: Chronic Conditions and Co-morbidities  Goal: Patient's chronic conditions and co-morbidity symptoms are monitored and maintained or improved  Outcome: Progressing     Problem: Nutrition Deficit:  Goal: Optimize nutritional status  Outcome: Progressing

## 2024-04-18 NOTE — ANESTHESIA PRE PROCEDURE
H, DO   0.5 mg at 04/16/24 0138   • montelukast (SINGULAIR) tablet 10 mg  10 mg Oral Nightly ChristyKamilah moran H, DO   10 mg at 04/16/24 0138   • Virt-Caps 1 mg  1 capsule Oral Daily ChristyKamilah moran H, DO   1 mg at 04/16/24 0815   • sodium chloride flush 0.9 % injection 5-40 mL  5-40 mL IntraVENous 2 times per day ChristyKamilah moran H, DO   10 mL at 04/16/24 0815   • sodium chloride flush 0.9 % injection 5-40 mL  5-40 mL IntraVENous PRN Christy, Kamilah H, DO       • 0.9 % sodium chloride infusion   IntraVENous PRN Kamilah Harrison H, DO       • potassium chloride (KLOR-CON M) extended release tablet 40 mEq  40 mEq Oral PRN Christy, Kamilah H, DO        Or   • potassium bicarb-citric acid (EFFER-K) effervescent tablet 40 mEq  40 mEq Oral PRN Christy, Kamilah H, DO        Or   • potassium chloride 10 mEq/100 mL IVPB (Peripheral Line)  10 mEq IntraVENous PRN Christy, Kamilah H, DO       • magnesium sulfate 2000 mg in 50 mL IVPB premix  2,000 mg IntraVENous PRN Christy, Kamilah H, DO       • ondansetron (ZOFRAN-ODT) disintegrating tablet 4 mg  4 mg Oral Q8H PRN Christy, Kamilah H, DO        Or   • ondansetron (ZOFRAN) injection 4 mg  4 mg IntraVENous Q6H PRN Christy, Kamilah H, DO       • polyethylene glycol (GLYCOLAX) packet 17 g  17 g Oral Daily PRN Christy, Kamilah H, DO       • acetaminophen (TYLENOL) tablet 650 mg  650 mg Oral Q6H PRN Christy, Kamilah H, DO   650 mg at 04/16/24 0138    Or   • acetaminophen (TYLENOL) suppository 650 mg  650 mg Rectal Q6H PRN Christy, Kamilah H, DO       • oxyCODONE (ROXICODONE) immediate release tablet 5 mg  5 mg Oral Q4H PRN Christy, Kamilah H, DO       • pantoprazole (PROTONIX) tablet 40 mg  40 mg Oral Nightly Christy, Kamilah H, DO   40 mg at 04/16/24 0138   • atorvastatin (LIPITOR) tablet 20 mg  20 mg Oral Nightly Kamilah Harrison, DO   20 mg at 04/16/24 0138   • lisinopril (PRINIVIL;ZESTRIL) tablet 10 mg  10 mg Oral Nightly Kamilah Harrison, DO   10 mg at 04/16/24 0138    And   • hydroCHLOROthiazide 
    BMI:   Wt Readings from Last 3 Encounters:   04/16/24 79.2 kg (174 lb 9.6 oz)   12/15/23 85.2 kg (187 lb 14.4 oz)   06/27/23 84.7 kg (186 lb 11.2 oz)     Body mass index is 30.93 kg/m².    CBC:   Lab Results   Component Value Date/Time    WBC 6.1 04/18/2024 05:25 AM    RBC 4.29 04/18/2024 05:25 AM    HGB 12.0 04/18/2024 05:25 AM    HCT 36.8 04/18/2024 05:25 AM    MCV 85.8 04/18/2024 05:25 AM    RDW 14.9 04/18/2024 05:25 AM     04/18/2024 05:25 AM       CMP:   Lab Results   Component Value Date/Time     04/18/2024 05:25 AM    K 3.8 04/18/2024 05:25 AM     04/18/2024 05:25 AM    CO2 24 04/18/2024 05:25 AM    BUN 8 04/18/2024 05:25 AM    CREATININE 0.8 04/18/2024 05:25 AM    GFRAA >60 10/10/2019 10:01 AM    AGRATIO 1.2 04/15/2024 05:56 PM    LABGLOM 76 04/18/2024 05:25 AM    GLUCOSE 107 04/18/2024 05:25 AM    PROT 7.0 04/15/2024 05:56 PM    CALCIUM 9.3 04/18/2024 05:25 AM    BILITOT 0.5 04/15/2024 05:56 PM    ALKPHOS 79 04/15/2024 05:56 PM    AST 19 04/15/2024 05:56 PM    ALT 15 04/15/2024 05:56 PM       POC Tests:   Recent Labs     04/17/24 1922   POCGLU 101*       Coags: No results found for: \"PROTIME\", \"INR\", \"APTT\"    HCG (If Applicable): No results found for: \"PREGTESTUR\", \"PREGSERUM\", \"HCG\", \"HCGQUANT\"     ABGs: No results found for: \"PHART\", \"PO2ART\", \"ODO8ULF\", \"DAW1KCG\", \"BEART\", \"B3ONMRWW\"     Type & Screen (If Applicable):  No results found for: \"LABABO\", \"LABRH\"    Drug/Infectious Status (If Applicable):  No results found for: \"HIV\", \"HEPCAB\"    COVID-19 Screening (If Applicable): No results found for: \"COVID19\"        Anesthesia Evaluation  Patient summary reviewed and Nursing notes reviewed   no history of anesthetic complications:   Airway: Mallampati: III  TM distance: <3 FB   Neck ROM: full  Mouth opening: > = 3 FB   Dental:    (+) upper dentures      Pulmonary:normal exam  breath sounds clear to auscultation  (+)           asthma (prn inh, no recent issues): seasonal asthma,

## 2024-04-18 NOTE — PROGRESS NOTES
9:25 am: PACU nurseAna, called report to writer following EGD. VS obtained and stable. Patient re-oriented to unit, room and staff. No needs expressed at this time. Call light in reach.

## 2024-04-18 NOTE — CONSULTS
[facility] GENERAL AND LAPAROSCOPIC SURGERY                       PATIENT NAME: Sonja Mcmahan     ADMISSION DATE: 4/15/2024  5:23 PM      TODAY'S DATE: 4/18/2024    Reason for Consult:  Abd pain    Requesting Physician:  Dr. STEVEN Burrows    HISTORY OF PRESENT ILLNESS:              The patient is a 73 y.o. female who presents with epigastric apin, sharp, focal, more with pressure and eating. Several weeks duration. Not eating well, losing weight over same time frame. Feels bloated and nauseated with eating. Has known long standing hiatal hernia. EGD today to eval, concern of twisting in mid to distal stomach along with polyp removed, and a second larger mass noted and biopsied.    Past Medical History:        Diagnosis Date    Asthma     Diabetes mellitus (HCC)     Hyperlipidemia     Hypertension        Past Surgical History:        Procedure Laterality Date    ELBOW SURGERY      HYSTERECTOMY, TOTAL ABDOMINAL (CERVIX REMOVED)      UPPER GASTROINTESTINAL ENDOSCOPY N/A 4/18/2024    ESOPHAGOGASTRODUODENOSCOPY DILATION BALLOON performed by Cortez Burrows MD at [facility] ENDOSCOPY    UPPER GASTROINTESTINAL ENDOSCOPY N/A 4/18/2024    ESOPHAGOGASTRODUODENOSCOPY BIOPSY performed by Cortez Burrows MD at [facility] ENDOSCOPY    UPPER GASTROINTESTINAL ENDOSCOPY N/A 4/18/2024    ESOPHAGOGASTRODUODENOSCOPY POLYP SNARE performed by Cortez Burrows MD at [facility] ENDOSCOPY       Current Medications:   Current Facility-Administered Medications: pantoprazole (PROTONIX) tablet 40 mg, 40 mg, Oral, QAM AC  LORazepam (ATIVAN) tablet 0.5 mg, 0.5 mg, Oral, Q8H PRN  montelukast (SINGULAIR) tablet 10 mg, 10 mg, Oral, Nightly  Virt-Caps 1 mg, 1 capsule, Oral, Daily  sodium chloride flush 0.9 % injection 5-40 mL, 5-40 mL, IntraVENous, 2 times per day  sodium chloride flush 0.9 % injection 5-40 mL, 5-40 mL, IntraVENous, PRN  0.9 % sodium chloride infusion, , IntraVENous, PRN  potassium chloride (KLOR-CON M) extended release tablet  endplate  sclerosis, and a disc osteophyte complex.  There is no acute osseous  abnormality.     IMPRESSION:  1. No acute intra-abdominal or pelvic process.  2. Large hiatal hernia.  3. Cholelithiasis without evidence of acute cholecystitis.  4. Diverticulosis of the sigmoid colon without inflammatory change.            IMPRESSION/RECOMMENDATIONS:    Hiatal hernia - large and likely symptomatic  Obstructive symptomatology as opposed to reflux issues overall, so the mass and twist area may be more relevant    Gastric mass - mid / distal lesion biopsied, body lesion removed - path pending    Follow up pathology  Check UGI  Has gallstones    Will recommend surgery: HH repair, lap antonio, possible gastric resection  Check studies, check pathology and plan accordingly    Thank you,    David Mack MD

## 2024-04-18 NOTE — PROGRESS NOTES
Pt transferred to room 5568 at this time. A&O with no signs of distress. Report given to Sonia ANTONIO. V/u and denies questions or further needs at this time.

## 2024-04-18 NOTE — ANESTHESIA POSTPROCEDURE EVALUATION
Department of Anesthesiology  Postprocedure Note    Patient: Sonja Mcmahan  MRN: [id number]  YOB: 1950  Date of evaluation: 4/18/2024    Procedure Summary       Date: 04/18/24 Room / Location: [place] [address] / [facility]    Anesthesia Start: 0809 Anesthesia Stop: 0901    Procedures:       ESOPHAGOGASTRODUODENOSCOPY DILATION BALLOON (Abdomen)      ESOPHAGOGASTRODUODENOSCOPY BIOPSY (Abdomen)      ESOPHAGOGASTRODUODENOSCOPY POLYP SNARE (Abdomen) Diagnosis:       Generalized abdominal pain      (Generalized abdominal pain [R10.84])    Surgeons: Cortez Burrows MD Responsible Provider: Alfredo Jones MD    Anesthesia Type: MAC ASA Status: 3            Anesthesia Type: No value filed.    Jessika Phase I: Jessika Score: 7    Jessika Phase II:      Anesthesia Post Evaluation    Patient location during evaluation: PACU  Patient participation: complete - patient participated  Level of consciousness: awake and alert  Airway patency: patent  Nausea & Vomiting: no vomiting and no nausea  Cardiovascular status: hemodynamically stable  Respiratory status: acceptable  Hydration status: stable  Pain management: adequate    No notable events documented.

## 2024-04-18 NOTE — PROGRESS NOTES
V2.0    [facility] [place] Note      Name:  Sonja Mcmahan /Age/Sex: 1950  (73 y.o. female)   MRN & CSN:  [id number] & [id number] Encounter Date/Time: 2024 3:08 PM EDT   Location:  [facility]-5568/5568-01 PCP: Abiodun Amanda PA     Attending:Kemi Rosa MD       Hospital Day: 4    Assessment and Recommendations   Sonja Mcmahan is a 73 y.o. female who presents with Hiatal hernia      Plan:     Hital hernia   With abdominal pain   Patient underwent EGD.  Had polyp that was removed.  Dilated.  Follow-up on biopsy.  General surgery has been consulted.  Further recommendation to follow depending on surgery if surgically manage      Gallstone without any cholecystitis  Gen surg consult    HTN    Diverticulosis of sigmoid colon   No signs of infection     Hx of biscupid valve and  moder AS  With chest pain on exertion  Optimized for procedure           Diet ADULT DIET; Clear Liquid   DVT Prophylaxis    Code Status Full Code   Disposition          Personally reviewed Lab Studies and Imaging         Subjective:     Chief Complaint:     Sonja Mcmahan is a 73 y.o. female who presents with       Review of Systems:      Pertinent positives and negatives discussed in HPI    Objective:     Intake/Output Summary (Last 24 hours) at 2024 1152  Last data filed at 2024 0856  Gross per 24 hour   Intake 700 ml   Output --   Net 700 ml      Vitals:   Vitals:    24 0915 24 0928 24 0930 24 1118   BP: (!) 106/59 117/66 117/66 137/62   Pulse: 83 74 73 74   Resp: 15 14 12 14   Temp:  98.1 °F (36.7 °C) 98.1 °F (36.7 °C) 97.6 °F (36.4 °C)   TempSrc:  Temporal Temporal Oral   SpO2: 99% 97% 96% 96%   Weight:       Height:             Physical Exam:      General: NAD  Eyes: EOMI  ENT: neck supple  Cardiovascular: Regular rate.  Respiratory: Clear to auscultation  Gastrointestinal: Soft, non tender  Genitourinary: no suprapubic tenderness  Musculoskeletal: No edema  Skin: warm, dry  Neuro:

## 2024-04-18 NOTE — PROGRESS NOTES
Egd on chart, large hh, polyp removed, esoph dilated, mid body ?torsion vs level of diaphragm, antrum ? Mass biopsied.  Rec  follow up biopsies  surgery consult (dr acosta visualized findings at endoscopy today)  npo  at this point mgmt largely surgical. please call back if needed  discussed with pt and family

## 2024-04-19 ENCOUNTER — ANESTHESIA EVENT (OUTPATIENT)
Dept: OPERATING ROOM | Age: 74
End: 2024-04-19
Payer: MEDICARE

## 2024-04-19 ENCOUNTER — APPOINTMENT (OUTPATIENT)
Dept: GENERAL RADIOLOGY | Age: 74
DRG: 326 | End: 2024-04-19
Payer: MEDICARE

## 2024-04-19 LAB
ANION GAP SERPL CALCULATED.3IONS-SCNC: 12 MMOL/L (ref 3–16)
BUN SERPL-MCNC: 6 MG/DL (ref 7–20)
CALCIUM SERPL-MCNC: 9.3 MG/DL (ref 8.3–10.6)
CHLORIDE SERPL-SCNC: 111 MMOL/L (ref 99–110)
CO2 SERPL-SCNC: 22 MMOL/L (ref 21–32)
CREAT SERPL-MCNC: 0.8 MG/DL (ref 0.6–1.2)
GFR SERPLBLD CREATININE-BSD FMLA CKD-EPI: 76 ML/MIN/{1.73_M2}
GLUCOSE BLD-MCNC: 100 MG/DL (ref 70–99)
GLUCOSE BLD-MCNC: 108 MG/DL (ref 70–99)
GLUCOSE BLD-MCNC: 111 MG/DL (ref 70–99)
GLUCOSE BLD-MCNC: 131 MG/DL (ref 70–99)
GLUCOSE SERPL-MCNC: 112 MG/DL (ref 70–99)
MAGNESIUM SERPL-MCNC: 1.6 MG/DL (ref 1.8–2.4)
PERFORMED ON: ABNORMAL
POTASSIUM SERPL-SCNC: 3.5 MMOL/L (ref 3.5–5.1)
SODIUM SERPL-SCNC: 145 MMOL/L (ref 136–145)

## 2024-04-19 PROCEDURE — APPNB30 APP NON BILLABLE TIME 0-30 MINS: Performed by: NURSE PRACTITIONER

## 2024-04-19 PROCEDURE — 83036 HEMOGLOBIN GLYCOSYLATED A1C: CPT

## 2024-04-19 PROCEDURE — 2580000003 HC RX 258: Performed by: INTERNAL MEDICINE

## 2024-04-19 PROCEDURE — 6370000000 HC RX 637 (ALT 250 FOR IP): Performed by: INTERNAL MEDICINE

## 2024-04-19 PROCEDURE — 36415 COLL VENOUS BLD VENIPUNCTURE: CPT

## 2024-04-19 PROCEDURE — 1200000000 HC SEMI PRIVATE

## 2024-04-19 PROCEDURE — 74240 X-RAY XM UPR GI TRC 1CNTRST: CPT

## 2024-04-19 PROCEDURE — 83735 ASSAY OF MAGNESIUM: CPT

## 2024-04-19 PROCEDURE — 80048 BASIC METABOLIC PNL TOTAL CA: CPT

## 2024-04-19 PROCEDURE — 6360000002 HC RX W HCPCS: Performed by: INTERNAL MEDICINE

## 2024-04-19 PROCEDURE — 6360000002 HC RX W HCPCS: Performed by: HOSPITALIST

## 2024-04-19 PROCEDURE — APPSS15 APP SPLIT SHARED TIME 0-15 MINUTES: Performed by: NURSE PRACTITIONER

## 2024-04-19 PROCEDURE — 99232 SBSQ HOSP IP/OBS MODERATE 35: CPT | Performed by: SURGERY

## 2024-04-19 RX ORDER — INSULIN LISPRO 100 [IU]/ML
0-4 INJECTION, SOLUTION INTRAVENOUS; SUBCUTANEOUS NIGHTLY
Status: DISCONTINUED | OUTPATIENT
Start: 2024-04-19 | End: 2024-05-07 | Stop reason: HOSPADM

## 2024-04-19 RX ORDER — MAGNESIUM SULFATE IN WATER 40 MG/ML
2000 INJECTION, SOLUTION INTRAVENOUS ONCE
Status: COMPLETED | OUTPATIENT
Start: 2024-04-19 | End: 2024-04-19

## 2024-04-19 RX ORDER — DEXTROSE MONOHYDRATE 100 MG/ML
INJECTION, SOLUTION INTRAVENOUS CONTINUOUS PRN
Status: DISCONTINUED | OUTPATIENT
Start: 2024-04-19 | End: 2024-05-07 | Stop reason: HOSPADM

## 2024-04-19 RX ORDER — INSULIN LISPRO 100 [IU]/ML
0-4 INJECTION, SOLUTION INTRAVENOUS; SUBCUTANEOUS
Status: DISCONTINUED | OUTPATIENT
Start: 2024-04-19 | End: 2024-05-07 | Stop reason: HOSPADM

## 2024-04-19 RX ORDER — GLUCAGON 1 MG/ML
1 KIT INJECTION PRN
Status: DISCONTINUED | OUTPATIENT
Start: 2024-04-19 | End: 2024-05-07 | Stop reason: HOSPADM

## 2024-04-19 RX ADMIN — POLYETHYLENE GLYCOL 3350 17 G: 17 POWDER, FOR SOLUTION ORAL at 11:19

## 2024-04-19 RX ADMIN — Medication 10 ML: at 22:14

## 2024-04-19 RX ADMIN — NEPHROCAP 1 MG: 1 CAP ORAL at 10:18

## 2024-04-19 RX ADMIN — MONTELUKAST SODIUM 10 MG: 10 TABLET, FILM COATED ORAL at 22:14

## 2024-04-19 RX ADMIN — LORAZEPAM 0.5 MG: 0.5 TABLET ORAL at 22:14

## 2024-04-19 RX ADMIN — MAGNESIUM SULFATE HEPTAHYDRATE 2000 MG: 40 INJECTION, SOLUTION INTRAVENOUS at 11:15

## 2024-04-19 RX ADMIN — CEFTRIAXONE SODIUM 1000 MG: 1 INJECTION, POWDER, FOR SOLUTION INTRAMUSCULAR; INTRAVENOUS at 22:23

## 2024-04-19 RX ADMIN — ATORVASTATIN CALCIUM 20 MG: 20 TABLET, FILM COATED ORAL at 22:14

## 2024-04-19 RX ADMIN — Medication 10 ML: at 11:16

## 2024-04-19 RX ADMIN — HYDROCHLOROTHIAZIDE 12.5 MG: 25 TABLET ORAL at 22:14

## 2024-04-19 RX ADMIN — LORAZEPAM 0.5 MG: 0.5 TABLET ORAL at 15:09

## 2024-04-19 ASSESSMENT — PAIN DESCRIPTION - ORIENTATION: ORIENTATION: LOWER

## 2024-04-19 ASSESSMENT — PAIN DESCRIPTION - PAIN TYPE: TYPE: ACUTE PAIN

## 2024-04-19 ASSESSMENT — PAIN DESCRIPTION - LOCATION
LOCATION: ABDOMEN
LOCATION: ABDOMEN

## 2024-04-19 ASSESSMENT — PAIN DESCRIPTION - DESCRIPTORS: DESCRIPTORS: DISCOMFORT;CRAMPING

## 2024-04-19 ASSESSMENT — PAIN - FUNCTIONAL ASSESSMENT: PAIN_FUNCTIONAL_ASSESSMENT: ACTIVITIES ARE NOT PREVENTED

## 2024-04-19 ASSESSMENT — PAIN SCALES - GENERAL
PAINLEVEL_OUTOF10: 5
PAINLEVEL_OUTOF10: 6
PAINLEVEL_OUTOF10: 3

## 2024-04-19 ASSESSMENT — PAIN SCALES - WONG BAKER: WONGBAKER_NUMERICALRESPONSE: HURTS A LITTLE BIT

## 2024-04-19 NOTE — PROGRESS NOTES
V2.0    [facility] [place] Progress Note      Name:  Sonja Mcmahan /Age/Sex: 1950  (73 y.o. female)   MRN & CSN:  [id number] & [id number] Encounter Date/Time: 2024 3:08 PM EDT   Location:  5T-5568/5568-01 PCP: Abiodun Amanda PA     Attending:Kemi Rosa MD       Hospital Day: 5    Assessment and Recommendations   Sonja Mcmahan is a 73 y.o. female who presents with Hiatal hernia      Plan:     Hital hernia   With abdominal pain   Patient underwent EGD.  Had polyp that was removed.  Dilated.  Follow-up on biopsy.  General surgery has been consulted.  Tentatively plan for hernia repair and cholecystectomy on Monday.  Further recommendation to follow      Gallstone without any cholecystitis  Gen surg consult    HTN    Diverticulosis of sigmoid colon   No signs of infection     Hx of biscupid valve and  moder AS  With chest pain on exertion  Optimized for procedure           Diet ADULT DIET; Clear Liquid  ADULT ORAL NUTRITION SUPPLEMENT; Lunch, Breakfast; Clear Liquid Oral Supplement   DVT Prophylaxis    Code Status Full Code   Disposition          Personally reviewed Lab Studies and Imaging         Subjective:     Chief Complaint:     Sonja Mcmahan is a 73 y.o. female who presents with       Review of Systems:      Pertinent positives and negatives discussed in HPI    Objective:   No intake or output data in the 24 hours ending 24 1453     Vitals:   Vitals:    24 2030 24 2351 24 0545 24 0817   BP: 128/82 102/64 (!) 154/84 (!) 162/69   Pulse: 73 74 94 83   Resp: 16 16 16 16   Temp: 98.5 °F (36.9 °C) 98.2 °F (36.8 °C) 98.4 °F (36.9 °C) 98.7 °F (37.1 °C)   TempSrc: Oral Oral Oral Oral   SpO2: 95% 97% 95% 95%   Weight:       Height:             Physical Exam:      General: NAD  Eyes: EOMI  ENT: neck supple  Cardiovascular: Regular rate.  Respiratory: Clear to auscultation  Gastrointestinal: Soft, non tender  Genitourinary: no suprapubic tenderness  Musculoskeletal: No  unselect if not a suspected or confirmed emergency medical condition->Emergency Medical Condition (MA) Reason for Exam: eval for obstruction FINDINGS: Lower Chest: There are linear reticulations peripherally in the lung bases most likely representing a combination of scarring and atelectasis.  There is no pleural effusion. Organs: The liver is normal in size with smooth contours and homogeneous enhancement.  There are scattered calcified granulomas throughout the liver. No biliary ductal dilatation is present and the gallbladder is physiologically distended with multiple layering radiopaque calculi.  There is no gallbladder wall thickening or pericholecystic edema.  The spleen is normal in size with diffuse calcified granulomas.  The adrenal glands are normal.  The pancreas is normal.  There is symmetric enhancement of the kidneys.  The right kidney is normal in size without hydronephrosis.  There is a 1.3 simple cyst of the right lower pole.  There are postsurgical changes of prior partial nephrectomy of the left lower pole.  There is cortical scarring of the left upper pole.  There is no left-sided hydronephrosis or contour deforming mass. GI/Bowel: There is a large hiatal hernia.  The stomach is under distended. The small and large bowel are normal in caliber without wall thickening or inflammation.  There is diffuse diverticulosis of the sigmoid colon without inflammatory change.  The appendix is not seen. Pelvis: The bladder is distended without wall thickening.The uterus is surgically absent and there is no adnexal mass. Peritoneum/Retroperitoneum: No free fluid or free air. The abdominal aorta is patent and normal in caliber. No lymphadenopathy is present. Bones/Soft Tissues: Vertebral body heights are maintained.  There are chronic fracture deformities of left-sided ribs.  There are degenerative changes of the spine were sent L3-L4 where there is vacuum disc phenomenon, endplate sclerosis, and a disc

## 2024-04-19 NOTE — PROGRESS NOTES
[facility]        Progress Note    Patient Name: Sonja Mcmahan  MRN: [id number]  YOB: 1950  Date of Evaluation: 2024    Chief Complaint: Abdominal pain    Subjective:  No acute events overnight  Pain mild, well controlled without narcotics  No nausea or vomiting, tolerating clear liquid diet  Passing flatus and stool, had been feeling constipated prior to BM today  Resting in bed at this time      Vital Signs:  Patient Vitals for the past 24 hrs:   BP Temp Temp src Pulse Resp SpO2   24 0817 (!) 162/69 98.7 °F (37.1 °C) Oral 83 16 95 %   24 0545 (!) 154/84 98.4 °F (36.9 °C) Oral 94 16 95 %   24 2351 102/64 98.2 °F (36.8 °C) Oral 74 16 97 %   24 2030 128/82 98.5 °F (36.9 °C) Oral 73 16 95 %   24 1621 138/62 97.8 °F (36.6 °C) Oral 74 14 96 %      TEMPERATURE HISTORY 24H: Temp (24hrs), Av.3 °F (36.8 °C), Min:97.8 °F (36.6 °C), Max:98.7 °F (37.1 °C)    BLOOD PRESSURE HISTORY: Systolic (36hrs), Av , Min:70 , Max:162    Diastolic (36hrs), Av, Min:47, Max:84      Intake/Output:  I/O last 3 completed shifts:  In: 710 [I.V.:710]  Out: -   No intake/output data recorded.  Drain/tube Output:       Physical Exam:  General: awake, alert, oriented to person, place, time  Cardiovascular:  regular rate and rhythm and no murmur noted  Lungs: clear to auscultation  Abdomen: soft, non-distended, mild epigastric tenderness only, bowel sounds active     Labs:  CBC:    Recent Labs     24  0550 24  0525   WBC 7.1 6.1   HGB 11.7* 12.0   HCT 36.1 36.8    192     BMP:    Recent Labs     24  0550 24  0525 24  0556    142 145   K 3.9 3.8 3.5    108 111*   CO2 24 24 22   BUN 12 8 6*   CREATININE 0.9 0.8 0.8   GLUCOSE 99 107* 112*     Hepatic:  No results for input(s): \"AST\", \"ALT\", \"ALB\", \"BILITOT\", \"ALKPHOS\" in the last 72 hours.  Amylase:  No results found for: \"AMYLASE\"  Lipase:    Lab

## 2024-04-20 LAB
EST. AVERAGE GLUCOSE BLD GHB EST-MCNC: 122.6 MG/DL
GLUCOSE BLD-MCNC: 103 MG/DL (ref 70–99)
GLUCOSE BLD-MCNC: 114 MG/DL (ref 70–99)
GLUCOSE BLD-MCNC: 90 MG/DL (ref 70–99)
GLUCOSE BLD-MCNC: 97 MG/DL (ref 70–99)
HBA1C MFR BLD: 5.9 %
PERFORMED ON: ABNORMAL
PERFORMED ON: ABNORMAL
PERFORMED ON: NORMAL
PERFORMED ON: NORMAL

## 2024-04-20 PROCEDURE — 2580000003 HC RX 258: Performed by: INTERNAL MEDICINE

## 2024-04-20 PROCEDURE — 1200000000 HC SEMI PRIVATE

## 2024-04-20 PROCEDURE — 6360000002 HC RX W HCPCS: Performed by: INTERNAL MEDICINE

## 2024-04-20 PROCEDURE — 6370000000 HC RX 637 (ALT 250 FOR IP): Performed by: INTERNAL MEDICINE

## 2024-04-20 RX ADMIN — Medication 10 ML: at 09:06

## 2024-04-20 RX ADMIN — NEPHROCAP 1 MG: 1 CAP ORAL at 09:06

## 2024-04-20 RX ADMIN — Medication 10 ML: at 20:42

## 2024-04-20 RX ADMIN — LISINOPRIL 2.5 MG: 10 TABLET ORAL at 20:42

## 2024-04-20 RX ADMIN — PANTOPRAZOLE SODIUM 40 MG: 40 TABLET, DELAYED RELEASE ORAL at 05:08

## 2024-04-20 RX ADMIN — MONTELUKAST SODIUM 10 MG: 10 TABLET, FILM COATED ORAL at 20:41

## 2024-04-20 RX ADMIN — CEFTRIAXONE SODIUM 1000 MG: 1 INJECTION, POWDER, FOR SOLUTION INTRAMUSCULAR; INTRAVENOUS at 20:47

## 2024-04-20 RX ADMIN — SODIUM CHLORIDE: 9 INJECTION, SOLUTION INTRAVENOUS at 13:59

## 2024-04-20 RX ADMIN — LORAZEPAM 0.5 MG: 0.5 TABLET ORAL at 22:03

## 2024-04-20 RX ADMIN — ATORVASTATIN CALCIUM 20 MG: 20 TABLET, FILM COATED ORAL at 20:40

## 2024-04-20 RX ADMIN — HYDROCHLOROTHIAZIDE 12.5 MG: 25 TABLET ORAL at 20:41

## 2024-04-20 NOTE — PROGRESS NOTES
obstruction Additional Contrast?->Oral Decision Support Exception - unselect if not a suspected or confirmed emergency medical condition->Emergency Medical Condition (MA) Reason for Exam: eval for obstruction FINDINGS: Lower Chest: There are linear reticulations peripherally in the lung bases most likely representing a combination of scarring and atelectasis.  There is no pleural effusion. Organs: The liver is normal in size with smooth contours and homogeneous enhancement.  There are scattered calcified granulomas throughout the liver. No biliary ductal dilatation is present and the gallbladder is physiologically distended with multiple layering radiopaque calculi.  There is no gallbladder wall thickening or pericholecystic edema.  The spleen is normal in size with diffuse calcified granulomas.  The adrenal glands are normal.  The pancreas is normal.  There is symmetric enhancement of the kidneys.  The right kidney is normal in size without hydronephrosis.  There is a 1.3 simple cyst of the right lower pole.  There are postsurgical changes of prior partial nephrectomy of the left lower pole.  There is cortical scarring of the left upper pole.  There is no left-sided hydronephrosis or contour deforming mass. GI/Bowel: There is a large hiatal hernia.  The stomach is under distended. The small and large bowel are normal in caliber without wall thickening or inflammation.  There is diffuse diverticulosis of the sigmoid colon without inflammatory change.  The appendix is not seen. Pelvis: The bladder is distended without wall thickening.The uterus is surgically absent and there is no adnexal mass. Peritoneum/Retroperitoneum: No free fluid or free air. The abdominal aorta is patent and normal in caliber. No lymphadenopathy is present. Bones/Soft Tissues: Vertebral body heights are maintained.  There are chronic fracture deformities of left-sided ribs.  There are degenerative changes of the spine were sent L3-L4 where

## 2024-04-20 NOTE — PROGRESS NOTES
Pt's PIV infiltrated. Patient has maintain adequate fluid intake.Okay, to discontinue continuous fluids, per provider. This RN, will attempt to replace PIV, shortly.   Lui Solorzano RN

## 2024-04-20 NOTE — PROGRESS NOTES
This RN attempted PIV insertion x2 and unsuccessful. Notified uncoming PM Nurse.   Lui Solorzano RN

## 2024-04-21 LAB
ALBUMIN SERPL-MCNC: 3.2 G/DL (ref 3.4–5)
ALBUMIN/GLOB SERPL: 1.1 {RATIO} (ref 1.1–2.2)
ALP SERPL-CCNC: 63 U/L (ref 40–129)
ALT SERPL-CCNC: 16 U/L (ref 10–40)
ANION GAP SERPL CALCULATED.3IONS-SCNC: 12 MMOL/L (ref 3–16)
AST SERPL-CCNC: 23 U/L (ref 15–37)
BASOPHILS # BLD: 0.1 K/UL (ref 0–0.2)
BASOPHILS NFR BLD: 1.2 %
BILIRUB SERPL-MCNC: 0.5 MG/DL (ref 0–1)
BUN SERPL-MCNC: 4 MG/DL (ref 7–20)
CALCIUM SERPL-MCNC: 9.3 MG/DL (ref 8.3–10.6)
CHLORIDE SERPL-SCNC: 108 MMOL/L (ref 99–110)
CO2 SERPL-SCNC: 23 MMOL/L (ref 21–32)
CREAT SERPL-MCNC: 0.8 MG/DL (ref 0.6–1.2)
DEPRECATED RDW RBC AUTO: 15 % (ref 12.4–15.4)
EOSINOPHIL # BLD: 0.3 K/UL (ref 0–0.6)
EOSINOPHIL NFR BLD: 4.2 %
GFR SERPLBLD CREATININE-BSD FMLA CKD-EPI: 76 ML/MIN/{1.73_M2}
GLUCOSE BLD-MCNC: 110 MG/DL (ref 70–99)
GLUCOSE BLD-MCNC: 131 MG/DL (ref 70–99)
GLUCOSE BLD-MCNC: 69 MG/DL (ref 70–99)
GLUCOSE BLD-MCNC: 73 MG/DL (ref 70–99)
GLUCOSE BLD-MCNC: 89 MG/DL (ref 70–99)
GLUCOSE SERPL-MCNC: 105 MG/DL (ref 70–99)
HCT VFR BLD AUTO: 36.8 % (ref 36–48)
HGB BLD-MCNC: 12.3 G/DL (ref 12–16)
LYMPHOCYTES # BLD: 2.1 K/UL (ref 1–5.1)
LYMPHOCYTES NFR BLD: 35.2 %
MCH RBC QN AUTO: 28.8 PG (ref 26–34)
MCHC RBC AUTO-ENTMCNC: 33.4 G/DL (ref 31–36)
MCV RBC AUTO: 86.1 FL (ref 80–100)
MONOCYTES # BLD: 0.4 K/UL (ref 0–1.3)
MONOCYTES NFR BLD: 6.9 %
NEUTROPHILS # BLD: 3.2 K/UL (ref 1.7–7.7)
NEUTROPHILS NFR BLD: 52.5 %
PERFORMED ON: ABNORMAL
PERFORMED ON: NORMAL
PERFORMED ON: NORMAL
PLATELET # BLD AUTO: 188 K/UL (ref 135–450)
PMV BLD AUTO: 7.6 FL (ref 5–10.5)
POTASSIUM SERPL-SCNC: 3.4 MMOL/L (ref 3.5–5.1)
PROT SERPL-MCNC: 6 G/DL (ref 6.4–8.2)
RBC # BLD AUTO: 4.27 M/UL (ref 4–5.2)
SODIUM SERPL-SCNC: 143 MMOL/L (ref 136–145)
WBC # BLD AUTO: 6 K/UL (ref 4–11)

## 2024-04-21 PROCEDURE — 6370000000 HC RX 637 (ALT 250 FOR IP): Performed by: INTERNAL MEDICINE

## 2024-04-21 PROCEDURE — 6360000002 HC RX W HCPCS: Performed by: INTERNAL MEDICINE

## 2024-04-21 PROCEDURE — 80053 COMPREHEN METABOLIC PANEL: CPT

## 2024-04-21 PROCEDURE — APPSS15 APP SPLIT SHARED TIME 0-15 MINUTES: Performed by: NURSE PRACTITIONER

## 2024-04-21 PROCEDURE — 36415 COLL VENOUS BLD VENIPUNCTURE: CPT

## 2024-04-21 PROCEDURE — 85025 COMPLETE CBC W/AUTO DIFF WBC: CPT

## 2024-04-21 PROCEDURE — 2580000003 HC RX 258: Performed by: INTERNAL MEDICINE

## 2024-04-21 PROCEDURE — 1200000000 HC SEMI PRIVATE

## 2024-04-21 PROCEDURE — 2580000003 HC RX 258: Performed by: SURGERY

## 2024-04-21 PROCEDURE — APPNB30 APP NON BILLABLE TIME 0-30 MINS: Performed by: NURSE PRACTITIONER

## 2024-04-21 RX ORDER — SODIUM CHLORIDE 9 MG/ML
INJECTION, SOLUTION INTRAVENOUS CONTINUOUS
Status: DISCONTINUED | OUTPATIENT
Start: 2024-04-21 | End: 2024-04-26

## 2024-04-21 RX ADMIN — CEFTRIAXONE SODIUM 1000 MG: 1 INJECTION, POWDER, FOR SOLUTION INTRAMUSCULAR; INTRAVENOUS at 20:38

## 2024-04-21 RX ADMIN — ATORVASTATIN CALCIUM 20 MG: 20 TABLET, FILM COATED ORAL at 20:35

## 2024-04-21 RX ADMIN — Medication 10 ML: at 07:42

## 2024-04-21 RX ADMIN — HYDROCHLOROTHIAZIDE 12.5 MG: 25 TABLET ORAL at 20:35

## 2024-04-21 RX ADMIN — Medication 10 ML: at 20:36

## 2024-04-21 RX ADMIN — PANTOPRAZOLE SODIUM 40 MG: 40 TABLET, DELAYED RELEASE ORAL at 05:12

## 2024-04-21 RX ADMIN — POTASSIUM CHLORIDE 40 MEQ: 1500 TABLET, EXTENDED RELEASE ORAL at 07:49

## 2024-04-21 RX ADMIN — SODIUM CHLORIDE: 9 INJECTION, SOLUTION INTRAVENOUS at 22:18

## 2024-04-21 RX ADMIN — NEPHROCAP 1 MG: 1 CAP ORAL at 07:41

## 2024-04-21 RX ADMIN — LORAZEPAM 0.5 MG: 0.5 TABLET ORAL at 22:17

## 2024-04-21 RX ADMIN — MONTELUKAST SODIUM 10 MG: 10 TABLET, FILM COATED ORAL at 20:35

## 2024-04-21 NOTE — PROGRESS NOTES
V2.0    [facility] Progress Note      Name:  Sonja Mcmahan /Age/Sex: 1950  (73 y.o. female)   MRN & CSN:  [id number] & [id number] Encounter Date/Time: 2024 3:08 PM EDT   Location:  [facility]-5568/5568-01 PCP: Abiodun Amanda PA     Attending:Kemi Rosa MD       Hospital Day: 7    Assessment and Recommendations   Sonja Mcmahan is a 73 y.o. female who presents with Hiatal hernia      Plan:     Hital hernia   With abdominal pain   Patient underwent EGD.  Had polyp that was removed.  Dilated.  Follow-up on biopsy.  General surgery has been consulted.    Plan for cholecystectomy and hernia repair tomorrow keep n.p.o. after midnight      Gallstone without any cholecystitis  Gen surg consult    HTN    Diverticulosis of sigmoid colon   No signs of infection     Hx of biscupid valve and  moder AS  With chest pain on exertion  Optimized for procedure No further recommendation          Diet ADULT DIET; Clear Liquid  ADULT ORAL NUTRITION SUPPLEMENT; Lunch, Breakfast; Clear Liquid Oral Supplement   DVT Prophylaxis    Code Status Full Code   Disposition Pending hernia repair and cholecystectomy         Personally reviewed Lab Studies and Imaging         Subjective:     Chief Complaint:     Sonja Mcmahan is a 73 y.o. female who presents with       Review of Systems:      Pertinent positives and negatives discussed in HPI    Objective:     Intake/Output Summary (Last 24 hours) at 2024 1006  Last data filed at 2024 1047  Gross per 24 hour   Intake 300 ml   Output --   Net 300 ml      Vitals:   Vitals:    24 1955 24 2322 24 0512 24 0739   BP: (!) 153/83 124/80 129/77 119/75   Pulse: 78 74 83 79   Resp: 14 16 14 16   Temp: 98 °F (36.7 °C) 97.8 °F (36.6 °C) 97.6 °F (36.4 °C) 97.7 °F (36.5 °C)   TempSrc: Oral Oral Oral Oral   SpO2: 97% 99% 96% 97%   Weight:       Height:             Physical Exam:      General: NAD  Eyes: EOMI  ENT: neck supple  Cardiovascular: Regular

## 2024-04-21 NOTE — PROGRESS NOTES
[facility]        Progress Note    Patient Name: Sonja Mcmahan  MRN: [id number]  YOB: 1950  Date of Evaluation: 2024    Chief Complaint: Abdominal pain    Subjective:  No acute events overnight  Pain mild, well controlled without narcotics  No nausea or vomiting, tolerating clear liquid diet  Resting in bed at this time      Vital Signs:  Patient Vitals for the past 24 hrs:   BP Temp Temp src Pulse Resp SpO2   24 1151 133/72 98 °F (36.7 °C) Oral 85 16 96 %   24 0739 119/75 97.7 °F (36.5 °C) Oral 79 16 97 %   24 0512 129/77 97.6 °F (36.4 °C) Oral 83 14 96 %   24 2322 124/80 97.8 °F (36.6 °C) Oral 74 16 99 %   24 1955 (!) 153/83 98 °F (36.7 °C) Oral 78 14 97 %   24 1658 138/65 97.8 °F (36.6 °C) Oral 73 16 94 %        TEMPERATURE HISTORY 24H: Temp (24hrs), Av.8 °F (36.6 °C), Min:97.6 °F (36.4 °C), Max:98 °F (36.7 °C)    BLOOD PRESSURE HISTORY: Systolic (36hrs), Av , Min:119 , Max:153    Diastolic (36hrs), Av, Min:65, Max:83      Intake/Output:  I/O last 3 completed shifts:  In: 300 [P.O.:300]  Out: -   No intake/output data recorded.  Drain/tube Output:       Physical Exam:  General: awake, alert, oriented to person, place, time  Cardiovascular:  regular rate and rhythm and no murmur noted  Lungs: clear to auscultation  Abdomen: soft, non-distended, mild epigastric tenderness only, bowel sounds active     Labs:  CBC:    Recent Labs     24  0549   WBC 6.0   HGB 12.3   HCT 36.8          BMP:    Recent Labs     24  0556 24  0549    143   K 3.5 3.4*   * 108   CO2 22 23   BUN 6* 4*   CREATININE 0.8 0.8   GLUCOSE 112* 105*       Hepatic:    Recent Labs     24  0549   AST 23   ALT 16   BILITOT 0.5   ALKPHOS 63     Amylase:  No results found for: \"AMYLASE\"  Lipase:    Lab Results   Component Value Date/Time    LIPASE 59.0 04/15/2024 05:56 PM      Mag:    Lab Results

## 2024-04-21 NOTE — PROGRESS NOTES
Head to toe assessment complete.  Vital signs obtained. Pt resting in bed at this time. AM medication administered. Pt tolerated well.  Pt denies pain. Pt denies further needs at this time. Call light in hand.  Pt verbalizes correct use.

## 2024-04-22 ENCOUNTER — ANESTHESIA (OUTPATIENT)
Dept: OPERATING ROOM | Age: 74
End: 2024-04-22
Payer: MEDICARE

## 2024-04-22 ENCOUNTER — APPOINTMENT (OUTPATIENT)
Dept: GENERAL RADIOLOGY | Age: 74
DRG: 326 | End: 2024-04-22
Payer: MEDICARE

## 2024-04-22 PROBLEM — K31.89 GASTRIC MASS: Status: ACTIVE | Noted: 2024-04-22

## 2024-04-22 PROBLEM — K80.10 CALCULUS OF GALLBLADDER WITH CHRONIC CHOLECYSTITIS WITHOUT OBSTRUCTION: Status: ACTIVE | Noted: 2024-04-22

## 2024-04-22 LAB
ABO + RH BLD: NORMAL
ALBUMIN SERPL-MCNC: 3.1 G/DL (ref 3.4–5)
ALBUMIN/GLOB SERPL: 1.2 {RATIO} (ref 1.1–2.2)
ALP SERPL-CCNC: 60 U/L (ref 40–129)
ALT SERPL-CCNC: 16 U/L (ref 10–40)
ANION GAP SERPL CALCULATED.3IONS-SCNC: 12 MMOL/L (ref 3–16)
AST SERPL-CCNC: 23 U/L (ref 15–37)
BASOPHILS # BLD: 0.1 K/UL (ref 0–0.2)
BASOPHILS NFR BLD: 1 %
BILIRUB SERPL-MCNC: 0.5 MG/DL (ref 0–1)
BLD GP AB SCN SERPL QL: NORMAL
BUN SERPL-MCNC: 4 MG/DL (ref 7–20)
CALCIUM SERPL-MCNC: 9.1 MG/DL (ref 8.3–10.6)
CHLORIDE SERPL-SCNC: 109 MMOL/L (ref 99–110)
CO2 SERPL-SCNC: 21 MMOL/L (ref 21–32)
CREAT SERPL-MCNC: 0.7 MG/DL (ref 0.6–1.2)
DEPRECATED RDW RBC AUTO: 15 % (ref 12.4–15.4)
EOSINOPHIL # BLD: 0.2 K/UL (ref 0–0.6)
EOSINOPHIL NFR BLD: 4 %
GFR SERPLBLD CREATININE-BSD FMLA CKD-EPI: 90 ML/MIN/{1.73_M2}
GLUCOSE BLD-MCNC: 109 MG/DL (ref 70–99)
GLUCOSE BLD-MCNC: 159 MG/DL (ref 70–99)
GLUCOSE BLD-MCNC: 90 MG/DL (ref 70–99)
GLUCOSE SERPL-MCNC: 113 MG/DL (ref 70–99)
HCT VFR BLD AUTO: 37.3 % (ref 36–48)
HGB BLD-MCNC: 12.4 G/DL (ref 12–16)
LYMPHOCYTES # BLD: 1.6 K/UL (ref 1–5.1)
LYMPHOCYTES NFR BLD: 29.2 %
MCH RBC QN AUTO: 28.6 PG (ref 26–34)
MCHC RBC AUTO-ENTMCNC: 33.1 G/DL (ref 31–36)
MCV RBC AUTO: 86.5 FL (ref 80–100)
MONOCYTES # BLD: 0.4 K/UL (ref 0–1.3)
MONOCYTES NFR BLD: 7.2 %
NEUTROPHILS # BLD: 3.2 K/UL (ref 1.7–7.7)
NEUTROPHILS NFR BLD: 58.6 %
PERFORMED ON: ABNORMAL
PERFORMED ON: ABNORMAL
PERFORMED ON: NORMAL
PLATELET # BLD AUTO: 185 K/UL (ref 135–450)
PMV BLD AUTO: 8.5 FL (ref 5–10.5)
POTASSIUM SERPL-SCNC: 4 MMOL/L (ref 3.5–5.1)
PROT SERPL-MCNC: 5.7 G/DL (ref 6.4–8.2)
RBC # BLD AUTO: 4.32 M/UL (ref 4–5.2)
SODIUM SERPL-SCNC: 142 MMOL/L (ref 136–145)
WBC # BLD AUTO: 5.5 K/UL (ref 4–11)

## 2024-04-22 PROCEDURE — 0D160ZA BYPASS STOMACH TO JEJUNUM, OPEN APPROACH: ICD-10-PCS | Performed by: SURGERY

## 2024-04-22 PROCEDURE — 3700000000 HC ANESTHESIA ATTENDED CARE: Performed by: SURGERY

## 2024-04-22 PROCEDURE — 6360000002 HC RX W HCPCS: Performed by: SURGERY

## 2024-04-22 PROCEDURE — 0FT44ZZ RESECTION OF GALLBLADDER, PERCUTANEOUS ENDOSCOPIC APPROACH: ICD-10-PCS | Performed by: SURGERY

## 2024-04-22 PROCEDURE — 3600000005 HC SURGERY LEVEL 5 BASE: Performed by: SURGERY

## 2024-04-22 PROCEDURE — 2580000003 HC RX 258: Performed by: SURGERY

## 2024-04-22 PROCEDURE — 43282 LAP PARAESOPH HER RPR W/MESH: CPT | Performed by: SURGERY

## 2024-04-22 PROCEDURE — 6370000000 HC RX 637 (ALT 250 FOR IP): Performed by: INTERNAL MEDICINE

## 2024-04-22 PROCEDURE — 2580000003 HC RX 258: Performed by: NURSE ANESTHETIST, CERTIFIED REGISTERED

## 2024-04-22 PROCEDURE — 88302 TISSUE EXAM BY PATHOLOGIST: CPT

## 2024-04-22 PROCEDURE — 47562 LAPAROSCOPIC CHOLECYSTECTOMY: CPT | Performed by: SURGERY

## 2024-04-22 PROCEDURE — 86850 RBC ANTIBODY SCREEN: CPT

## 2024-04-22 PROCEDURE — 49905 OMENTAL FLAP INTRA-ABDOM: CPT | Performed by: SURGERY

## 2024-04-22 PROCEDURE — 3700000001 HC ADD 15 MINUTES (ANESTHESIA): Performed by: SURGERY

## 2024-04-22 PROCEDURE — 2700000000 HC OXYGEN THERAPY PER DAY

## 2024-04-22 PROCEDURE — 88309 TISSUE EXAM BY PATHOLOGIST: CPT

## 2024-04-22 PROCEDURE — 86900 BLOOD TYPING SEROLOGIC ABO: CPT

## 2024-04-22 PROCEDURE — 0BUT4JZ SUPPLEMENT DIAPHRAGM WITH SYNTHETIC SUBSTITUTE, PERCUTANEOUS ENDOSCOPIC APPROACH: ICD-10-PCS | Performed by: SURGERY

## 2024-04-22 PROCEDURE — 6360000002 HC RX W HCPCS: Performed by: NURSE ANESTHETIST, CERTIFIED REGISTERED

## 2024-04-22 PROCEDURE — 2580000003 HC RX 258: Performed by: ANESTHESIOLOGY

## 2024-04-22 PROCEDURE — 36415 COLL VENOUS BLD VENIPUNCTURE: CPT

## 2024-04-22 PROCEDURE — 7100000000 HC PACU RECOVERY - FIRST 15 MIN: Performed by: SURGERY

## 2024-04-22 PROCEDURE — 2580000003 HC RX 258: Performed by: INTERNAL MEDICINE

## 2024-04-22 PROCEDURE — 43632 REMOVAL OF STOMACH PARTIAL: CPT | Performed by: SURGERY

## 2024-04-22 PROCEDURE — 2720000010 HC SURG SUPPLY STERILE: Performed by: SURGERY

## 2024-04-22 PROCEDURE — 2500000003 HC RX 250 WO HCPCS: Performed by: SURGERY

## 2024-04-22 PROCEDURE — 0DB60ZZ EXCISION OF STOMACH, OPEN APPROACH: ICD-10-PCS | Performed by: SURGERY

## 2024-04-22 PROCEDURE — A4217 STERILE WATER/SALINE, 500 ML: HCPCS | Performed by: SURGERY

## 2024-04-22 PROCEDURE — 80053 COMPREHEN METABOLIC PANEL: CPT

## 2024-04-22 PROCEDURE — C1781 MESH (IMPLANTABLE): HCPCS | Performed by: SURGERY

## 2024-04-22 PROCEDURE — 1200000000 HC SEMI PRIVATE

## 2024-04-22 PROCEDURE — C9290 INJ, BUPIVACAINE LIPOSOME: HCPCS | Performed by: SURGERY

## 2024-04-22 PROCEDURE — 2500000003 HC RX 250 WO HCPCS: Performed by: NURSE ANESTHETIST, CERTIFIED REGISTERED

## 2024-04-22 PROCEDURE — 2709999900 HC NON-CHARGEABLE SUPPLY: Performed by: SURGERY

## 2024-04-22 PROCEDURE — 85025 COMPLETE CBC W/AUTO DIFF WBC: CPT

## 2024-04-22 PROCEDURE — 86901 BLOOD TYPING SEROLOGIC RH(D): CPT

## 2024-04-22 PROCEDURE — 6360000002 HC RX W HCPCS: Performed by: ANESTHESIOLOGY

## 2024-04-22 PROCEDURE — 88304 TISSUE EXAM BY PATHOLOGIST: CPT

## 2024-04-22 PROCEDURE — 7100000001 HC PACU RECOVERY - ADDTL 15 MIN: Performed by: SURGERY

## 2024-04-22 PROCEDURE — 3600000015 HC SURGERY LEVEL 5 ADDTL 15MIN: Performed by: SURGERY

## 2024-04-22 DEVICE — BIO-A TISSUE REINFORCEMENT 7CMX10CM
Type: IMPLANTABLE DEVICE | Site: ABDOMEN | Status: FUNCTIONAL
Brand: GORE BIO-A TISSUE REINFORCEMENT

## 2024-04-22 RX ORDER — LIDOCAINE HYDROCHLORIDE 20 MG/ML
INJECTION, SOLUTION EPIDURAL; INFILTRATION; INTRACAUDAL; PERINEURAL PRN
Status: DISCONTINUED | OUTPATIENT
Start: 2024-04-22 | End: 2024-04-22 | Stop reason: SDUPTHER

## 2024-04-22 RX ORDER — SODIUM CHLORIDE 0.9 % (FLUSH) 0.9 %
5-40 SYRINGE (ML) INJECTION EVERY 12 HOURS SCHEDULED
Status: DISCONTINUED | OUTPATIENT
Start: 2024-04-22 | End: 2024-04-22 | Stop reason: HOSPADM

## 2024-04-22 RX ORDER — ACETAMINOPHEN 500 MG
1000 TABLET ORAL EVERY 8 HOURS SCHEDULED
Status: DISCONTINUED | OUTPATIENT
Start: 2024-04-22 | End: 2024-04-25

## 2024-04-22 RX ORDER — KETOROLAC TROMETHAMINE 15 MG/ML
15 INJECTION, SOLUTION INTRAMUSCULAR; INTRAVENOUS EVERY 6 HOURS
Status: COMPLETED | OUTPATIENT
Start: 2024-04-22 | End: 2024-04-24

## 2024-04-22 RX ORDER — FLUCONAZOLE 2 MG/ML
200 INJECTION, SOLUTION INTRAVENOUS EVERY 24 HOURS
Status: COMPLETED | OUTPATIENT
Start: 2024-04-22 | End: 2024-04-23

## 2024-04-22 RX ORDER — SODIUM CHLORIDE, SODIUM LACTATE, POTASSIUM CHLORIDE, AND CALCIUM CHLORIDE .6; .31; .03; .02 G/100ML; G/100ML; G/100ML; G/100ML
IRRIGANT IRRIGATION
Status: COMPLETED | OUTPATIENT
Start: 2024-04-22 | End: 2024-04-22

## 2024-04-22 RX ORDER — ESMOLOL HYDROCHLORIDE 10 MG/ML
INJECTION, SOLUTION INTRAVENOUS PRN
Status: DISCONTINUED | OUTPATIENT
Start: 2024-04-22 | End: 2024-04-22 | Stop reason: SDUPTHER

## 2024-04-22 RX ORDER — LIDOCAINE HYDROCHLORIDE 10 MG/ML
1 INJECTION, SOLUTION EPIDURAL; INFILTRATION; INTRACAUDAL; PERINEURAL
Status: DISCONTINUED | OUTPATIENT
Start: 2024-04-22 | End: 2024-04-22 | Stop reason: HOSPADM

## 2024-04-22 RX ORDER — LABETALOL HYDROCHLORIDE 5 MG/ML
INJECTION, SOLUTION INTRAVENOUS PRN
Status: DISCONTINUED | OUTPATIENT
Start: 2024-04-22 | End: 2024-04-22 | Stop reason: SDUPTHER

## 2024-04-22 RX ORDER — ONDANSETRON 2 MG/ML
4 INJECTION INTRAMUSCULAR; INTRAVENOUS
Status: DISCONTINUED | OUTPATIENT
Start: 2024-04-22 | End: 2024-04-22 | Stop reason: HOSPADM

## 2024-04-22 RX ORDER — SODIUM CHLORIDE 0.9 % (FLUSH) 0.9 %
5-40 SYRINGE (ML) INJECTION PRN
Status: DISCONTINUED | OUTPATIENT
Start: 2024-04-22 | End: 2024-04-22 | Stop reason: HOSPADM

## 2024-04-22 RX ORDER — BUPIVACAINE HYDROCHLORIDE 5 MG/ML
INJECTION, SOLUTION EPIDURAL; INTRACAUDAL
Status: COMPLETED | OUTPATIENT
Start: 2024-04-22 | End: 2024-04-22

## 2024-04-22 RX ORDER — FENTANYL CITRATE 50 UG/ML
INJECTION, SOLUTION INTRAMUSCULAR; INTRAVENOUS PRN
Status: DISCONTINUED | OUTPATIENT
Start: 2024-04-22 | End: 2024-04-22 | Stop reason: SDUPTHER

## 2024-04-22 RX ORDER — PROPOFOL 10 MG/ML
INJECTION, EMULSION INTRAVENOUS PRN
Status: DISCONTINUED | OUTPATIENT
Start: 2024-04-22 | End: 2024-04-22 | Stop reason: SDUPTHER

## 2024-04-22 RX ORDER — DEXMEDETOMIDINE HYDROCHLORIDE 100 UG/ML
INJECTION, SOLUTION INTRAVENOUS PRN
Status: DISCONTINUED | OUTPATIENT
Start: 2024-04-22 | End: 2024-04-22 | Stop reason: SDUPTHER

## 2024-04-22 RX ORDER — MAGNESIUM HYDROXIDE 1200 MG/15ML
LIQUID ORAL CONTINUOUS PRN
Status: COMPLETED | OUTPATIENT
Start: 2024-04-22 | End: 2024-04-22

## 2024-04-22 RX ORDER — HYDROMORPHONE HYDROCHLORIDE 2 MG/ML
INJECTION, SOLUTION INTRAMUSCULAR; INTRAVENOUS; SUBCUTANEOUS PRN
Status: DISCONTINUED | OUTPATIENT
Start: 2024-04-22 | End: 2024-04-22 | Stop reason: SDUPTHER

## 2024-04-22 RX ORDER — METOCLOPRAMIDE HYDROCHLORIDE 5 MG/ML
10 INJECTION INTRAMUSCULAR; INTRAVENOUS EVERY 6 HOURS
Status: COMPLETED | OUTPATIENT
Start: 2024-04-22 | End: 2024-04-24

## 2024-04-22 RX ORDER — LABETALOL HYDROCHLORIDE 5 MG/ML
5 INJECTION, SOLUTION INTRAVENOUS
Status: DISCONTINUED | OUTPATIENT
Start: 2024-04-22 | End: 2024-04-22 | Stop reason: HOSPADM

## 2024-04-22 RX ORDER — DEXAMETHASONE SODIUM PHOSPHATE 4 MG/ML
INJECTION, SOLUTION INTRA-ARTICULAR; INTRALESIONAL; INTRAMUSCULAR; INTRAVENOUS; SOFT TISSUE PRN
Status: DISCONTINUED | OUTPATIENT
Start: 2024-04-22 | End: 2024-04-22 | Stop reason: SDUPTHER

## 2024-04-22 RX ORDER — ONDANSETRON 2 MG/ML
INJECTION INTRAMUSCULAR; INTRAVENOUS PRN
Status: DISCONTINUED | OUTPATIENT
Start: 2024-04-22 | End: 2024-04-22 | Stop reason: SDUPTHER

## 2024-04-22 RX ORDER — ROCURONIUM BROMIDE 10 MG/ML
INJECTION, SOLUTION INTRAVENOUS PRN
Status: DISCONTINUED | OUTPATIENT
Start: 2024-04-22 | End: 2024-04-22 | Stop reason: SDUPTHER

## 2024-04-22 RX ORDER — SODIUM CHLORIDE 9 MG/ML
INJECTION, SOLUTION INTRAVENOUS PRN
Status: DISCONTINUED | OUTPATIENT
Start: 2024-04-22 | End: 2024-04-22 | Stop reason: HOSPADM

## 2024-04-22 RX ORDER — HYDROMORPHONE HYDROCHLORIDE 2 MG/ML
0.5 INJECTION, SOLUTION INTRAMUSCULAR; INTRAVENOUS; SUBCUTANEOUS EVERY 5 MIN PRN
Status: DISCONTINUED | OUTPATIENT
Start: 2024-04-22 | End: 2024-04-22 | Stop reason: HOSPADM

## 2024-04-22 RX ORDER — MORPHINE SULFATE 2 MG/ML
2 INJECTION, SOLUTION INTRAMUSCULAR; INTRAVENOUS
Status: DISCONTINUED | OUTPATIENT
Start: 2024-04-22 | End: 2024-05-07

## 2024-04-22 RX ORDER — METRONIDAZOLE 500 MG/100ML
500 INJECTION, SOLUTION INTRAVENOUS EVERY 8 HOURS
Status: COMPLETED | OUTPATIENT
Start: 2024-04-22 | End: 2024-04-23

## 2024-04-22 RX ORDER — NALOXONE HYDROCHLORIDE 0.4 MG/ML
INJECTION, SOLUTION INTRAMUSCULAR; INTRAVENOUS; SUBCUTANEOUS PRN
Status: DISCONTINUED | OUTPATIENT
Start: 2024-04-22 | End: 2024-04-22 | Stop reason: HOSPADM

## 2024-04-22 RX ORDER — MORPHINE SULFATE 4 MG/ML
4 INJECTION, SOLUTION INTRAMUSCULAR; INTRAVENOUS
Status: DISCONTINUED | OUTPATIENT
Start: 2024-04-22 | End: 2024-05-07

## 2024-04-22 RX ORDER — SODIUM CHLORIDE 9 MG/ML
INJECTION, SOLUTION INTRAVENOUS CONTINUOUS PRN
Status: DISCONTINUED | OUTPATIENT
Start: 2024-04-22 | End: 2024-04-22 | Stop reason: SDUPTHER

## 2024-04-22 RX ORDER — HYDROMORPHONE HYDROCHLORIDE 2 MG/ML
0.25 INJECTION, SOLUTION INTRAMUSCULAR; INTRAVENOUS; SUBCUTANEOUS EVERY 5 MIN PRN
Status: DISCONTINUED | OUTPATIENT
Start: 2024-04-22 | End: 2024-04-22 | Stop reason: HOSPADM

## 2024-04-22 RX ORDER — SODIUM CHLORIDE 9 MG/ML
INJECTION, SOLUTION INTRAVENOUS CONTINUOUS
Status: DISCONTINUED | OUTPATIENT
Start: 2024-04-22 | End: 2024-04-22 | Stop reason: HOSPADM

## 2024-04-22 RX ADMIN — FENTANYL CITRATE 25 MCG: 50 INJECTION, SOLUTION INTRAMUSCULAR; INTRAVENOUS at 14:11

## 2024-04-22 RX ADMIN — SODIUM CHLORIDE: 9 INJECTION, SOLUTION INTRAVENOUS at 14:02

## 2024-04-22 RX ADMIN — MORPHINE SULFATE 4 MG: 4 INJECTION, SOLUTION INTRAMUSCULAR; INTRAVENOUS at 21:00

## 2024-04-22 RX ADMIN — METRONIDAZOLE 500 MG: 500 INJECTION, SOLUTION INTRAVENOUS at 14:16

## 2024-04-22 RX ADMIN — METOCLOPRAMIDE 10 MG: 5 INJECTION, SOLUTION INTRAMUSCULAR; INTRAVENOUS at 20:06

## 2024-04-22 RX ADMIN — SODIUM CHLORIDE: 9 INJECTION, SOLUTION INTRAVENOUS at 14:40

## 2024-04-22 RX ADMIN — CEFAZOLIN 2000 MG: 2 INJECTION, POWDER, FOR SOLUTION INTRAMUSCULAR; INTRAVENOUS at 14:00

## 2024-04-22 RX ADMIN — ESMOLOL HYDROCHLORIDE 10 MG: 10 INJECTION INTRAVENOUS at 15:11

## 2024-04-22 RX ADMIN — CEFAZOLIN 2000 MG: 2 INJECTION, POWDER, FOR SOLUTION INTRAMUSCULAR; INTRAVENOUS at 22:18

## 2024-04-22 RX ADMIN — ONDANSETRON 4 MG: 2 INJECTION INTRAMUSCULAR; INTRAVENOUS at 17:53

## 2024-04-22 RX ADMIN — Medication 10 ML: at 20:06

## 2024-04-22 RX ADMIN — CEFAZOLIN 2000 MG: 2 INJECTION, POWDER, FOR SOLUTION INTRAMUSCULAR; INTRAVENOUS at 17:54

## 2024-04-22 RX ADMIN — ROCURONIUM BROMIDE 10 MG: 10 INJECTION, SOLUTION INTRAVENOUS at 16:24

## 2024-04-22 RX ADMIN — CEFTRIAXONE SODIUM 1000 MG: 1 INJECTION, POWDER, FOR SOLUTION INTRAMUSCULAR; INTRAVENOUS at 20:12

## 2024-04-22 RX ADMIN — HYDROMORPHONE HYDROCHLORIDE 0.4 MG: 2 INJECTION, SOLUTION INTRAMUSCULAR; INTRAVENOUS; SUBCUTANEOUS at 15:07

## 2024-04-22 RX ADMIN — LIDOCAINE HYDROCHLORIDE 60 MG: 20 INJECTION, SOLUTION EPIDURAL; INFILTRATION; INTRACAUDAL; PERINEURAL at 14:11

## 2024-04-22 RX ADMIN — DEXMEDETOMIDINE HYDROCHLORIDE 2 MCG: 100 INJECTION, SOLUTION INTRAVENOUS at 15:00

## 2024-04-22 RX ADMIN — DEXMEDETOMIDINE HYDROCHLORIDE 2 MCG: 100 INJECTION, SOLUTION INTRAVENOUS at 16:32

## 2024-04-22 RX ADMIN — DEXMEDETOMIDINE HYDROCHLORIDE 4 MCG: 100 INJECTION, SOLUTION INTRAVENOUS at 17:22

## 2024-04-22 RX ADMIN — DEXAMETHASONE SODIUM PHOSPHATE 4 MG: 4 INJECTION, SOLUTION INTRAMUSCULAR; INTRAVENOUS at 14:45

## 2024-04-22 RX ADMIN — DEXMEDETOMIDINE HYDROCHLORIDE 2 MCG: 100 INJECTION, SOLUTION INTRAVENOUS at 14:40

## 2024-04-22 RX ADMIN — FENTANYL CITRATE 25 MCG: 50 INJECTION, SOLUTION INTRAMUSCULAR; INTRAVENOUS at 14:55

## 2024-04-22 RX ADMIN — SUGAMMADEX 200 MG: 100 INJECTION, SOLUTION INTRAVENOUS at 18:03

## 2024-04-22 RX ADMIN — ROCURONIUM BROMIDE 10 MG: 10 INJECTION, SOLUTION INTRAVENOUS at 16:55

## 2024-04-22 RX ADMIN — ROCURONIUM BROMIDE 50 MG: 10 INJECTION, SOLUTION INTRAVENOUS at 14:11

## 2024-04-22 RX ADMIN — LABETALOL HYDROCHLORIDE 5 MG: 5 INJECTION, SOLUTION INTRAVENOUS at 15:25

## 2024-04-22 RX ADMIN — PROPOFOL 20 MG: 10 INJECTION, EMULSION INTRAVENOUS at 14:16

## 2024-04-22 RX ADMIN — ROCURONIUM BROMIDE 10 MG: 10 INJECTION, SOLUTION INTRAVENOUS at 15:53

## 2024-04-22 RX ADMIN — KETOROLAC TROMETHAMINE 15 MG: 15 INJECTION, SOLUTION INTRAMUSCULAR; INTRAVENOUS at 20:06

## 2024-04-22 RX ADMIN — HYDROMORPHONE HYDROCHLORIDE 0.2 MG: 2 INJECTION, SOLUTION INTRAMUSCULAR; INTRAVENOUS; SUBCUTANEOUS at 14:44

## 2024-04-22 RX ADMIN — SODIUM CHLORIDE: 9 INJECTION, SOLUTION INTRAVENOUS at 20:11

## 2024-04-22 RX ADMIN — METRONIDAZOLE 500 MG: 500 INJECTION, SOLUTION INTRAVENOUS at 22:22

## 2024-04-22 RX ADMIN — ROCURONIUM BROMIDE 10 MG: 10 INJECTION, SOLUTION INTRAVENOUS at 15:12

## 2024-04-22 RX ADMIN — FENTANYL CITRATE 25 MCG: 50 INJECTION, SOLUTION INTRAMUSCULAR; INTRAVENOUS at 14:16

## 2024-04-22 RX ADMIN — PANTOPRAZOLE SODIUM 40 MG: 40 TABLET, DELAYED RELEASE ORAL at 05:03

## 2024-04-22 RX ADMIN — PROPOFOL 100 MG: 10 INJECTION, EMULSION INTRAVENOUS at 14:11

## 2024-04-22 RX ADMIN — HYDROMORPHONE HYDROCHLORIDE 0.4 MG: 2 INJECTION, SOLUTION INTRAMUSCULAR; INTRAVENOUS; SUBCUTANEOUS at 16:42

## 2024-04-22 RX ADMIN — HYDROMORPHONE HYDROCHLORIDE 0.2 MG: 2 INJECTION, SOLUTION INTRAMUSCULAR; INTRAVENOUS; SUBCUTANEOUS at 17:22

## 2024-04-22 RX ADMIN — HYDROMORPHONE HYDROCHLORIDE 0.5 MG: 2 INJECTION, SOLUTION INTRAMUSCULAR; INTRAVENOUS; SUBCUTANEOUS at 18:46

## 2024-04-22 RX ADMIN — NEPHROCAP 1 MG: 1 CAP ORAL at 08:43

## 2024-04-22 RX ADMIN — FENTANYL CITRATE 25 MCG: 50 INJECTION, SOLUTION INTRAMUSCULAR; INTRAVENOUS at 16:32

## 2024-04-22 RX ADMIN — Medication 10 ML: at 08:45

## 2024-04-22 RX ADMIN — DEXAMETHASONE SODIUM PHOSPHATE 6 MG: 4 INJECTION, SOLUTION INTRAMUSCULAR; INTRAVENOUS at 16:02

## 2024-04-22 RX ADMIN — ROCURONIUM BROMIDE 10 MG: 10 INJECTION, SOLUTION INTRAVENOUS at 17:37

## 2024-04-22 RX ADMIN — MORPHINE SULFATE 4 MG: 4 INJECTION, SOLUTION INTRAMUSCULAR; INTRAVENOUS at 23:27

## 2024-04-22 RX ADMIN — FLUCONAZOLE 200 MG: 200 INJECTION, SOLUTION INTRAVENOUS at 14:35

## 2024-04-22 RX ADMIN — HYDROMORPHONE HYDROCHLORIDE 0.4 MG: 2 INJECTION, SOLUTION INTRAMUSCULAR; INTRAVENOUS; SUBCUTANEOUS at 17:45

## 2024-04-22 ASSESSMENT — PAIN SCALES - GENERAL
PAINLEVEL_OUTOF10: 9
PAINLEVEL_OUTOF10: 9
PAINLEVEL_OUTOF10: 7
PAINLEVEL_OUTOF10: 7

## 2024-04-22 ASSESSMENT — PAIN SCALES - WONG BAKER
WONGBAKER_NUMERICALRESPONSE: HURTS A LITTLE BIT

## 2024-04-22 ASSESSMENT — PAIN DESCRIPTION - DESCRIPTORS: DESCRIPTORS: ACHING

## 2024-04-22 ASSESSMENT — PAIN - FUNCTIONAL ASSESSMENT: PAIN_FUNCTIONAL_ASSESSMENT: 0-10

## 2024-04-22 ASSESSMENT — PAIN DESCRIPTION - LOCATION: LOCATION: ABDOMEN

## 2024-04-22 NOTE — BRIEF OP NOTE
Brief Postoperative Note      Patient: Sonja Mcmahan  YOB: 1950  MRN: [id number]    Date of Procedure: 4/22/2024    Pre-Op Diagnosis Codes:     * Hiatal hernia [K44.9]  Gastric mass  Cholelithiasis with chronic cholecystitis    Post-Op Diagnosis: Same       Procedure(s):  LAPAROSCOPIC HERNIA HIATAL REPAIR  LAPAROSCOPIC CHOLECYSTECTOMY  HEMIGASTRECTOMY WITH GASTROJEJUNOSTOMY    Surgeon(s):  David Mack MD    Assistant:  Surgical Assistant: Miller Astudillo; Rebecca Sen    Anesthesia: General    Estimated Blood Loss (mL): less than 50     Complications: None    Specimens:   ID Type Source Tests Collected by Time Destination   A : A) GALLBLADDER Tissue Gallbladder SURGICAL PATHOLOGY David Mack MD 4/22/2024 1548    B : B) HERNIA SAC Tissue Tissue SURGICAL PATHOLOGY David Mack MD 4/22/2024 1553    C : C) SUBTOTAL GASTRECTOMY Tissue Tissue SURGICAL PATHOLOGY David Mack MD 4/22/2024 1648        Implants:  Implant Name Type Inv. Item Serial No.  Lot No. LRB No. Used Action   MESH ELIZABETH [id number] SYN TISS REINF BIOABSRB DISP BIO-A - [id number]  MESH ELIZABETH [id number] SYN TISS REINF BIOABSRB DISP BIO-A [id number]  [employer]-  N/A 1 Implanted         Drains:   NG/OG/NJ/NE Tube Nasogastric 18 fr Left nostril (Active)       Urinary Catheter 04/22/24 Mclean (Active)       [REMOVED] NG/OG/NJ/NE Tube Orogastric 18 fr Center mouth (Removed)       Findings:  Infection Present At Time Of Surgery (PATOS) (choose all levels that have infection present):  No infection present  Other Findings: Large HH repair completed, GB removed, hemigastrectomy with retrocolic, retrogastric gastrojejunostomy completed    Electronically signed by David Mack MD on 4/22/2024 at 6:19 PM

## 2024-04-22 NOTE — ANESTHESIA POSTPROCEDURE EVALUATION
Department of Anesthesiology  Postprocedure Note    Patient: Sonja Mcmahan  MRN: [id number]  YOB: 1950  Date of evaluation: 4/22/2024    Procedure Summary       Date: 04/22/24 Room / Location: [address]    Anesthesia Start: 1403 Anesthesia Stop: 1829    Procedures:       LAPAROSCOPIC HERNIA HIATAL REPAIR (Abdomen)      LAPAROSCOPIC CHOLECYSTECTOMY (Abdomen)      HEMIGASTRECTOMY WITH RETROCOLIC RETROGASTIC GASTROJEJUNOSTOMY (Abdomen) Diagnosis:       Hiatal hernia      (Hiatal hernia [K44.9])    Surgeons: David Mack MD Responsible Provider: Danilo Brush MD    Anesthesia Type: general ASA Status: 3            Anesthesia Type: No value filed.    Jessika Phase I: Jessika Score: 8    Jessika Phase II:      Anesthesia Post Evaluation    Patient location during evaluation: PACU  Patient participation: complete - patient participated  Level of consciousness: awake and alert  Pain score: 3  Airway patency: patent  Nausea & Vomiting: no nausea  Cardiovascular status: blood pressure returned to baseline  Respiratory status: acceptable  Hydration status: euvolemic  Pain management: adequate    No notable events documented.

## 2024-04-22 NOTE — PROGRESS NOTES
Pt arrived from OR to PACU, awakens to voice, drowsy. VSS, O2 sats 99% on 6 L simple mask. Dressings to abdomen CDI, abdomen soft, DEQUAN dressing in place. NG in place at 55, clamped at this time. Mclean draining clear yellow urine. Will monitor.

## 2024-04-22 NOTE — PROGRESS NOTES
Pt resting quietly in bed with eyes closed, awakens to voice, states pain is improved and falls back asleep easily. VSS, O2 sats 96% on 3 L NC. Dressing to abdomen remains CDI, abdomen soft, ice pack in place. NG and wiggins in place. Pt seen by anesthesia, phase 1 criteria met. Will transfer pt back to 5T.

## 2024-04-22 NOTE — PROGRESS NOTES
Hospitalist Progress Note    Name:  Sonja Mcmahan    /Age/Sex: 1950  (73 y.o. female)  MRN & CSN:  [id number] & [id number]    PCP: Abiodun Amanda PA    Date of Admission: 4/15/2024    Patient Status:  Inpatient     Chief Complaint:   Chief Complaint   Patient presents with    Abdominal Pain     Pt here with daughter, per daughter pt was seen at GI doctor today (Dr. Marin) and told pt to go to ER for direct admit for hiatal hernia, also per daughter, states that \"the top of her stomach is twisted\" pt aslo c/o SOB        Hospital Course:   Admitted for abdominal pain.  Imaging indicative of hiatal hernia and cholelithiasis.  EGD on  showed polyps with hiatal hernia.  Esophagus dilated at that time.  GI consulted.  General surgery consulted for cholecystectomy and hiatal hernia repair.    Subjective:  Today is:  Hospital Day: 8.  Patient seen and examined in OR/NONE.     Lying in bed.  Denies pain.      Medications:  Reviewed    Infusion Medications    sodium chloride 50 mL/hr at 24 1402    sodium chloride 80 mL/hr at 24 2218    dextrose      sodium chloride       Scheduled Medications    sodium chloride flush  5-40 mL IntraVENous 2 times per day    ceFAZolin  2,000 mg IntraVENous Q8H    metroNIDAZOLE  500 mg IntraVENous Q8H    fluconazole  200 mg IntraVENous Q24H    insulin lispro  0-4 Units SubCUTAneous TID WC    insulin lispro  0-4 Units SubCUTAneous Nightly    pantoprazole  40 mg Oral QAM AC    montelukast  10 mg Oral Nightly    Virt-Caps  1 capsule Oral Daily    sodium chloride flush  5-40 mL IntraVENous 2 times per day    atorvastatin  20 mg Oral Nightly    lisinopril  10 mg Oral Nightly    And    hydroCHLOROthiazide  12.5 mg Oral Daily    cefTRIAXone (ROCEPHIN) IV  1,000 mg IntraVENous Q24H     PRN Meds: lidocaine 1 % injection, sodium chloride flush, sodium chloride, dextrose bolus **OR** dextrose bolus, glucagon (rDNA), dextrose, LORazepam, sodium chloride flush, sodium

## 2024-04-22 NOTE — PROGRESS NOTES
Nutrition Note    RECOMMENDATIONS  PO Diet: Recommend advance as tolerated to low fat diet s/p surgery  ONS: Reorder Ensure Clear or order Ensure Plus High Protein BID depending on what diet is resumed  Nursing: RD ordered wt, please obtain actual wt of the pt and document wt method     ASSESSMENT   Pt triggered for follow-up. Pt has been NPO or on liquid diet throughout admission now x 6 days, specifically on clear liquid diet snice 4/18. NPO today for hiatal hernia repair, hemigastrectomy, and cholecystectomy. Upon visiting, reported was tolerating but sick of clear liquid diet; asking when diet will be advanced s/p surgery. Was receiving Ensure Clear BID and accepting at times. RD will continue to monitor for diet to be resumed with advancement and tolerance.     Malnutrition Status: Insufficient data    NUTRITION DIAGNOSIS   Inadequate protein-energy intake related to altered GI function as evidenced by NPO or clear liquid status due to medical condition    Goals: PO intake 50% or greater, by next RD assessment (RD advancement with tolerance)     NUTRITION RELATED FINDINGS  Objective: NS at 80 mL/hr. Labs reviewed. LBM 4/20 per pt report. No edema noted.  Wounds: None    CURRENT NUTRITION THERAPIES  Diet NPO Exceptions are: Sips of Water with Meds     PO Intake: NPO   PO Supplement Intake:NPO    ANTHROPOMETRICS  Current Height: 160 cm (5' 3\")  Current Weight - Scale: 78.5 kg (173 lb)    Ideal Body Weight (IBW): 115 lbs  (52 kg)        BMI: 30.7    COMPARATIVE STANDARDS  Total Energy Requirements (kcals/day): unable to calculate estimated energy needs until actual wt of the pt is obtained       EDUCATION  Education not indicated     The patient will be monitored per nutrition standards of care. Consult dietitian if additional nutrition interventions are needed prior to RD reassessment.     Flaquita Rey, MS, RD, LD    Contact: [phone]

## 2024-04-22 NOTE — ANESTHESIA PRE PROCEDURE
Department of Anesthesiology  Preprocedure Note       Name:  Sonja Mcmahan   Age:  73 y.o.  :  1950                                          MRN:  [id number]         Date:  2024      Surgeon: Surgeon(s):  David Mack MD    Procedure: Procedure(s):  LAPAROSCOPIC HERNIA HIATAL REPAIR  LAPAROSCOPIC CHOLECYSTECTOMY WITH CHOLANGIOGRAM  HEMIGASTRECTOMY WITH GASTROJEJUNOSTOMY    Medications prior to admission:   Prior to Admission medications    Medication Sig Start Date End Date Taking? Authorizing Provider   BIOTIN PO Take by mouth    Meenakshi Martinez MD   Multiple Vitamins-Minerals (STRESS B-COMPLEX/C/ZINC) TABS TAKE 1 TABLET BY MOUTH ONCE DAILY 22   Meenakshi Martinez MD   loratadine (CLARITIN) 10 MG tablet TAKE 1 TABLET BY MOUTH EVERY DAY 22   Meenakshi Martinez MD   albuterol sulfate HFA (PROVENTIL;VENTOLIN;PROAIR) 108 (90 Base) MCG/ACT inhaler Inhale 2 puffs into the lungs every 4 hours as needed for Shortness of Breath 19   Meenakshi Martinez MD   albuterol (PROVENTIL) (2.5 MG/3ML) 0.083% nebulizer solution Inhale 3 mLs into the lungs every 6 hours as needed 10/8/18 12/15/23  Meenakshi Martinez MD   aspirin 81 MG chewable tablet Take 1 tablet by mouth daily    Meenakshi Martinez MD   atorvastatin (LIPITOR) 20 MG tablet Take 1 tablet by mouth daily 19   Meenakshi Martinez MD   LORazepam (ATIVAN) 0.5 MG tablet Take 1 tablet by mouth every 8 hours as needed. 18   Meenakshi Martinez MD   montelukast (SINGULAIR) 10 MG tablet Take 1 tablet by mouth nightly    Meenakshi Martinez MD   Cyanocobalamin (VITAMIN B-12) 1000 MCG SUBL Place under the tongue every 30 days    Meenakshi Martinez MD   lisinopril-hydrochlorothiazide (PRINZIDE;ZESTORETIC) 10-12.5 MG per tablet Take 0.5 tablets by mouth daily    Meenakshi Martinez MD       Current medications:    Current Facility-Administered Medications   Medication Dose Route Frequency Provider Last 
Prophylactic antiemetics administered.  Anesthetic plan and risks discussed with patient.      Plan discussed with attending.                  AVA Tran - DIANA   4/19/2024

## 2024-04-22 NOTE — H&P
[facility]      I have reviewed the history and physical and examined the patient and find no relevant changes.    I have reviewed with the patient and/or family the risks, benefits, and alternatives to the procedure.    David Mack MD  4/22/2024

## 2024-04-23 LAB
ALBUMIN SERPL-MCNC: 3.2 G/DL (ref 3.4–5)
ALBUMIN/GLOB SERPL: 1.2 {RATIO} (ref 1.1–2.2)
ALP SERPL-CCNC: 64 U/L (ref 40–129)
ALT SERPL-CCNC: 28 U/L (ref 10–40)
ANION GAP SERPL CALCULATED.3IONS-SCNC: 13 MMOL/L (ref 3–16)
AST SERPL-CCNC: 53 U/L (ref 15–37)
BASOPHILS # BLD: 0 K/UL (ref 0–0.2)
BASOPHILS NFR BLD: 0.2 %
BILIRUB SERPL-MCNC: 0.3 MG/DL (ref 0–1)
BUN SERPL-MCNC: 9 MG/DL (ref 7–20)
CALCIUM SERPL-MCNC: 8.6 MG/DL (ref 8.3–10.6)
CHLORIDE SERPL-SCNC: 111 MMOL/L (ref 99–110)
CO2 SERPL-SCNC: 19 MMOL/L (ref 21–32)
CREAT SERPL-MCNC: 0.9 MG/DL (ref 0.6–1.2)
DEPRECATED RDW RBC AUTO: 14.8 % (ref 12.4–15.4)
EOSINOPHIL # BLD: 0 K/UL (ref 0–0.6)
EOSINOPHIL NFR BLD: 0 %
GFR SERPLBLD CREATININE-BSD FMLA CKD-EPI: 66 ML/MIN/{1.73_M2}
GLUCOSE BLD-MCNC: 116 MG/DL (ref 70–99)
GLUCOSE BLD-MCNC: 121 MG/DL (ref 70–99)
GLUCOSE BLD-MCNC: 123 MG/DL (ref 70–99)
GLUCOSE BLD-MCNC: 133 MG/DL (ref 70–99)
GLUCOSE SERPL-MCNC: 162 MG/DL (ref 70–99)
HCT VFR BLD AUTO: 40 % (ref 36–48)
HGB BLD-MCNC: 12.8 G/DL (ref 12–16)
LIPASE SERPL-CCNC: 18 U/L (ref 13–60)
LYMPHOCYTES # BLD: 0.6 K/UL (ref 1–5.1)
LYMPHOCYTES NFR BLD: 4.6 %
MCH RBC QN AUTO: 28 PG (ref 26–34)
MCHC RBC AUTO-ENTMCNC: 32.1 G/DL (ref 31–36)
MCV RBC AUTO: 87.3 FL (ref 80–100)
MONOCYTES # BLD: 0.6 K/UL (ref 0–1.3)
MONOCYTES NFR BLD: 4.6 %
NEUTROPHILS # BLD: 12.2 K/UL (ref 1.7–7.7)
NEUTROPHILS NFR BLD: 90.6 %
PERFORMED ON: ABNORMAL
PLATELET # BLD AUTO: 213 K/UL (ref 135–450)
PMV BLD AUTO: 8.1 FL (ref 5–10.5)
POTASSIUM SERPL-SCNC: 4.5 MMOL/L (ref 3.5–5.1)
PROT SERPL-MCNC: 5.8 G/DL (ref 6.4–8.2)
RBC # BLD AUTO: 4.58 M/UL (ref 4–5.2)
SODIUM SERPL-SCNC: 143 MMOL/L (ref 136–145)
WBC # BLD AUTO: 13.5 K/UL (ref 4–11)

## 2024-04-23 PROCEDURE — 97530 THERAPEUTIC ACTIVITIES: CPT

## 2024-04-23 PROCEDURE — 6360000002 HC RX W HCPCS: Performed by: NURSE PRACTITIONER

## 2024-04-23 PROCEDURE — 97165 OT EVAL LOW COMPLEX 30 MIN: CPT

## 2024-04-23 PROCEDURE — 97535 SELF CARE MNGMENT TRAINING: CPT

## 2024-04-23 PROCEDURE — 36415 COLL VENOUS BLD VENIPUNCTURE: CPT

## 2024-04-23 PROCEDURE — 6360000002 HC RX W HCPCS: Performed by: SURGERY

## 2024-04-23 PROCEDURE — 2580000003 HC RX 258: Performed by: SURGERY

## 2024-04-23 PROCEDURE — APPNB30 APP NON BILLABLE TIME 0-30 MINS: Performed by: NURSE PRACTITIONER

## 2024-04-23 PROCEDURE — 1200000000 HC SEMI PRIVATE

## 2024-04-23 PROCEDURE — 83690 ASSAY OF LIPASE: CPT

## 2024-04-23 PROCEDURE — 99024 POSTOP FOLLOW-UP VISIT: CPT | Performed by: SURGERY

## 2024-04-23 PROCEDURE — 97161 PT EVAL LOW COMPLEX 20 MIN: CPT

## 2024-04-23 PROCEDURE — APPSS15 APP SPLIT SHARED TIME 0-15 MINUTES: Performed by: NURSE PRACTITIONER

## 2024-04-23 PROCEDURE — 85025 COMPLETE CBC W/AUTO DIFF WBC: CPT

## 2024-04-23 PROCEDURE — 80053 COMPREHEN METABOLIC PANEL: CPT

## 2024-04-23 RX ORDER — DICYCLOMINE HYDROCHLORIDE 10 MG/ML
10 INJECTION INTRAMUSCULAR ONCE
Status: COMPLETED | OUTPATIENT
Start: 2024-04-23 | End: 2024-04-23

## 2024-04-23 RX ADMIN — METOCLOPRAMIDE 10 MG: 5 INJECTION, SOLUTION INTRAMUSCULAR; INTRAVENOUS at 13:53

## 2024-04-23 RX ADMIN — METOCLOPRAMIDE 10 MG: 5 INJECTION, SOLUTION INTRAMUSCULAR; INTRAVENOUS at 01:40

## 2024-04-23 RX ADMIN — MORPHINE SULFATE 4 MG: 4 INJECTION, SOLUTION INTRAMUSCULAR; INTRAVENOUS at 10:17

## 2024-04-23 RX ADMIN — KETOROLAC TROMETHAMINE 15 MG: 15 INJECTION, SOLUTION INTRAMUSCULAR; INTRAVENOUS at 13:53

## 2024-04-23 RX ADMIN — ONDANSETRON 4 MG: 2 INJECTION INTRAMUSCULAR; INTRAVENOUS at 11:05

## 2024-04-23 RX ADMIN — Medication 10 ML: at 07:25

## 2024-04-23 RX ADMIN — KETOROLAC TROMETHAMINE 15 MG: 15 INJECTION, SOLUTION INTRAMUSCULAR; INTRAVENOUS at 20:18

## 2024-04-23 RX ADMIN — MORPHINE SULFATE 2 MG: 2 INJECTION, SOLUTION INTRAMUSCULAR; INTRAVENOUS at 05:53

## 2024-04-23 RX ADMIN — DICYCLOMINE HYDROCHLORIDE 10 MG: 10 INJECTION, SOLUTION INTRAMUSCULAR at 01:16

## 2024-04-23 RX ADMIN — METRONIDAZOLE 500 MG: 500 INJECTION, SOLUTION INTRAVENOUS at 05:47

## 2024-04-23 RX ADMIN — FLUCONAZOLE 200 MG: 200 INJECTION, SOLUTION INTRAVENOUS at 14:01

## 2024-04-23 RX ADMIN — METOCLOPRAMIDE 10 MG: 5 INJECTION, SOLUTION INTRAMUSCULAR; INTRAVENOUS at 07:25

## 2024-04-23 RX ADMIN — Medication 10 ML: at 20:19

## 2024-04-23 RX ADMIN — METOCLOPRAMIDE 10 MG: 5 INJECTION, SOLUTION INTRAMUSCULAR; INTRAVENOUS at 20:19

## 2024-04-23 RX ADMIN — CEFTRIAXONE SODIUM 1000 MG: 1 INJECTION, POWDER, FOR SOLUTION INTRAMUSCULAR; INTRAVENOUS at 20:26

## 2024-04-23 RX ADMIN — SODIUM CHLORIDE: 9 INJECTION, SOLUTION INTRAVENOUS at 13:25

## 2024-04-23 RX ADMIN — MORPHINE SULFATE 4 MG: 4 INJECTION, SOLUTION INTRAMUSCULAR; INTRAVENOUS at 16:02

## 2024-04-23 RX ADMIN — SODIUM CHLORIDE: 9 INJECTION, SOLUTION INTRAVENOUS at 00:39

## 2024-04-23 RX ADMIN — MORPHINE SULFATE 4 MG: 4 INJECTION, SOLUTION INTRAMUSCULAR; INTRAVENOUS at 18:47

## 2024-04-23 RX ADMIN — MORPHINE SULFATE 4 MG: 4 INJECTION, SOLUTION INTRAMUSCULAR; INTRAVENOUS at 03:32

## 2024-04-23 RX ADMIN — KETOROLAC TROMETHAMINE 15 MG: 15 INJECTION, SOLUTION INTRAMUSCULAR; INTRAVENOUS at 07:25

## 2024-04-23 RX ADMIN — MORPHINE SULFATE 4 MG: 4 INJECTION, SOLUTION INTRAMUSCULAR; INTRAVENOUS at 23:53

## 2024-04-23 RX ADMIN — KETOROLAC TROMETHAMINE 15 MG: 15 INJECTION, SOLUTION INTRAMUSCULAR; INTRAVENOUS at 01:40

## 2024-04-23 RX ADMIN — MORPHINE SULFATE 2 MG: 2 INJECTION, SOLUTION INTRAMUSCULAR; INTRAVENOUS at 12:34

## 2024-04-23 RX ADMIN — MORPHINE SULFATE 2 MG: 2 INJECTION, SOLUTION INTRAMUSCULAR; INTRAVENOUS at 08:23

## 2024-04-23 ASSESSMENT — PAIN SCALES - WONG BAKER
WONGBAKER_NUMERICALRESPONSE: HURTS A LITTLE BIT

## 2024-04-23 ASSESSMENT — PAIN SCALES - GENERAL
PAINLEVEL_OUTOF10: 7
PAINLEVEL_OUTOF10: 5
PAINLEVEL_OUTOF10: 5
PAINLEVEL_OUTOF10: 7
PAINLEVEL_OUTOF10: 5
PAINLEVEL_OUTOF10: 7
PAINLEVEL_OUTOF10: 7
PAINLEVEL_OUTOF10: 8
PAINLEVEL_OUTOF10: 7
PAINLEVEL_OUTOF10: 8

## 2024-04-23 ASSESSMENT — PAIN - FUNCTIONAL ASSESSMENT: PAIN_FUNCTIONAL_ASSESSMENT: ACTIVITIES ARE NOT PREVENTED

## 2024-04-23 ASSESSMENT — PAIN DESCRIPTION - ORIENTATION: ORIENTATION: LOWER

## 2024-04-23 ASSESSMENT — PAIN DESCRIPTION - DESCRIPTORS: DESCRIPTORS: ACHING

## 2024-04-23 ASSESSMENT — PAIN DESCRIPTION - PAIN TYPE: TYPE: SURGICAL PAIN

## 2024-04-23 ASSESSMENT — PAIN DESCRIPTION - LOCATION: LOCATION: ABDOMEN

## 2024-04-23 NOTE — PROGRESS NOTES
[facility] - Inpatient Rehabilitation Department   Phone: [phone]    Physical Therapy    [x] Initial Evaluation            [] Daily Treatment Note         [] Discharge Summary      Patient: Sonja Mcmahan   : 1950   MRN: [id number]   Date of Service:  2024  Admitting Diagnosis: Hiatal hernia  Current Admission Summary:  Admitted for abdominal pain on 4/15. Imaging indicative of hiatal hernia and cholelithiasis. EGD on  showed polyps with hiatal hernia. Esophagus dilated at that time. To OR  for below:  1. Laparoscopic hiatal hernia repair with primary closure and Bowman Bio-A mesh reinforcement.  2. Laparoscopic cholecystectomy.  3. Open hemigastrectomy with gastrojejunostomy and closure of duodenal stump and overlying omental flap for duodenal stump reinforcement.  Past Medical History:  has a past medical history of Asthma, Diabetes mellitus (HCC), Hyperlipidemia, and Hypertension.  Past Surgical History:  has a past surgical history that includes Hysterectomy, total abdominal; Elbow surgery; Upper gastrointestinal endoscopy (N/A, 2024); Upper gastrointestinal endoscopy (N/A, 2024); Upper gastrointestinal endoscopy (N/A, 2024); hiatal hernia repair (N/A, 2024); Cholecystectomy, laparoscopic (N/A, 2024); and gastrectomy (N/A, 2024).    Discharge Recommendations: Sonja Mcmahan scored a 16/24 on the AM-PAC short mobility form. Current research shows that an AM-PAC score of 18 or greater is typically associated with a discharge to the patient's home setting. Based on the patient's AM-PAC score and their current functional mobility deficits, it is recommended that the patient have 2-3 sessions per week of Physical Therapy at d/c to increase the patient's independence.  At this time, this patient demonstrates the endurance and safety to discharge home with initial  assist and home PT and a follow up treatment frequency of 2-3x/wk.  Please see  support  Dynamic Sitting Balance: fair (-): maintains balance at SBA with use of UE support  Static Standing Balance: fair (-): maintains balance at CGA with use of UE support  Dynamic Standing Balance: poor (+): requires min (A) to maintain balance  Comments: Pt unsteady initially upon standing requiring Min A and unilateral UE support for stability. Balance improved on 2nd standing trial and with use of (B) UE support on RW.     Other Therapeutic Interventions  Pt completed ADLs once seated in chair, see OT note for assist.     Discussed laying flat occasionally to allow stretching of scar tissue as she heals. Discussed use of RW for energy conservation and stability post-op. Discussed importance of getting up with staff in addition to therapy. Ice 20 minutes on/off. Pt and family verbalized understanding.     Functional Outcomes  AM-PAC Inpatient Mobility Raw Score : 16              Cognition  WFL  Orientation:    alert and oriented x 4  Command Following:   WFL    Education  Barriers To Learning: none  Patient Education: patient educated on goals, PT role and benefits, plan of care, general safety, functional mobility training, proper use of assistive device/equipment, transfer training, discharge recommendations  Learning Assessment:  patient verbalizes understanding, would benefit from continued reinforcement    Assessment  Activity Tolerance: Pt fatigued following transfer, but tolerated activity with less pain than she anticipated. 1 bout of dizziness after supine>sit and nausea after transfer, both resolved with seated rest.   Impairments Requiring Therapeutic Intervention: decreased functional mobility, decreased ADL status, decreased strength, decreased endurance, decreased balance, increased pain, decreased posture  Prognosis: good  Clinical Assessment: The patient is a 72 yo female admitted to [facility] for abdominal pain, now seen s/p hernia repair and lap antonio on 4/22. The patient is independent at

## 2024-04-23 NOTE — PROGRESS NOTES
V2.0    [facility] [place]      Name:  Sonja Mcmahan /Age/Sex: 1950  (73 y.o. female)   MRN & CSN:  [id number] & [id number] Encounter Date/Time: 2024 1:29 PM EDT   Location:  [facility]-5568/5568-01 PCP: Abiodun Amanda PA     Attending:Kemi Rosa MD       Hospital Day: 9    Assessment and Recommendations   Sonja Mcmahan is a 73 y.o. female who presents with Hiatal hernia      Plan:     Abdominal pain  Likely secondary to sliding hiatal hernia with gastric mass with high grade dysplasia  Patient is status post laparoscopic hernia repair with open hemigastrectomy with gastrojejunostomy.  Further surgical recommendation per general surgery    Cholelithiasis with chronic cholecystitis patient is status post laparoscopic Mylene cystectomy.    HTN  HLD        Diet Diet NPO Exceptions are: Sips of Water with Meds, Ice Chips   DVT Prophylaxis    Code Status Full Code   Disposition Per suegery team          Personally reviewed Lab Studies and Imaging         Subjective:     Chief Complaint:     Sonja Mcmahan is a 73 y.o. female who presents with post surgery  Denies any cp       Review of Systems:      Pertinent positives and negatives discussed in HPI    Objective:     Intake/Output Summary (Last 24 hours) at 2024 1329  Last data filed at 2024 0551  Gross per 24 hour   Intake 1700 ml   Output 900 ml   Net 800 ml      Vitals:   Vitals:    24 0853 24 1047 24 1059 24 1234   BP:   137/84    Pulse:   80    Resp: 16 16 16    Temp:   96.8 °F (36 °C)    TempSrc:   Tympanic    SpO2:   94% 91%   Weight:       Height:             Physical Exam:      General: NAD  Eyes: EOMI  ENT: neck supple  Cardiovascular: Regular rate.  Respiratory: Clear to auscultation  Gastrointestinal: Soft, non tender  Genitourinary: no suprapubic tenderness  Musculoskeletal: No edema  Skin: warm, dry  Neuro: Alert.  Psych: Mood appropriate.         Medications:   Medications:    fluconazole  200 mg

## 2024-04-23 NOTE — PROGRESS NOTES
[facility] - Inpatient Rehabilitation Department   Phone: [phone]    Occupational Therapy    [] Initial Evaluation            [] Daily Treatment Note         [] Discharge Summary      Patient: Sonja Mcmahan   : 1950   MRN: [id number]   Date of Service:  2024    Admitting Diagnosis:  Hiatal hernia  Current Admission Summary: Admitted for abdominal pain. Imaging indicative of hiatal hernia and cholelithiasis. EGD on  showed polyps with hiatal hernia. Esophagus dilated at that time. To OR  for below:  1. Laparoscopic hiatal hernia repair with primary closure and [place] Bio-A mesh reinforcement.  2. Laparoscopic cholecystectomy.  3. Open hemigastrectomy with gastrojejunostomy and closure of duodenal stump and overlying omental flap for duodenal stump reinforcement.  Past Medical History:  has a past medical history of Asthma, Diabetes mellitus (HCC), Hyperlipidemia, and Hypertension.  Past Surgical History:  has a past surgical history that includes Hysterectomy, total abdominal; Elbow surgery; Upper gastrointestinal endoscopy (N/A, 2024); Upper gastrointestinal endoscopy (N/A, 2024); Upper gastrointestinal endoscopy (N/A, 2024); hiatal hernia repair (N/A, 2024); Cholecystectomy, laparoscopic (N/A, 2024); and gastrectomy (N/A, 2024).    Discharge Recommendations: Sonja Mcmahan scored a 16/24 on the AM-PAC ADL Inpatient form. Current research shows that an AM-PAC score of 18 or greater is typically associated with a discharge to the patient's home setting. Based on the patient's AM-PAC score, and their current ADL deficits, it is recommended that the patient have 2-3 sessions per week of Occupational Therapy at d/c to increase the patient's independence.  At this time, this patient demonstrates the endurance and safety to discharge home with home services (home vs OP services) and a follow up treatment frequency of 2-3x/wk.   Please see assessment  strategies, IADL safety, transfer training, discharge recommendations  Learning Assessment:  patient verbalizes understanding, would benefit from continued reinforcement    Assessment  Activity Tolerance: Tolerated well. Reports dizziness EOB, /92. /81 once in recliner. Dizziness resolves with increased time  Impairments Requiring Therapeutic Intervention: decreased functional mobility, decreased ADL status, decreased safety awareness, decreased endurance, decreased balance, decreased IADL  Prognosis: good  Clinical Assessment: The patient is a 73 y.o. female who presents below their baseline level of function due to above deficits, associated with Hiatal hernia. Typically, pt is IND. Currently, pt is requiring min A for transfers only. Pt may have family available to stay with her at home upon discharge per discussion with dtr. Continued OT indicated in order to promote return to PLOF    Safety Interventions: patient left in chair, chair alarm in place, call light within reach, gait belt, nurse notified, and family/caregiver present    Plan  Frequency: 3-5 x/per week  Current Treatment Recommendations: strengthening, balance training, functional mobility training, transfer training, endurance training, patient/caregiver education, ADL/self-care training, IADL training, home management training, and equipment evaluation/education    Goals    Short Term Goals:  Time Frame: by discharge  Patient will complete upper body ADL at supervision   Patient will complete lower body ADL at supervision   Patient will complete toileting at modified independent   Patient will complete grooming at Independent   Patient will complete functional transfers at modified independent   Patient will complete functional mobility at modified independent     Above goals reviewed on 4/23/2024.  All goals are ongoing at this time unless indicated above.       Therapy Session Time     Individual Group Co-treatment   Time In    1000

## 2024-04-23 NOTE — PROGRESS NOTES
Pain improved overnight, A/Ox4, perfectserve sent to NP for abdominal cramping/spasms, medication given per MAR, no other needs at this time. Fall precautions in place, call light in reach.   Vitals:    04/23/24 0332   BP: 130/87   Pulse: 89   Resp: 14   Temp: 97.2 °F (36.2 °C)   SpO2: 97%

## 2024-04-23 NOTE — PROGRESS NOTES
Pt back in room from PACU, responds to voice, able to follow commands, respirations even/unlabored on 3L O2, DEQUAN dressing C/D/I, NG connected to LIWS, wiggins draining, IVF started, pain 9/10, medicated per MAR, SCDs placed, family and patient updated on plan of care, no other needs at this time.

## 2024-04-23 NOTE — PLAN OF CARE
Problem: Pain  Goal: Verbalizes/displays adequate comfort level or baseline comfort level  Outcome: Progressing     Problem: Chronic Conditions and Co-morbidities  Goal: Patient's chronic conditions and co-morbidity symptoms are monitored and maintained or improved  Outcome: Progressing     Problem: Nutrition Deficit:  Goal: Optimize nutritional status  Outcome: Progressing     Problem: Discharge Planning  Goal: Discharge to home or other facility with appropriate resources  Outcome: Progressing     Problem: Safety - Adult  Goal: Free from fall injury  Outcome: Progressing

## 2024-04-23 NOTE — OP NOTE
[address] [address]                            OPERATIVE REPORT      PATIENT NAME: GIANCARLO CARNEY              : 1950  MED REC NO: [id number]                      ROOM: [address] [place] [address]  ACCOUNT NO: [id number]                       ADMIT DATE: 04/15/2024  PROVIDER: David Mack MD      DATE OF PROCEDURE:  2024    SURGEON:  David Mack MD    PREOPERATIVE DIAGNOSES:    1. Hiatal hernia sliding with paraesophageal component.  2. Gastroesophageal reflux disease.  3. Cholelithiasis with chronic cholecystitis.  4. 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    POSTOPERATIVE DIAGNOSES:    1. Hiatal hernia sliding with paraesophageal component.  2. Gastroesophageal reflux disease.  3. Cholelithiasis with chronic cholecystitis.  4. 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    PROCEDURES:    1. Laparoscopic hiatal hernia repair with primary closure and [place] Bio-A mesh reinforcement.  2. Laparoscopic cholecystectomy.  3. Open hemigastrectomy with gastrojejunostomy and closure of duodenal stump and overlying omental flap for duodenal stump reinforcement.    ANESTHESIA:  General plus local.    ESTIMATED BLOOD LOSS:  Minimal.    COMPLICATIONS:  None.    SPECIMENS:    1. Hiatal hernia sac.  2. Gallbladder and contents.  3. Hemigastrectomy with resection of the entire body of the stomach through the antrum, pylorus to the 1st section of the duodenum.    FINDINGS OF SURGERY:  Included large complex hiatal hernia which was taken down.  Hernia sac was taken down.  Hernia was reduced and closed.  This was symptomatic for the patient and also necessary to mobilize the esophagus and stomach down in the abdomen for planning and anastomosis for her gastrojejunostomy.  The patient had gallstones, and laparoscopically the hiatal hernia repair was completed and the gallbladder was  along with the hernia sac was excised and removed.  The anterior and posterior vagus nerves were both identified and preserved.  The GE junction was elevated and repair of the hiatal hernia was performed.  This was done with interrupted 0 Ethibond sutures, securing each suture with a tie knot clip.  This was done to size a 56-[place]yr[place] bougie and positioned in the esophagus hiatus with the ability to admit a laparoscopic grasper along with the bougie for the size of the repair.  The repair was then reinforced with [place] Bio-A mesh.  This was cut to size and sutured to the common crural closure, right crura, left crura with an 0 Ethibond secured with a tie knot clip at each site.  The upper portion of the fundus and the short gastrics were taken down in this area to mobilize the stomach.  The patient's repair appeared complete.  A wrap was not performed.  We then moved around the instruments and retraction and exposed the gallbladder.    The fundus of the gallbladder was elevated.  Duodenal adhesions and scar tissue from the omentum were then taken down off the gallbladder, all consistent with chronic inflammatory change and the gallbladder infundibulum was elevated and retracted inferolaterally to the right.  Triangle of Calot then dissected.  The critical angle view technique was used to visualize cystic artery, cystic duct, and node of Calot.  Once the anatomy was clear, the gallbladder was widely dissected in this area.  The cystic duct was clipped with 3 clips proximally, 1 clip distally and transected.  The harmonic scalpel was used for dissection in addition to a couple clips placed on the cystic artery.  The gallbladder was completely removed off the hepatic fossa hemostatically with the harmonic scalpel.  The hernia sac and gallbladder were removed and sent to pathology.    Next, we evaluated the stomach.  I could identify a large lobulated mass within the stomach and at this point, the mass appeared in the mid

## 2024-04-23 NOTE — PROGRESS NOTES
[facility]        Progress Note    Patient Name: Sonja Mcmahan  MRN: [id number]  YOB: 1950  Date of Evaluation: 2024    Postoperative Day #1    Subjective:  No acute events overnight  Pain controlled  No nausea or vomiting, NGT in place  No flatus or stool  Resting in bed at this time      Vital Signs:  Patient Vitals for the past 24 hrs:   BP Temp Temp src Pulse Resp SpO2   24 1304 -- -- -- -- 16 --   24 1234 -- -- -- -- -- 91 %   24 1059 137/84 96.8 °F (36 °C) Tympanic 80 16 94 %   24 1047 -- -- -- -- 16 --   24 0853 -- -- -- -- 16 --   24 0715 118/80 97.8 °F (36.6 °C) Temporal 84 16 95 %   24 0332 130/87 97.2 °F (36.2 °C) Temporal 89 14 97 %   246 (!) 150/89 97 °F (36.1 °C) Temporal 91 16 99 %   24 194 (!) 168/82 97.3 °F (36.3 °C) Oral 90 -- 96 %   24 191 (!) 160/86 -- -- 89 12 98 %   24 1900 (!) 142/90 -- -- 91 14 95 %   24 1845 (!) 128/101 -- -- 93 16 99 %   24 183 (!) 175/77 97 °F (36.1 °C) Temporal 90 14 98 %   24 183 (!) 168/76 -- -- 89 14 99 %   24 182 (!) 162/74 -- -- 93 17 99 %   24 182 (!) 165/75 97 °F (36.1 °C) Temporal 95 16 99 %        TEMPERATURE HISTORY 24H: Temp (24hrs), Av.2 °F (36.2 °C), Min:96.8 °F (36 °C), Max:97.8 °F (36.6 °C)    BLOOD PRESSURE HISTORY: Systolic (36hrs), Av , Min:116 , Max:175    Diastolic (36hrs), Av, Min:73, Max:101      Intake/Output:  I/O last 3 completed shifts:  In: 1700 [I.V.:1700]  Out: 900 [Urine:550; Emesis/NG output:300; Blood:50]  No intake/output data recorded.  Drain/tube Output:       Physical Exam:  General: awake, alert, oriented to person, place, time  Lungs: unlabored respirations  Abdomen: soft, non-distended, incisional tenderness only, bowel sounds present  Skin/Wound: DEQUAN dressing in place, seal/suction intact    Labs:  CBC:    Recent Labs     24  0549 24  0601

## 2024-04-24 LAB
ANION GAP SERPL CALCULATED.3IONS-SCNC: 11 MMOL/L (ref 3–16)
BASOPHILS # BLD: 0 K/UL (ref 0–0.2)
BASOPHILS NFR BLD: 0.2 %
BUN SERPL-MCNC: 13 MG/DL (ref 7–20)
CALCIUM SERPL-MCNC: 8.7 MG/DL (ref 8.3–10.6)
CHLORIDE SERPL-SCNC: 110 MMOL/L (ref 99–110)
CO2 SERPL-SCNC: 19 MMOL/L (ref 21–32)
CREAT SERPL-MCNC: 0.9 MG/DL (ref 0.6–1.2)
DEPRECATED RDW RBC AUTO: 16.3 % (ref 12.4–15.4)
EOSINOPHIL # BLD: 0 K/UL (ref 0–0.6)
EOSINOPHIL NFR BLD: 0.1 %
GFR SERPLBLD CREATININE-BSD FMLA CKD-EPI: 66 ML/MIN/{1.73_M2}
GLUCOSE BLD-MCNC: 104 MG/DL (ref 70–99)
GLUCOSE BLD-MCNC: 112 MG/DL (ref 70–99)
GLUCOSE BLD-MCNC: 90 MG/DL (ref 70–99)
GLUCOSE BLD-MCNC: 99 MG/DL (ref 70–99)
GLUCOSE SERPL-MCNC: 126 MG/DL (ref 70–99)
HCT VFR BLD AUTO: 38.6 % (ref 36–48)
HGB BLD-MCNC: 12 G/DL (ref 12–16)
LYMPHOCYTES # BLD: 1.1 K/UL (ref 1–5.1)
LYMPHOCYTES NFR BLD: 7.8 %
MCH RBC QN AUTO: 27.8 PG (ref 26–34)
MCHC RBC AUTO-ENTMCNC: 31.2 G/DL (ref 31–36)
MCV RBC AUTO: 89 FL (ref 80–100)
MONOCYTES # BLD: 0.7 K/UL (ref 0–1.3)
MONOCYTES NFR BLD: 5.4 %
NEUTROPHILS # BLD: 12 K/UL (ref 1.7–7.7)
NEUTROPHILS NFR BLD: 86.5 %
PERFORMED ON: ABNORMAL
PERFORMED ON: ABNORMAL
PERFORMED ON: NORMAL
PERFORMED ON: NORMAL
PLATELET # BLD AUTO: 214 K/UL (ref 135–450)
PMV BLD AUTO: 7.9 FL (ref 5–10.5)
POTASSIUM SERPL-SCNC: 4.3 MMOL/L (ref 3.5–5.1)
RBC # BLD AUTO: 4.33 M/UL (ref 4–5.2)
SODIUM SERPL-SCNC: 140 MMOL/L (ref 136–145)
WBC # BLD AUTO: 13.9 K/UL (ref 4–11)

## 2024-04-24 PROCEDURE — 97535 SELF CARE MNGMENT TRAINING: CPT

## 2024-04-24 PROCEDURE — 85025 COMPLETE CBC W/AUTO DIFF WBC: CPT

## 2024-04-24 PROCEDURE — 1200000000 HC SEMI PRIVATE

## 2024-04-24 PROCEDURE — 6360000002 HC RX W HCPCS: Performed by: NURSE PRACTITIONER

## 2024-04-24 PROCEDURE — 97530 THERAPEUTIC ACTIVITIES: CPT

## 2024-04-24 PROCEDURE — 99024 POSTOP FOLLOW-UP VISIT: CPT | Performed by: SURGERY

## 2024-04-24 PROCEDURE — 80048 BASIC METABOLIC PNL TOTAL CA: CPT

## 2024-04-24 PROCEDURE — 6360000002 HC RX W HCPCS: Performed by: SURGERY

## 2024-04-24 PROCEDURE — A4216 STERILE WATER/SALINE, 10 ML: HCPCS | Performed by: HOSPITALIST

## 2024-04-24 PROCEDURE — 36415 COLL VENOUS BLD VENIPUNCTURE: CPT

## 2024-04-24 PROCEDURE — 2580000003 HC RX 258: Performed by: HOSPITALIST

## 2024-04-24 PROCEDURE — APPNB30 APP NON BILLABLE TIME 0-30 MINS: Performed by: NURSE PRACTITIONER

## 2024-04-24 PROCEDURE — 6360000002 HC RX W HCPCS: Performed by: HOSPITALIST

## 2024-04-24 PROCEDURE — 2580000003 HC RX 258: Performed by: SURGERY

## 2024-04-24 PROCEDURE — 6370000000 HC RX 637 (ALT 250 FOR IP): Performed by: HOSPITALIST

## 2024-04-24 PROCEDURE — APPSS15 APP SPLIT SHARED TIME 0-15 MINUTES: Performed by: NURSE PRACTITIONER

## 2024-04-24 PROCEDURE — C9113 INJ PANTOPRAZOLE SODIUM, VIA: HCPCS | Performed by: NURSE PRACTITIONER

## 2024-04-24 RX ORDER — PANTOPRAZOLE SODIUM 40 MG/10ML
40 INJECTION, POWDER, LYOPHILIZED, FOR SOLUTION INTRAVENOUS DAILY
Status: DISCONTINUED | OUTPATIENT
Start: 2024-04-24 | End: 2024-05-03

## 2024-04-24 RX ORDER — LABETALOL HYDROCHLORIDE 5 MG/ML
10 INJECTION, SOLUTION INTRAVENOUS EVERY 6 HOURS PRN
Status: DISCONTINUED | OUTPATIENT
Start: 2024-04-24 | End: 2024-04-25

## 2024-04-24 RX ORDER — ENOXAPARIN SODIUM 100 MG/ML
40 INJECTION SUBCUTANEOUS DAILY
Status: DISCONTINUED | OUTPATIENT
Start: 2024-04-24 | End: 2024-05-07 | Stop reason: HOSPADM

## 2024-04-24 RX ORDER — GUAIFENESIN/DEXTROMETHORPHAN 100-10MG/5
5 SYRUP ORAL EVERY 4 HOURS PRN
Status: DISCONTINUED | OUTPATIENT
Start: 2024-04-24 | End: 2024-05-07 | Stop reason: HOSPADM

## 2024-04-24 RX ORDER — HYDRALAZINE HYDROCHLORIDE 20 MG/ML
10 INJECTION INTRAMUSCULAR; INTRAVENOUS EVERY 6 HOURS PRN
Status: DISCONTINUED | OUTPATIENT
Start: 2024-04-24 | End: 2024-05-07 | Stop reason: HOSPADM

## 2024-04-24 RX ADMIN — Medication 10 ML: at 10:07

## 2024-04-24 RX ADMIN — MORPHINE SULFATE 4 MG: 4 INJECTION, SOLUTION INTRAMUSCULAR; INTRAVENOUS at 19:23

## 2024-04-24 RX ADMIN — PANTOPRAZOLE SODIUM 40 MG: 40 INJECTION, POWDER, FOR SOLUTION INTRAVENOUS at 10:18

## 2024-04-24 RX ADMIN — METOCLOPRAMIDE 10 MG: 5 INJECTION, SOLUTION INTRAMUSCULAR; INTRAVENOUS at 10:07

## 2024-04-24 RX ADMIN — GUAIFENESIN AND DEXTROMETHORPHAN 5 ML: 100; 10 SYRUP ORAL at 17:09

## 2024-04-24 RX ADMIN — KETOROLAC TROMETHAMINE 15 MG: 15 INJECTION, SOLUTION INTRAMUSCULAR; INTRAVENOUS at 10:07

## 2024-04-24 RX ADMIN — KETOROLAC TROMETHAMINE 15 MG: 15 INJECTION, SOLUTION INTRAMUSCULAR; INTRAVENOUS at 02:03

## 2024-04-24 RX ADMIN — MORPHINE SULFATE 4 MG: 4 INJECTION, SOLUTION INTRAMUSCULAR; INTRAVENOUS at 22:12

## 2024-04-24 RX ADMIN — LABETALOL HYDROCHLORIDE 10 MG: 5 INJECTION, SOLUTION INTRAVENOUS at 15:04

## 2024-04-24 RX ADMIN — MORPHINE SULFATE 4 MG: 4 INJECTION, SOLUTION INTRAMUSCULAR; INTRAVENOUS at 07:48

## 2024-04-24 RX ADMIN — Medication 10 ML: at 22:12

## 2024-04-24 RX ADMIN — METOCLOPRAMIDE 10 MG: 5 INJECTION, SOLUTION INTRAMUSCULAR; INTRAVENOUS at 15:04

## 2024-04-24 RX ADMIN — SODIUM CHLORIDE 0.5 MG: 9 INJECTION INTRAMUSCULAR; INTRAVENOUS; SUBCUTANEOUS at 17:04

## 2024-04-24 RX ADMIN — METOCLOPRAMIDE 10 MG: 5 INJECTION, SOLUTION INTRAMUSCULAR; INTRAVENOUS at 02:03

## 2024-04-24 RX ADMIN — GUAIFENESIN AND DEXTROMETHORPHAN 5 ML: 100; 10 SYRUP ORAL at 22:12

## 2024-04-24 RX ADMIN — MORPHINE SULFATE 2 MG: 2 INJECTION, SOLUTION INTRAMUSCULAR; INTRAVENOUS at 12:06

## 2024-04-24 RX ADMIN — KETOROLAC TROMETHAMINE 15 MG: 15 INJECTION, SOLUTION INTRAMUSCULAR; INTRAVENOUS at 15:04

## 2024-04-24 RX ADMIN — ENOXAPARIN SODIUM 40 MG: 100 INJECTION SUBCUTANEOUS at 17:11

## 2024-04-24 ASSESSMENT — PAIN - FUNCTIONAL ASSESSMENT
PAIN_FUNCTIONAL_ASSESSMENT: ACTIVITIES ARE NOT PREVENTED
PAIN_FUNCTIONAL_ASSESSMENT: PREVENTS OR INTERFERES SOME ACTIVE ACTIVITIES AND ADLS

## 2024-04-24 ASSESSMENT — PAIN DESCRIPTION - DESCRIPTORS
DESCRIPTORS: ACHING
DESCRIPTORS: SHARP
DESCRIPTORS: CRAMPING
DESCRIPTORS: SHARP
DESCRIPTORS: DISCOMFORT
DESCRIPTORS: DISCOMFORT;ACHING

## 2024-04-24 ASSESSMENT — PAIN DESCRIPTION - ORIENTATION
ORIENTATION: MID

## 2024-04-24 ASSESSMENT — PAIN DESCRIPTION - ONSET: ONSET: ON-GOING

## 2024-04-24 ASSESSMENT — PAIN DESCRIPTION - LOCATION
LOCATION: ABDOMEN

## 2024-04-24 ASSESSMENT — PAIN SCALES - GENERAL
PAINLEVEL_OUTOF10: 5
PAINLEVEL_OUTOF10: 8
PAINLEVEL_OUTOF10: 8
PAINLEVEL_OUTOF10: 6
PAINLEVEL_OUTOF10: 5
PAINLEVEL_OUTOF10: 6
PAINLEVEL_OUTOF10: 8

## 2024-04-24 ASSESSMENT — PAIN DESCRIPTION - PAIN TYPE: TYPE: SURGICAL PAIN

## 2024-04-24 ASSESSMENT — PAIN DESCRIPTION - FREQUENCY: FREQUENCY: CONTINUOUS

## 2024-04-24 NOTE — PROGRESS NOTES
Brief Nutrition Note    RECOMMENDATIONS  PO Diet: Diet per general surgery  Nutrition Support:   Recommend Clinimix 5/20 starting at 40 mL/hr  Recommend 250 mL 20% lipids 2 times per week    Pt is s/p lap hiatal hernia repair with primary closure, lap antonio, open hemigastrectomy with gastrojejunostomy and closure of duodenal stump and overlying omental flap 4/22. Continues NPO with NG tube in place for decompression. Pt has been NPO/on liquid diet throughout admission now x 9 days. Would likely benefit from initiation of TPN at this time. RD will place TPN recommendations above and continue to monitor.     Current Parenteral Nutrition Recs:  Goal PN Orders Provides: Clinimix 5/20 at 83 mL/hr provides 1992 mL total volume, 1755 calories, 100 grams of protein, and dextrose load of 3.49 mg/kg/min    COMPARATIVE STANDARDS  Total Energy Requirements (kcals/day): 5979-2091     Protein (g):         Fluid (mL/day):  5118-4935    Electronically signed by Flaquita Rey MS, RD, LD on 4/24/2024 at 11:22 AM  Contact: [phone]

## 2024-04-24 NOTE — CARE COORDINATION
Case Management Assessment  Initial Evaluation    Date/Time of Evaluation: 4/24/2024 4:21 PM  Assessment Completed by: Zabrina Guzman    If patient is discharged prior to next notation, then this note serves as note for discharge by case management.    Patient Name: Sonja Mcmahan                   YOB: 1950  Diagnosis: Hiatal hernia [K44.9]  Gall stones [K80.20]  Bacteriuria [R82.71]  Intractable abdominal pain [R10.9]                   Date / Time: 4/15/2024  5:23 PM    Patient Admission Status: Inpatient   Readmission Risk (Low < 19, Mod (19-27), High > 27): Readmission Risk Score: 11.4    Current PCP: Abiodun Amanda PA  PCP verified by CM? Yes    Chart Reviewed: Yes      History Provided by: Patient  Patient Orientation: Alert and Oriented    Patient Cognition: Alert    Hospitalization in the last 30 days (Readmission):  No    If yes, Readmission Assessment in CM Navigator will be completed.    Advance Directives:      Code Status: Full Code   Patient's Primary Decision Maker is: Legal Next of Kin      Discharge Planning:    Patient lives with: Alone Type of Home: Apartment  Primary Care Giver: Self  Patient Support Systems include: Children, Family Members   Current Financial resources: Medicare  Current community resources: None  Current services prior to admission: None            Current DME:              Type of Home Care services:  None    ADLS  Prior functional level: Independent in ADLs/IADLs, Bathing, Dressing, Toileting, Feeding, Cooking, Housework, Shopping, Mobility  Current functional level: Assistance with the following:, Bathing, Dressing, Toileting, Mobility    PT AM-PAC: 16 /24  OT AM-PAC: 15 /24    Family can provide assistance at DC: Yes (daughter lives very close per patient)  Would you like Case Management to discuss the discharge plan with any other family members/significant others, and if so, who? Yes  Plans to Return to Present Housing: Yes  Other Identified  the discharge plan. Freedom of choice list with basic dialogue that supports the patient's individualized plan of care/goals and shares the quality data associated with the providers was provided to: Patient   Patient Representative Name:       The Patient and/or Patient Representative Agree with the Discharge Plan? Yes    Zabrina Guzman  Case Management Department  Ph: [phone]

## 2024-04-24 NOTE — PROGRESS NOTES
telemonitor      Subjective  General: Pt seated in bed upon arrival, agreeable to OT session. RN present to disconnect/reconnect pt to suction  Pain: 4/10.  Location: abdomen  Pain Interventions: pain medication in place prior to arrival, RN notified of patient request for pain medication, repositioned , and therapy activities modified        Activities of Daily Living  Basic Activities of Daily Living  Grooming: stand by assistance oral care in stance at sink, washes face and hands seated on BSC  Upper Extremity Dressing: minimal assistance A for lines seated in recliner  Lower Extremity Dressing: dependent socks seated EOB  Instrumental Activities of Daily Living  No IADL completed on this date.    Functional Mobility  Bed Mobility:  Supine to Sit: minimal assistance  Scooting: stand by assistance  Comments: HOB elevated, A to elevate trunk  Transfers:  Sit to stand transfer:contact guard assistance  Stand to sit transfer: contact guard assistance  Comments: STS from EOB and BSC to HHA  Functional Mobility  Functional Mobility Activity: to/from bathroom  Required Assistance: contact guard assistance, minimal assistance  Comment: ambulates to bathroom no device min A with HHA and reaching for external support. Ambulates back from bathroom with FWW and CGA, improvements in stability noted, pt reports fatigue and pain  Balance:  Static Sitting Balance: fair (+): maintains balance at SBA/supervision without use of UE support  Dynamic Sitting Balance: fair: maintains balance at CGA without use of UE support  Static Standing Balance: poor (+): requires min (A) to maintain balance  Comments:    Other Therapeutic Interventions    Functional Outcomes  AM-PAC Inpatient Daily Activity Raw Score: 15                                    Cognition  WFL  Orientation:    alert and oriented x 4  Command Following:   WFL     Education  Barriers To Learning: none  Patient Education: patient educated on goals, OT role and benefits,  plan of care, ADL adaptive strategies, IADL safety, transfer training, discharge recommendations  Learning Assessment:  patient verbalizes understanding, would benefit from continued reinforcement    Assessment  Activity Tolerance: fair, pt reports dizziness EOB but recovers with rest break. -150 with activity and only recovers to 130-140 with seated rest breaks. Pt denies further dizziness/lightheadedness. RN notified  Impairments Requiring Therapeutic Intervention: decreased functional mobility, decreased ADL status, decreased safety awareness, decreased endurance, decreased balance, decreased IADL  Prognosis: good  Clinical Assessment: The patient is a 73 y.o. female who presents below their baseline level of function due to above deficits, associated with Hiatal hernia. Typically, pt is IND. Currently, pt is requiring min A- CGA with FWW with ambulation and standing ADLs. Pt limited by decreased activity tolerance and pain. Pt may have family available to stay with her at home upon discharge per discussion with dtr. Continued OT indicated in order to promote return to PLOF, recommend 24/7 supervision/assist    Safety Interventions: patient left in chair, chair alarm in place, call light within reach, gait belt, and nurse notified    Plan  Frequency: 3-5 x/per week  Current Treatment Recommendations: strengthening, balance training, functional mobility training, transfer training, endurance training, patient/caregiver education, ADL/self-care training, IADL training, home management training, and equipment evaluation/education    Goals    Short Term Goals:  Time Frame: by discharge  Patient will complete upper body ADL at supervision   Patient will complete lower body ADL at supervision   Patient will complete toileting at modified independent   Patient will complete grooming at Independent   Patient will complete functional transfers at modified independent   Patient will complete functional mobility at

## 2024-04-24 NOTE — PLAN OF CARE
Problem: Pain  Goal: Verbalizes/displays adequate comfort level or baseline comfort level  Outcome: Progressing     Problem: Chronic Conditions and Co-morbidities  Goal: Patient's chronic conditions and co-morbidity symptoms are monitored and maintained or improved  Outcome: Progressing  Flowsheets (Taken 4/24/2024 0746)  Care Plan - Patient's Chronic Conditions and Co-Morbidity Symptoms are Monitored and Maintained or Improved:   Monitor and assess patient's chronic conditions and comorbid symptoms for stability, deterioration, or improvement   Collaborate with multidisciplinary team to address chronic and comorbid conditions and prevent exacerbation or deterioration     Problem: Nutrition Deficit:  Goal: Optimize nutritional status  Outcome: Progressing     Problem: Discharge Planning  Goal: Discharge to home or other facility with appropriate resources  Outcome: Progressing  Flowsheets (Taken 4/24/2024 0746)  Discharge to home or other facility with appropriate resources:   Identify barriers to discharge with patient and caregiver   Arrange for needed discharge resources and transportation as appropriate     Problem: Safety - Adult  Goal: Free from fall injury  Outcome: Progressing

## 2024-04-24 NOTE — PROGRESS NOTES
V2.0    [facility] [place]      Name:  Sonja Mcmahan /Age/Sex: 1950  (73 y.o. female)   MRN & CSN:  [id number] & [id number] Encounter Date/Time: 2024 1:29 PM EDT   Location:  [facility]-5568/5568-01 PCP: Abiodun Amanda PA     Attending:Kemi Rosa MD       Hospital Day: 10    Assessment and Recommendations   Sonja Mcmahan is a 73 y.o. female who presents with Hiatal hernia      Plan:     Abdominal pain  Likely secondary to sliding hiatal hernia with gastric mass with high grade dysplasia  Patient is status post laparoscopic hernia repair with open hemigastrectomy with gastrojejunostomy.  Further surgical recommendation per general surgery  Continue NG tube.  No flatus.  Pain control    Cholelithiasis with chronic cholecystitis patient is status post laparoscopic Mylene cystectomy.    HTN  HLD        Diet Diet NPO Exceptions are: Sips of Water with Meds, Ice Chips   DVT Prophylaxis    Code Status Full Code   Disposition Per [facility] team          Personally reviewed Lab Studies and Imaging         Subjective:     Chief Complaint:     Sonja Mcmahan is a 73 y.o. female who presents with post surgery  Denies any cp   NG tube in place    Review of Systems:      Pertinent positives and negatives discussed in HPI    Objective:     Intake/Output Summary (Last 24 hours) at 2024 0928  Last data filed at 2024 1631  Gross per 24 hour   Intake --   Output 1050 ml   Net -1050 ml      Vitals:   Vitals:    24 0019 24 0023 24 0746 24 0748   BP: 125/78  137/83    Pulse: 97  99    Resp: 16 16 20 16   Temp:   97.9 °F (36.6 °C)    TempSrc: Temporal  Oral    SpO2:   96%    Weight:       Height:             Physical Exam:      General: NAD  Eyes: EOMI  ENT: neck supple  Cardiovascular: Regular rate.  Respiratory: Clear to auscultation  Gastrointestinal: Soft, non tender  Genitourinary: no suprapubic tenderness  Musculoskeletal: No edema  Skin: warm, dry  Neuro: Alert.  Psych: Mood

## 2024-04-24 NOTE — PROGRESS NOTES
[facility]        Progress Note    Patient Name: Sonja Mcmahan  MRN: [id number]  YOB: 1950  Date of Evaluation: 2024    Postoperative Day #2    Subjective:  No acute events overnight  Pain controlled  No nausea or vomiting, NGT in place  No flatus or stool  Voiding since Mclean removed  Resting in bed at this time      Vital Signs:  Patient Vitals for the past 24 hrs:   BP Temp Temp src Pulse Resp SpO2   24 1206 -- -- -- -- 16 --   24 1148 (!) 141/78 97.5 °F (36.4 °C) Oral (!) 105 20 90 %   24 0748 -- -- -- -- 16 --   24 0746 137/83 97.9 °F (36.6 °C) Oral 99 20 96 %   24 0023 -- -- -- -- 16 --   24 0019 125/78 -- Temporal 97 16 --   24 126/85 96.8 °F (36 °C) Temporal 88 18 93 %   24 1917 -- -- -- -- 16 --   24 1547 136/80 97.9 °F (36.6 °C) Temporal 76 16 94 %        TEMPERATURE HISTORY 24H: Temp (24hrs), Av.5 °F (36.4 °C), Min:96.8 °F (36 °C), Max:97.9 °F (36.6 °C)    BLOOD PRESSURE HISTORY: Systolic (36hrs), Av , Min:118 , Max:141    Diastolic (36hrs), Av, Min:78, Max:87      Intake/Output:  I/O last 3 completed shifts:  In: 100 [I.V.:100]  Out: 1700 [Urine:1000; Emesis/NG output:700]  I/O this shift:  In: -   Out: 50 [Emesis/NG output:50]  Drain/tube Output:       Physical Exam:  General: awake, alert, oriented to person, place, time  Lungs: unlabored respirations  Abdomen: soft, non-distended, incisional tenderness only, bowel sounds present  Skin/Wound: DEQUAN dressing in place, seal/suction intact    Labs:  CBC:    Recent Labs     24  0601 24  0559 24  0827   WBC 5.5 13.5* 13.9*   HGB 12.4 12.8 12.0   HCT 37.3 40.0 38.6    213 214       BMP:    Recent Labs     24  0601 24  0559 24  0827    143 140   K 4.0 4.5 4.3    111* 110   CO2 21 19* 19*   BUN 4* 9 13   CREATININE 0.7 0.9 0.9   GLUCOSE 113* 162* 126*       Hepatic:

## 2024-04-25 ENCOUNTER — APPOINTMENT (OUTPATIENT)
Dept: GENERAL RADIOLOGY | Age: 74
DRG: 326 | End: 2024-04-25
Payer: MEDICARE

## 2024-04-25 LAB
ANION GAP SERPL CALCULATED.3IONS-SCNC: 10 MMOL/L (ref 3–16)
BASOPHILS # BLD: 0 K/UL (ref 0–0.2)
BASOPHILS NFR BLD: 0.2 %
BUN SERPL-MCNC: 13 MG/DL (ref 7–20)
CALCIUM SERPL-MCNC: 8.5 MG/DL (ref 8.3–10.6)
CHLORIDE SERPL-SCNC: 115 MMOL/L (ref 99–110)
CO2 SERPL-SCNC: 20 MMOL/L (ref 21–32)
CREAT SERPL-MCNC: 0.7 MG/DL (ref 0.6–1.2)
DEPRECATED RDW RBC AUTO: 16.1 % (ref 12.4–15.4)
EKG ATRIAL RATE: 114 BPM
EKG DIAGNOSIS: NORMAL
EKG P AXIS: 54 DEGREES
EKG P-R INTERVAL: 150 MS
EKG Q-T INTERVAL: 342 MS
EKG QRS DURATION: 116 MS
EKG QTC CALCULATION (BAZETT): 471 MS
EKG R AXIS: -12 DEGREES
EKG T AXIS: 130 DEGREES
EKG VENTRICULAR RATE: 114 BPM
EOSINOPHIL # BLD: 0.1 K/UL (ref 0–0.6)
EOSINOPHIL NFR BLD: 0.8 %
GFR SERPLBLD CREATININE-BSD FMLA CKD-EPI: 90 ML/MIN/{1.73_M2}
GLUCOSE BLD-MCNC: 105 MG/DL (ref 70–99)
GLUCOSE BLD-MCNC: 112 MG/DL (ref 70–99)
GLUCOSE BLD-MCNC: 112 MG/DL (ref 70–99)
GLUCOSE BLD-MCNC: 98 MG/DL (ref 70–99)
GLUCOSE SERPL-MCNC: 113 MG/DL (ref 70–99)
HCT VFR BLD AUTO: 35.4 % (ref 36–48)
HGB BLD-MCNC: 11.5 G/DL (ref 12–16)
LYMPHOCYTES # BLD: 1 K/UL (ref 1–5.1)
LYMPHOCYTES NFR BLD: 7.9 %
MAGNESIUM SERPL-MCNC: 1.8 MG/DL (ref 1.8–2.4)
MCH RBC QN AUTO: 28.6 PG (ref 26–34)
MCHC RBC AUTO-ENTMCNC: 32.5 G/DL (ref 31–36)
MCV RBC AUTO: 88 FL (ref 80–100)
MONOCYTES # BLD: 0.9 K/UL (ref 0–1.3)
MONOCYTES NFR BLD: 7 %
NEUTROPHILS # BLD: 11 K/UL (ref 1.7–7.7)
NEUTROPHILS NFR BLD: 84.1 %
PERFORMED ON: ABNORMAL
PERFORMED ON: NORMAL
PLATELET # BLD AUTO: 214 K/UL (ref 135–450)
PMV BLD AUTO: 7.8 FL (ref 5–10.5)
POTASSIUM SERPL-SCNC: 4.6 MMOL/L (ref 3.5–5.1)
RBC # BLD AUTO: 4.02 M/UL (ref 4–5.2)
SODIUM SERPL-SCNC: 145 MMOL/L (ref 136–145)
WBC # BLD AUTO: 13 K/UL (ref 4–11)

## 2024-04-25 PROCEDURE — A4216 STERILE WATER/SALINE, 10 ML: HCPCS | Performed by: HOSPITALIST

## 2024-04-25 PROCEDURE — 2500000003 HC RX 250 WO HCPCS: Performed by: HOSPITALIST

## 2024-04-25 PROCEDURE — C9113 INJ PANTOPRAZOLE SODIUM, VIA: HCPCS | Performed by: NURSE PRACTITIONER

## 2024-04-25 PROCEDURE — 1200000000 HC SEMI PRIVATE

## 2024-04-25 PROCEDURE — 36415 COLL VENOUS BLD VENIPUNCTURE: CPT

## 2024-04-25 PROCEDURE — 71045 X-RAY EXAM CHEST 1 VIEW: CPT

## 2024-04-25 PROCEDURE — 93005 ELECTROCARDIOGRAM TRACING: CPT | Performed by: HOSPITALIST

## 2024-04-25 PROCEDURE — 83735 ASSAY OF MAGNESIUM: CPT

## 2024-04-25 PROCEDURE — 93010 ELECTROCARDIOGRAM REPORT: CPT | Performed by: INTERNAL MEDICINE

## 2024-04-25 PROCEDURE — 94760 N-INVAS EAR/PLS OXIMETRY 1: CPT

## 2024-04-25 PROCEDURE — 74240 X-RAY XM UPR GI TRC 1CNTRST: CPT

## 2024-04-25 PROCEDURE — 6370000000 HC RX 637 (ALT 250 FOR IP): Performed by: HOSPITALIST

## 2024-04-25 PROCEDURE — 6360000002 HC RX W HCPCS: Performed by: HOSPITALIST

## 2024-04-25 PROCEDURE — 99024 POSTOP FOLLOW-UP VISIT: CPT | Performed by: SURGERY

## 2024-04-25 PROCEDURE — 6360000002 HC RX W HCPCS: Performed by: NURSE PRACTITIONER

## 2024-04-25 PROCEDURE — 2580000003 HC RX 258: Performed by: SURGERY

## 2024-04-25 PROCEDURE — 85025 COMPLETE CBC W/AUTO DIFF WBC: CPT

## 2024-04-25 PROCEDURE — 2580000003 HC RX 258: Performed by: HOSPITALIST

## 2024-04-25 PROCEDURE — 80048 BASIC METABOLIC PNL TOTAL CA: CPT

## 2024-04-25 PROCEDURE — 2700000000 HC OXYGEN THERAPY PER DAY

## 2024-04-25 PROCEDURE — 6360000002 HC RX W HCPCS: Performed by: SURGERY

## 2024-04-25 PROCEDURE — 6360000004 HC RX CONTRAST MEDICATION

## 2024-04-25 RX ORDER — FUROSEMIDE 10 MG/ML
20 INJECTION INTRAMUSCULAR; INTRAVENOUS ONCE
Status: COMPLETED | OUTPATIENT
Start: 2024-04-25 | End: 2024-04-25

## 2024-04-25 RX ORDER — METOPROLOL TARTRATE 1 MG/ML
2.5 INJECTION, SOLUTION INTRAVENOUS EVERY 6 HOURS PRN
Status: DISCONTINUED | OUTPATIENT
Start: 2024-04-25 | End: 2024-05-07 | Stop reason: HOSPADM

## 2024-04-25 RX ADMIN — Medication 10 ML: at 21:46

## 2024-04-25 RX ADMIN — MORPHINE SULFATE 4 MG: 4 INJECTION, SOLUTION INTRAMUSCULAR; INTRAVENOUS at 14:49

## 2024-04-25 RX ADMIN — METOPROLOL TARTRATE 2.5 MG: 5 INJECTION INTRAVENOUS at 21:46

## 2024-04-25 RX ADMIN — Medication 10 ML: at 08:58

## 2024-04-25 RX ADMIN — IOHEXOL 50 ML: 350 INJECTION, SOLUTION INTRAVENOUS at 10:40

## 2024-04-25 RX ADMIN — MORPHINE SULFATE 4 MG: 4 INJECTION, SOLUTION INTRAMUSCULAR; INTRAVENOUS at 01:51

## 2024-04-25 RX ADMIN — MORPHINE SULFATE 4 MG: 4 INJECTION, SOLUTION INTRAMUSCULAR; INTRAVENOUS at 10:58

## 2024-04-25 RX ADMIN — GUAIFENESIN AND DEXTROMETHORPHAN 5 ML: 100; 10 SYRUP ORAL at 09:47

## 2024-04-25 RX ADMIN — METOPROLOL TARTRATE 2.5 MG: 5 INJECTION INTRAVENOUS at 09:48

## 2024-04-25 RX ADMIN — PANTOPRAZOLE SODIUM 40 MG: 40 INJECTION, POWDER, FOR SOLUTION INTRAVENOUS at 08:57

## 2024-04-25 RX ADMIN — MORPHINE SULFATE 4 MG: 4 INJECTION, SOLUTION INTRAMUSCULAR; INTRAVENOUS at 06:00

## 2024-04-25 RX ADMIN — MORPHINE SULFATE 4 MG: 4 INJECTION, SOLUTION INTRAMUSCULAR; INTRAVENOUS at 21:46

## 2024-04-25 RX ADMIN — ENOXAPARIN SODIUM 40 MG: 100 INJECTION SUBCUTANEOUS at 08:57

## 2024-04-25 RX ADMIN — MORPHINE SULFATE 4 MG: 4 INJECTION, SOLUTION INTRAMUSCULAR; INTRAVENOUS at 18:41

## 2024-04-25 RX ADMIN — SODIUM CHLORIDE 0.5 MG: 9 INJECTION INTRAMUSCULAR; INTRAVENOUS; SUBCUTANEOUS at 01:52

## 2024-04-25 RX ADMIN — FUROSEMIDE 20 MG: 10 INJECTION, SOLUTION INTRAMUSCULAR; INTRAVENOUS at 12:36

## 2024-04-25 ASSESSMENT — PAIN DESCRIPTION - ORIENTATION
ORIENTATION: MID

## 2024-04-25 ASSESSMENT — PAIN - FUNCTIONAL ASSESSMENT
PAIN_FUNCTIONAL_ASSESSMENT: PREVENTS OR INTERFERES SOME ACTIVE ACTIVITIES AND ADLS

## 2024-04-25 ASSESSMENT — PAIN DESCRIPTION - LOCATION
LOCATION: ABDOMEN

## 2024-04-25 ASSESSMENT — PAIN SCALES - WONG BAKER
WONGBAKER_NUMERICALRESPONSE: HURTS A LITTLE BIT

## 2024-04-25 ASSESSMENT — PAIN SCALES - GENERAL
PAINLEVEL_OUTOF10: 8

## 2024-04-25 ASSESSMENT — PAIN DESCRIPTION - DESCRIPTORS
DESCRIPTORS: ACHING

## 2024-04-25 ASSESSMENT — PAIN DESCRIPTION - PAIN TYPE
TYPE: SURGICAL PAIN

## 2024-04-25 NOTE — PROGRESS NOTES
[facility]        Progress Note    Patient Name: Sonja Mcmahan  MRN: [id number]  YOB: 1950  Date of Evaluation: 2024    Postoperative Day # 3  Subjective:  No acute events overnight  Pain controlled  No nausea or vomiting, NGT in place  No flatus or stool  Voiding since Mclean removed  Resting in bed at this time      Vital Signs:  Patient Vitals for the past 24 hrs:   BP Temp Temp src Pulse Resp SpO2   24 1128 -- -- -- -- 20 --   24 0845 (!) 156/88 97.8 °F (36.6 °C) Oral (!) 125 22 96 %   24 0630 -- -- -- -- 18 --   24 0558 (!) 152/81 97.9 °F (36.6 °C) Oral (!) 113 20 96 %   24 0221 -- -- -- -- 18 --   24 0145 (!) 142/67 97.7 °F (36.5 °C) Oral (!) 119 20 93 %   24 2242 -- -- -- -- 18 --   24 2200 -- -- -- (!) 109 -- --   24 137/83 97.4 °F (36.3 °C) Oral (!) 113 20 94 %   24 1953 -- -- -- -- 20 --   24 1923 -- -- -- -- 20 --   24 1724 107/72 -- -- (!) 108 -- 94 %   24 1717 (!) 161/83 -- -- (!) 110 -- 93 %   24 1637 135/73 98.3 °F (36.8 °C) Oral (!) 116 -- 92 %   24 1515 132/84 -- -- (!) 102 -- --   24 1448 (!) 185/98 97.9 °F (36.6 °C) Oral (!) 125 20 90 %   24 1206 -- -- -- -- 16 --        TEMPERATURE HISTORY 24H: Temp (24hrs), Av.8 °F (36.6 °C), Min:97.4 °F (36.3 °C), Max:98.3 °F (36.8 °C)    BLOOD PRESSURE HISTORY: Systolic (36hrs), Av , Min:107 , Max:185    Diastolic (36hrs), Av, Min:67, Max:98      Intake/Output:  I/O last 3 completed shifts:  In: 671 [I.V.:671]  Out: 300 [Emesis/NG output:300]  No intake/output data recorded.  Drain/tube Output:       Physical Exam:  General: awake, alert, oriented to person, place, time  Lungs: unlabored respirations  Abdomen: soft, non-distended, incisional tenderness only, bowel sounds present  Skin/Wound: DEQUAN dressing in place, seal/suction intact    Labs:  CBC:    Recent Labs

## 2024-04-25 NOTE — PROGRESS NOTES
Assessment complete. VSS. Patient resting in bed. Respirations even and easy. Call light in reach. Fall precautions in place. No needs expressed at this time.

## 2024-04-25 NOTE — PROGRESS NOTES
Physical Therapy  Sonja Mcmahan  PT reviewed patient's chart. Upon arrival, patient semi reclined in bed with a visitor present. Patient reports she is \"worn out\" following NGT placement this AM in addition fatigue from medication RN recently provided. Will re-attempt as the schedule allows.  Thank you.  Chaya Cordero PT, DPT, [id number]

## 2024-04-25 NOTE — PROGRESS NOTES
Nutrition Note    RECOMMENDATIONS  PO Diet: Diet per general surgery  Nutrition Support:   Recommend Clinimix 5/20 starting at 40 mL/hr  Recommend 250 mL 20% lipids 2 times per week   3. Nursing: Please obtain updated wt of the pt and document wt method. RD has ordered multiple wts to be obtained    ASSESSMENT   Pt triggered for follow-up. Continues NPO with NG tube in place for decompression. FL UGI today showed no evidence of leak. If pt is to remain NPO, recommend initiation of nutrition support at this time given NPO/clear liquid status now x 7 days. RD will continue to monitor.     Malnutrition Status: At risk for malnutrition (Comment)  Acute Illness    NUTRITION DIAGNOSIS   Inadequate protein-energy intake related to altered GI function as evidenced by NPO or clear liquid status due to medical condition    Goals: Initiate PO diet, Initiate nutrition support, by next RD assessment     NUTRITION RELATED FINDINGS  Objective: NS at 80 mL/hr. Labs reviewed. LBM 4/20 per pt. Trace BUE edema.  Wounds: Surgical Incision    CURRENT NUTRITION THERAPIES  Diet NPO Exceptions are: Sips of Water with Meds, Ice Chips     PO Intake: NPO   PO Supplement Intake:NPO    Current Parenteral Nutrition Recs:  Goal PN Orders Provides: Clinimix 5/20 at 83 mL/hr provides 1992 mL total volume, 1755 calories, 100 grams of protein, and dextrose load of 3.49 mg/kg/min    ANTHROPOMETRICS  Current Height: 160 cm (5' 3\")  Current Weight - Scale: 78.5 kg (173 lb)    Ideal Body Weight (IBW): 115 lbs  (52 kg)       BMI: 30.8    COMPARATIVE STANDARDS  Total Energy Requirements (kcals/day): 8349-3205     Protein (g):         Fluid (mL/day):  5015-5298    EDUCATION  Education not indicated     The patient will be monitored per nutrition standards of care. Consult dietitian if additional nutrition interventions are needed prior to RD reassessment.     Flaquita Rey MS, RD, LD    Contact: [phone]

## 2024-04-25 NOTE — CARE COORDINATION
SW spoke to admissions at [facility] in [place], [place], [phone].  They reviewed pt's previous admission to their services back in 2022 and stated they should be able to accept back again.  Asked SW to fax documentation.  SW faxed facesheet, H&P, PT/OT notes and most recent hospitalist note.  Cristela maldonado/[facility] stated this is one of their branches.  She is following and will take care of communicating and faxing d/c documents and orders.    Electronically signed by ERNA Ventura, ANUMW on 4/25/2024 at 2:39 PM

## 2024-04-25 NOTE — PROGRESS NOTES
V2.0    [facility] [place] Progress Note      Name:  Sonja Mcmahan /Age/Sex: 1950  (73 y.o. female)   MRN & CSN:  [id number] & [id number] Encounter Date/Time: 2024 1:29 PM EDT   Location:  [facility]-5568/5568-01 PCP: Abiodun Amanda PA     Attending:Kemi Rosa MD       Hospital Day: 11    Assessment and Recommendations   Sonja Mcmahan is a 73 y.o. female who presents with Hiatal hernia      Plan:     Abdominal pain  Likely secondary to sliding hiatal hernia with gastric mass with high grade dysplasia  Patient is status post laparoscopic hernia repair with open hemigastrectomy with gastrojejunostomy.  Further surgical recommendation per general surgery    Persistent tachycardia.  Unclear with nurse chest pain.  EKG reviewed.  Was sinus.  Give IV metoprolol as needed if needed.      The tachycardia is due to pain unable to swallow and anxiety.  Will monitor closely.    Cholelithiasis with chronic cholecystitis patient is status post laparoscopic Mylene cystectomy.    HTN  HLD        Diet Diet NPO Exceptions are: Sips of Water with Meds, Ice Chips   DVT Prophylaxis    Code Status Full Code   Disposition Per suegery team          Personally reviewed Lab Studies and Imaging         Subjective:     Chief Complaint:     Sonja Mcmahan is a 73 y.o. female who presents with post surgery  Denies any cp   NG tube in place    Review of Systems:      Pertinent positives and negatives discussed in HPI    Objective:     Intake/Output Summary (Last 24 hours) at 2024 1156  Last data filed at 2024 0505  Gross per 24 hour   Intake 671 ml   Output 250 ml   Net 421 ml      Vitals:   Vitals:    24 0558 24 0630 24 0845 24 1128   BP: (!) 152/81  (!) 156/88    Pulse: (!) 113  (!) 125    Resp: 20 18 22 20   Temp: 97.9 °F (36.6 °C)  97.8 °F (36.6 °C)    TempSrc: Oral  Oral    SpO2: 96%  96%    Weight:       Height:             Physical Exam:      General: NAD  Eyes: EOMI  ENT: neck

## 2024-04-25 NOTE — PROGRESS NOTES
Occupational Therapy  Per RN pt is currently getting an x-ray this AM. Will re-attempt to see pt at a later time as schedule allows.     Thanks,  Kathy Bar OTR/L [id number]

## 2024-04-26 LAB
ANION GAP SERPL CALCULATED.3IONS-SCNC: 13 MMOL/L (ref 3–16)
BASOPHILS # BLD: 0 K/UL (ref 0–0.2)
BASOPHILS NFR BLD: 0.3 %
BUN SERPL-MCNC: 11 MG/DL (ref 7–20)
CALCIUM SERPL-MCNC: 8.9 MG/DL (ref 8.3–10.6)
CHLORIDE SERPL-SCNC: 114 MMOL/L (ref 99–110)
CO2 SERPL-SCNC: 19 MMOL/L (ref 21–32)
CREAT SERPL-MCNC: 0.5 MG/DL (ref 0.6–1.2)
DEPRECATED RDW RBC AUTO: 15.8 % (ref 12.4–15.4)
EOSINOPHIL # BLD: 0 K/UL (ref 0–0.6)
EOSINOPHIL NFR BLD: 0.4 %
GFR SERPLBLD CREATININE-BSD FMLA CKD-EPI: >90 ML/MIN/{1.73_M2}
GLUCOSE BLD-MCNC: 104 MG/DL (ref 70–99)
GLUCOSE BLD-MCNC: 88 MG/DL (ref 70–99)
GLUCOSE BLD-MCNC: 93 MG/DL (ref 70–99)
GLUCOSE BLD-MCNC: 95 MG/DL (ref 70–99)
GLUCOSE SERPL-MCNC: 110 MG/DL (ref 70–99)
HCT VFR BLD AUTO: 35.7 % (ref 36–48)
HGB BLD-MCNC: 11 G/DL (ref 12–16)
LYMPHOCYTES # BLD: 1.3 K/UL (ref 1–5.1)
LYMPHOCYTES NFR BLD: 10.3 %
MAGNESIUM SERPL-MCNC: 1.7 MG/DL (ref 1.8–2.4)
MCH RBC QN AUTO: 27.5 PG (ref 26–34)
MCHC RBC AUTO-ENTMCNC: 30.8 G/DL (ref 31–36)
MCV RBC AUTO: 89.1 FL (ref 80–100)
MONOCYTES # BLD: 1 K/UL (ref 0–1.3)
MONOCYTES NFR BLD: 7.7 %
NEUTROPHILS # BLD: 10.5 K/UL (ref 1.7–7.7)
NEUTROPHILS NFR BLD: 81.3 %
PERFORMED ON: ABNORMAL
PERFORMED ON: NORMAL
PHOSPHATE SERPL-MCNC: 1.1 MG/DL (ref 2.5–4.9)
PLATELET # BLD AUTO: 217 K/UL (ref 135–450)
PMV BLD AUTO: 8.1 FL (ref 5–10.5)
POTASSIUM SERPL-SCNC: 3.4 MMOL/L (ref 3.5–5.1)
POTASSIUM SERPL-SCNC: 4.1 MMOL/L (ref 3.5–5.1)
RBC # BLD AUTO: 4 M/UL (ref 4–5.2)
SODIUM SERPL-SCNC: 146 MMOL/L (ref 136–145)
WBC # BLD AUTO: 12.9 K/UL (ref 4–11)

## 2024-04-26 PROCEDURE — 85025 COMPLETE CBC W/AUTO DIFF WBC: CPT

## 2024-04-26 PROCEDURE — A4216 STERILE WATER/SALINE, 10 ML: HCPCS | Performed by: HOSPITALIST

## 2024-04-26 PROCEDURE — 6360000002 HC RX W HCPCS: Performed by: NURSE PRACTITIONER

## 2024-04-26 PROCEDURE — 2500000003 HC RX 250 WO HCPCS: Performed by: SURGERY

## 2024-04-26 PROCEDURE — 6360000002 HC RX W HCPCS: Performed by: SURGERY

## 2024-04-26 PROCEDURE — 99024 POSTOP FOLLOW-UP VISIT: CPT | Performed by: SURGERY

## 2024-04-26 PROCEDURE — APPNB30 APP NON BILLABLE TIME 0-30 MINS: Performed by: NURSE PRACTITIONER

## 2024-04-26 PROCEDURE — C1751 CATH, INF, PER/CENT/MIDLINE: HCPCS

## 2024-04-26 PROCEDURE — 84132 ASSAY OF SERUM POTASSIUM: CPT

## 2024-04-26 PROCEDURE — 02HV33Z INSERTION OF INFUSION DEVICE INTO SUPERIOR VENA CAVA, PERCUTANEOUS APPROACH: ICD-10-PCS | Performed by: INTERNAL MEDICINE

## 2024-04-26 PROCEDURE — 36569 INSJ PICC 5 YR+ W/O IMAGING: CPT

## 2024-04-26 PROCEDURE — 1200000000 HC SEMI PRIVATE

## 2024-04-26 PROCEDURE — 6360000002 HC RX W HCPCS: Performed by: HOSPITALIST

## 2024-04-26 PROCEDURE — 2500000003 HC RX 250 WO HCPCS: Performed by: INTERNAL MEDICINE

## 2024-04-26 PROCEDURE — 2580000003 HC RX 258: Performed by: NURSE PRACTITIONER

## 2024-04-26 PROCEDURE — APPSS15 APP SPLIT SHARED TIME 0-15 MINUTES: Performed by: NURSE PRACTITIONER

## 2024-04-26 PROCEDURE — 2580000003 HC RX 258: Performed by: HOSPITALIST

## 2024-04-26 PROCEDURE — 2580000003 HC RX 258: Performed by: SURGERY

## 2024-04-26 PROCEDURE — 97535 SELF CARE MNGMENT TRAINING: CPT

## 2024-04-26 PROCEDURE — 80048 BASIC METABOLIC PNL TOTAL CA: CPT

## 2024-04-26 PROCEDURE — 84100 ASSAY OF PHOSPHORUS: CPT

## 2024-04-26 PROCEDURE — 83735 ASSAY OF MAGNESIUM: CPT

## 2024-04-26 PROCEDURE — 2580000003 HC RX 258: Performed by: INTERNAL MEDICINE

## 2024-04-26 PROCEDURE — 36415 COLL VENOUS BLD VENIPUNCTURE: CPT

## 2024-04-26 RX ORDER — SODIUM CHLORIDE 9 MG/ML
25 INJECTION, SOLUTION INTRAVENOUS PRN
Status: DISCONTINUED | OUTPATIENT
Start: 2024-04-26 | End: 2024-05-07 | Stop reason: HOSPADM

## 2024-04-26 RX ORDER — LIDOCAINE HYDROCHLORIDE 10 MG/ML
5 INJECTION, SOLUTION EPIDURAL; INFILTRATION; INTRACAUDAL; PERINEURAL ONCE
Status: DISCONTINUED | OUTPATIENT
Start: 2024-04-26 | End: 2024-05-07 | Stop reason: HOSPADM

## 2024-04-26 RX ORDER — SODIUM CHLORIDE 0.9 % (FLUSH) 0.9 %
5-40 SYRINGE (ML) INJECTION PRN
Status: DISCONTINUED | OUTPATIENT
Start: 2024-04-26 | End: 2024-05-07 | Stop reason: HOSPADM

## 2024-04-26 RX ORDER — METOCLOPRAMIDE HYDROCHLORIDE 5 MG/ML
5 INJECTION INTRAMUSCULAR; INTRAVENOUS EVERY 6 HOURS
Status: COMPLETED | OUTPATIENT
Start: 2024-04-26 | End: 2024-04-27

## 2024-04-26 RX ORDER — SODIUM CHLORIDE 0.9 % (FLUSH) 0.9 %
5-40 SYRINGE (ML) INJECTION EVERY 12 HOURS SCHEDULED
Status: DISCONTINUED | OUTPATIENT
Start: 2024-04-26 | End: 2024-05-07 | Stop reason: HOSPADM

## 2024-04-26 RX ORDER — DEXTROSE MONOHYDRATE, SODIUM CHLORIDE, AND POTASSIUM CHLORIDE 50; 1.49; 4.5 G/1000ML; G/1000ML; G/1000ML
INJECTION, SOLUTION INTRAVENOUS CONTINUOUS
Status: DISCONTINUED | OUTPATIENT
Start: 2024-04-26 | End: 2024-05-02

## 2024-04-26 RX ADMIN — Medication 10 ML: at 20:45

## 2024-04-26 RX ADMIN — MORPHINE SULFATE 4 MG: 4 INJECTION, SOLUTION INTRAMUSCULAR; INTRAVENOUS at 03:00

## 2024-04-26 RX ADMIN — POTASSIUM CHLORIDE, DEXTROSE MONOHYDRATE AND SODIUM CHLORIDE: 150; 5; 450 INJECTION, SOLUTION INTRAVENOUS at 15:31

## 2024-04-26 RX ADMIN — SODIUM CHLORIDE 0.5 MG: 9 INJECTION INTRAMUSCULAR; INTRAVENOUS; SUBCUTANEOUS at 00:49

## 2024-04-26 RX ADMIN — METOCLOPRAMIDE 5 MG: 5 INJECTION, SOLUTION INTRAMUSCULAR; INTRAVENOUS at 23:08

## 2024-04-26 RX ADMIN — MORPHINE SULFATE 4 MG: 4 INJECTION, SOLUTION INTRAMUSCULAR; INTRAVENOUS at 10:53

## 2024-04-26 RX ADMIN — ENOXAPARIN SODIUM 40 MG: 100 INJECTION SUBCUTANEOUS at 09:44

## 2024-04-26 RX ADMIN — MORPHINE SULFATE 4 MG: 4 INJECTION, SOLUTION INTRAMUSCULAR; INTRAVENOUS at 23:08

## 2024-04-26 RX ADMIN — METOCLOPRAMIDE 5 MG: 5 INJECTION, SOLUTION INTRAMUSCULAR; INTRAVENOUS at 18:59

## 2024-04-26 RX ADMIN — POTASSIUM PHOSPHATES 30 MMOL: 236; 224 INJECTION, SOLUTION INTRAVENOUS at 21:28

## 2024-04-26 RX ADMIN — MORPHINE SULFATE 4 MG: 4 INJECTION, SOLUTION INTRAMUSCULAR; INTRAVENOUS at 20:42

## 2024-04-26 ASSESSMENT — PAIN SCALES - GENERAL
PAINLEVEL_OUTOF10: 8
PAINLEVEL_OUTOF10: 8
PAINLEVEL_OUTOF10: 7
PAINLEVEL_OUTOF10: 9
PAINLEVEL_OUTOF10: 8

## 2024-04-26 ASSESSMENT — PAIN DESCRIPTION - DESCRIPTORS
DESCRIPTORS: PATIENT UNABLE TO DESCRIBE
DESCRIPTORS: PATIENT UNABLE TO DESCRIBE
DESCRIPTORS: ACHING

## 2024-04-26 ASSESSMENT — PAIN DESCRIPTION - PAIN TYPE
TYPE: SURGICAL PAIN;ACUTE PAIN
TYPE: SURGICAL PAIN
TYPE: SURGICAL PAIN
TYPE: SURGICAL PAIN;ACUTE PAIN

## 2024-04-26 ASSESSMENT — PAIN DESCRIPTION - LOCATION
LOCATION: ABDOMEN;BACK
LOCATION: ABDOMEN
LOCATION: ABDOMEN;BACK
LOCATION: ABDOMEN

## 2024-04-26 ASSESSMENT — PAIN DESCRIPTION - ORIENTATION: ORIENTATION: MID

## 2024-04-26 ASSESSMENT — PAIN - FUNCTIONAL ASSESSMENT: PAIN_FUNCTIONAL_ASSESSMENT: PREVENTS OR INTERFERES SOME ACTIVE ACTIVITIES AND ADLS

## 2024-04-26 NOTE — PROGRESS NOTES
[facility]        Progress Note    Patient Name: Sonja Mcmahan  MRN: [id number]  YOB: 1950  Date of Evaluation: 2024    Postoperative Day #4    Subjective:  No acute events overnight  Pain fairly controlled, more soreness since lost IV access and no recent IV narcotics  No nausea or vomiting, NGT in place  No flatus or stool  Resting in bed at this time      Vital Signs:  Patient Vitals for the past 24 hrs:   BP Temp Temp src Pulse Resp SpO2   24 1208 (!) 165/76 98 °F (36.7 °C) Axillary 99 18 96 %   24 0800 (!) 168/78 97.9 °F (36.6 °C) Temporal (!) 111 20 96 %   24 0330 -- -- -- -- 18 --   24 0245 131/84 97 °F (36.1 °C) Temporal (!) 108 18 97 %   24 2220 122/87 -- -- 98 -- --   24 2216 -- -- -- -- 18 --   24 2215 127/78 -- -- 97 -- --   24 2210 133/64 -- -- 96 -- --   24 2200 (!) 180/80 -- -- (!) 108 -- --   24 2143 (!) 138/109 97.2 °F (36.2 °C) Oral (!) 118 18 95 %   24 1911 -- -- -- -- 18 --   24 1612 (!) 156/131 97.9 °F (36.6 °C) Oral (!) 120 16 95 %        TEMPERATURE HISTORY 24H: Temp (24hrs), Av.6 °F (36.4 °C), Min:97 °F (36.1 °C), Max:98 °F (36.7 °C)    BLOOD PRESSURE HISTORY: Systolic (36hrs), Av , Min:122 , Max:180    Diastolic (36hrs), Av, Min:64, Max:131      Intake/Output:  I/O last 3 completed shifts:  In: 50 [P.O.:50]  Out: 2300 [Urine:950; Emesis/NG output:1350]  I/O this shift:  In: -   Out: 100 [Urine:100]  Drain/tube Output:       Physical Exam:  General: awake, alert, oriented to person, place, time  Lungs: unlabored respirations  Abdomen: soft, non-distended, incisional tenderness only, bowel sounds present  Skin/Wound: healing well, no drainage, no erythema, well approximated with stapes--DEQUAN dressing changed, seal/suction intact    Labs:  CBC:    Recent Labs     24  0827 24  0636 24  0620   WBC 13.9* 13.0* 12.9*   HGB 12.0 11.5*  infection  Hypertension  Moderate aortic stenosis, bicuspid valve      Plan:  1. Pain fairly controlled, more soreness since lost IV access and no recent IV narcotics, incisional tenderness only on exam, incision healing well, no nausea or vomiting, NGT in place, no flatus or stool, mild tachycardia otherwise stable vitals, mild persistent leukocytosis, UGI reviewed; continued supportive care, keep DEQUAN dressing in place, insert PICC, add Reglan  2. NPO with NGT decompression; monitor bowel function  3. IV hydration; monitor and correct electrolytes  4. Activity as tolerated, ambulate TID--PT/OT following  5. Pulmonary toilet, incentive spirometry  6. PRN analgesics and antiemetics--minimizing narcotics as tolerated  7. DVT prophylaxis with  Lovenox and SCD's  8. Management of medical comorbid etiologies per primary team and consulting services    EDUCATION:  Educated patient on plan of care and disease process--all questions answered.    Plans discussed with patient and nursing.  Reviewed and discussed with Dr. Mack.      Signed:  AVA Pacheco - CNP  4/26/2024 3:52 PM    Surgery Staff:     I have personally performed a face to face diagnostic evaluation on this patient. I have interviewed and examined the patient and I agree with the assessment above. Time was spent reviewing patient chart (including but not limited to notes, labs, imaging and other testing), interviewing and counseling patient and present family members, performing physical exam, documentation of my findings and subsequent follow up of ordered medication and testing, placing referrals and communication with patient care providers, coordinating future care as well as documentation in the EHR. This encompassed more than 50% of the total encounter time. In summary, my findings and plan are the following:   Pt stable overall  PIC today for access, still needs intermittent IV's a couple days  Wound clean, DEQUAN changed  Good BS, follow NG

## 2024-04-26 NOTE — PROGRESS NOTES
Physical Therapy  Sonja Mcmahan    Pt getting PICC line, will re-attempt PT session as schedule allows.     Viky Charlton PT, DPT [id number]

## 2024-04-26 NOTE — PROGRESS NOTES
V2.0    [facility] [place] Note      Name:  Sonja Mcmahan /Age/Sex: 1950  (73 y.o. female)   MRN & CSN:  [id number] & [id number] Encounter Date/Time: 2024 8:39 AM EDT   Location:  5T-5568/5568-01 PCP: Abiodun Amanda PA     Attending:Kemi Rosa MD       Hospital Day: 12    Assessment and Recommendations   Sonja Mcmahan is a 73 y.o. female who presents with Hiatal hernia      Plan:     Hiatal hernia  primary closure and [place] [employer]-A mesh reinforcement, laparoscopic cholecystectomy, open hemigastrectomy with gastrojejunostomy and closure of duodenal stump and overlying omental flap for duodenal stump reinforcement for hiatal hernia sliding with paraesophageal component on 2020  Continue management per general surgery  NG tube in place.  Still with nausea vomiting checking upper GI series may need gastric emptying study prior to NG removal      Sinus tachycardia  Much improved after beta-blocker.    Cholelithiasis with chronic cholecystitis  Patient is status post cholecystectomy as above            Diet Diet NPO Exceptions are: Sips of Water with Meds, Ice Chips   DVT Prophylaxis    Code Status Full Code   Disposition To be determined by general surgery         Personally reviewed Lab Studies and Imaging         Subjective:     Chief Complaint:     Sonja Mcmahan is a 73 y.o. female who presents with feeling much better heart rate is better still some      Review of Systems:      Pertinent positives and negatives discussed in HPI    Objective:     Intake/Output Summary (Last 24 hours) at 2024 0839  Last data filed at 2024 1743  Gross per 24 hour   Intake 50 ml   Output 2150 ml   Net -2100 ml      Vitals:   Vitals:    24 2220 24 0245 24 0330 24 0800   BP: 122/87 131/84  (!) 168/78   Pulse: 98 (!) 108  (!) 111   Resp:  18 18 20   Temp:  97 °F (36.1 °C)  97.9 °F (36.6 °C)   TempSrc:  Temporal  Temporal   SpO2:  97%  96%   Weight:       Height:

## 2024-04-26 NOTE — PROGRESS NOTES
[facility] - Inpatient Rehabilitation Department   Phone: [phone]    Occupational Therapy    [] Initial Evaluation            [x] Daily Treatment Note         [] Discharge Summary      Patient: Sonja Mcmahan   : 1950   MRN: [id number]   Date of Service:  2024    Admitting Diagnosis:  Hiatal hernia  Current Admission Summary: Admitted for abdominal pain. Imaging indicative of hiatal hernia and cholelithiasis. EGD on  showed polyps with hiatal hernia. Esophagus dilated at that time. To OR  for below:  1. Laparoscopic hiatal hernia repair with primary closure and [place] [employer]-A mesh reinforcement.  2. Laparoscopic cholecystectomy.  3. Open hemigastrectomy with gastrojejunostomy and closure of duodenal stump and overlying omental flap for duodenal stump reinforcement.  Past Medical History:  has a past medical history of Asthma, Diabetes mellitus (HCC), Hyperlipidemia, and Hypertension.  Past Surgical History:  has a past surgical history that includes Hysterectomy, total abdominal; Elbow surgery; Upper gastrointestinal endoscopy (N/A, 2024); Upper gastrointestinal endoscopy (N/A, 2024); Upper gastrointestinal endoscopy (N/A, 2024); hiatal hernia repair (N/A, 2024); Cholecystectomy, laparoscopic (N/A, 2024); and gastrectomy (N/A, 2024).    Discharge Recommendations: Sonja Mcmahan scored a 17/24 on the AM-PAC ADL Inpatient form. Current research shows that an AM-PAC score of 18 or greater is typically associated with a discharge to the patient's home setting. Based on the patient's AM-PAC score, and their current ADL deficits, it is recommended that the patient have 2-3 sessions per week of Occupational Therapy at d/c to increase the patient's independence.  At this time, this patient demonstrates the endurance and safety to discharge home with home services (home vs OP services) and a follow up treatment frequency of 2-3x/wk.   Please see assessment

## 2024-04-26 NOTE — PROGRESS NOTES
Occupational Therapy  Sonja Mcmahan     Pt getting PICC, will reattempt as schedule permits    Judith OWEN OTR/L [id number]

## 2024-04-26 NOTE — PROGRESS NOTES
Arrived to place PICC line with bedside RN Cira. Pre-procedure and timeout done with RN, discussed limitations of placement and allergies. Consent confirmed. Vital signs stable. Labs, allergies, medications, and code status reviewed. No contraindications noted.    Procedure explained to pt, including the risk and benefits of the procedure. All questions answered. Pt verbalizes understanding of the procedure and states no more questions.     Pt's basilic, brachial, cephalic are all easily collapsible with no indication for a clot. Vein selected is large enough for catheter. Pt tolerated sterile procedure well, with no difficulty accessing basilic vein, when accessed - blood was free flowing and non-pulsatile. Guidewire, introducer, and catheter went in smoothly.     PICC line verified with ECG  PICC is verified:  Please replace all existing IV tubing with new IV tubing prior to using the PICC for current IV infusions.  Please remove any PIVs from PICC arm.  All of the above may be sources of infection or an increase chance of a clot.      Post procedure - reorganized pt table, placed pt in lowest position, with call light and educated on line care. Instructed pt/RN not to use arm for at least 30min to avoid bleeding. Reported off to bedside RN.        [phone]

## 2024-04-27 LAB
ANION GAP SERPL CALCULATED.3IONS-SCNC: 12 MMOL/L (ref 3–16)
BASOPHILS # BLD: 0 K/UL (ref 0–0.2)
BASOPHILS NFR BLD: 0.1 %
BUN SERPL-MCNC: 11 MG/DL (ref 7–20)
CALCIUM SERPL-MCNC: 8.5 MG/DL (ref 8.3–10.6)
CHLORIDE SERPL-SCNC: 110 MMOL/L (ref 99–110)
CO2 SERPL-SCNC: 21 MMOL/L (ref 21–32)
CREAT SERPL-MCNC: 0.5 MG/DL (ref 0.6–1.2)
DEPRECATED RDW RBC AUTO: 15.4 % (ref 12.4–15.4)
EOSINOPHIL # BLD: 0.1 K/UL (ref 0–0.6)
EOSINOPHIL NFR BLD: 0.5 %
GFR SERPLBLD CREATININE-BSD FMLA CKD-EPI: >90 ML/MIN/{1.73_M2}
GLUCOSE BLD-MCNC: 110 MG/DL (ref 70–99)
GLUCOSE BLD-MCNC: 113 MG/DL (ref 70–99)
GLUCOSE BLD-MCNC: 120 MG/DL (ref 70–99)
GLUCOSE BLD-MCNC: 126 MG/DL (ref 70–99)
GLUCOSE SERPL-MCNC: 141 MG/DL (ref 70–99)
HCT VFR BLD AUTO: 31.2 % (ref 36–48)
HGB BLD-MCNC: 9.9 G/DL (ref 12–16)
LYMPHOCYTES # BLD: 0.8 K/UL (ref 1–5.1)
LYMPHOCYTES NFR BLD: 8.3 %
MAGNESIUM SERPL-MCNC: 1.7 MG/DL (ref 1.8–2.4)
MCH RBC QN AUTO: 27.4 PG (ref 26–34)
MCHC RBC AUTO-ENTMCNC: 31.7 G/DL (ref 31–36)
MCV RBC AUTO: 86.6 FL (ref 80–100)
MONOCYTES # BLD: 1 K/UL (ref 0–1.3)
MONOCYTES NFR BLD: 9.8 %
NEUTROPHILS # BLD: 8.1 K/UL (ref 1.7–7.7)
NEUTROPHILS NFR BLD: 81.3 %
PERFORMED ON: ABNORMAL
PHOSPHATE SERPL-MCNC: 2 MG/DL (ref 2.5–4.9)
PLATELET # BLD AUTO: 215 K/UL (ref 135–450)
PMV BLD AUTO: 7.6 FL (ref 5–10.5)
POTASSIUM SERPL-SCNC: 3.8 MMOL/L (ref 3.5–5.1)
RBC # BLD AUTO: 3.6 M/UL (ref 4–5.2)
SODIUM SERPL-SCNC: 143 MMOL/L (ref 136–145)
WBC # BLD AUTO: 10 K/UL (ref 4–11)

## 2024-04-27 PROCEDURE — 99024 POSTOP FOLLOW-UP VISIT: CPT | Performed by: SURGERY

## 2024-04-27 PROCEDURE — 2500000003 HC RX 250 WO HCPCS: Performed by: INTERNAL MEDICINE

## 2024-04-27 PROCEDURE — 6360000002 HC RX W HCPCS: Performed by: SURGERY

## 2024-04-27 PROCEDURE — 2500000003 HC RX 250 WO HCPCS: Performed by: SURGERY

## 2024-04-27 PROCEDURE — 83735 ASSAY OF MAGNESIUM: CPT

## 2024-04-27 PROCEDURE — 1200000000 HC SEMI PRIVATE

## 2024-04-27 PROCEDURE — 2580000003 HC RX 258: Performed by: NURSE PRACTITIONER

## 2024-04-27 PROCEDURE — 2580000003 HC RX 258: Performed by: SURGERY

## 2024-04-27 PROCEDURE — 2500000003 HC RX 250 WO HCPCS: Performed by: HOSPITALIST

## 2024-04-27 PROCEDURE — 80048 BASIC METABOLIC PNL TOTAL CA: CPT

## 2024-04-27 PROCEDURE — 6360000002 HC RX W HCPCS: Performed by: INTERNAL MEDICINE

## 2024-04-27 PROCEDURE — C9113 INJ PANTOPRAZOLE SODIUM, VIA: HCPCS | Performed by: NURSE PRACTITIONER

## 2024-04-27 PROCEDURE — 2580000003 HC RX 258: Performed by: INTERNAL MEDICINE

## 2024-04-27 PROCEDURE — 6370000000 HC RX 637 (ALT 250 FOR IP): Performed by: SURGERY

## 2024-04-27 PROCEDURE — 85025 COMPLETE CBC W/AUTO DIFF WBC: CPT

## 2024-04-27 PROCEDURE — 6360000002 HC RX W HCPCS: Performed by: NURSE PRACTITIONER

## 2024-04-27 PROCEDURE — 84100 ASSAY OF PHOSPHORUS: CPT

## 2024-04-27 RX ORDER — BISACODYL 10 MG
10 SUPPOSITORY, RECTAL RECTAL ONCE
Status: COMPLETED | OUTPATIENT
Start: 2024-04-27 | End: 2024-04-27

## 2024-04-27 RX ORDER — MAGNESIUM SULFATE 1 G/100ML
1000 INJECTION INTRAVENOUS ONCE
Status: COMPLETED | OUTPATIENT
Start: 2024-04-27 | End: 2024-04-27

## 2024-04-27 RX ORDER — BISACODYL 10 MG
10 SUPPOSITORY, RECTAL RECTAL DAILY PRN
Status: DISCONTINUED | OUTPATIENT
Start: 2024-04-27 | End: 2024-05-07 | Stop reason: HOSPADM

## 2024-04-27 RX ADMIN — OXYCODONE HYDROCHLORIDE 5 MG: 5 TABLET ORAL at 21:59

## 2024-04-27 RX ADMIN — OXYCODONE HYDROCHLORIDE 5 MG: 5 TABLET ORAL at 15:08

## 2024-04-27 RX ADMIN — PANTOPRAZOLE SODIUM 40 MG: 40 INJECTION, POWDER, FOR SOLUTION INTRAVENOUS at 08:17

## 2024-04-27 RX ADMIN — POTASSIUM CHLORIDE, DEXTROSE MONOHYDRATE AND SODIUM CHLORIDE: 150; 5; 450 INJECTION, SOLUTION INTRAVENOUS at 13:43

## 2024-04-27 RX ADMIN — SODIUM CHLORIDE 25 ML: 9 INJECTION, SOLUTION INTRAVENOUS at 08:19

## 2024-04-27 RX ADMIN — ENOXAPARIN SODIUM 40 MG: 100 INJECTION SUBCUTANEOUS at 08:27

## 2024-04-27 RX ADMIN — LISINOPRIL 10 MG: 10 TABLET ORAL at 21:05

## 2024-04-27 RX ADMIN — POTASSIUM PHOSPHATES 20 MMOL: 236; 224 INJECTION, SOLUTION INTRAVENOUS at 08:20

## 2024-04-27 RX ADMIN — MORPHINE SULFATE 4 MG: 4 INJECTION, SOLUTION INTRAMUSCULAR; INTRAVENOUS at 20:59

## 2024-04-27 RX ADMIN — BISACODYL 10 MG: 10 SUPPOSITORY RECTAL at 13:40

## 2024-04-27 RX ADMIN — METOPROLOL TARTRATE 2.5 MG: 5 INJECTION INTRAVENOUS at 15:32

## 2024-04-27 RX ADMIN — MORPHINE SULFATE 4 MG: 4 INJECTION, SOLUTION INTRAMUSCULAR; INTRAVENOUS at 16:44

## 2024-04-27 RX ADMIN — Medication 10 ML: at 08:24

## 2024-04-27 RX ADMIN — MORPHINE SULFATE 4 MG: 4 INJECTION, SOLUTION INTRAMUSCULAR; INTRAVENOUS at 13:35

## 2024-04-27 RX ADMIN — METOCLOPRAMIDE 5 MG: 5 INJECTION, SOLUTION INTRAMUSCULAR; INTRAVENOUS at 10:38

## 2024-04-27 RX ADMIN — METOCLOPRAMIDE 5 MG: 5 INJECTION, SOLUTION INTRAMUSCULAR; INTRAVENOUS at 04:31

## 2024-04-27 RX ADMIN — Medication 10 ML: at 21:10

## 2024-04-27 RX ADMIN — MAGNESIUM SULFATE HEPTAHYDRATE 1000 MG: 1 INJECTION, SOLUTION INTRAVENOUS at 18:06

## 2024-04-27 RX ADMIN — MORPHINE SULFATE 4 MG: 4 INJECTION, SOLUTION INTRAMUSCULAR; INTRAVENOUS at 04:31

## 2024-04-27 RX ADMIN — Medication 10 ML: at 21:27

## 2024-04-27 RX ADMIN — Medication 10 ML: at 10:38

## 2024-04-27 RX ADMIN — MORPHINE SULFATE 2 MG: 2 INJECTION, SOLUTION INTRAMUSCULAR; INTRAVENOUS at 08:34

## 2024-04-27 ASSESSMENT — PAIN DESCRIPTION - LOCATION
LOCATION: ABDOMEN;BACK
LOCATION: ABDOMEN
LOCATION: BACK;ABDOMEN
LOCATION: BACK
LOCATION: ABDOMEN;BACK
LOCATION: BACK;ABDOMEN

## 2024-04-27 ASSESSMENT — PAIN SCALES - GENERAL
PAINLEVEL_OUTOF10: 6
PAINLEVEL_OUTOF10: 3
PAINLEVEL_OUTOF10: 6
PAINLEVEL_OUTOF10: 7
PAINLEVEL_OUTOF10: 8
PAINLEVEL_OUTOF10: 7
PAINLEVEL_OUTOF10: 5
PAINLEVEL_OUTOF10: 7
PAINLEVEL_OUTOF10: 9
PAINLEVEL_OUTOF10: 6

## 2024-04-27 ASSESSMENT — PAIN DESCRIPTION - DESCRIPTORS: DESCRIPTORS: PATIENT UNABLE TO DESCRIBE

## 2024-04-27 ASSESSMENT — PAIN DESCRIPTION - ORIENTATION: ORIENTATION: LEFT

## 2024-04-27 ASSESSMENT — PAIN DESCRIPTION - PAIN TYPE: TYPE: SURGICAL PAIN;ACUTE PAIN

## 2024-04-27 NOTE — PROGRESS NOTES
lesion with   a stalk. 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  Intradepartmental   consultation was obtained with consensus.     CASE SUMMARY: (STOMACH)   Standard(s): AJCC-UICC 8     SPECIMEN     Procedure   Subtotal gastrectomy (resection of the entire body of the stomach through   the antrum, pylorus to the 1st section of the duodenum)     TUMOR     Tumor Site   Use the esophageal checklist if the tumor involves the EGJ and the tumor   midpoint is 2 cm or less into the proximal stomach.   Mid body (per OR note)     Histologic Type   Adenocarcinoma    Adenocarcinoma Classification (based on WHO)    Tubular adenocarcinoma     Histologic Grade   G1, well differentiated     Tumor Size   Cannot be determined (explain): The adenoma polyp measures 4cm with   invasion in deep portion     Tumor Extent   Invades submucosa     Treatment Effect   No known presurgical therapy     Lymphatic and / or Vascular Invasion   Not identified     MARGINS     Margin Status for Invasive Carcinoma   All margins negative for invasive carcinoma   Closest Margin(s) to Invasive Carcinoma   At least 2 cm from the presumed proximal staple line and 16.5 cm from the   presumed distal staple line     Margin Status for Dysplasia   All margins negative for dysplasia     REGIONAL LYMPH NODES     Regional Lymph Node Status   Regional lymph nodes present    All regional lymph nodes negative for tumor    Number of Lymph Nodes Examined    Exact number (specify): 16     pTNM CLASSIFICATION (AJCC 8th Edition)   Reporting of pT, pN, and (when applicable) pM categories is based on   information available to the pathologist at the time the report is   issued. As per the AJCC (Chapter 1, 8th Ed.) it is the managing   physician's responsibility to  management of remainder of medical comorbidities to primary and consulting teams    Minh Blackmon MD, FACS  4/27/2024  3:47 PM

## 2024-04-27 NOTE — PROGRESS NOTES
Hospitalist Progress Note      PCP: Abiodun Amanda PA    Date of Admission: 4/15/2024    Chief Complaint: Hiatal hernia    Hospital Course: 73 y.o. female with a history of hiatal hernia, obesity, GERD who presented by request of GI Dr Marin after c/o dyspnea and intractable abdominal pain. Has been unable to tolerate po and patient and daughter think weight loss of unknown amount. Abdominal pain present over the past month. GI sent her to ED for abdominal imaging.   Admitted as inpatient.  Followed by Cardio, GI and Surgery.      SPECT on 4/16:     **Myocardial perfusion is normal. No reversible ischemia. Low risk study.**     EGD on 4/18:  large hh, polyp removed, esoph dilated, mid body ?torsion vs level of diaphragm, antrum ? Mass biopsied.     On 4/22/24 underwent:  LAPAROSCOPIC HERNIA HIATAL REPAIR  LAPAROSCOPIC CHOLECYSTECTOMY  HEMIGASTRECTOMY WITH GASTROJEJUNOSTOMY    PICC placed.      Subjective: Patient had small flatus, no BM.  Still with NGT.  Still has expected abdominal pain.  No CP, SOB, HA or fevers.       Medications:  Reviewed    Infusion Medications    dextrose 5% and 0.45% NaCl with KCl 20 mEq 75 mL/hr at 04/27/24 0404    sodium chloride      dextrose      sodium chloride 25 mL (04/27/24 0819)     Scheduled Medications    bisacodyl  10 mg Rectal Once    lidocaine 1 % injection  5 mL IntraDERmal Once    sodium chloride flush  5-40 mL IntraVENous 2 times per day    pantoprazole  40 mg IntraVENous Daily    enoxaparin  40 mg SubCUTAneous Daily    insulin lispro  0-4 Units SubCUTAneous TID WC    insulin lispro  0-4 Units SubCUTAneous Nightly    montelukast  10 mg Oral Nightly    Virt-Caps  1 capsule Oral Daily    sodium chloride flush  5-40 mL IntraVENous 2 times per day    atorvastatin  20 mg Oral Nightly    lisinopril  10 mg Oral Nightly    And    hydroCHLOROthiazide  12.5 mg Oral Daily     PRN Meds: bisacodyl, sodium chloride flush, sodium chloride, sodium phosphate 15 mmol in sodium  chloride 0.9 % 250 mL IVPB, metoprolol, hydrALAZINE, LORazepam, guaiFENesin-dextromethorphan, morphine **OR** morphine, dextrose bolus **OR** dextrose bolus, glucagon (rDNA), dextrose, LORazepam, sodium chloride flush, sodium chloride, potassium chloride **OR** potassium alternative oral replacement **OR** potassium chloride, magnesium sulfate, ondansetron **OR** ondansetron, polyethylene glycol, acetaminophen **OR** acetaminophen, oxyCODONE      Intake/Output Summary (Last 24 hours) at 4/27/2024 1326  Last data filed at 4/27/2024 1038  Gross per 24 hour   Intake 638.18 ml   Output 625 ml   Net 13.18 ml       Physical Exam Performed:    BP (!) 162/78   Pulse (!) 118   Temp 97.8 °F (36.6 °C) (Oral)   Resp 16   Ht 1.6 m (5' 3\")   Wt 78.5 kg (173 lb)   SpO2 94%   BMI 30.65 kg/m²     General appearance: Obese, pleasant  HEENT: Pupils equal, round, and reactive to light. Conjunctivae/corneas clear.  NGT  Neck: Supple, with full range of motion. No jugular venous distention. Trachea midline.  Respiratory:  Normal respiratory effort. Clear to auscultation, bilaterally without Rales/Wheezes/Rhonchi.  Cardiovascular: Regular rate and rhythm with normal S1/S2 without murmurs, rubs or gallops.  Abdomen: Soft, expected tenderness, obese, non-distended with normal bowel sounds.  Musculoskeletal: No clubbing, cyanosis or edema bilaterally.  Full range of motion without deformity.  Skin: Skin color, texture, turgor normal.  No rashes or lesions.  Neurologic:  Neurovascularly intact without any focal sensory/motor deficits. Cranial nerves: II-XII intact, grossly non-focal.  Psychiatric: Alert and oriented, thought content appropriate, normal insight  Capillary Refill: Brisk, 3 seconds, normal   Peripheral Pulses: +2 palpable, equal bilaterally       Labs:   Recent Labs     04/25/24  0636 04/26/24  0620 04/27/24  0605   WBC 13.0* 12.9* 10.0   HGB 11.5* 11.0* 9.9*   HCT 35.4* 35.7* 31.2*    217 215     Recent Labs

## 2024-04-27 NOTE — PROGRESS NOTES
Perfect serve sent to Dr. Amor r/t 15 beat run of SVT. At this time, awaiting response    7314: Orders received for 1g IV magnesium.

## 2024-04-27 NOTE — PROGRESS NOTES
Shift assessment complete. VSS. Patient resting in bed on semi fowlers position. Respirations even and unlabored. Call light within reach. Fall precautions in place. Bed in low, locked position. No additional needs noted at this time.

## 2024-04-27 NOTE — PROGRESS NOTES
Patient had a 15 beat run of SVT. HR staying between 100-118 today on telemetry. MD messaged at 1523. Orders received to give PRN metoprolol. After administration HR decreased to 80-90 bpm. Pt also noted to dropped to 85% on RA when sleeping, placed on 2L via NC and improved to 95%. No hx of CRISSY that patient or daughter aware of but patient frequently snores. Call light within reach. Fall precautions in place. Daughter at bedside.

## 2024-04-27 NOTE — ACP (ADVANCE CARE PLANNING)
Advanced Care Planning Note.    Purpose of Encounter: Advanced care planning in light of hiatal hernia  Parties In Attendance: Patient  Decisional Capacity: Yes  Subjective: Patient with expected abdominal pain  Objective: Cr 0.5  Goals of Care Determination: Patient wants full support (CPR, vent, surgery, HD, trach, PEG)  Plan:  SPECT, EGD, Hiatal hernia repair with cholecystectomy, PT/OT, IVF  Code Status: Full code  Time spent on Advanced care Plannin minutes  Advanced Care Planning Documents: Completed advanced directives on chart, daughters share the POA.    Jericho Rosa MD  2024 1:35 PM

## 2024-04-27 NOTE — PLAN OF CARE
Problem: Pain  Goal: Verbalizes/displays adequate comfort level or baseline comfort level  Outcome: Progressing     Problem: Chronic Conditions and Co-morbidities  Goal: Patient's chronic conditions and co-morbidity symptoms are monitored and maintained or improved  Outcome: Progressing     Problem: Nutrition Deficit:  Goal: Optimize nutritional status  Outcome: Progressing     Problem: Discharge Planning  Goal: Discharge to home or other facility with appropriate resources  Outcome: Progressing     Problem: Safety - Adult  Goal: Free from fall injury  Outcome: Progressing     Problem: Cardiovascular - Adult  Goal: Maintains optimal cardiac output and hemodynamic stability  Reactivated     Problem: Skin/Tissue Integrity - Adult  Goal: Incisions, wounds, or drain sites healing without S/S of infection  Reactivated     Problem: Musculoskeletal - Adult  Goal: Return mobility to safest level of function  Reactivated  Goal: Return ADL status to a safe level of function  Reactivated     Problem: Gastrointestinal - Adult  Goal: Minimal or absence of nausea and vomiting  Reactivated  Goal: Maintains or returns to baseline bowel function  Reactivated  Goal: Maintains adequate nutritional intake  Reactivated

## 2024-04-28 ENCOUNTER — APPOINTMENT (OUTPATIENT)
Dept: CT IMAGING | Age: 74
DRG: 326 | End: 2024-04-28
Payer: MEDICARE

## 2024-04-28 LAB
ANION GAP SERPL CALCULATED.3IONS-SCNC: 10 MMOL/L (ref 3–16)
ANION GAP SERPL CALCULATED.3IONS-SCNC: 9 MMOL/L (ref 3–16)
BASOPHILS # BLD: 0 K/UL (ref 0–0.2)
BASOPHILS # BLD: 0 K/UL (ref 0–0.2)
BASOPHILS NFR BLD: 0.2 %
BASOPHILS NFR BLD: 0.3 %
BUN SERPL-MCNC: 6 MG/DL (ref 7–20)
BUN SERPL-MCNC: 7 MG/DL (ref 7–20)
CALCIUM SERPL-MCNC: 7.1 MG/DL (ref 8.3–10.6)
CALCIUM SERPL-MCNC: 8.3 MG/DL (ref 8.3–10.6)
CHLORIDE SERPL-SCNC: 107 MMOL/L (ref 99–110)
CHLORIDE SERPL-SCNC: 114 MMOL/L (ref 99–110)
CO2 SERPL-SCNC: 18 MMOL/L (ref 21–32)
CO2 SERPL-SCNC: 22 MMOL/L (ref 21–32)
CREAT SERPL-MCNC: <0.5 MG/DL (ref 0.6–1.2)
CREAT SERPL-MCNC: <0.5 MG/DL (ref 0.6–1.2)
DEPRECATED RDW RBC AUTO: 15.6 % (ref 12.4–15.4)
DEPRECATED RDW RBC AUTO: 15.8 % (ref 12.4–15.4)
EOSINOPHIL # BLD: 0.1 K/UL (ref 0–0.6)
EOSINOPHIL # BLD: 0.1 K/UL (ref 0–0.6)
EOSINOPHIL NFR BLD: 1.1 %
EOSINOPHIL NFR BLD: 1.3 %
GFR SERPLBLD CREATININE-BSD FMLA CKD-EPI: >90 ML/MIN/{1.73_M2}
GFR SERPLBLD CREATININE-BSD FMLA CKD-EPI: >90 ML/MIN/{1.73_M2}
GLUCOSE BLD-MCNC: 112 MG/DL (ref 70–99)
GLUCOSE BLD-MCNC: 116 MG/DL (ref 70–99)
GLUCOSE BLD-MCNC: 121 MG/DL (ref 70–99)
GLUCOSE BLD-MCNC: 127 MG/DL (ref 70–99)
GLUCOSE SERPL-MCNC: 126 MG/DL (ref 70–99)
GLUCOSE SERPL-MCNC: 136 MG/DL (ref 70–99)
HCT VFR BLD AUTO: 26.5 % (ref 36–48)
HCT VFR BLD AUTO: 31.9 % (ref 36–48)
HGB BLD-MCNC: 10.2 G/DL (ref 12–16)
HGB BLD-MCNC: 8.8 G/DL (ref 12–16)
LYMPHOCYTES # BLD: 1 K/UL (ref 1–5.1)
LYMPHOCYTES # BLD: 1.3 K/UL (ref 1–5.1)
LYMPHOCYTES NFR BLD: 11.3 %
LYMPHOCYTES NFR BLD: 12.8 %
MAGNESIUM SERPL-MCNC: 1.4 MG/DL (ref 1.8–2.4)
MAGNESIUM SERPL-MCNC: 1.7 MG/DL (ref 1.8–2.4)
MCH RBC QN AUTO: 27.6 PG (ref 26–34)
MCH RBC QN AUTO: 28.7 PG (ref 26–34)
MCHC RBC AUTO-ENTMCNC: 31.8 G/DL (ref 31–36)
MCHC RBC AUTO-ENTMCNC: 33.3 G/DL (ref 31–36)
MCV RBC AUTO: 86.3 FL (ref 80–100)
MCV RBC AUTO: 86.7 FL (ref 80–100)
MONOCYTES # BLD: 1 K/UL (ref 0–1.3)
MONOCYTES # BLD: 1.1 K/UL (ref 0–1.3)
MONOCYTES NFR BLD: 10.7 %
MONOCYTES NFR BLD: 11.4 %
NEUTROPHILS # BLD: 6.9 K/UL (ref 1.7–7.7)
NEUTROPHILS # BLD: 7.7 K/UL (ref 1.7–7.7)
NEUTROPHILS NFR BLD: 74.9 %
NEUTROPHILS NFR BLD: 76 %
PERFORMED ON: ABNORMAL
PHOSPHATE SERPL-MCNC: 1.2 MG/DL (ref 2.5–4.9)
PHOSPHATE SERPL-MCNC: 1.4 MG/DL (ref 2.5–4.9)
PLATELET # BLD AUTO: 196 K/UL (ref 135–450)
PLATELET # BLD AUTO: 225 K/UL (ref 135–450)
PMV BLD AUTO: 7.6 FL (ref 5–10.5)
PMV BLD AUTO: 8 FL (ref 5–10.5)
POTASSIUM SERPL-SCNC: 3.1 MMOL/L (ref 3.5–5.1)
POTASSIUM SERPL-SCNC: 3.5 MMOL/L (ref 3.5–5.1)
RBC # BLD AUTO: 3.07 M/UL (ref 4–5.2)
RBC # BLD AUTO: 3.68 M/UL (ref 4–5.2)
SODIUM SERPL-SCNC: 139 MMOL/L (ref 136–145)
SODIUM SERPL-SCNC: 141 MMOL/L (ref 136–145)
WBC # BLD AUTO: 10.3 K/UL (ref 4–11)
WBC # BLD AUTO: 9.1 K/UL (ref 4–11)

## 2024-04-28 PROCEDURE — 2580000003 HC RX 258: Performed by: NURSE PRACTITIONER

## 2024-04-28 PROCEDURE — 6360000002 HC RX W HCPCS: Performed by: NURSE PRACTITIONER

## 2024-04-28 PROCEDURE — C9113 INJ PANTOPRAZOLE SODIUM, VIA: HCPCS | Performed by: NURSE PRACTITIONER

## 2024-04-28 PROCEDURE — 2580000003 HC RX 258: Performed by: SURGERY

## 2024-04-28 PROCEDURE — 6360000002 HC RX W HCPCS: Performed by: SURGERY

## 2024-04-28 PROCEDURE — 71260 CT THORAX DX C+: CPT

## 2024-04-28 PROCEDURE — 2500000003 HC RX 250 WO HCPCS: Performed by: INTERNAL MEDICINE

## 2024-04-28 PROCEDURE — 6360000002 HC RX W HCPCS: Performed by: INTERNAL MEDICINE

## 2024-04-28 PROCEDURE — 6360000004 HC RX CONTRAST MEDICATION: Performed by: INTERNAL MEDICINE

## 2024-04-28 PROCEDURE — 80048 BASIC METABOLIC PNL TOTAL CA: CPT

## 2024-04-28 PROCEDURE — 1200000000 HC SEMI PRIVATE

## 2024-04-28 PROCEDURE — 85025 COMPLETE CBC W/AUTO DIFF WBC: CPT

## 2024-04-28 PROCEDURE — 84100 ASSAY OF PHOSPHORUS: CPT

## 2024-04-28 PROCEDURE — 2500000003 HC RX 250 WO HCPCS: Performed by: SURGERY

## 2024-04-28 PROCEDURE — 99024 POSTOP FOLLOW-UP VISIT: CPT | Performed by: SURGERY

## 2024-04-28 PROCEDURE — 36592 COLLECT BLOOD FROM PICC: CPT

## 2024-04-28 PROCEDURE — 83735 ASSAY OF MAGNESIUM: CPT

## 2024-04-28 PROCEDURE — 36415 COLL VENOUS BLD VENIPUNCTURE: CPT

## 2024-04-28 PROCEDURE — 2580000003 HC RX 258: Performed by: INTERNAL MEDICINE

## 2024-04-28 RX ORDER — POTASSIUM CHLORIDE 7.45 MG/ML
10 INJECTION INTRAVENOUS
Status: DISPENSED | OUTPATIENT
Start: 2024-04-28 | End: 2024-04-28

## 2024-04-28 RX ORDER — MAGNESIUM SULFATE IN WATER 40 MG/ML
2000 INJECTION, SOLUTION INTRAVENOUS ONCE
Status: COMPLETED | OUTPATIENT
Start: 2024-04-28 | End: 2024-04-28

## 2024-04-28 RX ORDER — FUROSEMIDE 10 MG/ML
40 INJECTION INTRAMUSCULAR; INTRAVENOUS 2 TIMES DAILY
Status: DISCONTINUED | OUTPATIENT
Start: 2024-04-28 | End: 2024-05-01

## 2024-04-28 RX ADMIN — SODIUM CHLORIDE 25 ML: 9 INJECTION, SOLUTION INTRAVENOUS at 15:12

## 2024-04-28 RX ADMIN — POTASSIUM CHLORIDE, DEXTROSE MONOHYDRATE AND SODIUM CHLORIDE: 150; 5; 450 INJECTION, SOLUTION INTRAVENOUS at 21:31

## 2024-04-28 RX ADMIN — ENOXAPARIN SODIUM 40 MG: 100 INJECTION SUBCUTANEOUS at 10:40

## 2024-04-28 RX ADMIN — FUROSEMIDE 40 MG: 10 INJECTION, SOLUTION INTRAMUSCULAR; INTRAVENOUS at 18:51

## 2024-04-28 RX ADMIN — MORPHINE SULFATE 4 MG: 4 INJECTION, SOLUTION INTRAMUSCULAR; INTRAVENOUS at 18:51

## 2024-04-28 RX ADMIN — Medication 10 ML: at 10:41

## 2024-04-28 RX ADMIN — IOPAMIDOL 75 ML: 755 INJECTION, SOLUTION INTRAVENOUS at 13:00

## 2024-04-28 RX ADMIN — MORPHINE SULFATE 4 MG: 4 INJECTION, SOLUTION INTRAMUSCULAR; INTRAVENOUS at 21:31

## 2024-04-28 RX ADMIN — POTASSIUM PHOSPHATES 20 MMOL: 236; 224 INJECTION, SOLUTION INTRAVENOUS at 15:13

## 2024-04-28 RX ADMIN — POTASSIUM CHLORIDE 10 MEQ: 7.46 INJECTION, SOLUTION INTRAVENOUS at 11:57

## 2024-04-28 RX ADMIN — Medication 5 ML: at 21:39

## 2024-04-28 RX ADMIN — POTASSIUM CHLORIDE 10 MEQ: 7.46 INJECTION, SOLUTION INTRAVENOUS at 14:09

## 2024-04-28 RX ADMIN — MAGNESIUM SULFATE HEPTAHYDRATE 2000 MG: 40 INJECTION, SOLUTION INTRAVENOUS at 10:47

## 2024-04-28 RX ADMIN — MORPHINE SULFATE 2 MG: 2 INJECTION, SOLUTION INTRAMUSCULAR; INTRAVENOUS at 10:54

## 2024-04-28 RX ADMIN — PANTOPRAZOLE SODIUM 40 MG: 40 INJECTION, POWDER, FOR SOLUTION INTRAVENOUS at 10:40

## 2024-04-28 RX ADMIN — MORPHINE SULFATE 4 MG: 4 INJECTION, SOLUTION INTRAMUSCULAR; INTRAVENOUS at 04:05

## 2024-04-28 RX ADMIN — POTASSIUM CHLORIDE 10 MEQ: 7.46 INJECTION, SOLUTION INTRAVENOUS at 10:50

## 2024-04-28 RX ADMIN — MORPHINE SULFATE 4 MG: 4 INJECTION, SOLUTION INTRAMUSCULAR; INTRAVENOUS at 14:13

## 2024-04-28 RX ADMIN — Medication 10 ML: at 10:40

## 2024-04-28 RX ADMIN — MORPHINE SULFATE 4 MG: 4 INJECTION, SOLUTION INTRAMUSCULAR; INTRAVENOUS at 06:32

## 2024-04-28 ASSESSMENT — PAIN DESCRIPTION - LOCATION
LOCATION: ABDOMEN

## 2024-04-28 ASSESSMENT — PAIN SCALES - GENERAL
PAINLEVEL_OUTOF10: 6
PAINLEVEL_OUTOF10: 6
PAINLEVEL_OUTOF10: 5
PAINLEVEL_OUTOF10: 7
PAINLEVEL_OUTOF10: 8

## 2024-04-28 NOTE — PLAN OF CARE
Problem: Pain  Goal: Verbalizes/displays adequate comfort level or baseline comfort level  4/28/2024 0342 by Angel Ledesma RN  Outcome: Progressing     Problem: Pain  Goal: Verbalizes/displays adequate comfort level or baseline comfort level  4/28/2024 0342 by Angel Ledesma RN  Outcome: Progressing    Problem: Chronic Conditions and Co-morbidities  Goal: Patient's chronic conditions and co-morbidity symptoms are monitored and maintained or improved  4/28/2024 0342 by Angel Ledesma RN  Outcome: Progressing     Problem: Nutrition Deficit:  Goal: Optimize nutritional status  4/28/2024 0342 by Angel Ledesma RN  Outcome: Progressing     Problem: Discharge Planning  Goal: Discharge to home or other facility with appropriate resources  4/28/2024 0342 by Angel Ledesma RN  Outcome: Progressing     Problem: Safety - Adult  Goal: Free from fall injury  4/28/2024 0342 by Angel Ledesma RN  Outcome: Progressing     Problem: Cardiovascular - Adult  Goal: Maintains optimal cardiac output and hemodynamic stability  4/28/2024 0342 by Angel Ledesma RN  Outcome: Progressing     Problem: Skin/Tissue Integrity - Adult  Goal: Incisions, wounds, or drain sites healing without S/S of infection  4/28/2024 0342 by Angel Ledesma RN  Outcome: Progressing     Problem: Musculoskeletal - Adult  Goal: Return mobility to safest level of function  4/28/2024 0342 by Angel Ledesma RN  Outcome: Progressing  Goal: Return ADL status to a safe level of function  4/28/2024 0342 by Angel Ledesma RN  Outcome: Progressing     Problem: Gastrointestinal - Adult  Goal: Minimal or absence of nausea and vomiting  4/28/2024 0342 by Angel Ledesma RN  Outcome: Progressing  Goal: Maintains or returns to baseline bowel function  4/28/2024 0342 by Angel Ledesma RN  Outcome: Progressing  Goal: Maintains adequate nutritional intake  4/28/2024 0342 by Angel Ledesma RN  Outcome: Progressing

## 2024-04-28 NOTE — PLAN OF CARE
Problem: Pain  Goal: Verbalizes/displays adequate comfort level or baseline comfort level  4/28/2024 1714 by Tennille Austin RN  Outcome: Progressing  4/28/2024 0342 by Angel Ledesma RN  Outcome: Progressing     Problem: Chronic Conditions and Co-morbidities  Goal: Patient's chronic conditions and co-morbidity symptoms are monitored and maintained or improved  4/28/2024 1714 by Tennille Austin RN  Outcome: Progressing  4/28/2024 0342 by Angel Ledesma RN  Outcome: Progressing     Problem: Nutrition Deficit:  Goal: Optimize nutritional status  4/28/2024 1714 by Tennille Austin RN  Outcome: Progressing  4/28/2024 0342 by Angel Ledesma RN  Outcome: Progressing     Problem: Discharge Planning  Goal: Discharge to home or other facility with appropriate resources  4/28/2024 1714 by Tennille Austin RN  Outcome: Progressing  4/28/2024 0342 by Angel Ledesma RN  Outcome: Progressing     Problem: Safety - Adult  Goal: Free from fall injury  4/28/2024 1714 by Tennille Austin RN  Outcome: Progressing  4/28/2024 0342 by Angel Ledesma RN  Outcome: Progressing     Problem: Cardiovascular - Adult  Goal: Maintains optimal cardiac output and hemodynamic stability  4/28/2024 1714 by Tennille Austin RN  Outcome: Progressing  4/28/2024 0342 by Angel Ledesma RN  Outcome: Progressing     Problem: Skin/Tissue Integrity - Adult  Goal: Incisions, wounds, or drain sites healing without S/S of infection  4/28/2024 1714 by Tennille Austin RN  Outcome: Progressing  4/28/2024 0342 by Angel Ledesma RN  Outcome: Progressing     Problem: Musculoskeletal - Adult  Goal: Return mobility to safest level of function  4/28/2024 1714 by Tennille Austin RN  Outcome: Progressing  4/28/2024 0342 by Angel Ledesma RN  Outcome: Progressing  Goal: Return ADL status to a safe level of function  4/28/2024 1714 by Tennille Austin RN  Outcome: Progressing  4/28/2024 0342 by Angel Ledesma RN  Outcome: Progressing     Problem:

## 2024-04-28 NOTE — PROGRESS NOTES
[facility]        Progress Note    Patient Name: Sonja Mcmahan  MRN: [id number]  YOB: 1950  Date of Evaluation: 2024    Postoperative Day #6    Subjective:  No acute events overnight  Pain unchanged  No nausea with NG  Passing stool  Resting in bed at this time    Vital Signs:  Patient Vitals for the past 24 hrs:   BP Temp Temp src Pulse Resp SpO2   24 1104 (!) 162/78 97.8 °F (36.6 °C) Oral (!) 118 16 94 %   24 0834 -- -- -- -- 16 --   24 0803 (!) 152/83 97.9 °F (36.6 °C) Axillary (!) 107 22 91 %   24 0424 (!) 138/59 97.9 °F (36.6 °C) Temporal (!) 108 24 93 %   24 2351 (!) 162/77 98.3 °F (36.8 °C) Axillary (!) 116 20 96 %   240 -- -- -- (!) 102 -- --   24 (!) 163/81 97.9 °F (36.6 °C) Temporal (!) 116 24 96 %   24 1702 (!) 168/86 98 °F (36.7 °C) Oral (!) 111 18 96 %      TEMPERATURE HISTORY 24H: Temp (24hrs), Av °F (36.7 °C), Min:97.8 °F (36.6 °C), Max:98.3 °F (36.8 °C)    BLOOD PRESSURE HISTORY: Systolic (36hrs), Av , Min:131 , Max:168    Diastolic (36hrs), Av, Min:59, Max:86      Intake/Output:  I/O last 3 completed shifts:  In: 618.2 [I.V.:346; IV Piggyback:272.2]  Out: 675 [Urine:400; Emesis/NG output:275]  I/O this shift:  In: 20 [I.V.:20]  Out: 200 [Urine:200]  Drain/tube Output:       Physical Exam:  General: awake, alert, oriented to person, place, time  Lungs: unlabored respirations  Abdomen: soft, non-distended, incisional tenderness only, DEQUAN in place and functional    Labs:  CBC:    Recent Labs     24  0636 24  0620 24  0605   WBC 13.0* 12.9* 10.0   HGB 11.5* 11.0* 9.9*   HCT 35.4* 35.7* 31.2*    217 215     BMP:    Recent Labs     24  0636 24  0620 24  1820 24  0605    146*  --  143   K 4.6 4.1 3.4* 3.8   * 114*  --  110   CO2 20* 19*  --  21   BUN 13 11  --  11   CREATININE 0.7 0.5*  --  0.5*   GLUCOSE 113*  pathologic stage based   upon all pertinent information, including but potentially not limited to   this pathology report.     pT Category   pT1b: Tumor invades the submucosa     pN Category#   # Metastatic tumor deposits in the subserosal fat adjacent to a gastric   carcinoma, without evidence of residual lymph node tissue, are considered   regional lymph node metastases for purposes of gastric cancer staging.   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 Correlation with laboratory tests (serum gastrin, vitamin B12   levels, serologies for anti-intrinsic factor and anti-parietal cell) may   be considered.     SPECIAL STUDIES   HER2 (ERBB2) Biomarker Testing will be performed at an external   institution; an addendum will follow.         ADOLPH/ADOLPH       EGD 4/18/2024--FINAL DIAGNOSIS:        A. Stomach, body polyp, biopsy:      - Gastric hyperplastic polyp with erosion.      - Negative for dysplasia or malignancy.        B. Stomach, antral mass, biopsy:      - Gastric mucosa with at least high grade dysplasia (diffuse)- See        Comment.        C. Esophagus, biopsy:      - Squamous mucosa with mild chronic inflammation and reactive change.      - No diagnostic features of eosinophilic esophagitis.      - Negative for intestinal metaplasia (goblet cells) or dysplasia.     COMMENT: Part B:  Adenocarcinoma cannot be excluded. The biopsy is superficial/ fragmented and may not present the entire mass lesion;   clinical correlation is required. Background intestinal metaplasia is   present.     OR 4/22/2024--Pending    Imaging:  I have personally reviewed the following films:    FL UGI    Result Date: 4/25/2024  EXAMINATION: SINGLE CONTRAST UPPER GI SERIES 4/25/2024

## 2024-04-28 NOTE — PROGRESS NOTES
Shift assessment complete. VSS. Patient resting in bed on high fowlers position. Respirations even and labored on room air. Call light within reach. Fall precautions in place. Bed in low, locked position. No additional needs noted at this time.

## 2024-04-28 NOTE — PROGRESS NOTES
Hospitalist Progress Note      PCP: Abiodun Amanda PA    Date of Admission: 4/15/2024    Chief Complaint: Hiatal hernia    Hospital Course: 73 y.o. female with a history of hiatal hernia, obesity, GERD who presented by request of GI Dr Marin after c/o dyspnea and intractable abdominal pain. Has been unable to tolerate po and patient and daughter think weight loss of unknown amount. Abdominal pain present over the past month. GI sent her to ED for abdominal imaging.   Admitted as inpatient.  Followed by Cardio, GI and Surgery.      SPECT on 4/16:     **Myocardial perfusion is normal. No reversible ischemia. Low risk study.**     EGD on 4/18:  large hh, polyp removed, esoph dilated, mid body ?torsion vs level of diaphragm, antrum ? Mass biopsied.     On 4/22/24 underwent:  LAPAROSCOPIC HERNIA HIATAL REPAIR  LAPAROSCOPIC CHOLECYSTECTOMY  HEMIGASTRECTOMY WITH GASTROJEJUNOSTOMY    PICC placed.     Had NSVT.     Complaining of BL UE swelling.  BL UE Doppler US and CTA Chest requested.    Subjective: Patient denies flatus or BM.  Says she has BL UE edema and some pain with breathing.  NGT in place.  Still has expected abdominal pain.  No CP, SOB, HA or fevers.       Medications:  Reviewed    Infusion Medications    dextrose 5% and 0.45% NaCl with KCl 20 mEq 75 mL/hr at 04/27/24 1622    sodium chloride      dextrose      sodium chloride 100 mL/hr at 04/28/24 1046     Scheduled Medications    potassium phosphate IVPB (CENTRAL LINE)  20 mmol IntraVENous Once    potassium chloride  10 mEq IntraVENous Q1H    lidocaine 1 % injection  5 mL IntraDERmal Once    sodium chloride flush  5-40 mL IntraVENous 2 times per day    pantoprazole  40 mg IntraVENous Daily    enoxaparin  40 mg SubCUTAneous Daily    insulin lispro  0-4 Units SubCUTAneous TID     insulin lispro  0-4 Units SubCUTAneous Nightly    montelukast  10 mg Oral Nightly    Virt-Caps  1 capsule Oral Daily    sodium chloride flush  5-40 mL IntraVENous 2 times

## 2024-04-29 PROBLEM — E44.1 MILD MALNUTRITION (HCC): Chronic | Status: ACTIVE | Noted: 2024-04-29

## 2024-04-29 PROBLEM — I44.7 LBBB (LEFT BUNDLE BRANCH BLOCK): Status: ACTIVE | Noted: 2024-04-29

## 2024-04-29 PROBLEM — I47.10 SVT (SUPRAVENTRICULAR TACHYCARDIA) (HCC): Status: ACTIVE | Noted: 2024-04-29

## 2024-04-29 LAB
ANION GAP SERPL CALCULATED.3IONS-SCNC: 10 MMOL/L (ref 3–16)
BASOPHILS # BLD: 0 K/UL (ref 0–0.2)
BASOPHILS NFR BLD: 0 %
BUN SERPL-MCNC: 5 MG/DL (ref 7–20)
CALCIUM SERPL-MCNC: 8.2 MG/DL (ref 8.3–10.6)
CHLORIDE SERPL-SCNC: 108 MMOL/L (ref 99–110)
CO2 SERPL-SCNC: 24 MMOL/L (ref 21–32)
CREAT SERPL-MCNC: 0.5 MG/DL (ref 0.6–1.2)
DEPRECATED RDW RBC AUTO: 15.7 % (ref 12.4–15.4)
EKG ATRIAL RATE: 107 BPM
EKG DIAGNOSIS: NORMAL
EKG P AXIS: 41 DEGREES
EKG P-R INTERVAL: 132 MS
EKG Q-T INTERVAL: 314 MS
EKG QRS DURATION: 78 MS
EKG QTC CALCULATION (BAZETT): 419 MS
EKG R AXIS: -8 DEGREES
EKG T AXIS: 66 DEGREES
EKG VENTRICULAR RATE: 107 BPM
EOSINOPHIL # BLD: 0.1 K/UL (ref 0–0.6)
EOSINOPHIL NFR BLD: 1 %
GFR SERPLBLD CREATININE-BSD FMLA CKD-EPI: >90 ML/MIN/{1.73_M2}
GLUCOSE BLD-MCNC: 121 MG/DL (ref 70–99)
GLUCOSE BLD-MCNC: 125 MG/DL (ref 70–99)
GLUCOSE BLD-MCNC: 130 MG/DL (ref 70–99)
GLUCOSE BLD-MCNC: 155 MG/DL (ref 70–99)
GLUCOSE SERPL-MCNC: 145 MG/DL (ref 70–99)
HCT VFR BLD AUTO: 29.7 % (ref 36–48)
HGB BLD-MCNC: 9.6 G/DL (ref 12–16)
LYMPHOCYTES # BLD: 0.9 K/UL (ref 1–5.1)
LYMPHOCYTES NFR BLD: 8 %
MAGNESIUM SERPL-MCNC: 1.9 MG/DL (ref 1.8–2.4)
MCH RBC QN AUTO: 27.6 PG (ref 26–34)
MCHC RBC AUTO-ENTMCNC: 32.2 G/DL (ref 31–36)
MCV RBC AUTO: 85.8 FL (ref 80–100)
MONOCYTES # BLD: 1.1 K/UL (ref 0–1.3)
MONOCYTES NFR BLD: 10 %
MYELOCYTES NFR BLD MANUAL: 1 %
NEUTROPHILS # BLD: 9.1 K/UL (ref 1.7–7.7)
NEUTROPHILS NFR BLD: 80 %
PERFORMED ON: ABNORMAL
PHOSPHATE SERPL-MCNC: 2.5 MG/DL (ref 2.5–4.9)
PLATELET # BLD AUTO: 240 K/UL (ref 135–450)
PLATELET BLD QL SMEAR: ADEQUATE
PMV BLD AUTO: 7.4 FL (ref 5–10.5)
POTASSIUM SERPL-SCNC: 3.9 MMOL/L (ref 3.5–5.1)
RBC # BLD AUTO: 3.46 M/UL (ref 4–5.2)
RBC MORPH BLD: NORMAL
SLIDE REVIEW: ABNORMAL
SODIUM SERPL-SCNC: 142 MMOL/L (ref 136–145)
WBC # BLD AUTO: 11.2 K/UL (ref 4–11)

## 2024-04-29 PROCEDURE — 94760 N-INVAS EAR/PLS OXIMETRY 1: CPT

## 2024-04-29 PROCEDURE — 6360000002 HC RX W HCPCS: Performed by: INTERNAL MEDICINE

## 2024-04-29 PROCEDURE — 97530 THERAPEUTIC ACTIVITIES: CPT

## 2024-04-29 PROCEDURE — 83735 ASSAY OF MAGNESIUM: CPT

## 2024-04-29 PROCEDURE — 93005 ELECTROCARDIOGRAM TRACING: CPT | Performed by: INTERNAL MEDICINE

## 2024-04-29 PROCEDURE — 2580000003 HC RX 258: Performed by: NURSE PRACTITIONER

## 2024-04-29 PROCEDURE — 2500000003 HC RX 250 WO HCPCS: Performed by: SURGERY

## 2024-04-29 PROCEDURE — 84100 ASSAY OF PHOSPHORUS: CPT

## 2024-04-29 PROCEDURE — 2580000003 HC RX 258: Performed by: SURGERY

## 2024-04-29 PROCEDURE — 97116 GAIT TRAINING THERAPY: CPT

## 2024-04-29 PROCEDURE — 99223 1ST HOSP IP/OBS HIGH 75: CPT | Performed by: INTERNAL MEDICINE

## 2024-04-29 PROCEDURE — 80048 BASIC METABOLIC PNL TOTAL CA: CPT

## 2024-04-29 PROCEDURE — 6370000000 HC RX 637 (ALT 250 FOR IP): Performed by: SURGERY

## 2024-04-29 PROCEDURE — 85025 COMPLETE CBC W/AUTO DIFF WBC: CPT

## 2024-04-29 PROCEDURE — 6370000000 HC RX 637 (ALT 250 FOR IP): Performed by: INTERNAL MEDICINE

## 2024-04-29 PROCEDURE — 97535 SELF CARE MNGMENT TRAINING: CPT

## 2024-04-29 PROCEDURE — C9113 INJ PANTOPRAZOLE SODIUM, VIA: HCPCS | Performed by: NURSE PRACTITIONER

## 2024-04-29 PROCEDURE — 6360000002 HC RX W HCPCS: Performed by: NURSE PRACTITIONER

## 2024-04-29 PROCEDURE — 6360000002 HC RX W HCPCS: Performed by: SURGERY

## 2024-04-29 PROCEDURE — APPSS15 APP SPLIT SHARED TIME 0-15 MINUTES: Performed by: NURSE PRACTITIONER

## 2024-04-29 PROCEDURE — 93970 EXTREMITY STUDY: CPT

## 2024-04-29 PROCEDURE — APPNB30 APP NON BILLABLE TIME 0-30 MINS: Performed by: NURSE PRACTITIONER

## 2024-04-29 PROCEDURE — 93010 ELECTROCARDIOGRAM REPORT: CPT | Performed by: INTERNAL MEDICINE

## 2024-04-29 PROCEDURE — 1200000000 HC SEMI PRIVATE

## 2024-04-29 RX ADMIN — MORPHINE SULFATE 4 MG: 4 INJECTION, SOLUTION INTRAMUSCULAR; INTRAVENOUS at 13:56

## 2024-04-29 RX ADMIN — Medication 10 ML: at 13:56

## 2024-04-29 RX ADMIN — MORPHINE SULFATE 4 MG: 4 INJECTION, SOLUTION INTRAMUSCULAR; INTRAVENOUS at 04:30

## 2024-04-29 RX ADMIN — MORPHINE SULFATE 4 MG: 4 INJECTION, SOLUTION INTRAMUSCULAR; INTRAVENOUS at 08:31

## 2024-04-29 RX ADMIN — ENOXAPARIN SODIUM 40 MG: 100 INJECTION SUBCUTANEOUS at 08:27

## 2024-04-29 RX ADMIN — POTASSIUM CHLORIDE, DEXTROSE MONOHYDRATE AND SODIUM CHLORIDE: 150; 5; 450 INJECTION, SOLUTION INTRAVENOUS at 16:10

## 2024-04-29 RX ADMIN — FUROSEMIDE 40 MG: 10 INJECTION, SOLUTION INTRAMUSCULAR; INTRAVENOUS at 10:04

## 2024-04-29 RX ADMIN — MORPHINE SULFATE 4 MG: 4 INJECTION, SOLUTION INTRAMUSCULAR; INTRAVENOUS at 00:44

## 2024-04-29 RX ADMIN — PANTOPRAZOLE SODIUM 40 MG: 40 INJECTION, POWDER, FOR SOLUTION INTRAVENOUS at 10:04

## 2024-04-29 RX ADMIN — METOPROLOL TARTRATE 25 MG: 25 TABLET, FILM COATED ORAL at 16:14

## 2024-04-29 RX ADMIN — MORPHINE SULFATE 4 MG: 4 INJECTION, SOLUTION INTRAMUSCULAR; INTRAVENOUS at 18:42

## 2024-04-29 RX ADMIN — LORAZEPAM 0.5 MG: 0.5 TABLET ORAL at 22:02

## 2024-04-29 RX ADMIN — OXYCODONE HYDROCHLORIDE 5 MG: 5 TABLET ORAL at 22:02

## 2024-04-29 RX ADMIN — Medication 10 ML: at 08:35

## 2024-04-29 RX ADMIN — FUROSEMIDE 40 MG: 10 INJECTION, SOLUTION INTRAMUSCULAR; INTRAVENOUS at 16:14

## 2024-04-29 RX ADMIN — METOPROLOL TARTRATE 25 MG: 25 TABLET, FILM COATED ORAL at 22:02

## 2024-04-29 RX ADMIN — SODIUM CHLORIDE, PRESERVATIVE FREE 10 ML: 5 INJECTION INTRAVENOUS at 18:42

## 2024-04-29 ASSESSMENT — PAIN SCALES - GENERAL
PAINLEVEL_OUTOF10: 7
PAINLEVEL_OUTOF10: 8
PAINLEVEL_OUTOF10: 7

## 2024-04-29 ASSESSMENT — PAIN DESCRIPTION - LOCATION
LOCATION: ABDOMEN
LOCATION: ABDOMEN

## 2024-04-29 ASSESSMENT — PAIN DESCRIPTION - DESCRIPTORS
DESCRIPTORS: ACHING
DESCRIPTORS: ACHING

## 2024-04-29 NOTE — CONSULTS
[facility]   Electrophysiology Consultation   Date: 4/29/2024  Reason for Consultation: PAT  Consult Requesting Physician: Jericho Rosa MD     Chief Complaint   Patient presents with    Abdominal Pain     Pt here with daughter, per daughter pt was seen at GI doctor today (Dr. Marin) and told pt to go to ER for direct admit for hiatal hernia, also per daughter, states that \"the top of her stomach is twisted\" pt aslo c/o SOB      HPI: Sonja Mcmahan is a 73 y.o. female presented to hospital with c/o abdominal pain and dyspnea. She had been unable to tolerate PO intake. She was sent to emergency room by her GI doctor for evaluation.      EGD on 4/18/2024- large hiatal hernia  4/22/2024- laparoscopic hiatal hernia repair- NGT in place      Cardiology was consulted today for wide-complex tachycardia- Pt was in bed at the time, she did not have any symptoms. She came out of it on her own.   Per chart review- patient had wide-complex tachycardia. CTA of chest ruled out PE. Dopplers of bilateral upper extremities were negative for DVT.       All of the episodes of wide-complex tachycardia seem to be sinus tachycardia or  atrial tachycardia with left bundle branch block.      Past medical history: hiatal hernia, DM, HLD, HTN and obesity.       Past Medical History:   Diagnosis Date    Asthma     Diabetes mellitus (HCC)     Hyperlipidemia     Hypertension         Past Surgical History:   Procedure Laterality Date    CHOLECYSTECTOMY, LAPAROSCOPIC N/A 4/22/2024    LAPAROSCOPIC CHOLECYSTECTOMY performed by David Mack MD at [facility] OR    ELBOW SURGERY      GASTRECTOMY N/A 4/22/2024    HEMIGASTRECTOMY WITH RETROCOLIC RETROGASTIC GASTROJEJUNOSTOMY performed by David Mack MD at [facility] OR    HIATAL HERNIA REPAIR N/A 4/22/2024    LAPAROSCOPIC HERNIA HIATAL REPAIR performed by David Mack MD at [facility] OR    HYSTERECTOMY, TOTAL ABDOMINAL (CERVIX REMOVED)      UPPER GASTROINTESTINAL  5 mL IntraDERmal Once    sodium chloride flush  5-40 mL IntraVENous 2 times per day    pantoprazole  40 mg IntraVENous Daily    enoxaparin  40 mg SubCUTAneous Daily    insulin lispro  0-4 Units SubCUTAneous TID WC    insulin lispro  0-4 Units SubCUTAneous Nightly    montelukast  10 mg Oral Nightly    Virt-Caps  1 capsule Oral Daily    sodium chloride flush  5-40 mL IntraVENous 2 times per day    atorvastatin  20 mg Oral Nightly    lisinopril  10 mg Oral Nightly    And    hydroCHLOROthiazide  12.5 mg Oral Daily     Continuous Infusions:   dextrose 5% and 0.45% NaCl with KCl 20 mEq 75 mL/hr at 04/28/24 2131    sodium chloride      dextrose      sodium chloride 25 mL (04/28/24 1512)     PRN Meds:.bisacodyl, sodium chloride flush, sodium chloride, sodium phosphate 15 mmol in sodium chloride 0.9 % 250 mL IVPB, metoprolol, hydrALAZINE, LORazepam, guaiFENesin-dextromethorphan, morphine **OR** morphine, dextrose bolus **OR** dextrose bolus, glucagon (rDNA), dextrose, LORazepam, sodium chloride flush, sodium chloride, potassium chloride **OR** potassium alternative oral replacement **OR** potassium chloride, magnesium sulfate, ondansetron **OR** ondansetron, polyethylene glycol, acetaminophen **OR** acetaminophen, oxyCODONE     Patient Active Problem List    Diagnosis Date Noted    Heart murmur, systolic 06/28/2022    Mild malnutrition (HCC) 04/29/2024    Calculus of gallbladder with chronic cholecystitis without obstruction 04/22/2024    Gastric mass 04/22/2024    Gall stones 04/18/2024    Intractable abdominal pain 04/16/2024    Hiatal hernia 04/15/2024    Obesity 04/15/2024    Diverticulosis 04/15/2024    History of hysterectomy 04/15/2024    Hyperlipidemia 02/07/2022    Gastroesophageal reflux disease with esophagitis 09/12/2016    Essential hypertension 04/07/2016      Active Hospital Problems    Diagnosis Date Noted    Mild malnutrition (HCC) [E44.1] 04/29/2024    Calculus of gallbladder with chronic cholecystitis

## 2024-04-29 NOTE — PROGRESS NOTES
Hospitalist Progress Note      PCP: Abiodun Amanda PA    Date of Admission: 4/15/2024    Chief Complaint: Hiatal hernia    Hospital Course: 73 y.o. female with a history of hiatal hernia, obesity, GERD who presented by request of GI Dr Marin after c/o dyspnea and intractable abdominal pain. Has been unable to tolerate po and patient and daughter think weight loss of unknown amount. Abdominal pain present over the past month. GI sent her to ED for abdominal imaging.   Admitted as inpatient.  Followed by Cardio, GI and Surgery.      SPECT on 4/16:     **Myocardial perfusion is normal. No reversible ischemia. Low risk study.**     EGD on 4/18:  large hh, polyp removed, esoph dilated, mid body ?torsion vs level of diaphragm, antrum ? Mass biopsied.     On 4/22/24 underwent:  LAPAROSCOPIC HERNIA HIATAL REPAIR  LAPAROSCOPIC CHOLECYSTECTOMY  HEMIGASTRECTOMY WITH GASTROJEJUNOSTOMY    PICC placed.     Had NSVT.     Complaining of BL UE swelling.  BL UE Doppler US negative for DVT and CTA Chest negative for PE.    Started on IVF for fluid overload.    Subjective: Patient with flatus, no BM.  Wants NGT out. +718 mL since admission. Tolerable abdominal pain.  No CP, SOB, HA or fevers.       Medications:  Reviewed    Infusion Medications    dextrose 5% and 0.45% NaCl with KCl 20 mEq 75 mL/hr at 04/28/24 2131    sodium chloride      dextrose      sodium chloride 25 mL (04/28/24 1512)     Scheduled Medications    metoprolol tartrate  25 mg Oral BID    furosemide  40 mg IntraVENous BID    lidocaine 1 % injection  5 mL IntraDERmal Once    sodium chloride flush  5-40 mL IntraVENous 2 times per day    pantoprazole  40 mg IntraVENous Daily    enoxaparin  40 mg SubCUTAneous Daily    insulin lispro  0-4 Units SubCUTAneous TID     insulin lispro  0-4 Units SubCUTAneous Nightly    montelukast  10 mg Oral Nightly    Virt-Caps  1 capsule Oral Daily    sodium chloride flush  5-40 mL IntraVENous 2 times per day    atorvastatin

## 2024-04-29 NOTE — PROGRESS NOTES
[facility] - Inpatient Rehabilitation Department   Phone: [phone]    Physical Therapy    [] Initial Evaluation            [x] Daily Treatment Note         [] Discharge Summary      Patient: Sonja Mcmahan   : 1950   MRN: [id number]   Date of Service:  2024  Admitting Diagnosis: Hiatal hernia  Current Admission Summary:  Admitted for abdominal pain on 4/15. Imaging indicative of hiatal hernia and cholelithiasis. EGD on  showed polyps with hiatal hernia. Esophagus dilated at that time. To OR  for below:  1. Laparoscopic hiatal hernia repair with primary closure and [place] [employer]-A mesh reinforcement.  2. Laparoscopic cholecystectomy.  3. Open hemigastrectomy with gastrojejunostomy and closure of duodenal stump and overlying omental flap for duodenal stump reinforcement.  Past Medical History:  has a past medical history of Asthma, Diabetes mellitus (HCC), Hyperlipidemia, and Hypertension.  Past Surgical History:  has a past surgical history that includes Hysterectomy, total abdominal; Elbow surgery; Upper gastrointestinal endoscopy (N/A, 2024); Upper gastrointestinal endoscopy (N/A, 2024); Upper gastrointestinal endoscopy (N/A, 2024); hiatal hernia repair (N/A, 2024); Cholecystectomy, laparoscopic (N/A, 2024); and gastrectomy (N/A, 2024).    Discharge Recommendations: Sonja Mcmahan scored a 17/24 on the AM-PAC short mobility form. Current research shows that an AM-PAC score of 18 or greater is typically associated with a discharge to the patient's home setting. Based on the patient's AM-PAC score and their current functional mobility deficits, it is recommended that the patient have 2-3 sessions per week of Physical Therapy at d/c to increase the patient's independence.  At this time, this patient demonstrates the endurance and safety to discharge home with initial  assist and home PT and a follow up treatment frequency of 2-3x/wk.  Please see  none  Patient Education: patient educated on goals, PT role and benefits, plan of care, general safety, functional mobility training, transfer training  Learning Assessment:  patient verbalizes understanding, would benefit from continued reinforcement    Assessment  Activity Tolerance: Pt fatigued following ambulation to and from bathroom. Pt reported dizziness when ambulating but did not want a seated rest break.   Impairments Requiring Therapeutic Intervention: decreased functional mobility, decreased ADL status, decreased strength, decreased endurance, decreased balance, increased pain, decreased posture  Prognosis: good  Clinical Assessment: The patient is a 72 yo female admitted to [facility] for abdominal pain, now seen s/p hernia repair and lap antonio on 4/22. The patient is independent at baseline but currently presents with decreased balance, endurance, and strength following the above procedures. The patient requires CGA for transfers and is limited by fatigue. Pt does better with encouragement and reassurance of limited activity during session. She has great support from family but would benefit from additional skilled PT to safely progress tolerance to activity and independence with functional mobility back to baseline. Given the patient's independence even just this weekend prior to surgery, per nursing, she is anticipated to progress steadily.   Safety Interventions: patient left in bed, bed alarm in place, call light within reach, patient at risk for falls, nurse notified, and family/caregiver present    Plan  Frequency: 3-5 x/per week  Current Treatment Recommendations: strengthening, balance training, functional mobility training, transfer training, gait training, stair training, endurance training, patient/caregiver education, pain management, home exercise program, safety education, equipment evaluation/education, and positioning    Goals  Patient Goals: Get home in a few days   Short Term Goals:  Time

## 2024-04-29 NOTE — PROGRESS NOTES
Shift assessment complete. VSS. Schedule meds given per MAR. Patient resting in bed. Respirations even and unlabored. Call light within reach.Bed in low, locked position. No additional needs noted at this time.

## 2024-04-29 NOTE — PLAN OF CARE
Problem: Pain  Goal: Verbalizes/displays adequate comfort level or baseline comfort level  4/29/2024 0135 by Kike Mejia RN  Outcome: Progressing     Problem: Chronic Conditions and Co-morbidities  Goal: Patient's chronic conditions and co-morbidity symptoms are monitored and maintained or improved  4/29/2024 0135 by Kike Mejia RN  Outcome: Progressing     Problem: Nutrition Deficit:  Goal: Optimize nutritional status  4/29/2024 0135 by Kike Mejia RN  Outcome: Progressing     Problem: Discharge Planning  Goal: Discharge to home or other facility with appropriate resources  4/29/2024 0135 by Kike Mejia RN  Outcome: Progressing     Problem: Safety - Adult  Goal: Free from fall injury  4/29/2024 0135 by Kike Mejia RN  Outcome: Progressing     Problem: Cardiovascular - Adult  Goal: Maintains optimal cardiac output and hemodynamic stability  4/29/2024 0135 by Kike Mejia RN  Outcome: Progressing     Problem: Skin/Tissue Integrity - Adult  Goal: Incisions, wounds, or drain sites healing without S/S of infection  4/29/2024 0135 by Kike Mejia RN  Outcome: Progressing     Problem: Musculoskeletal - Adult  Goal: Return mobility to safest level of function  4/29/2024 0135 by Kike Mejia RN  Outcome: Progressing  Goal: Return ADL status to a safe level of function  4/29/2024 0135 by Kike Mejia RN  Outcome: Progressing       Problem: Gastrointestinal - Adult  Goal: Minimal or absence of nausea and vomiting  4/29/2024 0135 by Kike Mejia RN  Outcome: Progressing  Goal: Maintains or returns to baseline bowel function  4/29/2024 0135 by Kike Mejia RN  Outcome: Progressing  Goal: Maintains adequate nutritional intake  4/29/2024 0135 by Kike Mejia RN  Outcome: Progressing     Problem: Respiratory - Adult  Goal: Achieves optimal ventilation and oxygenation  4/29/2024 0135 by Kike Mejia RN  Outcome: Progressing

## 2024-04-29 NOTE — PROGRESS NOTES
Shift assessment completed. Routine vitals obtained. Patient c/o pain, 7/10. PRN morphine administered along with other scheduled medications given. Patient is awake, alert and oriented. Patient is currently resting in bed, NG tube hooked up to low intermittent suction. Call light within reach. Fall precaution in place, bed alarm engaged, bed locked and in low position. No further needs expressed.

## 2024-04-29 NOTE — PROGRESS NOTES
[facility]   Electrophysiology Nurse Practitioner  Pre-Consult Rounding    Date: 4/29/2024  Date of admission: 4/15/2024  5:23 PM  Reason for Admission: Hiatal hernia [K44.9]  Gall stones [K80.20]  Bacteriuria [R82.71]  Intractable abdominal pain [R10.9]  Consult Requesting Physician: Jericho Rosa MD   Reason for Consult: SVT     Sonja Mcmahan is a 73 y.o. female presented to hospital with c/o abdominal pain and dyspnea. She had been unable to tolerate PO intake. She was sent to emergency room by her GI doctor for evaluation.     EGD on 4/18/2024- large hiatal hernia  4/22/2024- laparoscopic hiatal hernia repair- NGT in place      Cardiology was consulted today for NSVT- Pt was in bed at the time, she did not have any symptoms. She came out of it on her own.   Per chart review- patient had NSVT on 4/28. CTA of chest ruled out PE. Dopplers of bilateral upper extremities were negative for DVT.    Past medical history: hiatal hernia, DM, HLD, HTN and obesity.       Na-142  K-3.9  Mag-1.9  BUN-5  Creat-0.5  WBC-11.2  H&H-9.6 & 29.7  Plat- 240      Telemetry: Sinus tachycardia with PVCs    ECG: Sinus tachycardia with PVCs    Stress test:4/16/2024   Normal, no ischemia     ECHO:  4/17/2024  EF 55-60%  Grade II diastolic dysfunction  Mild AS  Mild TR  Thickened aortic valve     CTA Chest-4/28/2024  -Bilateral pleural effusions  -1.6 cm left thyroid nodule       Lab Results   Component Value Date    LVEF 60 06/27/2023     No results found for: \"TSHFT4\", \"TSH\"    Plan:     Already has PRN replacement orders for electrolytes- monitor   Consider monitor at discharge if episodes continue   Added metoprolol tartrate 25mg BID   Can consider decreasing Lisinopril if she becomes hypotensive- will reevaluate tomorrow     Will discuss the plan for EP attending. Full consult note to follow.     Dayanara Montgomery, APRN - CNP  [facility] [place]  Office: [phone]

## 2024-04-29 NOTE — PROGRESS NOTES
NG tube has been clamped for 4 hours. Patient tolerated well, reports no nausea. Restarted low intermittent suction via NG tube per order. Assisted patient to bathroom and back to bed. Voided. Call light within reach. Bed alarm engaged, bed locked and in low position, fall precautions in place. No further needs expressed.

## 2024-04-29 NOTE — PROGRESS NOTES
treated and controlled arterial     hypertension. Velocities are measured in cm/s ; Diameters are measured in mm Right UE Vein Measurements 2D Measurements +--------+----------+---------------+----------+ !Location!Visualized!Compressibility!Thrombosis! +--------+----------+---------------+----------+ !IJV     !Yes       !Yes            !None      ! +--------+----------+---------------+----------+ !SCV     !Yes       !Yes            !None      ! +--------+----------+---------------+----------+ !Axillary!Yes       !Yes            !None      ! +--------+----------+---------------+----------+ !Brachial!Yes       !Yes            !None      ! +--------+----------+---------------+----------+ !Radial  !Yes       !Yes            !None      ! +--------+----------+---------------+----------+ !Ulnar   !Yes       !Yes            !None      ! +--------+----------+---------------+----------+ !Basilic !Yes       !Yes            !None      ! +--------+----------+---------------+----------+ !Cephalic!Yes       !Yes            !None      ! +--------+----------+---------------+----------+ Doppler Measurements +--------+-----------+------+ !Location!Signal     !Reflux! +--------+-----------+------+ !IJV     !Pulsatile  !      ! +--------+-----------+------+ !SCV     !Pulsatile  !      ! +--------+-----------+------+ !Axillary!Spontaneous!      ! +--------+-----------+------+ Left UE Vein Measurements 2D Measurements +--------+----------+---------------+----------+ !Location!Visualized!Compressibility!Thrombosis! +--------+----------+---------------+----------+ !IJV     !Yes       !Yes            !None      ! +--------+----------+---------------+----------+ !SCV     !Yes       !Yes            !None      ! +--------+----------+---------------+----------+ !Axillary!Yes       !Yes            !None      ! +--------+----------+---------------+----------+ !Brachial!Yes       !Yes            !None      !  worsening bibasilar atelectasis.  No change in the bibasilar scarring. Upper Abdomen: Beam hardening artifact degrades image quality.  Status post recent partial gastrectomy, hiatal hernia repair and cholecystectomy.  The nasogastric tube is incompletely imaged. A small amount of pneumoperitoneum and moderate inflammatory stranding in the epigastric region is likely related to postsurgical changes, significantly degraded by the beam hardening artifact.  There is decreased enhancement involving the medial spleen which could be due the timing of the contrast bolus. Soft Tissues/Bones: Degenerative changes involve the thoracic spine and left shoulder.  The bones are demineralized.     1. Small bilateral pleural effusions. 2. 1.6 cm left thyroid nodule.  Recommend outpatient thyroid ultrasound for further evaluation. 3. Small amount of pneumoperitoneum and moderate amount of inflammatory stranding in the epigastric region is likely related to recent postsurgical changes      Scheduled Meds:   metoprolol tartrate  25 mg Oral BID    furosemide  40 mg IntraVENous BID    lidocaine 1 % injection  5 mL IntraDERmal Once    sodium chloride flush  5-40 mL IntraVENous 2 times per day    pantoprazole  40 mg IntraVENous Daily    enoxaparin  40 mg SubCUTAneous Daily    insulin lispro  0-4 Units SubCUTAneous TID WC    insulin lispro  0-4 Units SubCUTAneous Nightly    montelukast  10 mg Oral Nightly    Virt-Caps  1 capsule Oral Daily    sodium chloride flush  5-40 mL IntraVENous 2 times per day    atorvastatin  20 mg Oral Nightly    lisinopril  10 mg Oral Nightly    And    hydroCHLOROthiazide  12.5 mg Oral Daily     Continuous Infusions:   dextrose 5% and 0.45% NaCl with KCl 20 mEq 75 mL/hr at 04/28/24 2131    sodium chloride      dextrose      sodium chloride 25 mL (04/28/24 1512)     PRN Meds:.bisacodyl, sodium chloride flush, sodium chloride, sodium phosphate 15 mmol in sodium chloride 0.9 % 250 mL IVPB, metoprolol, hydrALAZINE,

## 2024-04-29 NOTE — PROGRESS NOTES
Nutrition Note    RECOMMENDATIONS  PO Diet: Diet per general surgery  Nutrition Support:   Pt has been identified as at risk for refeeding syndrome per ASPEN guidelines. The following is recommended to reduce the risk of refeeding syndrome:  Check potassium, magnesium, and phosphorus prior to initiating nutrition and daily x 3 days following initiation of nutrition support.   Administer 100 mg thiamine prior to initiating nutrition support and continue daily for at least 5-7 days.   Pt's receiving PN should have an MVI added to their PN daily.    Recommend discontinue D51/2NS at 75 mL/hr prior to initiation of TPN  Recommend Bag 1: Clinimix 5/20 starting at 40 mL/hr  Recommend 250 mL 20% lipids 2 times per week     ASSESSMENT   Pt triggered for follow-up. Continues NPO now x 7 days. NG tube remains in place for decompression. If NG tube is unable to be removed and pt is not able to be started on a diet today, recommend initiation of TPN. RD will place TPN recommendations in chart and continue to monitor.     Malnutrition Status: Mild malnutrition  Acute Illness  Findings of the 6 clinical characteristics of malnutrition:  Energy Intake:  50% or less of estimated energy requirements for 5 or more days  Weight Loss:  No significant weight loss     Body Fat Loss:  No significant body fat loss     Muscle Mass Loss:  No significant muscle mass loss    Fluid Accumulation:  Mild Extremities   Strength:  Not Performed    NUTRITION DIAGNOSIS   Inadequate protein-energy intake related to altered GI function as evidenced by NPO or clear liquid status due to medical condition    Goals: Initiate PO diet, Initiate nutrition support, by next RD assessment     NUTRITION RELATED FINDINGS  Objective: D51/2NS at 75 mL/hr. Labs reviewed. LBM 4/27. Non-pitting generalized, BLE; +1 BUE edema.  Wounds: Surgical Incision    CURRENT NUTRITION THERAPIES  Diet NPO Exceptions are: Sips of Water with Meds, Ice Chips     PO Intake: NPO   PO

## 2024-04-29 NOTE — PROGRESS NOTES
[facility] - Inpatient Rehabilitation Department   Phone: [phone]    Occupational Therapy    [] Initial Evaluation            [x] Daily Treatment Note         [] Discharge Summary      Patient: Sonja Mcmahan   : 1950   MRN: [id number]   Date of Service:  2024    Admitting Diagnosis:  Hiatal hernia  Current Admission Summary: Admitted for abdominal pain. Imaging indicative of hiatal hernia and cholelithiasis. EGD on  showed polyps with hiatal hernia. Esophagus dilated at that time. To OR  for below:  1. Laparoscopic hiatal hernia repair with primary closure and [place] Bio-A mesh reinforcement.  2. Laparoscopic cholecystectomy.  3. Open hemigastrectomy with gastrojejunostomy and closure of duodenal stump and overlying omental flap for duodenal stump reinforcement.  Past Medical History:  has a past medical history of Asthma, Diabetes mellitus (HCC), Hyperlipidemia, and Hypertension.  Past Surgical History:  has a past surgical history that includes Hysterectomy, total abdominal; Elbow surgery; Upper gastrointestinal endoscopy (N/A, 2024); Upper gastrointestinal endoscopy (N/A, 2024); Upper gastrointestinal endoscopy (N/A, 2024); hiatal hernia repair (N/A, 2024); Cholecystectomy, laparoscopic (N/A, 2024); and gastrectomy (N/A, 2024).    Discharge Recommendations: Snoja Mcmahan scored a 17/24 on the AM-PAC ADL Inpatient form. Current research shows that an AM-PAC score of 18 or greater is typically associated with a discharge to the patient's home setting. Based on the patient's AM-PAC score, and their current ADL deficits, it is recommended that the patient have 2-3 sessions per week of Occupational Therapy at d/c to increase the patient's independence.  At this time, this patient demonstrates the endurance and safety to discharge home with home services (home vs OP services) and a follow up treatment frequency of 2-3x/wk.   Please see assessment  complete grooming at Independent   Patient will complete functional transfers at modified independent   Patient will complete functional mobility at modified independent     Above goals reviewed on 4/29/2024.  All goals are ongoing at this time unless indicated above.       Therapy Session Time     Individual Group Co-treatment   Time In 1130     Time Out 1145     Minutes 15          Timed Code Treatment Minutes:  Timed Code Treatment Minutes: 15 Minutes  Total Treatment Minutes:  15 minutes        Electronically Signed By: LALITO Gill MOT OTR/L [id number]

## 2024-04-29 NOTE — PROGRESS NOTES
Occupational Therapy  Sonja Mcmahan     Attempted to see patient for OT treatment. Patient currently off the floor.     Will attempt to see patient when she returns.     Thanks,   Carolynn Olguin, MOT OTR/L [id number]

## 2024-04-30 ENCOUNTER — APPOINTMENT (OUTPATIENT)
Dept: GENERAL RADIOLOGY | Age: 74
DRG: 326 | End: 2024-04-30
Payer: MEDICARE

## 2024-04-30 PROBLEM — I47.19 PAT (PAROXYSMAL ATRIAL TACHYCARDIA) (HCC): Status: ACTIVE | Noted: 2024-04-30

## 2024-04-30 LAB
ANION GAP SERPL CALCULATED.3IONS-SCNC: 8 MMOL/L (ref 3–16)
BASOPHILS # BLD: 0 K/UL (ref 0–0.2)
BASOPHILS NFR BLD: 0.2 %
BUN SERPL-MCNC: 6 MG/DL (ref 7–20)
CALCIUM SERPL-MCNC: 8 MG/DL (ref 8.3–10.6)
CHLORIDE SERPL-SCNC: 103 MMOL/L (ref 99–110)
CO2 SERPL-SCNC: 27 MMOL/L (ref 21–32)
CREAT SERPL-MCNC: 0.6 MG/DL (ref 0.6–1.2)
DEPRECATED RDW RBC AUTO: 15.3 % (ref 12.4–15.4)
EOSINOPHIL # BLD: 0.1 K/UL (ref 0–0.6)
EOSINOPHIL NFR BLD: 0.7 %
GFR SERPLBLD CREATININE-BSD FMLA CKD-EPI: >90 ML/MIN/{1.73_M2}
GLUCOSE BLD-MCNC: 119 MG/DL (ref 70–99)
GLUCOSE BLD-MCNC: 124 MG/DL (ref 70–99)
GLUCOSE BLD-MCNC: 150 MG/DL (ref 70–99)
GLUCOSE BLD-MCNC: 93 MG/DL (ref 70–99)
GLUCOSE SERPL-MCNC: 152 MG/DL (ref 70–99)
HCT VFR BLD AUTO: 27.3 % (ref 36–48)
HGB BLD-MCNC: 9 G/DL (ref 12–16)
LYMPHOCYTES # BLD: 1.1 K/UL (ref 1–5.1)
LYMPHOCYTES NFR BLD: 8.9 %
MAGNESIUM SERPL-MCNC: 1.6 MG/DL (ref 1.8–2.4)
MCH RBC QN AUTO: 27.9 PG (ref 26–34)
MCHC RBC AUTO-ENTMCNC: 32.9 G/DL (ref 31–36)
MCV RBC AUTO: 84.9 FL (ref 80–100)
MONOCYTES # BLD: 1.1 K/UL (ref 0–1.3)
MONOCYTES NFR BLD: 9.3 %
NEUTROPHILS # BLD: 9.6 K/UL (ref 1.7–7.7)
NEUTROPHILS NFR BLD: 80.9 %
PERFORMED ON: ABNORMAL
PERFORMED ON: NORMAL
PHOSPHATE SERPL-MCNC: 2.6 MG/DL (ref 2.5–4.9)
PLATELET # BLD AUTO: 210 K/UL (ref 135–450)
PMV BLD AUTO: 7.6 FL (ref 5–10.5)
POTASSIUM SERPL-SCNC: 3.3 MMOL/L (ref 3.5–5.1)
RBC # BLD AUTO: 3.21 M/UL (ref 4–5.2)
SODIUM SERPL-SCNC: 138 MMOL/L (ref 136–145)
WBC # BLD AUTO: 11.9 K/UL (ref 4–11)

## 2024-04-30 PROCEDURE — 97110 THERAPEUTIC EXERCISES: CPT

## 2024-04-30 PROCEDURE — 6360000002 HC RX W HCPCS: Performed by: INTERNAL MEDICINE

## 2024-04-30 PROCEDURE — 6370000000 HC RX 637 (ALT 250 FOR IP): Performed by: INTERNAL MEDICINE

## 2024-04-30 PROCEDURE — 2580000003 HC RX 258: Performed by: SURGERY

## 2024-04-30 PROCEDURE — APPNB30 APP NON BILLABLE TIME 0-30 MINS: Performed by: NURSE PRACTITIONER

## 2024-04-30 PROCEDURE — 36592 COLLECT BLOOD FROM PICC: CPT

## 2024-04-30 PROCEDURE — 1200000000 HC SEMI PRIVATE

## 2024-04-30 PROCEDURE — 99024 POSTOP FOLLOW-UP VISIT: CPT | Performed by: SURGERY

## 2024-04-30 PROCEDURE — 97116 GAIT TRAINING THERAPY: CPT

## 2024-04-30 PROCEDURE — 83735 ASSAY OF MAGNESIUM: CPT

## 2024-04-30 PROCEDURE — 6360000002 HC RX W HCPCS: Performed by: SURGERY

## 2024-04-30 PROCEDURE — 94761 N-INVAS EAR/PLS OXIMETRY MLT: CPT

## 2024-04-30 PROCEDURE — 6370000000 HC RX 637 (ALT 250 FOR IP): Performed by: SURGERY

## 2024-04-30 PROCEDURE — 6360000002 HC RX W HCPCS: Performed by: NURSE PRACTITIONER

## 2024-04-30 PROCEDURE — 99232 SBSQ HOSP IP/OBS MODERATE 35: CPT

## 2024-04-30 PROCEDURE — APPSS15 APP SPLIT SHARED TIME 0-15 MINUTES: Performed by: NURSE PRACTITIONER

## 2024-04-30 PROCEDURE — C9113 INJ PANTOPRAZOLE SODIUM, VIA: HCPCS | Performed by: NURSE PRACTITIONER

## 2024-04-30 PROCEDURE — 97535 SELF CARE MNGMENT TRAINING: CPT

## 2024-04-30 PROCEDURE — 2580000003 HC RX 258: Performed by: NURSE PRACTITIONER

## 2024-04-30 PROCEDURE — 85025 COMPLETE CBC W/AUTO DIFF WBC: CPT

## 2024-04-30 PROCEDURE — 2500000003 HC RX 250 WO HCPCS: Performed by: SURGERY

## 2024-04-30 PROCEDURE — 6370000000 HC RX 637 (ALT 250 FOR IP)

## 2024-04-30 PROCEDURE — 84100 ASSAY OF PHOSPHORUS: CPT

## 2024-04-30 PROCEDURE — 97530 THERAPEUTIC ACTIVITIES: CPT

## 2024-04-30 PROCEDURE — 80048 BASIC METABOLIC PNL TOTAL CA: CPT

## 2024-04-30 PROCEDURE — 74019 RADEX ABDOMEN 2 VIEWS: CPT

## 2024-04-30 RX ORDER — POTASSIUM CHLORIDE 20 MEQ/1
40 TABLET, EXTENDED RELEASE ORAL
Status: DISPENSED | OUTPATIENT
Start: 2024-04-30 | End: 2024-04-30

## 2024-04-30 RX ORDER — METOPROLOL TARTRATE 50 MG/1
50 TABLET, FILM COATED ORAL 2 TIMES DAILY
Status: DISCONTINUED | OUTPATIENT
Start: 2024-04-30 | End: 2024-05-02

## 2024-04-30 RX ORDER — POTASSIUM CHLORIDE 7.45 MG/ML
10 INJECTION INTRAVENOUS
Status: DISPENSED | OUTPATIENT
Start: 2024-04-30 | End: 2024-04-30

## 2024-04-30 RX ORDER — MAGNESIUM SULFATE IN WATER 40 MG/ML
2000 INJECTION, SOLUTION INTRAVENOUS ONCE
Status: COMPLETED | OUTPATIENT
Start: 2024-04-30 | End: 2024-04-30

## 2024-04-30 RX ADMIN — POTASSIUM CHLORIDE 40 MEQ: 1500 TABLET, EXTENDED RELEASE ORAL at 08:45

## 2024-04-30 RX ADMIN — MAGNESIUM SULFATE HEPTAHYDRATE 2000 MG: 40 INJECTION, SOLUTION INTRAVENOUS at 08:54

## 2024-04-30 RX ADMIN — Medication 10 ML: at 21:44

## 2024-04-30 RX ADMIN — POTASSIUM CHLORIDE, DEXTROSE MONOHYDRATE AND SODIUM CHLORIDE: 150; 5; 450 INJECTION, SOLUTION INTRAVENOUS at 06:03

## 2024-04-30 RX ADMIN — POTASSIUM BICARBONATE 40 MEQ: 782 TABLET, EFFERVESCENT ORAL at 10:20

## 2024-04-30 RX ADMIN — POTASSIUM CHLORIDE 10 MEQ: 7.46 INJECTION, SOLUTION INTRAVENOUS at 18:25

## 2024-04-30 RX ADMIN — POTASSIUM CHLORIDE 10 MEQ: 7.46 INJECTION, SOLUTION INTRAVENOUS at 17:21

## 2024-04-30 RX ADMIN — FUROSEMIDE 40 MG: 10 INJECTION, SOLUTION INTRAMUSCULAR; INTRAVENOUS at 18:47

## 2024-04-30 RX ADMIN — METOPROLOL TARTRATE 50 MG: 50 TABLET, FILM COATED ORAL at 21:44

## 2024-04-30 RX ADMIN — POTASSIUM CHLORIDE 10 MEQ: 7.46 INJECTION, SOLUTION INTRAVENOUS at 15:56

## 2024-04-30 RX ADMIN — LORAZEPAM 0.5 MG: 0.5 TABLET ORAL at 21:44

## 2024-04-30 RX ADMIN — ENOXAPARIN SODIUM 40 MG: 100 INJECTION SUBCUTANEOUS at 08:38

## 2024-04-30 RX ADMIN — OXYCODONE HYDROCHLORIDE 5 MG: 5 TABLET ORAL at 16:00

## 2024-04-30 RX ADMIN — METOPROLOL TARTRATE 25 MG: 25 TABLET, FILM COATED ORAL at 08:38

## 2024-04-30 RX ADMIN — OXYCODONE HYDROCHLORIDE 5 MG: 5 TABLET ORAL at 21:44

## 2024-04-30 RX ADMIN — Medication 10 ML: at 08:39

## 2024-04-30 RX ADMIN — Medication 10 ML: at 08:40

## 2024-04-30 RX ADMIN — MORPHINE SULFATE 4 MG: 4 INJECTION, SOLUTION INTRAMUSCULAR; INTRAVENOUS at 10:03

## 2024-04-30 RX ADMIN — FUROSEMIDE 40 MG: 10 INJECTION, SOLUTION INTRAMUSCULAR; INTRAVENOUS at 08:37

## 2024-04-30 RX ADMIN — PANTOPRAZOLE SODIUM 40 MG: 40 INJECTION, POWDER, FOR SOLUTION INTRAVENOUS at 08:38

## 2024-04-30 ASSESSMENT — PAIN SCALES - GENERAL
PAINLEVEL_OUTOF10: 6
PAINLEVEL_OUTOF10: 6
PAINLEVEL_OUTOF10: 7

## 2024-04-30 ASSESSMENT — PAIN DESCRIPTION - ORIENTATION: ORIENTATION: MID

## 2024-04-30 ASSESSMENT — PAIN DESCRIPTION - LOCATION
LOCATION: ABDOMEN

## 2024-04-30 ASSESSMENT — PAIN DESCRIPTION - DESCRIPTORS: DESCRIPTORS: ACHING

## 2024-04-30 NOTE — PROGRESS NOTES
[facility]   Electrophysiology   Date: 4/30/2024  Reason for Follow up: PAT  Chief Complaint   Patient presents with    Abdominal Pain     Pt here with daughter, per daughter pt was seen at GI doctor today (Dr. Marin) and told pt to go to ER for direct admit for hiatal hernia, also per daughter, states that \"the top of her stomach is twisted\" pt aslo c/o SOB        HPI: Sonja Mcmahan is a 73 y.o. female who has a past medical history of Hiatal hernia, DM, HTN and obesity who  presented to hospital with c/o abdominal pain and dyspnea. She had been unable to tolerate PO intake. She was sent to emergency room by her GI doctor for evaluation.      EGD on 4/18/2024- large hiatal hernia  4/22/2024- laparoscopic hiatal hernia repair- NGT in place      Cardiology was consulted on 4/29  for PAT- The runs are short and she is asymptomatic   Per chart review- patient had similar episodes on 4/28. CTA of chest ruled out PE. Dopplers of bilateral upper extremities were negative for DVT.      Weight: 190 lb    Assessment and Plan:     PAT  Increased BB today   She remains asymptomatic although episodes are happening more often   Monitor and replace electrolytes as needed     Hernia Repair  NGT remains in place  Surgery following     HTN  Continue lisinopril  Low sodium diet   Pain control       Relevant available labs, and cardiovascular diagnostics, documents reviewed.   ECG / Tele: Sinus tachycardia, short runs of PAT    NA: 138  K: 3.3  BUN: 6  Cr: 0.6    AST:53    ALT: 28    Hgb: 9.0  Platelets 210     Echo: 4/17/2024  EF 55-60%  Grade II diastolic dysfunction  Mild AS  Mild TR  Thickened aortic valve     Stress test: 4/16/2024  Normal, no ischemia    CTA Chest-4/28/2024  Bilateral pleural effusions  1.6 cm left thyroid nodule       Scheduled Meds:   metoprolol tartrate  50 mg Oral BID    potassium chloride  10 mEq IntraVENous Q1H    furosemide  40 mg IntraVENous BID    lidocaine 1 % injection  5 mL

## 2024-04-30 NOTE — PROGRESS NOTES
[facility] - Inpatient Rehabilitation Department   Phone: [phone]    Physical Therapy    [] Initial Evaluation            [x] Daily Treatment Note         [] Discharge Summary      Patient: Sonja Mcmahan   : 1950   MRN: [id number]   Date of Service:  2024  Admitting Diagnosis: Hiatal hernia  Current Admission Summary:  Admitted for abdominal pain on 4/15. Imaging indicative of hiatal hernia and cholelithiasis. EGD on  showed polyps with hiatal hernia. Esophagus dilated at that time. To OR  for below:  1. Laparoscopic hiatal hernia repair with primary closure and [place] Bio-A mesh reinforcement.  2. Laparoscopic cholecystectomy.  3. Open hemigastrectomy with gastrojejunostomy and closure of duodenal stump and overlying omental flap for duodenal stump reinforcement.  Past Medical History:  has a past medical history of Asthma, Diabetes mellitus (HCC), Hyperlipidemia, and Hypertension.  Past Surgical History:  has a past surgical history that includes Hysterectomy, total abdominal; Elbow surgery; Upper gastrointestinal endoscopy (N/A, 2024); Upper gastrointestinal endoscopy (N/A, 2024); Upper gastrointestinal endoscopy (N/A, 2024); hiatal hernia repair (N/A, 2024); Cholecystectomy, laparoscopic (N/A, 2024); and gastrectomy (N/A, 2024).    Discharge Recommendations: Sonja Mcmahan scored a 16/24 on the AM-PAC short mobility form. Current research shows that an AM-PAC score of 17 or less is typically not associated with a discharge to the patient's home setting. Based on the patient's AM-PAC score and their current functional mobility deficits, it is recommended that the patient have 5-7 sessions per week of Physical Therapy at d/c to increase the patient's independence.  At this time, this patient demonstrates complex nursing, medical, and rehabilitative needs, and would benefit from intensive rehabilitation services upon discharge from the Inpatient  week  Current Treatment Recommendations: strengthening, balance training, functional mobility training, transfer training, gait training, stair training, endurance training, patient/caregiver education, pain management, home exercise program, safety education, equipment evaluation/education, and positioning    Goals  Patient Goals: Get home in a few days   Short Term Goals:  Time Frame: Before discharge  Patient will complete bed mobility at supervision, with use of log roll technique from flat bed without rail   Patient will complete sit<>stand transfers at supervision   Patient will ambulate 50 ft with use of rolling walker at supervision  Patient to maintain standing at supervision, with unilateral UE support for 5 minutes in order to complete ADLs    Above goals reviewed on 4/30/2024.  All goals are ongoing at this time unless indicated above.      Therapy Session Time      Individual Group Co-treatment   Time In 1309       Time Out 1347       Minutes 38         Timed Code Treatment Minutes:  38 Minutes  Total Treatment Minutes:  38 Minutes         Electronically Signed By: Liane Hamlin, PT  I agree with the above note.  PT directly observed the SPT with the patient.  Liane Hamlin, PT DPT [id number]

## 2024-04-30 NOTE — PROGRESS NOTES
[facility]        Progress Note    Patient Name: Sonja Mcmahan  MRN: [id number]  YOB: 1950  Date of Evaluation: 2024    Postoperative Day #8    Subjective:  No acute events overnight  Pain controlled  No nausea or vomiting, NGT in place, tolerated clamping  Denies flatus or stool  Resting in bed at this time      Vital Signs:  Patient Vitals for the past 24 hrs:   BP Temp Temp src Pulse Resp SpO2 Height Weight   24 0836 122/80 98.8 °F (37.1 °C) Temporal 86 16 92 % -- --   24 2315 121/74 (!) 100.5 °F (38.1 °C) Axillary 90 16 91 % -- --   24 2232 -- -- -- -- 16 -- -- --   24 2200 -- -- -- (!) 118 -- -- -- --   24 2145 (!) 147/75 98.1 °F (36.7 °C) Oral 96 16 92 % -- --   24 1612 133/81 97.8 °F (36.6 °C) Oral (!) 112 17 91 % -- --   24 1227 -- -- -- -- -- -- 1.6 m (5' 3\") --   24 1212 113/70 98 °F (36.7 °C) Oral (!) 109 18 93 % 1.6 m (5' 3\") 86.5 kg (190 lb 11.2 oz)   24 1101 -- -- -- -- 18 96 % -- --   24 1003 128/64 -- -- (!) 106 -- -- -- --        TEMPERATURE HISTORY 24H: Temp (24hrs), Av.6 °F (37 °C), Min:97.8 °F (36.6 °C), Max:100.5 °F (38.1 °C)    BLOOD PRESSURE HISTORY: Systolic (36hrs), Av , Min:113 , Max:156    Diastolic (36hrs), Av, Min:64, Max:85      Intake/Output:  I/O last 3 completed shifts:  In: 480 [P.O.:480]  Out: 450 [Emesis/NG output:450]  No intake/output data recorded.  Drain/tube Output:       Physical Exam:  General: awake, alert, oriented to person, place, time  Lungs: unlabored respirations  Abdomen: soft, non-distended, incisional tenderness only, bowel sounds present  Skin/Wound: DEQUAN dressing in place, only scant old drainage, seal intact but suction no longer working    Labs:  CBC:    Recent Labs     24  0755 24  0612 24  0612   WBC 10.3 11.2* 11.9*   HGB 10.2* 9.6* 9.0*   HCT 31.9* 29.7* 27.3*    240 210       BMP:    Recent Labs  oxyCODONE      Assessment:  73 y.o. female admitted with   1. Hiatal hernia    2. Bacteriuria    3. Gall stones    4. 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 Pain controlled, incisional tenderness only on exam, no nausea or vomiting, NGT in place, tolerated clamping, denies flatus or stool, fever noted up to 100.5, vitals otherwise stable, mild leukocytosis; continued supportive care, keep DEQUAN dressing in place, check AXR  2. NPO, NGT clamping--may remove pending AXR results; monitor bowel function  3. IV hydration; monitor and correct electrolytes  4. Activity as tolerated, ambulate TID--PT/OT following  5. Pulmonary toilet, incentive spirometry  6. PRN analgesics and antiemetics--minimizing narcotics as tolerated  7. DVT prophylaxis with  Lovenox and SCD's  8. Management of medical comorbid etiologies per primary team and consulting services    EDUCATION:  Educated patient on plan of care and disease process--all questions answered.    Plans discussed with patient and nursing.  Reviewed and discussed with Dr. Mack.      Signed:  AVA Pacheco - CNP  4/30/2024 9:31 AM    Surg Staff:   Pt has had minimal NG drainage, no N/V. Follow up AXR ok  Temp normal and VS normal today  Will go ahead with NG removal    David Mack MD

## 2024-04-30 NOTE — PROGRESS NOTES
[facility] - Inpatient Rehabilitation Department   Phone: [phone]    Occupational Therapy    [] Initial Evaluation            [x] Daily Treatment Note         [] Discharge Summary      Patient: Sonja Mcmahan   : 1950   MRN: [id number]   Date of Service:  2024    Admitting Diagnosis:  Hiatal hernia  Current Admission Summary: Admitted for abdominal pain. Imaging indicative of hiatal hernia and cholelithiasis. EGD on  showed polyps with hiatal hernia. Esophagus dilated at that time. To OR  for below:  1. Laparoscopic hiatal hernia repair with primary closure and [place] Bio-A mesh reinforcement.  2. Laparoscopic cholecystectomy.  3. Open hemigastrectomy with gastrojejunostomy and closure of duodenal stump and overlying omental flap for duodenal stump reinforcement.  Past Medical History:  has a past medical history of Asthma, Diabetes mellitus (HCC), Hyperlipidemia, and Hypertension.  Past Surgical History:  has a past surgical history that includes Hysterectomy, total abdominal; Elbow surgery; Upper gastrointestinal endoscopy (N/A, 2024); Upper gastrointestinal endoscopy (N/A, 2024); Upper gastrointestinal endoscopy (N/A, 2024); hiatal hernia repair (N/A, 2024); Cholecystectomy, laparoscopic (N/A, 2024); and gastrectomy (N/A, 2024).    Discharge Recommendations: Sonja Mcmahan scored a 17/24 on the AM-PAC ADL Inpatient form. Current research shows that an AM-PAC score of 18 or greater is typically associated with a discharge to the patient's home setting. Based on the patient's AM-PAC score, and their current ADL deficits, it is recommended that the patient have 2-3 sessions per week of Occupational Therapy at d/c to increase the patient's independence.  At this time, this patient demonstrates the endurance and safety to discharge home with home services (home vs OP services) and a follow up treatment frequency of 2-3x/wk.   Please see assessment  section for further patient specific details.    If patient discharges prior to next session this note will serve as a discharge summary.  Please see below for the latest assessment towards goals.     HOME HEALTH CARE: LEVEL 3 SAFETY     - Initial home health evaluation to occur within 24-48 hours, in patient home   - Therapy evaluations in home within 24-48 hours of discharge; including DME and home safety   - Frontload therapy 5 days, then 3x a week   - Therapy to evaluate if patient has Home Health Aide needs for personal care   -  evaluation within 24-48 hours, includes evaluation of resources and insurance to determine [place], [place], LTC, and Medicaid options       DME Required For Discharge:  recommend use of RW. grab bars by toilet or elevated toilet seat (3 in 1 bedside commode)     Precautions/Restrictions: high fall risk, NPO except ice chips and sips with meds   Weight Bearing Restrictions: no restrictions  [] Right Upper Extremity  [] Left Upper Extremity [] Right Lower Extremity  [] Left Lower Extremity     Required Braces/Orthotics: no braces required   [] Right  [] Left  Positional Restrictions:no positional restrictions    Pre-Admission Information   Lives With: alone                     Type of Home:  Duplex  Home Layout: one level  Home Access: level entry  Bathroom Layout: tub/shower unit  Bathroom Equipment: grab bars in shower, shower chair  Toilet Height: standard height - able to push up from sink  Home Equipment: rolling walker, rollator - 4 wheeled walker, single point cane, reacher              Family is borrowing a lift chair from a friend for this recovery  Transfer Assistance: Independent without use of device  Ambulation Assistance: Independent without use of device  ADL Assistance: independent with all ADL's  IADL Assistance: independent with homemaking tasks  Active :        [x] Yes                 [] No  Hand Dominance: [] Left                 [x] Right  Current

## 2024-04-30 NOTE — PROGRESS NOTES
Hospitalist Progress Note      PCP: Abiodun Amanda PA    Date of Admission: 4/15/2024    Chief Complaint: Hiatal hernia    Hospital Course: 73 y.o. female with a history of hiatal hernia, obesity, GERD who presented by request of GI Dr Marin after c/o dyspnea and intractable abdominal pain. Has been unable to tolerate po and patient and daughter think weight loss of unknown amount. Abdominal pain present over the past month. GI sent her to ED for abdominal imaging.   Admitted as inpatient.  Followed by Cardio, GI and Surgery.      SPECT on 4/16:     **Myocardial perfusion is normal. No reversible ischemia. Low risk study.**     EGD on 4/18:  large hh, polyp removed, esoph dilated, mid body ?torsion vs level of diaphragm, antrum ? Mass biopsied.     On 4/22/24 underwent:  LAPAROSCOPIC HERNIA HIATAL REPAIR  LAPAROSCOPIC CHOLECYSTECTOMY  HEMIGASTRECTOMY WITH GASTROJEJUNOSTOMY    PICC placed.     Had NSVT.     Complaining of BL UE swelling.  BL UE Doppler US negative for DVT and CTA Chest negative for PE.    Started on IVF for fluid overload.    Subjective: Patient had fever this morning.  Still has flatus but no BM.  +468 mL since admission. Stable abdominal pain.  No CP, SOB, HA or fevers.       Medications:  Reviewed    Infusion Medications    dextrose 5% and 0.45% NaCl with KCl 20 mEq 75 mL/hr at 04/30/24 0603    sodium chloride      dextrose      sodium chloride 25 mL (04/28/24 1512)     Scheduled Medications    metoprolol tartrate  50 mg Oral BID    furosemide  40 mg IntraVENous BID    lidocaine 1 % injection  5 mL IntraDERmal Once    sodium chloride flush  5-40 mL IntraVENous 2 times per day    pantoprazole  40 mg IntraVENous Daily    enoxaparin  40 mg SubCUTAneous Daily    insulin lispro  0-4 Units SubCUTAneous TID     insulin lispro  0-4 Units SubCUTAneous Nightly    montelukast  10 mg Oral Nightly    Virt-Caps  1 capsule Oral Daily    sodium chloride flush  5-40 mL IntraVENous 2 times per day       3. Cholelithiasis without evidence of acute cholecystitis.   4. Diverticulosis of the sigmoid colon without inflammatory change.         XR ABDOMEN (2 VIEWS)    (Results Pending)       IP CONSULT TO GI  IP CONSULT TO CARDIOLOGY  IP CONSULT TO GENERAL SURGERY  IP CONSULT TO HOME CARE NEEDS  IP CONSULT TO CARDIOLOGY    Assessment/Plan:    Active Hospital Problems    Diagnosis     Sinus tachycardia [R00.0]      Priority: Medium    Mild malnutrition (HCC) [E44.1]     SVT (supraventricular tachycardia) (HCC) [I47.10]     LBBB (left bundle branch block) [I44.7]     Calculus of gallbladder with chronic cholecystitis without obstruction [K80.10]     Gastric mass [K31.89]     Gall stones [K80.20]     Intractable abdominal pain [R10.9]     Hiatal hernia [K44.9]     Obesity [E66.9]     Diverticulosis [K57.90]     History of hysterectomy [Z90.710]     Hyperlipidemia [E78.5]     Gastroesophageal reflux disease with esophagitis [K21.00]     Benign essential HTN [I10]      Tachycardia/SVT/BL UE Edema  CTA Chest r/o PE  Negative BL UE Doppler US r/o DVT  Continue Lasix 40 mg IV bid today  Telemetry to r/o arrhythmia from electrolyte shifts with IV Lasix  Fever  Suspect secondary to atelectasis  No need for Abx at this time  Hypokalemia  Replete and repeat  Stop HCTZ  Hiatal hernia  S/P repair on 4/22  NGT in place  NPO per Surgery  On IVF  S/P IV Reglan  Had flatus, no BM   Morphine IV PRN  Chronic cholecystitis  S/P Lap antonio on 4/22  Gastric adencarcinoma  S/P gasterectomy  Obesity  BMI is 31  HTN  Continue Lisinopril  Continue Hydralazine IV PRN  Stop HCTZ      DVT Prophylaxis: Lovenox  Diet: Diet NPO Exceptions are: Sips of Water with Meds, Ice Chips  Code Status: Full Code  PT/OT Eval Status: Following    Dispo - Home with home PT/OT    Discussed with patient, nursing and CM.    Discussed with Dr Mack (Surgery).  Slow bowel recovery here.    Jericho Rosa MD

## 2024-04-30 NOTE — PROGRESS NOTES
NG clamped from 2200 until 0230. Pt tolerated well, reports no nausea. Restarted LIWS @0230 via order. Pt received Ativan, Metoprolol and Oxycodone, see MAR.     NG clamped again at 0645. 150mL output overnight. Pt resting with eyes closed. Call light within reach. Fall precautions in place.

## 2024-05-01 ENCOUNTER — APPOINTMENT (OUTPATIENT)
Dept: GENERAL RADIOLOGY | Age: 74
DRG: 326 | End: 2024-05-01
Payer: MEDICARE

## 2024-05-01 LAB
ANION GAP SERPL CALCULATED.3IONS-SCNC: 10 MMOL/L (ref 3–16)
BASOPHILS # BLD: 0 K/UL (ref 0–0.2)
BASOPHILS NFR BLD: 0.2 %
BILIRUB UR QL STRIP.AUTO: NEGATIVE
BUN SERPL-MCNC: 8 MG/DL (ref 7–20)
CALCIUM SERPL-MCNC: 8 MG/DL (ref 8.3–10.6)
CHLORIDE SERPL-SCNC: 100 MMOL/L (ref 99–110)
CLARITY UR: CLEAR
CO2 SERPL-SCNC: 26 MMOL/L (ref 21–32)
COLOR UR: YELLOW
CREAT SERPL-MCNC: 0.6 MG/DL (ref 0.6–1.2)
DEPRECATED RDW RBC AUTO: 15.5 % (ref 12.4–15.4)
EOSINOPHIL # BLD: 0.1 K/UL (ref 0–0.6)
EOSINOPHIL NFR BLD: 1.3 %
GFR SERPLBLD CREATININE-BSD FMLA CKD-EPI: >90 ML/MIN/{1.73_M2}
GLUCOSE BLD-MCNC: 120 MG/DL (ref 70–99)
GLUCOSE BLD-MCNC: 135 MG/DL (ref 70–99)
GLUCOSE BLD-MCNC: 161 MG/DL (ref 70–99)
GLUCOSE SERPL-MCNC: 109 MG/DL (ref 70–99)
GLUCOSE UR STRIP.AUTO-MCNC: NEGATIVE MG/DL
HCT VFR BLD AUTO: 26 % (ref 36–48)
HGB BLD-MCNC: 8.7 G/DL (ref 12–16)
HGB UR QL STRIP.AUTO: NEGATIVE
KETONES UR STRIP.AUTO-MCNC: NEGATIVE MG/DL
LEUKOCYTE ESTERASE UR QL STRIP.AUTO: NEGATIVE
LYMPHOCYTES # BLD: 1.1 K/UL (ref 1–5.1)
LYMPHOCYTES NFR BLD: 9.2 %
MAGNESIUM SERPL-MCNC: 1.9 MG/DL (ref 1.8–2.4)
MCH RBC QN AUTO: 28.4 PG (ref 26–34)
MCHC RBC AUTO-ENTMCNC: 33.5 G/DL (ref 31–36)
MCV RBC AUTO: 84.8 FL (ref 80–100)
MONOCYTES # BLD: 1 K/UL (ref 0–1.3)
MONOCYTES NFR BLD: 9 %
NEUTROPHILS # BLD: 9.4 K/UL (ref 1.7–7.7)
NEUTROPHILS NFR BLD: 80.3 %
NITRITE UR QL STRIP.AUTO: NEGATIVE
PERFORMED ON: ABNORMAL
PH UR STRIP.AUTO: 6.5 [PH] (ref 5–8)
PHOSPHATE SERPL-MCNC: 2.6 MG/DL (ref 2.5–4.9)
PLATELET # BLD AUTO: 211 K/UL (ref 135–450)
PMV BLD AUTO: 7.7 FL (ref 5–10.5)
POTASSIUM SERPL-SCNC: 3.6 MMOL/L (ref 3.5–5.1)
PROT UR STRIP.AUTO-MCNC: NEGATIVE MG/DL
RBC # BLD AUTO: 3.07 M/UL (ref 4–5.2)
SODIUM SERPL-SCNC: 136 MMOL/L (ref 136–145)
SP GR UR STRIP.AUTO: <=1.005 (ref 1–1.03)
TSH SERPL DL<=0.005 MIU/L-ACNC: 1.74 UIU/ML (ref 0.27–4.2)
UA COMPLETE W REFLEX CULTURE PNL UR: NORMAL
UA DIPSTICK W REFLEX MICRO PNL UR: NORMAL
URN SPEC COLLECT METH UR: NORMAL
UROBILINOGEN UR STRIP-ACNC: 0.2 E.U./DL
WBC # BLD AUTO: 11.7 K/UL (ref 4–11)

## 2024-05-01 PROCEDURE — 36592 COLLECT BLOOD FROM PICC: CPT

## 2024-05-01 PROCEDURE — 1200000000 HC SEMI PRIVATE

## 2024-05-01 PROCEDURE — 6370000000 HC RX 637 (ALT 250 FOR IP): Performed by: SURGERY

## 2024-05-01 PROCEDURE — 6370000000 HC RX 637 (ALT 250 FOR IP): Performed by: NURSE PRACTITIONER

## 2024-05-01 PROCEDURE — 99232 SBSQ HOSP IP/OBS MODERATE 35: CPT

## 2024-05-01 PROCEDURE — 81003 URINALYSIS AUTO W/O SCOPE: CPT

## 2024-05-01 PROCEDURE — 85025 COMPLETE CBC W/AUTO DIFF WBC: CPT

## 2024-05-01 PROCEDURE — 6360000002 HC RX W HCPCS: Performed by: INTERNAL MEDICINE

## 2024-05-01 PROCEDURE — C9113 INJ PANTOPRAZOLE SODIUM, VIA: HCPCS | Performed by: NURSE PRACTITIONER

## 2024-05-01 PROCEDURE — 2580000003 HC RX 258: Performed by: NURSE PRACTITIONER

## 2024-05-01 PROCEDURE — 99024 POSTOP FOLLOW-UP VISIT: CPT | Performed by: SURGERY

## 2024-05-01 PROCEDURE — 71045 X-RAY EXAM CHEST 1 VIEW: CPT

## 2024-05-01 PROCEDURE — 97535 SELF CARE MNGMENT TRAINING: CPT

## 2024-05-01 PROCEDURE — APPNB30 APP NON BILLABLE TIME 0-30 MINS: Performed by: NURSE PRACTITIONER

## 2024-05-01 PROCEDURE — 2500000003 HC RX 250 WO HCPCS: Performed by: SURGERY

## 2024-05-01 PROCEDURE — 93005 ELECTROCARDIOGRAM TRACING: CPT | Performed by: STUDENT IN AN ORGANIZED HEALTH CARE EDUCATION/TRAINING PROGRAM

## 2024-05-01 PROCEDURE — 83735 ASSAY OF MAGNESIUM: CPT

## 2024-05-01 PROCEDURE — 84100 ASSAY OF PHOSPHORUS: CPT

## 2024-05-01 PROCEDURE — APPSS15 APP SPLIT SHARED TIME 0-15 MINUTES: Performed by: NURSE PRACTITIONER

## 2024-05-01 PROCEDURE — 97530 THERAPEUTIC ACTIVITIES: CPT

## 2024-05-01 PROCEDURE — 2580000003 HC RX 258: Performed by: SURGERY

## 2024-05-01 PROCEDURE — 80048 BASIC METABOLIC PNL TOTAL CA: CPT

## 2024-05-01 PROCEDURE — 97116 GAIT TRAINING THERAPY: CPT

## 2024-05-01 PROCEDURE — 6360000002 HC RX W HCPCS: Performed by: NURSE PRACTITIONER

## 2024-05-01 PROCEDURE — 84443 ASSAY THYROID STIM HORMONE: CPT

## 2024-05-01 PROCEDURE — 6370000000 HC RX 637 (ALT 250 FOR IP)

## 2024-05-01 RX ORDER — MAGNESIUM HYDROXIDE/ALUMINUM HYDROXICE/SIMETHICONE 120; 1200; 1200 MG/30ML; MG/30ML; MG/30ML
30 SUSPENSION ORAL EVERY 4 HOURS PRN
Status: DISCONTINUED | OUTPATIENT
Start: 2024-05-01 | End: 2024-05-07 | Stop reason: HOSPADM

## 2024-05-01 RX ORDER — FUROSEMIDE 10 MG/ML
40 INJECTION INTRAMUSCULAR; INTRAVENOUS 2 TIMES DAILY
Status: DISCONTINUED | OUTPATIENT
Start: 2024-05-01 | End: 2024-05-02

## 2024-05-01 RX ORDER — BISACODYL 10 MG
10 SUPPOSITORY, RECTAL RECTAL ONCE
Status: COMPLETED | OUTPATIENT
Start: 2024-05-01 | End: 2024-05-01

## 2024-05-01 RX ORDER — METOCLOPRAMIDE HYDROCHLORIDE 5 MG/ML
10 INJECTION INTRAMUSCULAR; INTRAVENOUS EVERY 6 HOURS
Status: DISPENSED | OUTPATIENT
Start: 2024-05-01 | End: 2024-05-02

## 2024-05-01 RX ORDER — FUROSEMIDE 10 MG/ML
40 INJECTION INTRAMUSCULAR; INTRAVENOUS ONCE
Status: COMPLETED | OUTPATIENT
Start: 2024-05-02 | End: 2024-05-02

## 2024-05-01 RX ADMIN — MONTELUKAST SODIUM 10 MG: 10 TABLET, FILM COATED ORAL at 21:52

## 2024-05-01 RX ADMIN — METOCLOPRAMIDE 10 MG: 5 INJECTION, SOLUTION INTRAMUSCULAR; INTRAVENOUS at 11:14

## 2024-05-01 RX ADMIN — Medication 10 ML: at 22:16

## 2024-05-01 RX ADMIN — LISINOPRIL 10 MG: 10 TABLET ORAL at 21:51

## 2024-05-01 RX ADMIN — ATORVASTATIN CALCIUM 20 MG: 20 TABLET, FILM COATED ORAL at 21:51

## 2024-05-01 RX ADMIN — PANTOPRAZOLE SODIUM 40 MG: 40 INJECTION, POWDER, FOR SOLUTION INTRAVENOUS at 09:25

## 2024-05-01 RX ADMIN — METOCLOPRAMIDE 10 MG: 5 INJECTION, SOLUTION INTRAMUSCULAR; INTRAVENOUS at 18:55

## 2024-05-01 RX ADMIN — FUROSEMIDE 40 MG: 10 INJECTION, SOLUTION INTRAMUSCULAR; INTRAVENOUS at 09:25

## 2024-05-01 RX ADMIN — OXYCODONE HYDROCHLORIDE 5 MG: 5 TABLET ORAL at 18:55

## 2024-05-01 RX ADMIN — FUROSEMIDE 40 MG: 10 INJECTION, SOLUTION INTRAMUSCULAR; INTRAVENOUS at 18:55

## 2024-05-01 RX ADMIN — BISACODYL 10 MG: 10 SUPPOSITORY RECTAL at 11:14

## 2024-05-01 RX ADMIN — METOPROLOL TARTRATE 50 MG: 50 TABLET, FILM COATED ORAL at 09:26

## 2024-05-01 RX ADMIN — OXYCODONE HYDROCHLORIDE 5 MG: 5 TABLET ORAL at 14:11

## 2024-05-01 RX ADMIN — METOCLOPRAMIDE 10 MG: 5 INJECTION, SOLUTION INTRAMUSCULAR; INTRAVENOUS at 21:52

## 2024-05-01 RX ADMIN — ENOXAPARIN SODIUM 40 MG: 100 INJECTION SUBCUTANEOUS at 09:26

## 2024-05-01 RX ADMIN — Medication 10 ML: at 09:25

## 2024-05-01 RX ADMIN — OXYCODONE HYDROCHLORIDE 5 MG: 5 TABLET ORAL at 06:48

## 2024-05-01 RX ADMIN — POTASSIUM CHLORIDE, DEXTROSE MONOHYDRATE AND SODIUM CHLORIDE: 150; 5; 450 INJECTION, SOLUTION INTRAVENOUS at 14:52

## 2024-05-01 RX ADMIN — METOPROLOL TARTRATE 50 MG: 50 TABLET, FILM COATED ORAL at 21:51

## 2024-05-01 RX ADMIN — Medication 10 ML: at 09:28

## 2024-05-01 ASSESSMENT — PAIN DESCRIPTION - DESCRIPTORS: DESCRIPTORS: ACHING

## 2024-05-01 ASSESSMENT — PAIN DESCRIPTION - ORIENTATION: ORIENTATION: MID

## 2024-05-01 ASSESSMENT — PAIN SCALES - GENERAL
PAINLEVEL_OUTOF10: 7
PAINLEVEL_OUTOF10: 7
PAINLEVEL_OUTOF10: 3
PAINLEVEL_OUTOF10: 6

## 2024-05-01 ASSESSMENT — PAIN DESCRIPTION - LOCATION
LOCATION: ABDOMEN

## 2024-05-01 NOTE — PROGRESS NOTES
05/01/24  0515   WBC 11.2* 11.9* 11.7*   HGB 9.6* 9.0* 8.7*   HCT 29.7* 27.3* 26.0*    210 211       Recent Labs     04/29/24  0612 04/30/24  0612 05/01/24  0515    138 136   K 3.9 3.3* 3.6    103 100   CO2 24 27 26   BUN 5* 6* 8   CREATININE 0.5* 0.6 0.6   CALCIUM 8.2* 8.0* 8.0*   PHOS 2.5 2.6 2.6       No results for input(s): \"AST\", \"ALT\", \"BILIDIR\", \"BILITOT\", \"ALKPHOS\" in the last 72 hours.  No results for input(s): \"INR\" in the last 72 hours.  No results for input(s): \"CKTOTAL\", \"TROPHS\" in the last 72 hours.    Urinalysis:      Lab Results   Component Value Date/Time    NITRU Negative 05/01/2024 02:58 PM    WBCUA 6 04/15/2024 05:56 PM    BACTERIA 4+ 04/15/2024 05:56 PM    RBCUA 2 04/15/2024 05:56 PM    BLOODU Negative 05/01/2024 02:58 PM    GLUCOSEU Negative 05/01/2024 02:58 PM       Radiology:  XR ABDOMEN (2 VIEWS)   Final Result   Nonspecific bowel gas pattern.  Oral contrast is seen in the colon      Pleural-parenchymal disease at the lung bases.         VL Extremity Venous Bilateral   Final Result      CT CHEST PULMONARY EMBOLISM W CONTRAST   Final Result   1. Small bilateral pleural effusions.   2. 1.6 cm left thyroid nodule.  Recommend outpatient thyroid ultrasound for   further evaluation.   3. Small amount of pneumoperitoneum and moderate amount of inflammatory   stranding in the epigastric region is likely related to recent postsurgical   changes         FL UGI   Final Result   No fluoroscopic evidence of leak.         XR CHEST PORTABLE   Final Result   Bibasilar atelectasis associated small pleural effusions.         FL UGI   Final Result   1. Moderate to large sliding-type hiatal hernia, with moderate   gastroesophageal reflux.  However, no evidence of reflux esophagitis.   2. Small to moderate-sized distal esophageal diverticulum.   3. No evidence of a mass, stricture, or peptic ulcer disease of the upper GI   tract.         NM MYOCARDIAL SPECT REST EXERCISE OR RX   Final Result       CT ABDOMEN PELVIS W IV CONTRAST Additional Contrast? Oral   Final Result   1. No acute intra-abdominal or pelvic process.   2. Large hiatal hernia.   3. Cholelithiasis without evidence of acute cholecystitis.   4. Diverticulosis of the sigmoid colon without inflammatory change.             IP CONSULT TO GI  IP CONSULT TO CARDIOLOGY  IP CONSULT TO GENERAL SURGERY  IP CONSULT TO HOME CARE NEEDS  IP CONSULT TO CARDIOLOGY  IP CONSULT TO PHYSICAL MEDICINE REHAB  IP CONSULT TO ONCOLOGY    Assessment/Plan:    Active Hospital Problems    Diagnosis     Sinus tachycardia [R00.0]      Priority: Medium    PAT (paroxysmal atrial tachycardia) (HCC) [I47.19]     Mild malnutrition (HCC) [E44.1]     SVT (supraventricular tachycardia) (HCC) [I47.10]     LBBB (left bundle branch block) [I44.7]     Calculus of gallbladder with chronic cholecystitis without obstruction [K80.10]     Gastric mass [K31.89]     Gall stones [K80.20]     Intractable abdominal pain [R10.9]     Hiatal hernia [K44.9]     Obesity [E66.9]     Diverticulosis [K57.90]     History of hysterectomy [Z90.710]     Hyperlipidemia [E78.5]     Gastroesophageal reflux disease with esophagitis [K21.00]     Benign essential HTN [I10]      Tachycardia/SVT/BL UE Edema  CTA Chest r/o PE  Negative BL UE Doppler US r/o DVT  Continue Lasix 40 mg IV bid   Telemetry to r/o arrhythmia from electrolyte shifts with IV Lasix  Fever  Suspect secondary to atelectasis  No need for Abx at this time  Hypokalemia  Repleted and repeated  Stopred HCTZ  Hiatal hernia  S/P repair on 4/22  NGT out  CLD per Surgery  Still on IVF  S/P IV Reglan  Has flatus, no BM   Morphine IV PRN  Chronic cholecystitis  S/P Lap antonio on 4/22  Gastric adencarcinoma  S/P gasterectomy  Obesity  BMI is 31  HTN  Continue Lisinopril  Continue Hydralazine IV PRN  Stopped HCTZ      DVT Prophylaxis: Lovenox  Diet: ADULT DIET; Clear Liquid  ADULT ORAL NUTRITION SUPPLEMENT; Breakfast, Lunch, Dinner; Clear Liquid Oral

## 2024-05-01 NOTE — PROGRESS NOTES
[facility]        Progress Note    Patient Name: Sonja Mcmahan  MRN: [id number]  YOB: 1950  Date of Evaluation: 2024    Postoperative Day #9    Subjective:  No acute events overnight  Pain controlled  No nausea or vomiting since NGT removed, did have mild reflux  Passing flatus, no stool  Resting in bed at this time      Vital Signs:  Patient Vitals for the past 24 hrs:   BP Temp Temp src Pulse Resp SpO2   24 0925 107/71 97.4 °F (36.3 °C) Oral 86 18 90 %   24 0630 109/71 99.8 °F (37.7 °C) Oral 90 18 93 %   24 0023 -- -- -- 74 -- --   24 2351 104/68 98.6 °F (37 °C) Oral 78 16 90 %   24 2214 -- -- -- -- 16 --   24 2130 (!) 104/55 100.1 °F (37.8 °C) Oral 99 16 92 %   24 1650 134/73 99.1 °F (37.3 °C) Oral 99 16 91 %   24 1600 -- -- -- -- 16 --   24 1537 -- -- -- -- -- 92 %        TEMPERATURE HISTORY 24H: Temp (24hrs), Av °F (37.2 °C), Min:97.4 °F (36.3 °C), Max:100.1 °F (37.8 °C)    BLOOD PRESSURE HISTORY: Systolic (36hrs), Av , Min:104 , Max:134    Diastolic (36hrs), Av, Min:55, Max:80      Intake/Output:  I/O last 3 completed shifts:  In: 3967.8 [I.V.:3639.7; IV Piggyback:328.2]  Out: 950 [Emesis/NG output:950]  I/O this shift:  In: 1688.2 [I.V.:1556.2; IV Piggyback:132]  Out: -   Drain/tube Output:       Physical Exam:  General: awake, alert, oriented to person, place, time  Lungs: unlabored respirations  Abdomen: soft, non-distended, incisional tenderness only, bowel sounds present  Skin/Wound: healing well, no drainage, no erythema, well approximated with staples    Labs:  CBC:    Recent Labs     24  0612 24  0612 24  0515   WBC 11.2* 11.9* 11.7*   HGB 9.6* 9.0* 8.7*   HCT 29.7* 27.3* 26.0*    210 211       BMP:    Recent Labs     24  0612 24  0612 24  0515    138 136   K 3.9 3.3* 3.6    103 100   CO2 24 27 26   BUN 5* 6* 8    CREATININE 0.5* 0.6 0.6   GLUCOSE 145* 152* 109*       Hepatic:  No results for input(s): \"AST\", \"ALT\", \"BILITOT\", \"ALKPHOS\" in the last 72 hours.    Invalid input(s): \"ALB\"    Amylase:  No results found for: \"AMYLASE\"  Lipase:    Lab Results   Component Value Date/Time    LIPASE 18.0 04/23/2024 05:59 AM    LIPASE 59.0 04/15/2024 05:56 PM      Mag:    Lab Results   Component Value Date/Time    MG 1.90 05/01/2024 05:15 AM    MG 1.60 04/30/2024 06:12 AM     Phos:     Lab Results   Component Value Date/Time    PHOS 2.6 05/01/2024 05:15 AM    PHOS 2.6 04/30/2024 06:12 AM        Coags: No results found for: \"PROTIME\", \"INR\", \"APTT\"    Cultures:  Anaerobic culture  No results found for: \"LABANAE\"  Fungus stain  No results found for requested labs within last 30 days.     Gram stain  No results found for requested labs within last 30 days.     Organism  No results found for: \"ORG\"  Surgical culture  No results found for: \"CXSURG\"  Blood culture 1  No results found for requested labs within last 30 days.     Blood culture 2  No results found for requested labs within last 30 days.     Fecal occult  No results found for requested labs within last 30 days.     GI bacterial pathogens by PCR  No results found for requested labs within last 30 days.     C. difficile  No results found for requested labs within last 30 days.     Urine culture  No results found for: \"LABURIN\"    Pathology:  EGD 4/18/2024--FINAL DIAGNOSIS:        A. Stomach, body polyp, biopsy:      - Gastric hyperplastic polyp with erosion.      - Negative for dysplasia or malignancy.        B. Stomach, antral mass, biopsy:      - Gastric mucosa with at least high grade dysplasia (diffuse)- See        Comment.        C. Esophagus, biopsy:      - Squamous mucosa with mild chronic inflammation and reactive change.      - No diagnostic features of eosinophilic esophagitis.      - Negative for intestinal metaplasia (goblet cells) or dysplasia.     COMMENT: Part B:

## 2024-05-01 NOTE — CARE COORDINATION
SW met with patient to discuss ARU recommendation yesterday.  Patient stated she felt like she was \"having a rough day.\"  Stated she felt stronger today.  Pt is agreeable to ARU if she needs it and it's still recommended at discharge.  Pt agreeable to ARU consult and speaking with ARU doctor.  If ARU is not needed, then pt will discharge home w/[facility] (this is one of [facility]'s branches - [facility] following).      Electronically signed by ERNA Ventura, CARLITO on 5/1/2024 at 2:34 PM

## 2024-05-01 NOTE — PROGRESS NOTES
[facility], [facility] [place]   Electrophysiology   Date: 5/1/2024  Reason for Follow up: PAT  Chief Complaint   Patient presents with    Abdominal Pain     Pt here with daughter, per daughter pt was seen at GI doctor today (Dr. Marin) and told pt to go to ER for direct admit for hiatal hernia, also per daughter, states that \"the top of her stomach is twisted\" pt aslo c/o SOB        HPI: Sonja Mcmahan is a 73 y.o. female who has a past medical history of Hiatal hernia, DM, HTN and obesity who  presented to hospital with c/o abdominal pain and dyspnea. She had been unable to tolerate PO intake. She was sent to emergency room by her GI doctor for evaluation.      EGD on 4/18/2024- large hiatal hernia  4/22/2024- laparoscopic hiatal hernia repair- NGT in place      Cardiology was consulted on 4/29  for PAT- The runs are short and she is asymptomatic   Per chart review- patient had similar episodes on 4/28. CTA of chest ruled out PE. Dopplers of bilateral upper extremities were negative for DVT.    Patient seen at bedside. She is resting comfortably, NGT was removed yesterday by surgery.  Runs of PAT are much shorter with the increase in BB yesterday. She remains asymptomatic with the episodes. Denies chest pain, SOB or palpitations at the time of my visit.       Weight: 190 lb    Assessment and Plan:     PAT  Resting heart rate much more controlled today- 80-90s  BB was increased yesterday- BP has lowered some- monitor closely  Still had some episodes of PAT on telemetry overnight however, they are much shorter in duration since increase in BB  Monitor and replace electrolytes as needed     Hernia Repair  NGT removed yesterday  Surgery following     HTN  Continue lisinopril  Low sodium diet   Pain control     Relevant available labs, and cardiovascular diagnostics, documents reviewed.   ECG / Tele: Sinus tachycardia, short runs of PAT    NA: 136  K: 3.6  BUN: 8  Cr: 0.6  WBC 11.7  AST:53  ALT: 28  Hgb:  8.7  Platelets 211     Echo: 4/17/2024  EF 55-60%  Grade II diastolic dysfunction  Mild AS  Mild TR  Thickened aortic valve     Stress test: 4/16/2024  Normal, no ischemia    CTA Chest-4/28/2024  Bilateral pleural effusions  1.6 cm left thyroid nodule       Scheduled Meds:   furosemide  40 mg IntraVENous BID    metoclopramide  10 mg IntraVENous Q6H    metoprolol tartrate  50 mg Oral BID    lidocaine 1 % injection  5 mL IntraDERmal Once    sodium chloride flush  5-40 mL IntraVENous 2 times per day    pantoprazole  40 mg IntraVENous Daily    enoxaparin  40 mg SubCUTAneous Daily    insulin lispro  0-4 Units SubCUTAneous TID WC    insulin lispro  0-4 Units SubCUTAneous Nightly    montelukast  10 mg Oral Nightly    Virt-Caps  1 capsule Oral Daily    sodium chloride flush  5-40 mL IntraVENous 2 times per day    atorvastatin  20 mg Oral Nightly    lisinopril  10 mg Oral Nightly     Continuous Infusions:   dextrose 5% and 0.45% NaCl with KCl 20 mEq 75 mL/hr at 05/01/24 1453    sodium chloride      dextrose      sodium chloride Stopped (04/28/24 2133)     PRN Meds:.bisacodyl, sodium chloride flush, sodium chloride, sodium phosphate 15 mmol in sodium chloride 0.9 % 250 mL IVPB, metoprolol, hydrALAZINE, LORazepam, guaiFENesin-dextromethorphan, morphine **OR** morphine, dextrose bolus **OR** dextrose bolus, glucagon (rDNA), dextrose, LORazepam, sodium chloride flush, sodium chloride, potassium chloride **OR** potassium alternative oral replacement **OR** potassium chloride, magnesium sulfate, ondansetron **OR** ondansetron, polyethylene glycol, acetaminophen **OR** acetaminophen, oxyCODONE     Prior to Admission medications    Medication Sig Start Date End Date Taking? Authorizing Provider   BIOTIN PO Take by mouth    Meenakshi Martinez MD   Multiple Vitamins-Minerals (STRESS B-COMPLEX/C/ZINC) TABS TAKE 1 TABLET BY MOUTH ONCE DAILY 5/1/22   Meenakshi Martinez MD   loratadine (CLARITIN) 10 MG tablet TAKE 1 TABLET BY MOUTH

## 2024-05-01 NOTE — PROGRESS NOTES
[facility] - Inpatient Rehabilitation Department   Phone: [phone]    Occupational Therapy    [] Initial Evaluation            [x] Daily Treatment Note         [] Discharge Summary      Patient: Sonja Mcmahan   : 1950   MRN: [id number]   Date of Service:  2024    Admitting Diagnosis:  Hiatal hernia  Current Admission Summary: Admitted for abdominal pain. Imaging indicative of hiatal hernia and cholelithiasis. EGD on  showed polyps with hiatal hernia. Esophagus dilated at that time. To OR  for below:  1. Laparoscopic hiatal hernia repair with primary closure and [place] Bio-A mesh reinforcement.  2. Laparoscopic cholecystectomy.  3. Open hemigastrectomy with gastrojejunostomy and closure of duodenal stump and overlying omental flap for duodenal stump reinforcement.  Past Medical History:  has a past medical history of Asthma, Diabetes mellitus (HCC), Hyperlipidemia, and Hypertension.  Past Surgical History:  has a past surgical history that includes Hysterectomy, total abdominal; Elbow surgery; Upper gastrointestinal endoscopy (N/A, 2024); Upper gastrointestinal endoscopy (N/A, 2024); Upper gastrointestinal endoscopy (N/A, 2024); hiatal hernia repair (N/A, 2024); Cholecystectomy, laparoscopic (N/A, 2024); and gastrectomy (N/A, 2024).    Discharge Recommendations: Sonja Mcmahan scored a 17/24 on the AM-PAC ADL Inpatient form. Current research shows that an AM-PAC score of 18 or greater is typically associated with a discharge to the patient's home setting. Based on the patient's AM-PAC score, and their current ADL deficits, it is recommended that the patient have 2-3 sessions per week of Occupational Therapy at d/c to increase the patient's independence.  At this time, this patient demonstrates the endurance and safety to discharge home with home services (home vs OP services) and a follow up treatment frequency of 2-3x/wk.   Please see assessment

## 2024-05-01 NOTE — PROGRESS NOTES
[facility] - Inpatient Rehabilitation Department   Phone: [phone]    Physical Therapy    [] Initial Evaluation            [x] Daily Treatment Note         [] Discharge Summary      Patient: Sonja Mcmahan   : 1950   MRN: [id number]   Date of Service:  2024  Admitting Diagnosis: Hiatal hernia  Current Admission Summary:  Admitted for abdominal pain on 4/15. Imaging indicative of hiatal hernia and cholelithiasis. EGD on  showed polyps with hiatal hernia. Esophagus dilated at that time. To OR  for below:  1. Laparoscopic hiatal hernia repair with primary closure and [place] Bio-A mesh reinforcement.  2. Laparoscopic cholecystectomy.  3. Open hemigastrectomy with gastrojejunostomy and closure of duodenal stump and overlying omental flap for duodenal stump reinforcement.  Past Medical History:  has a past medical history of Asthma, Diabetes mellitus (HCC), Hyperlipidemia, and Hypertension.  Past Surgical History:  has a past surgical history that includes Hysterectomy, total abdominal; Elbow surgery; Upper gastrointestinal endoscopy (N/A, 2024); Upper gastrointestinal endoscopy (N/A, 2024); Upper gastrointestinal endoscopy (N/A, 2024); hiatal hernia repair (N/A, 2024); Cholecystectomy, laparoscopic (N/A, 2024); and gastrectomy (N/A, 2024).    Discharge Recommendations: Sonja Mcmahan scored a 18/24 on the AM-PAC short mobility form. Current research shows that an AM-PAC score of 18 or greater is typically associated with a discharge to the patient's home setting. Based on the patient's AM-PAC score and their current functional mobility deficits, it is recommended that the patient have 2-3 sessions per week of Physical Therapy at d/c to increase the patient's independence.  At this time, this patient demonstrates the endurance and safety to discharge home with home PT services and a follow up treatment frequency of 2-3x/wk.  Please see assessment section  for further patient specific details.    If patient discharges prior to next session this note will serve as a discharge summary.  Please see below for the latest assessment towards goals.     HOME HEALTH CARE: LEVEL 1 STANDARD    - Initial home health evaluation to occur within 24-48 hours, in patient home   - Therapy to evaluate with goal of regaining prior level of functioning   - Therapy to evaluate if patient has Home Health Aide needs for personal care    DME Required For Discharge:  raised toilet seat with armrests (for pt's home, has this at her daughter's house where she sometimes stays)    Precautions/Restrictions: high fall risk, NPO except ice chips and sips with meds  Weight Bearing Restrictions: no restrictions    Required Braces/Orthotics: no braces required  Positional Restrictions:no positional restrictions    Pre-Admission Information   Lives With: alone    Type of Home:  Duplex  Home Layout: one level  Home Access: level entry  Bathroom Layout: tub/shower unit  Bathroom Equipment: grab bars in shower, shower chair  Toilet Height: standard height - able to push up from sink  Home Equipment: rolling walker, rollator - 4 wheeled walker, single point cane, reacher   Family is borrowing a lift chair from a friend for this recovery  Transfer Assistance: Independent without use of device  Ambulation Assistance: Independent without use of device  ADL Assistance: independent with all ADL's  IADL Assistance: independent with homemaking tasks  Active :        [x] Yes  [] No  Hand Dominance: [] Left  [x] Right  Current Employment: retired.  Occupation: Home health aid  Hobbies: Read, shopping  Recent Falls: No falls in last 6 months    Examination   Vision:   Vision Corrective Device: wears glasses at all times - bifocals  Hearing:   WFL      Subjective  General: Patient semi-reclined in bed upon therapist arrival. Pt notes having already been to and from her bed to the recliner multiple times today and

## 2024-05-02 ENCOUNTER — APPOINTMENT (OUTPATIENT)
Dept: CT IMAGING | Age: 74
DRG: 326 | End: 2024-05-02
Payer: MEDICARE

## 2024-05-02 LAB
ANION GAP SERPL CALCULATED.3IONS-SCNC: 10 MMOL/L (ref 3–16)
ANISOCYTOSIS BLD QL SMEAR: ABNORMAL
BACTERIA URNS QL MICRO: ABNORMAL /HPF
BASOPHILS # BLD: 0 K/UL (ref 0–0.2)
BASOPHILS NFR BLD: 0 %
BILIRUB UR QL STRIP.AUTO: ABNORMAL
BUN SERPL-MCNC: 11 MG/DL (ref 7–20)
CALCIUM SERPL-MCNC: 7.8 MG/DL (ref 8.3–10.6)
CHLORIDE SERPL-SCNC: 92 MMOL/L (ref 99–110)
CLARITY UR: ABNORMAL
CO2 SERPL-SCNC: 27 MMOL/L (ref 21–32)
COLOR UR: ABNORMAL
CREAT SERPL-MCNC: 1.2 MG/DL (ref 0.6–1.2)
DEPRECATED RDW RBC AUTO: 15.5 % (ref 12.4–15.4)
EOSINOPHIL # BLD: 0 K/UL (ref 0–0.6)
EOSINOPHIL NFR BLD: 0 %
EPI CELLS #/AREA URNS HPF: ABNORMAL /HPF (ref 0–5)
GFR SERPLBLD CREATININE-BSD FMLA CKD-EPI: 47 ML/MIN/{1.73_M2}
GLUCOSE BLD-MCNC: 103 MG/DL (ref 70–99)
GLUCOSE BLD-MCNC: 108 MG/DL (ref 70–99)
GLUCOSE BLD-MCNC: 146 MG/DL (ref 70–99)
GLUCOSE SERPL-MCNC: 148 MG/DL (ref 70–99)
GLUCOSE UR STRIP.AUTO-MCNC: NEGATIVE MG/DL
HCT VFR BLD AUTO: 27 % (ref 36–48)
HGB BLD-MCNC: 8.6 G/DL (ref 12–16)
HGB UR QL STRIP.AUTO: NEGATIVE
HYALINE CASTS #/AREA URNS LPF: ABNORMAL /LPF (ref 0–2)
IRON SATN MFR SERPL: 18 % (ref 15–50)
IRON SERPL-MCNC: 22 UG/DL (ref 37–145)
KETONES UR STRIP.AUTO-MCNC: NEGATIVE MG/DL
LEUKOCYTE ESTERASE UR QL STRIP.AUTO: ABNORMAL
LYMPHOCYTES # BLD: 1.2 K/UL (ref 1–5.1)
LYMPHOCYTES NFR BLD: 7 %
MAGNESIUM SERPL-MCNC: 1.6 MG/DL (ref 1.8–2.4)
MCH RBC QN AUTO: 27 PG (ref 26–34)
MCHC RBC AUTO-ENTMCNC: 31.8 G/DL (ref 31–36)
MCV RBC AUTO: 84.9 FL (ref 80–100)
MONOCYTES # BLD: 1.6 K/UL (ref 0–1.3)
MONOCYTES NFR BLD: 10 %
NEUTROPHILS # BLD: 12.7 K/UL (ref 1.7–7.7)
NEUTROPHILS NFR BLD: 81 %
NEUTS BAND NFR BLD MANUAL: 1 % (ref 0–7)
NITRITE UR QL STRIP.AUTO: NEGATIVE
NT-PROBNP SERPL-MCNC: 2490 PG/ML (ref 0–449)
PERFORMED ON: ABNORMAL
PH UR STRIP.AUTO: 5.5 [PH] (ref 5–8)
PHOSPHATE SERPL-MCNC: 3.5 MG/DL (ref 2.5–4.9)
PLATELET # BLD AUTO: 214 K/UL (ref 135–450)
PLATELET BLD QL SMEAR: ADEQUATE
PMV BLD AUTO: 8 FL (ref 5–10.5)
POLYCHROMASIA BLD QL SMEAR: ABNORMAL
POTASSIUM SERPL-SCNC: 3.8 MMOL/L (ref 3.5–5.1)
PROT UR STRIP.AUTO-MCNC: ABNORMAL MG/DL
RBC # BLD AUTO: 3.18 M/UL (ref 4–5.2)
SLIDE REVIEW: ABNORMAL
SODIUM SERPL-SCNC: 129 MMOL/L (ref 136–145)
SP GR UR STRIP.AUTO: 1.01 (ref 1–1.03)
TIBC SERPL-MCNC: 120 UG/DL (ref 260–445)
TRANSFERRIN SERPL-MCNC: 93 MG/DL (ref 200–360)
UA COMPLETE W REFLEX CULTURE PNL UR: ABNORMAL
UA DIPSTICK W REFLEX MICRO PNL UR: YES
URN SPEC COLLECT METH UR: ABNORMAL
UROBILINOGEN UR STRIP-ACNC: 1 E.U./DL
VARIANT LYMPHS NFR BLD MANUAL: 1 % (ref 0–6)
WBC # BLD AUTO: 15.5 K/UL (ref 4–11)
WBC #/AREA URNS HPF: ABNORMAL /HPF (ref 0–5)

## 2024-05-02 PROCEDURE — 51798 US URINE CAPACITY MEASURE: CPT

## 2024-05-02 PROCEDURE — 81001 URINALYSIS AUTO W/SCOPE: CPT

## 2024-05-02 PROCEDURE — 2580000003 HC RX 258: Performed by: NURSE PRACTITIONER

## 2024-05-02 PROCEDURE — 83540 ASSAY OF IRON: CPT

## 2024-05-02 PROCEDURE — 6370000000 HC RX 637 (ALT 250 FOR IP): Performed by: SURGERY

## 2024-05-02 PROCEDURE — 1200000000 HC SEMI PRIVATE

## 2024-05-02 PROCEDURE — APPSS15 APP SPLIT SHARED TIME 0-15 MINUTES: Performed by: NURSE PRACTITIONER

## 2024-05-02 PROCEDURE — 83880 ASSAY OF NATRIURETIC PEPTIDE: CPT

## 2024-05-02 PROCEDURE — 82728 ASSAY OF FERRITIN: CPT

## 2024-05-02 PROCEDURE — 6360000002 HC RX W HCPCS: Performed by: NURSE PRACTITIONER

## 2024-05-02 PROCEDURE — 84100 ASSAY OF PHOSPHORUS: CPT

## 2024-05-02 PROCEDURE — 36415 COLL VENOUS BLD VENIPUNCTURE: CPT

## 2024-05-02 PROCEDURE — 6360000004 HC RX CONTRAST MEDICATION

## 2024-05-02 PROCEDURE — 6360000004 HC RX CONTRAST MEDICATION: Performed by: INTERNAL MEDICINE

## 2024-05-02 PROCEDURE — 80048 BASIC METABOLIC PNL TOTAL CA: CPT

## 2024-05-02 PROCEDURE — 84466 ASSAY OF TRANSFERRIN: CPT

## 2024-05-02 PROCEDURE — 99232 SBSQ HOSP IP/OBS MODERATE 35: CPT

## 2024-05-02 PROCEDURE — 74177 CT ABD & PELVIS W/CONTRAST: CPT

## 2024-05-02 PROCEDURE — 2580000003 HC RX 258: Performed by: SURGERY

## 2024-05-02 PROCEDURE — 6370000000 HC RX 637 (ALT 250 FOR IP): Performed by: NURSE PRACTITIONER

## 2024-05-02 PROCEDURE — APPNB30 APP NON BILLABLE TIME 0-30 MINS: Performed by: NURSE PRACTITIONER

## 2024-05-02 PROCEDURE — 99024 POSTOP FOLLOW-UP VISIT: CPT | Performed by: SURGERY

## 2024-05-02 PROCEDURE — C9113 INJ PANTOPRAZOLE SODIUM, VIA: HCPCS | Performed by: NURSE PRACTITIONER

## 2024-05-02 PROCEDURE — 71250 CT THORAX DX C-: CPT

## 2024-05-02 PROCEDURE — 2580000003 HC RX 258: Performed by: INTERNAL MEDICINE

## 2024-05-02 PROCEDURE — 85025 COMPLETE CBC W/AUTO DIFF WBC: CPT

## 2024-05-02 PROCEDURE — 6360000002 HC RX W HCPCS: Performed by: INTERNAL MEDICINE

## 2024-05-02 PROCEDURE — 83735 ASSAY OF MAGNESIUM: CPT

## 2024-05-02 RX ORDER — 0.9 % SODIUM CHLORIDE 0.9 %
500 INTRAVENOUS SOLUTION INTRAVENOUS ONCE
Status: COMPLETED | OUTPATIENT
Start: 2024-05-02 | End: 2024-05-02

## 2024-05-02 RX ORDER — MAGNESIUM SULFATE IN WATER 40 MG/ML
2000 INJECTION, SOLUTION INTRAVENOUS ONCE
Status: COMPLETED | OUTPATIENT
Start: 2024-05-02 | End: 2024-05-02

## 2024-05-02 RX ORDER — MIDODRINE HYDROCHLORIDE 5 MG/1
5 TABLET ORAL ONCE
Status: COMPLETED | OUTPATIENT
Start: 2024-05-02 | End: 2024-05-02

## 2024-05-02 RX ORDER — 0.9 % SODIUM CHLORIDE 0.9 %
1000 INTRAVENOUS SOLUTION INTRAVENOUS ONCE
Status: COMPLETED | OUTPATIENT
Start: 2024-05-02 | End: 2024-05-02

## 2024-05-02 RX ADMIN — Medication 10 ML: at 14:57

## 2024-05-02 RX ADMIN — Medication 10 ML: at 19:52

## 2024-05-02 RX ADMIN — DIATRIZOATE MEGLUMINE AND DIATRIZOATE SODIUM: 660; 100 LIQUID ORAL; RECTAL at 14:45

## 2024-05-02 RX ADMIN — MIDODRINE HYDROCHLORIDE 5 MG: 5 TABLET ORAL at 03:01

## 2024-05-02 RX ADMIN — OXYCODONE HYDROCHLORIDE 5 MG: 5 TABLET ORAL at 01:33

## 2024-05-02 RX ADMIN — SODIUM CHLORIDE 1000 ML: 9 INJECTION, SOLUTION INTRAVENOUS at 17:04

## 2024-05-02 RX ADMIN — PANTOPRAZOLE SODIUM 40 MG: 40 INJECTION, POWDER, FOR SOLUTION INTRAVENOUS at 15:02

## 2024-05-02 RX ADMIN — SODIUM CHLORIDE 500 ML: 9 INJECTION, SOLUTION INTRAVENOUS at 19:50

## 2024-05-02 RX ADMIN — MONTELUKAST SODIUM 10 MG: 10 TABLET, FILM COATED ORAL at 20:49

## 2024-05-02 RX ADMIN — Medication 10 ML: at 14:58

## 2024-05-02 RX ADMIN — MAGNESIUM SULFATE HEPTAHYDRATE 2000 MG: 40 INJECTION, SOLUTION INTRAVENOUS at 14:57

## 2024-05-02 RX ADMIN — FUROSEMIDE 40 MG: 10 INJECTION, SOLUTION INTRAMUSCULAR; INTRAVENOUS at 00:32

## 2024-05-02 RX ADMIN — SODIUM CHLORIDE: 9 INJECTION, SOLUTION INTRAVENOUS at 14:56

## 2024-05-02 RX ADMIN — PIPERACILLIN AND TAZOBACTAM 3375 MG: 3; .375 INJECTION, POWDER, LYOPHILIZED, FOR SOLUTION INTRAVENOUS at 17:26

## 2024-05-02 RX ADMIN — IOPAMIDOL 75 ML: 755 INJECTION, SOLUTION INTRAVENOUS at 12:38

## 2024-05-02 RX ADMIN — ENOXAPARIN SODIUM 40 MG: 100 INJECTION SUBCUTANEOUS at 15:01

## 2024-05-02 RX ADMIN — ATORVASTATIN CALCIUM 20 MG: 20 TABLET, FILM COATED ORAL at 20:49

## 2024-05-02 ASSESSMENT — PAIN DESCRIPTION - ORIENTATION
ORIENTATION: RIGHT;LEFT
ORIENTATION: LOWER;MID

## 2024-05-02 ASSESSMENT — PAIN SCALES - GENERAL
PAINLEVEL_OUTOF10: 7
PAINLEVEL_OUTOF10: 0
PAINLEVEL_OUTOF10: 7

## 2024-05-02 ASSESSMENT — PAIN DESCRIPTION - LOCATION
LOCATION: BACK
LOCATION: BACK;LEG

## 2024-05-02 ASSESSMENT — PAIN SCALES - WONG BAKER
WONGBAKER_NUMERICALRESPONSE: NO HURT
WONGBAKER_NUMERICALRESPONSE: NO HURT

## 2024-05-02 NOTE — CONSULTS
[facility]    Consult Note      Requesting Physician:  Dr. Mack    CHIEF COMPLAINT:  abdominal pain      HISTORY OF PRESENT ILLNESS:    Ms. Mcmahan  is a 73 y.o. female we are seeing in consultation for Gastric cancer      ICD-10-CM    1. Hiatal hernia  K44.9       2. Bacteriuria  R82.71       3. Gall stones  K80.20       4. Generalized abdominal pain  R10.84 Surgical Pathology     Surgical Pathology     Surgical Pathology     Surgical Pathology         73-year-old lady with past medical history significant for asthma, hyperlipidemia, hypertension.    She presented to the hospital on 4/15/2024 with abdominal pain and was noted to have large hiatal hernia.  CT abdomen and pelvis showed large hiatal hernia, distended gallbladder with gallstones.  She was evaluated by gastroenterologist.  EGD was performed on 4/18/2024.  Antral mass was noted which was biopsied which showed high-grade dysplasia.    The patient was then evaluated by general surgeon.  The patient underwent laparoscopic hiatal hernia repair, cholecystectomy, hemigastrectomy with gastrojejunostomy and lymph node dissection on 4/22/2024.    The patient is recovering from this surgery    The pathology showed gastric adenocarcinoma arising from adenoma, resection margins were negative for dysplasia or malignancy and no metastasis were noted in 16 lymph nodes.    Prior to presentation, the patient was independent.  She did not have any anorexia nausea vomiting or weight loss.  No chest pain or shortness of breath.    No constipation diarrhea  No swelling in the legs.    Past Medical History:  Past Medical History:   Diagnosis Date    Asthma     Diabetes mellitus (HCC)     Hyperlipidemia     Hypertension        Past Surgical History:  Past Surgical History:   Procedure Laterality Date    CHOLECYSTECTOMY, LAPAROSCOPIC N/A  EXAMINATION:  ONE XRAY VIEW OF THE CHEST    5/1/2024 10:40 pm    COMPARISON:  CT 04/28/2024.  Chest radiograph 04/25/2024.    HISTORY:  ORDERING SYSTEM PROVIDED HISTORY: hypoxia  TECHNOLOGIST PROVIDED HISTORY:  Reason for exam:->hypoxia  Reason for Exam: hypoxia    FINDINGS:  Right PICC line terminates at the level of the distal SVC.The cardiac and  mediastinal contours appear unchanged. Vascular congestion, mild basilar  opacities and small effusions again noted.  No new airspace disease  identified in the interval.  No evidence for pneumothorax.  Impression: No significant interval change.      Problem List  Patient Active Problem List   Diagnosis    Gastroesophageal reflux disease with esophagitis    Benign essential HTN    Hyperlipidemia    Sinus tachycardia    Heart murmur, systolic    Hiatal hernia    Obesity    Diverticulosis    History of hysterectomy    Intractable abdominal pain    Gall stones    Calculus of gallbladder with chronic cholecystitis without obstruction    Gastric mass    Mild malnutrition (HCC)    SVT (supraventricular tachycardia) (HCC)    LBBB (left bundle branch block)    PAT (paroxysmal atrial tachycardia) (HCC)       IMPRESSION/RECOMMENDATIONS:    73-year-old lady with history of hypertension, hyperlipidemia, asthma who presented to the hospital with abdominal pain and was noted to have large hiatal hernia, cholelithiasis with possible acute cholecystitis.  EGD done in mid April 2024 showed antral nodule with high-grade dysplasia, malignancy could not be ruled out.    The patient underwent laparoscopic cholecystectomy, hiatal hernia repair, partial gastrectomy with lymph node dissection by Dr. Jackson on 4/22/2024.  The pathology showed    1.  Gastric adenocarcinoma:    -G1 well-differentiated  -pT1b pN0 M0 -stage I.    -Patient had R0 resection  -No lymphovascular invasion was noted  -16 lymph nodes were negative    Patient does not need adjuvant treatment  -She will be monitored as per

## 2024-05-02 NOTE — PROGRESS NOTES
Physical Therapy  SALMA for testing. Will follow-up another day as schedule permits.   Thanks, Marguerite Nieto PT, DPT [id number]

## 2024-05-02 NOTE — PROGRESS NOTES
[facility]        Progress Note    Patient Name: Sonja Mcmahan  MRN: [id number]  YOB: 1950  Date of Evaluation: 2024    Postoperative Day #10    Subjective:  No acute events overnight  Pain controlled  Reports isolated episode of nausea or vomiting overnight, otherwise had been tolerating clear liquids  Passing flatus, and reports small stool  Urinary retention with Mclean catheter placement this morning  Resting in bed at this time      Vital Signs:  Patient Vitals for the past 24 hrs:   BP Temp Temp src Pulse Resp SpO2 Height   24 1202 112/65 98 °F (36.7 °C) Oral 88 18 96 % --   24 0945 -- -- -- -- -- -- 1.6 m (5' 3\")   24 0815 117/69 97.7 °F (36.5 °C) Oral 87 18 95 % --   24 0515 100/65 -- -- 72 18 95 % --   24 0315 94/61 -- -- 77 18 -- --   24 0300 (!) 74/48 -- -- -- -- -- --   24 0133 (!) 89/58 -- -- 82 18 92 % --   24 0045 109/63 98.2 °F (36.8 °C) Oral 86 20 95 % --   24 0015 (!) 96/55 -- -- 83 18 93 % --   24 2132 112/61 99.2 °F (37.3 °C) Oral (!) 132 20 91 % --   24 1606 124/78 98.7 °F (37.1 °C) Oral 97 20 -- --        TEMPERATURE HISTORY 24H: Temp (24hrs), Av.4 °F (36.9 °C), Min:97.7 °F (36.5 °C), Max:99.2 °F (37.3 °C)    BLOOD PRESSURE HISTORY: Systolic (36hrs), Av , Min:74 , Max:124    Diastolic (36hrs), Av, Min:48, Max:78      Intake/Output:  I/O last 3 completed shifts:  In: 2941.8 [P.O.:960; I.V.:1849.9; IV Piggyback:132]  Out: 450 [Urine:450]  I/O this shift:  In: -   Out: 168 [Urine:168]  Drain/tube Output:       Physical Exam:  General: awake, alert, oriented to person, place, time  Lungs: unlabored respirations, congested cough  Abdomen: soft, non-distended, incisional tenderness only, bowel sounds hypoactive  Skin/Wound: healing well, no drainage, no erythema, well approximated with staples    Labs:  CBC:    Recent Labs     24  0612 24  0515  bolus **OR** dextrose bolus, glucagon (rDNA), dextrose, LORazepam, sodium chloride flush, sodium chloride, potassium chloride **OR** potassium alternative oral replacement **OR** potassium chloride, magnesium sulfate, ondansetron **OR** ondansetron, polyethylene glycol, acetaminophen **OR** acetaminophen, oxyCODONE      Assessment:  73 y.o. female admitted with   1. Hiatal hernia    2. Bacteriuria    3. Gall stones    4. Generalized abdominal pain        OR Date 4/22/2024, laparoscopic hiatal hernia repair with primary closure and [employer] mesh reinforcement, laparoscopic cholecystectomy, open hemigastrectomy with gastrojejunostomy and closure of duodenal stump and overlying omental flap for duodenal stump reinforcement for hiatal hernia sliding with paraesophageal component, GERD, cholelithiasis with chronic cholecystitis, gastric mass with high-grade dysplasia, possible adenocarcinoma--final pathology reveals adenocarcinoma  Urinary tract infection  Hypertension  Moderate aortic stenosis, bicuspid valve      Plan:  1. Pain controlled, incisional tenderness only on exam, incision healing well, reports isolated episode of nausea or vomiting overnight, otherwise had been tolerating clear liquids, passing flatus, and reports small stool, urinary retention with Mclean catheter placement this morning, low-grade fever up to 99.2 overnight with tachycardia, vitals otherwise stable, increasing leukocytosis, creatinine increased; continued supportive care, check CT chest/abdomen/pelvis with PO/IV contrast, give 1 liter NS bolus  2. Hold at clear liquid diet with supplements as tolerated; monitor bowel function  3. IV hydration until PO intake is adequate; monitor and correct electrolytes  4. Activity as tolerated, ambulate TID, up to chair for meals--PT/OT following  5. Pulmonary toilet, incentive spirometry  6. PRN analgesics and antiemetics--minimizing narcotics as tolerated  7. DVT prophylaxis with  Lovenox and

## 2024-05-02 NOTE — PROGRESS NOTES
Nutrition Note    RECOMMENDATIONS  PO Diet: Advance diet as tolerated to regular   ONS: Change ONS to Ensure Plus High Protein when diet is advanced   Nutrition Support: If pt does not tolerate diet advancement, recommend initiation of TPN; RD remains available to provide recommendations     ASSESSMENT   Pt triggered for follow-up. NG tube removed 4/30. Started on clear liquid diet yesterday after NPO x 9 days with two documented meal intakes each of 51-75%. Ensure Clear ordered TID. RD will monitor for diet advancement/tolerance with adequate po intake.     Malnutrition Status: Mild malnutrition  Acute Illness  Findings of the 6 clinical characteristics of malnutrition:  Energy Intake:  75% or less of estimated energy requirements for 7 or more days  Weight Loss:  No significant weight loss     Body Fat Loss:  No significant body fat loss     Muscle Mass Loss:  No significant muscle mass loss    Fluid Accumulation:  Mild Extremities   Strength:  Not Performed    NUTRITION DIAGNOSIS   Inadequate protein-energy intake related to altered GI function as evidenced by NPO or clear liquid status due to medical condition    Goals: PO intake 50% or greater, by next RD assessment, other (specify) (diet advancement/tolerance)     NUTRITION RELATED FINDINGS  Objective: Na 129, Mg 1.60. LBM 4/27. Non-pitting generalized, BLE; +1 BUE edema.  Wounds: Surgical Incision    CURRENT NUTRITION THERAPIES  ADULT DIET; Clear Liquid  ADULT ORAL NUTRITION SUPPLEMENT; Breakfast, Lunch, Dinner; Clear Liquid Oral Supplement     PO Intake: 51-75%   PO Supplement Intake:Unable to assess    ANTHROPOMETRICS  Current Height: 160 cm (5' 3\")  Current Weight - Scale: 86.5 kg (190 lb 11.2 oz)    Ideal Body Weight (IBW): 115 lbs  (52 kg)        BMI: 33.7    COMPARATIVE STANDARDS  Total Energy Requirements (kcals/day): 1862     Protein (g):         Fluid (mL/day):  1862    EDUCATION  Education not indicated     The patient will be monitored

## 2024-05-02 NOTE — PROGRESS NOTES
SLP Attempt  Sonja Mcmahan  1950    Speech Therapy/Clinical Swallow Evaluation order received. Per nurse report Pt is currently held NPO for pending procedures and is unable to be seen at this time. Will re-attempt at a later time as schedule allows.    Karen Edouard [facility]-SLP#[phone]

## 2024-05-02 NOTE — PROGRESS NOTES
[facility]   Electrophysiology   Date: 5/2/2024  Reason for Follow up: PAT  Chief Complaint   Patient presents with    Abdominal Pain     Pt here with daughter, per daughter pt was seen at GI doctor today (Dr. Marin) and told pt to go to ER for direct admit for hiatal hernia, also per daughter, states that \"the top of her stomach is twisted\" pt aslo c/o SOB        HPI: Sonja Mcmahan is a 73 y.o. female who has a past medical history of Hiatal hernia, DM, HTN and obesity who  presented to hospital with c/o abdominal pain and dyspnea. She had been unable to tolerate PO intake. She was sent to emergency room by her GI doctor for evaluation.      EGD on 4/18/2024- large hiatal hernia  4/22/2024- laparoscopic hiatal hernia repair- NGT in place      Cardiology was consulted on 4/29  for PAT- The runs are short and she is asymptomatic   Per chart review- patient had similar episodes on 4/28. CTA of chest ruled out PE. Dopplers of bilateral upper extremities were negative for DVT.    Patient seen at bedside. She's comfortable and feels about the same as yesterday. New oncology consult today as tissue from her gastrectomy is concerning for adenocarcinoma. Heart reate is controlled this morning in the 80's. She did have some episodes of hypotension overnight, Metoprolol was decreased to 12.5 by hospitalist. She states she did feel weak overnight and was \"unable to get out of bed\". he continues to have runs of PAT, they are short and she is asymptomatic. Appropriate to keep BB at lower dose given hypotension. Denies chest pain, SOB or palpitations at the time of my visit.       Weight: 190 lb    Assessment and Plan:     PAT  Resting HR controlled in the 80's  Metoprolol decreased to 12.5 d/t hypotension overnight   Continues to have short runs of PAT on telemetry- asymptomatic   Monitor and replace electrolytes as needed     Hernia Repair  Surgery following   Oncology consult for gastric tissue  0842  Last data filed at 5/2/2024 0250  Gross per 24 hour   Intake 2941.84 ml   Output 450 ml   Net 2491.84 ml       Constitutional: Oriented. No distress.   Cardiovascular: Sinus rhythm, runs of PAT, regular rhythm, S1&S2.    Pulmonary/Chest: Bilateral respiratory sounds. No rhonchi.    Abdominal: Soft. No tenderness   Musculoskeletal: No edema    Neurological: Alert and oriented. Follows command    Active Hospital Problems    Diagnosis Date Noted    Sinus tachycardia [R00.0] 06/28/2022     Priority: Medium    PAT (paroxysmal atrial tachycardia) ([facility]) [I47.19] 04/30/2024    Mild malnutrition (HCC) [E44.1] 04/29/2024    SVT (supraventricular tachycardia) ([facility]) [I47.10] 04/29/2024    LBBB (left bundle branch block) [I44.7] 04/29/2024    Calculus of gallbladder with chronic cholecystitis without obstruction [K80.10] 04/22/2024    Gastric mass [K31.89] 04/22/2024    Gall stones [K80.20] 04/18/2024    Intractable abdominal pain [R10.9] 04/16/2024    Hiatal hernia [K44.9] 04/15/2024    Obesity [E66.9] 04/15/2024    Diverticulosis [K57.90] 04/15/2024    History of hysterectomy [Z90.710] 04/15/2024    Hyperlipidemia [E78.5] 02/07/2022    Gastroesophageal reflux disease with esophagitis [K21.00] 09/12/2016    Benign essential HTN [I10] 04/07/2016       Scheduled Meds:   magnesium sulfate  2,000 mg IntraVENous Once    sodium chloride  500 mL IntraVENous Once    metoprolol tartrate  12.5 mg Oral BID    metoclopramide  10 mg IntraVENous Q6H    lidocaine 1 % injection  5 mL IntraDERmal Once    sodium chloride flush  5-40 mL IntraVENous 2 times per day    pantoprazole  40 mg IntraVENous Daily    enoxaparin  40 mg SubCUTAneous Daily    insulin lispro  0-4 Units SubCUTAneous TID WC    insulin lispro  0-4 Units SubCUTAneous Nightly    montelukast  10 mg Oral Nightly    Virt-Caps  1 capsule Oral Daily    sodium chloride flush  5-40 mL IntraVENous 2 times per day    atorvastatin  20 mg Oral Nightly     Continuous Infusions:   sodium

## 2024-05-02 NOTE — PLAN OF CARE
Problem: Pain  Goal: Verbalizes/displays adequate comfort level or baseline comfort level  Outcome: Progressing     Problem: Chronic Conditions and Co-morbidities  Goal: Patient's chronic conditions and co-morbidity symptoms are monitored and maintained or improved  Outcome: Progressing     Problem: Nutrition Deficit:  Goal: Optimize nutritional status  Outcome: Progressing  Flowsheets (Taken 5/2/2024 7951 by Flaquita Rey, MS, RD, LD)  Nutrient intake appropriate for improving, restoring, or maintaining nutritional needs:   Recommend appropriate diets, oral nutritional supplements, and vitamin/mineral supplements   Monitor oral intake, labs, and treatment plans     Problem: Discharge Planning  Goal: Discharge to home or other facility with appropriate resources  Outcome: Progressing     Problem: Safety - Adult  Goal: Free from fall injury  Outcome: Progressing     Problem: Cardiovascular - Adult  Goal: Maintains optimal cardiac output and hemodynamic stability  Outcome: Progressing     Problem: Skin/Tissue Integrity - Adult  Goal: Incisions, wounds, or drain sites healing without S/S of infection  Outcome: Progressing     Problem: Musculoskeletal - Adult  Goal: Return mobility to safest level of function  Outcome: Progressing  Goal: Return ADL status to a safe level of function  Outcome: Progressing     Problem: Gastrointestinal - Adult  Goal: Minimal or absence of nausea and vomiting  Outcome: Progressing  Goal: Maintains or returns to baseline bowel function  Outcome: Progressing  Goal: Maintains adequate nutritional intake  Outcome: Progressing     Problem: Respiratory - Adult  Goal: Achieves optimal ventilation and oxygenation  Outcome: Progressing     Problem: Skin/Tissue Integrity  Goal: Absence of new skin breakdown  Description: 1.  Monitor for areas of redness and/or skin breakdown  2.  Assess vascular access sites hourly  3.  Every 4-6 hours minimum:  Change oxygen saturation probe site  4.  Every 4-6  hours:  If on nasal continuous positive airway pressure, respiratory therapy assess nares and determine need for appliance change or resting period.  Outcome: Progressing

## 2024-05-02 NOTE — CONSULTS
Sonja Mcmahan  5/2/2024  [id number]    Chief Complaint: Hiatal hernia    Subjective   HPI: Sonja Mcmahan is a 73 y.o. female with PMHx notable for HTN, HLD, DM2, GERD who presented on 4/15 with dyspnea, intractable abdominal pain. Cardiology was consulted, recommended NM Stress Test which showed no reversible ischemia, low-risk. TTE stable from prior with LVEF 55-60%, grade 2 diastolic dysfunction, mild aortic stenosis, mild tricuspid regurgitation. She had EGD on 4/18, demonstrating large hiatal hernia, also had esophageal dilation performed and gastric antrum mass biopsied. On 4/22 she underwent laparoscopic hiatal hernia repair, cholecystectomy, and hemigastrectomy w/ gastrojejunostomy. 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 Oncology consulted, not recommending adjuvant treatment at this time. Hospital course complicated by: hypotension, leukocytosis, fevers, hypokalemia, PAT.    Currently patient reports that she is feeling well. Denies fevers, chills, chest pain, dyspnea, palpitations, abdominal pain. She endorses some generalized weakness. Additionally, due to ongoing medical issues she is concerned with going home directly from the hospital. She is interested in rehab in a medically supervised environment.      Past Medical History:   Diagnosis Date    Asthma     Diabetes mellitus (HCC)     Hyperlipidemia     Hypertension        Past Surgical History:   Procedure Laterality Date    CHOLECYSTECTOMY, LAPAROSCOPIC N/A 4/22/2024    LAPAROSCOPIC CHOLECYSTECTOMY performed by David Mack MD at [facility] OR    ELBOW SURGERY      GASTRECTOMY N/A 4/22/2024    HEMIGASTRECTOMY WITH RETROCOLIC RETROGASTIC GASTROJEJUNOSTOMY performed by David Mack MD at [facility] OR    HIATAL HERNIA REPAIR N/A 4/22/2024    LAPAROSCOPIC HERNIA HIATAL REPAIR performed by David Mack MD at [facility] OR    HYSTERECTOMY, TOTAL ABDOMINAL  of difficulty - A Little  Supine to Sit: Level of difficulty - A Little     Bed to Chair: Physical Assistance Required - A Little  Ambulate Across Room: Physical Assistance Required - A Little  Ascend 3-5 Steps With HR: Physical Assistance Required - A Little    OT    Eating: Physical Assistance Required - None  Grooming: Physical Assistance Required - A Little  LB Dressing: Physical Assistance Required - A Little  UB Dressing: Physical Assistance Required - A Little  Bathing: Physical Assistance Required - A Lot  Toileting: Physical Assistance Required - A Lot    SLP         Body mass index is 33.78 kg/m².       Assessment:  Patient Active Problem List   Diagnosis    Gastroesophageal reflux disease with esophagitis    Benign essential HTN    Hyperlipidemia    Sinus tachycardia    Heart murmur, systolic    Hiatal hernia    Obesity    Diverticulosis    History of hysterectomy    Intractable abdominal pain    Gall stones    Calculus of gallbladder with chronic cholecystitis without obstruction    Gastric mass    Mild malnutrition (HCC)    SVT (supraventricular tachycardia) (HCC)    LBBB (left bundle branch block)    PAT (paroxysmal atrial tachycardia) (HCC)       Plan:   Patient is likely to be good candidate for acute inpatient rehabilitation, pending completion of infectious work-up and hemodynamic stability. Please obtain updated PT/OT evals when able. PM&R will continue to follow.      Alex Rico MD 5/2/2024, 6:37 PM    * This document was created using dictation software.  While all precautions were taken to ensure accuracy, errors may have occurred.  Please disregard any typographical errors.

## 2024-05-02 NOTE — PROGRESS NOTES
Hospitalist Progress Note      PCP: Abiodun Amanda PA    Date of Admission: 4/15/2024    Chief Complaint: Hiatal hernia    Hospital Course: 73 y.o. female with a history of hiatal hernia, obesity, GERD who presented by request of GI Dr Marin after c/o dyspnea and intractable abdominal pain. Has been unable to tolerate po and patient and daughter think weight loss of unknown amount. Abdominal pain present over the past month. GI sent her to ED for abdominal imaging.   Admitted as inpatient.  Followed by Cardio, GI and Surgery.      SPECT on 4/16:     **Myocardial perfusion is normal. No reversible ischemia. Low risk study.**     EGD on 4/18:  large hh, polyp removed, esoph dilated, mid body ?torsion vs level of diaphragm, antrum ? Mass biopsied.     On 4/22/24 underwent:  LAPAROSCOPIC HERNIA HIATAL REPAIR  LAPAROSCOPIC CHOLECYSTECTOMY  HEMIGASTRECTOMY WITH GASTROJEJUNOSTOMY    PICC placed.     Had NSVT.     Complaining of BL UE swelling.  BL UE Doppler US negative for DVT and CTA Chest negative for PE.    Started on IV Lasix for fluid overload.    Vomited overnight b/w 5/1 to 5/2.  WBC worsening, CXR with possible overload and hypotensive.  Ordered CT Chest w/o contrast to r/o aspiration, CT Ab/P with PO contrast, IVF, blood cx, UCx and Mclean.    Subjective: Patient vomited overnight.  Given Lasix IV.  Hypotensive.  Still has flatus and no BM.  Minimal abdominal pain.  No CP, SOB, HA or fevers.       Medications:  Reviewed    Infusion Medications    sodium chloride      dextrose      sodium chloride Stopped (04/28/24 2133)     Scheduled Medications    magnesium sulfate  2,000 mg IntraVENous Once    sodium chloride  500 mL IntraVENous Once    metoprolol tartrate  12.5 mg Oral BID    sodium chloride  1,000 mL IntraVENous Once    metoclopramide  10 mg IntraVENous Q6H    lidocaine 1 % injection  5 mL IntraDERmal Once    sodium chloride flush  5-40 mL IntraVENous 2 times per day    pantoprazole  40 mg

## 2024-05-02 NOTE — PROGRESS NOTES
Occupational Therapy  Sonja Mcmahan     Pt off the floor for testing, will reattempt as pt is available and schedule permits    Judith Watson [facility] OTR/L [id number]

## 2024-05-03 ENCOUNTER — APPOINTMENT (OUTPATIENT)
Dept: CT IMAGING | Age: 74
DRG: 326 | End: 2024-05-03
Payer: MEDICARE

## 2024-05-03 LAB
ANION GAP SERPL CALCULATED.3IONS-SCNC: 12 MMOL/L (ref 3–16)
ANISOCYTOSIS BLD QL SMEAR: ABNORMAL
BASOPHILS # BLD: 0 K/UL (ref 0–0.2)
BASOPHILS NFR BLD: 0 %
BUN SERPL-MCNC: 11 MG/DL (ref 7–20)
CALCIUM SERPL-MCNC: 7.5 MG/DL (ref 8.3–10.6)
CHLORIDE SERPL-SCNC: 97 MMOL/L (ref 99–110)
CO2 SERPL-SCNC: 24 MMOL/L (ref 21–32)
CREAT SERPL-MCNC: 0.9 MG/DL (ref 0.6–1.2)
DEPRECATED RDW RBC AUTO: 15.6 % (ref 12.4–15.4)
EKG ATRIAL RATE: 99 BPM
EKG DIAGNOSIS: NORMAL
EKG P AXIS: 41 DEGREES
EKG P-R INTERVAL: 144 MS
EKG Q-T INTERVAL: 368 MS
EKG QRS DURATION: 122 MS
EKG QTC CALCULATION (BAZETT): 472 MS
EKG R AXIS: -6 DEGREES
EKG T AXIS: 123 DEGREES
EKG VENTRICULAR RATE: 99 BPM
EOSINOPHIL # BLD: 0 K/UL (ref 0–0.6)
EOSINOPHIL NFR BLD: 0 %
FERRITIN SERPL IA-MCNC: 565.8 NG/ML (ref 15–150)
GFR SERPLBLD CREATININE-BSD FMLA CKD-EPI: 66 ML/MIN/{1.73_M2}
GLUCOSE BLD-MCNC: 113 MG/DL (ref 70–99)
GLUCOSE BLD-MCNC: 113 MG/DL (ref 70–99)
GLUCOSE BLD-MCNC: 119 MG/DL (ref 70–99)
GLUCOSE BLD-MCNC: 126 MG/DL (ref 70–99)
GLUCOSE SERPL-MCNC: 114 MG/DL (ref 70–99)
HCT VFR BLD AUTO: 24.1 % (ref 36–48)
HGB BLD-MCNC: 8 G/DL (ref 12–16)
INR PPP: 1.27 (ref 0.85–1.15)
LYMPHOCYTES # BLD: 0.9 K/UL (ref 1–5.1)
LYMPHOCYTES NFR BLD: 8 %
MAGNESIUM SERPL-MCNC: 2.2 MG/DL (ref 1.8–2.4)
MCH RBC QN AUTO: 28.2 PG (ref 26–34)
MCHC RBC AUTO-ENTMCNC: 33.4 G/DL (ref 31–36)
MCV RBC AUTO: 84.6 FL (ref 80–100)
METAMYELOCYTES NFR BLD MANUAL: 1 %
MONOCYTES # BLD: 0.6 K/UL (ref 0–1.3)
MONOCYTES NFR BLD: 5 %
NEUTROPHILS # BLD: 10.3 K/UL (ref 1.7–7.7)
NEUTROPHILS NFR BLD: 77 %
NEUTS BAND NFR BLD MANUAL: 9 % (ref 0–7)
OVALOCYTES BLD QL SMEAR: ABNORMAL
PERFORMED ON: ABNORMAL
PHOSPHATE SERPL-MCNC: 3.5 MG/DL (ref 2.5–4.9)
PLATELET # BLD AUTO: 222 K/UL (ref 135–450)
PLATELET BLD QL SMEAR: ADEQUATE
PMV BLD AUTO: 7.7 FL (ref 5–10.5)
POLYCHROMASIA BLD QL SMEAR: ABNORMAL
POTASSIUM SERPL-SCNC: 3.4 MMOL/L (ref 3.5–5.1)
PROTHROMBIN TIME: 16.1 SEC (ref 11.9–14.9)
RBC # BLD AUTO: 2.84 M/UL (ref 4–5.2)
SLIDE REVIEW: ABNORMAL
SODIUM SERPL-SCNC: 133 MMOL/L (ref 136–145)
WBC # BLD AUTO: 11.8 K/UL (ref 4–11)

## 2024-05-03 PROCEDURE — 87205 SMEAR GRAM STAIN: CPT

## 2024-05-03 PROCEDURE — 87070 CULTURE OTHR SPECIMN AEROBIC: CPT

## 2024-05-03 PROCEDURE — 2580000003 HC RX 258: Performed by: SURGERY

## 2024-05-03 PROCEDURE — 6360000002 HC RX W HCPCS: Performed by: INTERNAL MEDICINE

## 2024-05-03 PROCEDURE — 2580000003 HC RX 258: Performed by: NURSE PRACTITIONER

## 2024-05-03 PROCEDURE — 2580000003 HC RX 258: Performed by: INTERNAL MEDICINE

## 2024-05-03 PROCEDURE — 6370000000 HC RX 637 (ALT 250 FOR IP): Performed by: SURGERY

## 2024-05-03 PROCEDURE — 2700000000 HC OXYGEN THERAPY PER DAY

## 2024-05-03 PROCEDURE — 87076 CULTURE ANAEROBE IDENT EACH: CPT

## 2024-05-03 PROCEDURE — 85610 PROTHROMBIN TIME: CPT

## 2024-05-03 PROCEDURE — 99152 MOD SED SAME PHYS/QHP 5/>YRS: CPT

## 2024-05-03 PROCEDURE — 1200000000 HC SEMI PRIVATE

## 2024-05-03 PROCEDURE — 80048 BASIC METABOLIC PNL TOTAL CA: CPT

## 2024-05-03 PROCEDURE — 92526 ORAL FUNCTION THERAPY: CPT

## 2024-05-03 PROCEDURE — 6360000002 HC RX W HCPCS: Performed by: NURSE PRACTITIONER

## 2024-05-03 PROCEDURE — 83735 ASSAY OF MAGNESIUM: CPT

## 2024-05-03 PROCEDURE — 87116 MYCOBACTERIA CULTURE: CPT

## 2024-05-03 PROCEDURE — 87075 CULTR BACTERIA EXCEPT BLOOD: CPT

## 2024-05-03 PROCEDURE — 87015 SPECIMEN INFECT AGNT CONCNTJ: CPT

## 2024-05-03 PROCEDURE — APPSS15 APP SPLIT SHARED TIME 0-15 MINUTES: Performed by: NURSE PRACTITIONER

## 2024-05-03 PROCEDURE — 0F9030Z DRAINAGE OF LIVER WITH DRAINAGE DEVICE, PERCUTANEOUS APPROACH: ICD-10-PCS | Performed by: INTERNAL MEDICINE

## 2024-05-03 PROCEDURE — APPNB30 APP NON BILLABLE TIME 0-30 MINS: Performed by: NURSE PRACTITIONER

## 2024-05-03 PROCEDURE — 6370000000 HC RX 637 (ALT 250 FOR IP): Performed by: NURSE PRACTITIONER

## 2024-05-03 PROCEDURE — 87206 SMEAR FLUORESCENT/ACID STAI: CPT

## 2024-05-03 PROCEDURE — 99024 POSTOP FOLLOW-UP VISIT: CPT | Performed by: SURGERY

## 2024-05-03 PROCEDURE — 6360000002 HC RX W HCPCS: Performed by: RADIOLOGY

## 2024-05-03 PROCEDURE — 84100 ASSAY OF PHOSPHORUS: CPT

## 2024-05-03 PROCEDURE — 87102 FUNGUS ISOLATION CULTURE: CPT

## 2024-05-03 PROCEDURE — 94761 N-INVAS EAR/PLS OXIMETRY MLT: CPT

## 2024-05-03 PROCEDURE — 87077 CULTURE AEROBIC IDENTIFY: CPT

## 2024-05-03 PROCEDURE — 93010 ELECTROCARDIOGRAM REPORT: CPT | Performed by: INTERNAL MEDICINE

## 2024-05-03 PROCEDURE — 6370000000 HC RX 637 (ALT 250 FOR IP): Performed by: INTERNAL MEDICINE

## 2024-05-03 PROCEDURE — C9113 INJ PANTOPRAZOLE SODIUM, VIA: HCPCS | Performed by: NURSE PRACTITIONER

## 2024-05-03 PROCEDURE — 87186 SC STD MICRODIL/AGAR DIL: CPT

## 2024-05-03 PROCEDURE — 92610 EVALUATE SWALLOWING FUNCTION: CPT

## 2024-05-03 PROCEDURE — 85025 COMPLETE CBC W/AUTO DIFF WBC: CPT

## 2024-05-03 RX ORDER — FENTANYL CITRATE 50 UG/ML
INJECTION, SOLUTION INTRAMUSCULAR; INTRAVENOUS PRN
Status: COMPLETED | OUTPATIENT
Start: 2024-05-03 | End: 2024-05-03

## 2024-05-03 RX ORDER — PANTOPRAZOLE SODIUM 40 MG/10ML
40 INJECTION, POWDER, LYOPHILIZED, FOR SOLUTION INTRAVENOUS 2 TIMES DAILY
Status: DISCONTINUED | OUTPATIENT
Start: 2024-05-03 | End: 2024-05-07 | Stop reason: HOSPADM

## 2024-05-03 RX ORDER — MIDAZOLAM HYDROCHLORIDE 2 MG/2ML
INJECTION, SOLUTION INTRAMUSCULAR; INTRAVENOUS PRN
Status: COMPLETED | OUTPATIENT
Start: 2024-05-03 | End: 2024-05-03

## 2024-05-03 RX ORDER — POTASSIUM CHLORIDE 7.45 MG/ML
10 INJECTION INTRAVENOUS
Status: ACTIVE | OUTPATIENT
Start: 2024-05-03 | End: 2024-05-03

## 2024-05-03 RX ADMIN — FENTANYL CITRATE 25 MCG: 50 INJECTION, SOLUTION INTRAMUSCULAR; INTRAVENOUS at 13:27

## 2024-05-03 RX ADMIN — MIDAZOLAM HYDROCHLORIDE 0.5 MG: 1 INJECTION, SOLUTION INTRAMUSCULAR; INTRAVENOUS at 13:07

## 2024-05-03 RX ADMIN — FENTANYL CITRATE 25 MCG: 50 INJECTION, SOLUTION INTRAMUSCULAR; INTRAVENOUS at 13:21

## 2024-05-03 RX ADMIN — PIPERACILLIN AND TAZOBACTAM 3375 MG: 3; .375 INJECTION, POWDER, LYOPHILIZED, FOR SOLUTION INTRAVENOUS at 11:52

## 2024-05-03 RX ADMIN — Medication 10 ML: at 20:01

## 2024-05-03 RX ADMIN — PANTOPRAZOLE SODIUM 40 MG: 40 INJECTION, POWDER, FOR SOLUTION INTRAVENOUS at 20:02

## 2024-05-03 RX ADMIN — PIPERACILLIN AND TAZOBACTAM 3375 MG: 3; .375 INJECTION, POWDER, LYOPHILIZED, FOR SOLUTION INTRAVENOUS at 20:24

## 2024-05-03 RX ADMIN — POTASSIUM BICARBONATE 40 MEQ: 782 TABLET, EFFERVESCENT ORAL at 18:27

## 2024-05-03 RX ADMIN — MONTELUKAST SODIUM 10 MG: 10 TABLET, FILM COATED ORAL at 20:01

## 2024-05-03 RX ADMIN — PANTOPRAZOLE SODIUM 40 MG: 40 INJECTION, POWDER, FOR SOLUTION INTRAVENOUS at 11:38

## 2024-05-03 RX ADMIN — ATORVASTATIN CALCIUM 20 MG: 20 TABLET, FILM COATED ORAL at 20:01

## 2024-05-03 RX ADMIN — OXYCODONE HYDROCHLORIDE 5 MG: 5 TABLET ORAL at 00:32

## 2024-05-03 RX ADMIN — Medication 10 ML: at 20:02

## 2024-05-03 RX ADMIN — OXYCODONE HYDROCHLORIDE 5 MG: 5 TABLET ORAL at 18:51

## 2024-05-03 RX ADMIN — ALUMINUM HYDROXIDE, MAGNESIUM HYDROXIDE, AND SIMETHICONE 30 ML: 200; 200; 20 SUSPENSION ORAL at 04:10

## 2024-05-03 RX ADMIN — SODIUM CHLORIDE 200 MG: 9 INJECTION, SOLUTION INTRAVENOUS at 18:26

## 2024-05-03 RX ADMIN — Medication 10 ML: at 11:53

## 2024-05-03 RX ADMIN — METOPROLOL TARTRATE 12.5 MG: 25 TABLET, FILM COATED ORAL at 11:40

## 2024-05-03 RX ADMIN — PIPERACILLIN AND TAZOBACTAM 3375 MG: 3; .375 INJECTION, POWDER, LYOPHILIZED, FOR SOLUTION INTRAVENOUS at 01:16

## 2024-05-03 RX ADMIN — METOPROLOL TARTRATE 12.5 MG: 25 TABLET, FILM COATED ORAL at 20:01

## 2024-05-03 RX ADMIN — LORAZEPAM 0.5 MG: 0.5 TABLET ORAL at 20:01

## 2024-05-03 RX ADMIN — FENTANYL CITRATE 25 MCG: 50 INJECTION, SOLUTION INTRAMUSCULAR; INTRAVENOUS at 13:25

## 2024-05-03 ASSESSMENT — PAIN DESCRIPTION - ORIENTATION
ORIENTATION: RIGHT;LEFT;MID;UPPER;LOWER
ORIENTATION: RIGHT;MID

## 2024-05-03 ASSESSMENT — PAIN SCALES - GENERAL
PAINLEVEL_OUTOF10: 7
PAINLEVEL_OUTOF10: 10
PAINLEVEL_OUTOF10: 7

## 2024-05-03 ASSESSMENT — PAIN DESCRIPTION - DESCRIPTORS
DESCRIPTORS: ACHING;THROBBING
DESCRIPTORS: DISCOMFORT

## 2024-05-03 ASSESSMENT — PAIN SCALES - WONG BAKER: WONGBAKER_NUMERICALRESPONSE: HURTS LITTLE MORE

## 2024-05-03 ASSESSMENT — PAIN DESCRIPTION - LOCATION
LOCATION: ABDOMEN
LOCATION: ABDOMEN

## 2024-05-03 NOTE — PROGRESS NOTES
ONCOLOGY HEMATOLOGY CARE PROGRESS NOTE      SUBJECTIVE:    Events noted.  Had abdominal abscess drained  No fever    ROS:         OBJECTIVE        Physical    VITALS:  Patient Vitals for the past 24 hrs:   BP Temp Temp src Pulse Resp SpO2 Weight   05/03/24 1400 (!) 151/72 98 °F (36.7 °C) Oral 75 18 95 % --   05/03/24 1349 126/74 -- -- 68 16 94 % --   05/03/24 1332 -- -- -- 79 19 99 % --   05/03/24 1330 (!) 107/49 -- -- 89 21 100 % --   05/03/24 1325 (!) 100/38 -- -- 75 19 99 % --   05/03/24 1320 (!) 99/45 -- -- 76 18 95 % --   05/03/24 1315 (!) 91/41 -- -- 72 20 92 % --   05/03/24 1310 (!) 102/35 -- -- 72 20 92 % --   05/03/24 1306 (!) 121/55 -- -- 79 18 95 % --   05/03/24 1140 107/63 97.9 °F (36.6 °C) Oral 77 18 93 % --   05/03/24 1045 109/68 98.5 °F (36.9 °C) Oral 78 18 94 % --   05/03/24 0818 -- -- -- 84 16 93 % --   05/03/24 0438 -- -- -- -- -- -- 83.6 kg (184 lb 3.2 oz)   05/03/24 0341 106/61 97.7 °F (36.5 °C) Oral 90 16 96 % --   05/03/24 0102 -- -- -- -- 18 -- --   05/02/24 2320 120/68 98.8 °F (37.1 °C) Oral (!) 102 22 99 % --   05/02/24 1930 (!) 90/54 99.1 °F (37.3 °C) Oral 100 18 99 % --   05/02/24 1717 119/71 98 °F (36.7 °C) Oral 96 18 95 % --       24HR INTAKE/OUTPUT:    Intake/Output Summary (Last 24 hours) at 5/3/2024 1442  Last data filed at 5/3/2024 1328  Gross per 24 hour   Intake 10 ml   Output 920 ml   Net -910 ml     Conscious alert and oriented.  Mild pallor was present  No icterus  Neck is supple  Abdomen mildly distended.  Midline incision with some bruises noted.  Extremities mild edema noted.  Neuro no focal deficits noted        DATA:  CBC:    Recent Labs     05/03/24  0530 05/02/24  0610 05/01/24  0515 04/30/24  0612   WBC 11.8* 15.5* 11.7* 11.9*   NEUTROABS 10.3* 12.7* 9.4* 9.6*   LYMPHOPCT 8.0 7.0 9.2 8.9   RBC 2.84* 3.18* 3.07* 3.21*   HGB 8.0* 8.6* 8.7* 9.0*   HCT 24.1* 27.0* 26.0* 27.3*   MCV 84.6 84.9 84.8 84.9   MCH 28.2 27.0 28.4 27.9   MCHC

## 2024-05-03 NOTE — PROGRESS NOTES
[facility]        Progress Note    Patient Name: Sonja Mcmahan  MRN: [id number]  YOB: 1950  Date of Evaluation: 5/3/2024    Postoperative Day #11    Subjective:  No acute events overnight  Pain controlled  No further nausea or vomiting overnight, tolerating clear liquids, still having mild reflux  Passing flatus, denies further stool  Resting in bed at this time      Vital Signs:  Patient Vitals for the past 24 hrs:   BP Temp Temp src Pulse Resp SpO2 Weight   24 1306 (!) 121/55 -- -- 79 18 95 % --   24 1140 107/63 97.9 °F (36.6 °C) Oral 77 18 93 % --   24 1045 109/68 98.5 °F (36.9 °C) Oral 78 18 94 % --   24 0818 -- -- -- 84 16 93 % --   24 0438 -- -- -- -- -- -- 83.6 kg (184 lb 3.2 oz)   24 0341 106/61 97.7 °F (36.5 °C) Oral 90 16 96 % --   24 0102 -- -- -- -- 18 -- --   24 2320 120/68 98.8 °F (37.1 °C) Oral (!) 102 22 99 % --   24 1930 (!) 90/54 99.1 °F (37.3 °C) Oral 100 18 99 % --   24 1717 119/71 98 °F (36.7 °C) Oral 96 18 95 % --   24 1430 (!) 105/58 98.8 °F (37.1 °C) Oral (!) 101 18 96 % --        TEMPERATURE HISTORY 24H: Temp (24hrs), Av.4 °F (36.9 °C), Min:97.7 °F (36.5 °C), Max:99.1 °F (37.3 °C)    BLOOD PRESSURE HISTORY: Systolic (36hrs), Av , Min:74 , Max:121    Diastolic (36hrs), Av, Min:48, Max:71      Intake/Output:  I/O last 3 completed shifts:  In: -   Out: 1518 [Urine:1518]  I/O this shift:  In: 10 [I.V.:10]  Out: -   Drain/tube Output:       Physical Exam:  General: awake, alert, oriented to person, place, time  Lungs: unlabored respirations, congested cough  Abdomen: soft, non-distended, incisional tenderness only, bowel sounds present  Skin/Wound: healing well, no drainage, no erythema, ecchymosis noted, well approximated with staples    Labs:  CBC:    Recent Labs     24  0515 24  0610 24  0530   WBC 11.7* 15.5* 11.8*   HGB 8.7* 8.6* 8.0*

## 2024-05-03 NOTE — PRE SEDATION
Sedation Pre-Procedure Note    Patient Name: Sonja Mcmahan   YOB: 1950  Room/Bed: 5TN-5568/5568-01  Medical Record Number: [id number]  Date: 5/3/2024   Time: 1:35 PM       Indication:  Abdominal abscess drainage catheter placement.    Consent: I have discussed with the patient and/or the patient representative the indication, alternatives, and the possible risks and/or complications of the planned procedure and the anesthesia methods. The patient and/or patient representative appear to understand and agree to proceed.    Vital Signs:   Vitals:    05/03/24 1332   BP:    Pulse: 79   Resp: 19   Temp:    SpO2: 99%       Past Medical History:   has a past medical history of Asthma, Diabetes mellitus (HCC), Hyperlipidemia, and Hypertension.    Past Surgical History:   has a past surgical history that includes Hysterectomy, total abdominal; Elbow surgery; Upper gastrointestinal endoscopy (N/A, 4/18/2024); Upper gastrointestinal endoscopy (N/A, 4/18/2024); Upper gastrointestinal endoscopy (N/A, 4/18/2024); hiatal hernia repair (N/A, 4/22/2024); Cholecystectomy, laparoscopic (N/A, 4/22/2024); and gastrectomy (N/A, 4/22/2024).    Medications:   Scheduled Meds:    potassium chloride  10 mEq IntraVENous Q1H    pantoprazole  40 mg IntraVENous BID    metoprolol tartrate  12.5 mg Oral BID    piperacillin-tazobactam  3,375 mg IntraVENous Q8H    lidocaine 1 % injection  5 mL IntraDERmal Once    sodium chloride flush  5-40 mL IntraVENous 2 times per day    enoxaparin  40 mg SubCUTAneous Daily    insulin lispro  0-4 Units SubCUTAneous TID WC    insulin lispro  0-4 Units SubCUTAneous Nightly    montelukast  10 mg Oral Nightly    Virt-Caps  1 capsule Oral Daily    sodium chloride flush  5-40 mL IntraVENous 2 times per day    atorvastatin  20 mg Oral Nightly     Continuous Infusions:    sodium chloride      dextrose      sodium chloride 25 mL/hr at 05/02/24 1456     PRN Meds: aluminum & magnesium hydroxide-simethicone,

## 2024-05-03 NOTE — PROGRESS NOTES
Sonja Mcmahan  5/3/2024  [id number]    Chief Complaint: Hiatal hernia    Subjective   CT A/P w/ multiple intraabdominal fluid and gas collections. Started on IV Zosyn. Scheduled today for IR guided drain placement.     Patient reports that she is doing well. Denies fevers, chills, chest pain, shortness of breath, abdominal pain.          Objective   Objective:  Patient Vitals for the past 24 hrs:   BP Temp Temp src Pulse Resp SpO2 Weight   05/03/24 1716 103/66 98.3 °F (36.8 °C) Oral 77 18 96 % --   05/03/24 1400 (!) 151/72 98 °F (36.7 °C) Oral 75 18 95 % --   05/03/24 1349 126/74 -- -- 68 16 94 % --   05/03/24 1332 -- -- -- 79 19 99 % --   05/03/24 1330 (!) 107/49 -- -- 89 21 100 % --   05/03/24 1325 (!) 100/38 -- -- 75 19 99 % --   05/03/24 1320 (!) 99/45 -- -- 76 18 95 % --   05/03/24 1315 (!) 91/41 -- -- 72 20 92 % --   05/03/24 1310 (!) 102/35 -- -- 72 20 92 % --   05/03/24 1306 (!) 121/55 -- -- 79 18 95 % --   05/03/24 1140 107/63 97.9 °F (36.6 °C) Oral 77 18 93 % --   05/03/24 1045 109/68 98.5 °F (36.9 °C) Oral 78 18 94 % --   05/03/24 0818 -- -- -- 84 16 93 % --   05/03/24 0438 -- -- -- -- -- -- 83.6 kg (184 lb 3.2 oz)   05/03/24 0341 106/61 97.7 °F (36.5 °C) Oral 90 16 96 % --   05/03/24 0102 -- -- -- -- 18 -- --   05/02/24 2320 120/68 98.8 °F (37.1 °C) Oral (!) 102 22 99 % --   05/02/24 1930 (!) 90/54 99.1 °F (37.3 °C) Oral 100 18 99 % --     Gen: No distress, pleasant.   HEENT: Normocephalic, atraumatic.   CV: No audible murmurs, well perfused extremities  Resp: No respiratory distress. No increased WOB  Abd: Soft, nontender nondistended. Abdominal incision closed with staples, c/d/i, surrounding ecchymoses.   Ext: No edema.   Neuro: Alert, oriented, appropriately interactive. 5/5 strength throughout.     Laboratory data: Available via EMR.     Therapy progress:    PT    Rolling: Level of difficulty - None   Sit to Stand from a Chair: Level of difficulty - A Little  Supine to Sit: Level of difficulty - A

## 2024-05-03 NOTE — PROGRESS NOTES
Physical/Occupational Therapy  Hold/refusal due to upcoming procedure. Will follow-up as able.   Thanks, Marguerite Nieto PT, DPT [id number]    Rosalee Tom, M/OT, OTR/L- [id number]

## 2024-05-03 NOTE — PROGRESS NOTES
Facility/Department: [address] ONCOLOGY  Speech Language Pathology  DYSPHAGIA BEDSIDE SWALLOW EVALUATION     Patient: Sonja Mcmahan   : 1950   MRN: [id number]      Evaluation Date: 5/3/2024   Admitting Diagnosis: Hiatal hernia [K44.9]  Gall stones [K80.20]  Bacteriuria [R82.71]  Intractable abdominal pain [R10.9]  Pain: Did not state                                                       H&P: Sonja Mcmahan is a 73 y.o. female with a history of hiatal hernia, obesity, GERD who presented by bequest of GI Dr Marni after c/o dyspnea and intractable abdominal pain. Has been unable to tolerate po and patient and daughter think weight loss of unknown amount. Abdominal pain present over the past month. GI sent her to ED for abdominal imaging. Patient denies vomiting, last BM unformed this am related to poor po intake. Admitted for further eval and care.  Patient and daughter endorse frustration with ED weight, request something for GI pain and that Protonix and Tylenol usually help.  States that she may leave AMA.  I recommended against this given her primary complaint.  Has capacity.     Vitals in the ED 97.4F, pulse 91, 117/93, 97 room air    Imaging:  No recent imaging    History/Prior Level of Function:   Living Status: Home  Prior Dysphagia History: None per chart   Reason for referral: SLP evaluation orders received due to dysphagia risk     Dysphagia Impressions/Diagnosis: Oropharyngeal Dysphagia   Pt was seen sitting upright in bed on 3L O2 via nasal cannula. Pt reported no difficulty with swallowing. She is currently on a clear liquid diet. Oral mechanism exam revealed largely functional strength, coordination, and ROM. Trials of thin liquids and jello were provided to assess swallow function. Adequate oral acceptance noted with effective oral clearing. Swallow initiation appeared timely with laryngeal elevation felt upon manual palpation. No overt clinical s/s of aspiration were assessed across PO  trials. At this time, recommend continuation of clear liquid diet with diet advance as indicated per MD. At this time, no further dysphagia therapy is indicated at this time.     Recommended Diet and Intervention 5/3/2024:  Diet Solids Recommendation:  Clear liquids  Liquid Consistency Recommendation:  Thin liquids  Recommended form of Meds: Meds whole with water        Compensatory Swallowing Strategies:  Alternate solids/liquids , Upright as possible with all PO intake     SHORT TERM DYSPHAGIA GOALS/PLAN OF CARE:  No further follow-up indicated     Discharge Recommendations: No further follow-up appears indicated at this time.     Patient Positioning: Upright in bed     Current Diet Level (prior to evaluation): Clear liquids  Thin liquids      Respiratory Status:   []Room Air   [x]O2 via nasal cannula  []Previously intubated:   Total days intubated:  Date extubated:    []Other:    Dentition:  []Adequate  [x]Dentures   []Missing Many Teeth  []Edentulous  []Other:    Baseline Vocal Quality:  [x]Normal  []Dysphonic   []Aphonic   []Hoarse  []Wet  []Weak  []Other:    Volitional Cough:  Not Elicited     Volitional Swallow:   []Absent   []Delayed     [x]Adequate     []Required use of drink     Oral Mechanism Exam:  [x]WFL []Mild   [] Moderate  []Severe  []To be assessed  Impaired:   []Left side      []Right side    []Labial ROM/Coordination    []Labial Symmetry   []Lingual ROM/Coordination   []Lingual Symmetry  []Gag  []Other:     Oral Phase: [x]WFL []Mild   [] Moderate  []Severe  []To be assessed   []Impaired/Prolonged Mastication:   []Oral Holding:   []Spillage Left:   []Spillage Right:  []Pocketing Left:   []Pocketing Right:   []Decreased Anterior to Posterior Transit:   []Suspected Premature Bolus Loss:   []Lingual/Palatal Residue:   []Other:     Pharyngeal Phase: [x]WFL []Mild   [] Moderate  []Severe  []To be assessed   []Delayed Swallow:   []Suspected Pharyngeal Pooling:   []Decreased Laryngeal

## 2024-05-03 NOTE — BRIEF OP NOTE
Brief Postoperative Note    Sonja Mcmahan  YOB: 1950  [id number]    Pre-operative Diagnosis: abdominal abscess    Post-operative Diagnosis: Same    Procedure: CT-guided guided drain placement    Anesthesia: Moderate Sedation    Surgeons: Ryan Naqvi MD    Estimated Blood Loss: Less than 5 mL    Complications: None    Specimens: Was Obtained: 20cc yellow fluid drained and sent for analysis    Findings: Successful CT guided drain placement within the abdominal abscess.    Electronically signed by Ryan Naqvi MD on 5/3/2024 at 1:36 PM

## 2024-05-03 NOTE — PROGRESS NOTES
Dad is calling back, he did receive the voice message and would still like a call back.  He would like to discuss other options.   in these collections  However    For CT guided IR drainage of abdominal collection on 5/3.      Subjective: Patient with SBP in 90-120s.  Stable abdominal pain.  No CP, SOB, HA or fevers.       Medications:  Reviewed    Infusion Medications    sodium chloride      dextrose      sodium chloride 25 mL/hr at 05/02/24 1456     Scheduled Medications    potassium chloride  10 mEq IntraVENous Q1H    pantoprazole  40 mg IntraVENous BID    metoprolol tartrate  12.5 mg Oral BID    piperacillin-tazobactam  3,375 mg IntraVENous Q8H    lidocaine 1 % injection  5 mL IntraDERmal Once    sodium chloride flush  5-40 mL IntraVENous 2 times per day    enoxaparin  40 mg SubCUTAneous Daily    insulin lispro  0-4 Units SubCUTAneous TID WC    insulin lispro  0-4 Units SubCUTAneous Nightly    montelukast  10 mg Oral Nightly    Virt-Caps  1 capsule Oral Daily    sodium chloride flush  5-40 mL IntraVENous 2 times per day    atorvastatin  20 mg Oral Nightly     PRN Meds: aluminum & magnesium hydroxide-simethicone, bisacodyl, sodium chloride flush, sodium chloride, sodium phosphate 15 mmol in sodium chloride 0.9 % 250 mL IVPB, metoprolol, hydrALAZINE, LORazepam, guaiFENesin-dextromethorphan, morphine **OR** morphine, dextrose bolus **OR** dextrose bolus, glucagon (rDNA), dextrose, LORazepam, sodium chloride flush, sodium chloride, potassium chloride **OR** potassium alternative oral replacement **OR** potassium chloride, magnesium sulfate, ondansetron **OR** ondansetron, polyethylene glycol, acetaminophen **OR** acetaminophen, oxyCODONE      Intake/Output Summary (Last 24 hours) at 5/3/2024 1225  Last data filed at 5/3/2024 1153  Gross per 24 hour   Intake 10 ml   Output 900 ml   Net -890 ml         Physical Exam Performed:    /63   Pulse 77   Temp 97.9 °F (36.6 °C) (Oral)   Resp 18   Ht 1.6 m (5' 3\")   Wt 83.6 kg (184 lb 3.2 oz)   SpO2 93%   BMI 32.63 kg/m²     General appearance: Obese, pleasant  HEENT: Pupils equal, round, and  change from gastrectomy and gastric bypass is seen.  Multiple   circumscribed areas of fluid and gas are seen in the abdomen, marginating the   left hepatic lobe, descending duodenum, and peripancreatic-Nicolle celiac   region.  Sterility of this fluid is indeterminate by CT.  This fluid and gas   could be postoperative, secondary to sequelae of an anastomotic leak or early   phlegmon-abscess.  No obvious oral contrast is seen in these collections   however         CT ABDOMEN PELVIS W IV CONTRAST Additional Contrast? Oral (Give approx. 1/3 normal PO contrast volume)   Final Result   Chest      Small bilateral pleural effusions are seen with adjacent consolidation of the   lung bases, similar to prior, either due to atelectasis or pneumonia.      Abdomen and pelvis      Postsurgical change from gastrectomy and gastric bypass is seen.  Multiple   circumscribed areas of fluid and gas are seen in the abdomen, marginating the   left hepatic lobe, descending duodenum, and peripancreatic-Nicolle celiac   region.  Sterility of this fluid is indeterminate by CT.  This fluid and gas   could be postoperative, secondary to sequelae of an anastomotic leak or early   phlegmon-abscess.  No obvious oral contrast is seen in these collections   however         XR CHEST PORTABLE   Final Result   No significant interval change.         XR ABDOMEN (2 VIEWS)   Final Result   Nonspecific bowel gas pattern.  Oral contrast is seen in the colon      Pleural-parenchymal disease at the lung bases.         VL Extremity Venous Bilateral   Final Result      CT CHEST PULMONARY EMBOLISM W CONTRAST   Final Result   1. Small bilateral pleural effusions.   2. 1.6 cm left thyroid nodule.  Recommend outpatient thyroid ultrasound for   further evaluation.   3. Small amount of pneumoperitoneum and moderate amount of inflammatory   stranding in the epigastric region is likely related to recent postsurgical   changes         FL UGI   Final Result   No

## 2024-05-03 NOTE — PROGRESS NOTES
Speech Language Pathology  Attempt    Sonja Mcmahan  1950      SLP Eval and Treat orders were received. Attempted to see patient for clinical swallow evaluation. Pt is currently NPO for procedure. Will hold and follow up as schedule allows.    1340- Second attempt to see patient for evaluation, Pt remains off of floor at this time.     Gema Murphy M.A., CCC-SLP [id number]  Speech-Language Pathologist

## 2024-05-04 LAB
ANION GAP SERPL CALCULATED.3IONS-SCNC: 11 MMOL/L (ref 3–16)
ANISOCYTOSIS BLD QL SMEAR: ABNORMAL
BASOPHILS # BLD: 0 K/UL (ref 0–0.2)
BASOPHILS NFR BLD: 0 %
BUN SERPL-MCNC: 11 MG/DL (ref 7–20)
CALCIUM SERPL-MCNC: 7.5 MG/DL (ref 8.3–10.6)
CHLORIDE SERPL-SCNC: 99 MMOL/L (ref 99–110)
CO2 SERPL-SCNC: 23 MMOL/L (ref 21–32)
CREAT SERPL-MCNC: 0.7 MG/DL (ref 0.6–1.2)
DEPRECATED RDW RBC AUTO: 15.7 % (ref 12.4–15.4)
EOSINOPHIL # BLD: 0.1 K/UL (ref 0–0.6)
EOSINOPHIL NFR BLD: 1 %
GFR SERPLBLD CREATININE-BSD FMLA CKD-EPI: 90 ML/MIN/{1.73_M2}
GLUCOSE BLD-MCNC: 100 MG/DL (ref 70–99)
GLUCOSE BLD-MCNC: 117 MG/DL (ref 70–99)
GLUCOSE BLD-MCNC: 132 MG/DL (ref 70–99)
GLUCOSE BLD-MCNC: 147 MG/DL (ref 70–99)
GLUCOSE SERPL-MCNC: 105 MG/DL (ref 70–99)
HCT VFR BLD AUTO: 25 % (ref 36–48)
HGB BLD-MCNC: 8.1 G/DL (ref 12–16)
LOEFFLER MB STN SPEC: NORMAL
LYMPHOCYTES # BLD: 0.9 K/UL (ref 1–5.1)
LYMPHOCYTES NFR BLD: 9 %
MCH RBC QN AUTO: 27.5 PG (ref 26–34)
MCHC RBC AUTO-ENTMCNC: 32.5 G/DL (ref 31–36)
MCV RBC AUTO: 84.6 FL (ref 80–100)
METAMYELOCYTES NFR BLD MANUAL: 2 %
MONOCYTES # BLD: 0.6 K/UL (ref 0–1.3)
MONOCYTES NFR BLD: 6 %
NEUTROPHILS # BLD: 8 K/UL (ref 1.7–7.7)
NEUTROPHILS NFR BLD: 72 %
NEUTS BAND NFR BLD MANUAL: 10 % (ref 0–7)
OVALOCYTES BLD QL SMEAR: ABNORMAL
PERFORMED ON: ABNORMAL
PLATELET # BLD AUTO: 280 K/UL (ref 135–450)
PLATELET BLD QL SMEAR: ADEQUATE
PMV BLD AUTO: 7.9 FL (ref 5–10.5)
POLYCHROMASIA BLD QL SMEAR: ABNORMAL
POTASSIUM SERPL-SCNC: 3.6 MMOL/L (ref 3.5–5.1)
RBC # BLD AUTO: 2.96 M/UL (ref 4–5.2)
SLIDE REVIEW: ABNORMAL
SODIUM SERPL-SCNC: 133 MMOL/L (ref 136–145)
WBC # BLD AUTO: 9.5 K/UL (ref 4–11)

## 2024-05-04 PROCEDURE — 85025 COMPLETE CBC W/AUTO DIFF WBC: CPT

## 2024-05-04 PROCEDURE — 2580000003 HC RX 258: Performed by: INTERNAL MEDICINE

## 2024-05-04 PROCEDURE — 6360000002 HC RX W HCPCS: Performed by: INTERNAL MEDICINE

## 2024-05-04 PROCEDURE — 6370000000 HC RX 637 (ALT 250 FOR IP): Performed by: SURGERY

## 2024-05-04 PROCEDURE — C9113 INJ PANTOPRAZOLE SODIUM, VIA: HCPCS | Performed by: NURSE PRACTITIONER

## 2024-05-04 PROCEDURE — 1200000000 HC SEMI PRIVATE

## 2024-05-04 PROCEDURE — 51798 US URINE CAPACITY MEASURE: CPT

## 2024-05-04 PROCEDURE — 6360000002 HC RX W HCPCS: Performed by: NURSE PRACTITIONER

## 2024-05-04 PROCEDURE — 2580000003 HC RX 258: Performed by: SURGERY

## 2024-05-04 PROCEDURE — 2580000003 HC RX 258: Performed by: NURSE PRACTITIONER

## 2024-05-04 PROCEDURE — 99024 POSTOP FOLLOW-UP VISIT: CPT | Performed by: SURGERY

## 2024-05-04 PROCEDURE — 80048 BASIC METABOLIC PNL TOTAL CA: CPT

## 2024-05-04 PROCEDURE — 6370000000 HC RX 637 (ALT 250 FOR IP): Performed by: NURSE PRACTITIONER

## 2024-05-04 RX ADMIN — METOPROLOL TARTRATE 12.5 MG: 25 TABLET, FILM COATED ORAL at 19:59

## 2024-05-04 RX ADMIN — SODIUM CHLORIDE 200 MG: 9 INJECTION, SOLUTION INTRAVENOUS at 18:07

## 2024-05-04 RX ADMIN — LORAZEPAM 0.5 MG: 0.5 TABLET ORAL at 20:00

## 2024-05-04 RX ADMIN — OXYCODONE HYDROCHLORIDE 5 MG: 5 TABLET ORAL at 20:13

## 2024-05-04 RX ADMIN — ENOXAPARIN SODIUM 40 MG: 100 INJECTION SUBCUTANEOUS at 08:48

## 2024-05-04 RX ADMIN — METOPROLOL TARTRATE 12.5 MG: 25 TABLET, FILM COATED ORAL at 08:47

## 2024-05-04 RX ADMIN — PANTOPRAZOLE SODIUM 40 MG: 40 INJECTION, POWDER, FOR SOLUTION INTRAVENOUS at 08:47

## 2024-05-04 RX ADMIN — Medication 10 ML: at 20:04

## 2024-05-04 RX ADMIN — MONTELUKAST SODIUM 10 MG: 10 TABLET, FILM COATED ORAL at 20:00

## 2024-05-04 RX ADMIN — OXYCODONE HYDROCHLORIDE 5 MG: 5 TABLET ORAL at 14:52

## 2024-05-04 RX ADMIN — ATORVASTATIN CALCIUM 20 MG: 20 TABLET, FILM COATED ORAL at 19:59

## 2024-05-04 RX ADMIN — PANTOPRAZOLE SODIUM 40 MG: 40 INJECTION, POWDER, FOR SOLUTION INTRAVENOUS at 19:59

## 2024-05-04 RX ADMIN — SODIUM CHLORIDE: 9 INJECTION, SOLUTION INTRAVENOUS at 19:57

## 2024-05-04 RX ADMIN — PIPERACILLIN AND TAZOBACTAM 3375 MG: 3; .375 INJECTION, POWDER, LYOPHILIZED, FOR SOLUTION INTRAVENOUS at 19:58

## 2024-05-04 RX ADMIN — Medication 10 ML: at 20:03

## 2024-05-04 RX ADMIN — Medication 10 ML: at 08:47

## 2024-05-04 RX ADMIN — PIPERACILLIN AND TAZOBACTAM 3375 MG: 3; .375 INJECTION, POWDER, LYOPHILIZED, FOR SOLUTION INTRAVENOUS at 04:40

## 2024-05-04 RX ADMIN — OXYCODONE HYDROCHLORIDE 5 MG: 5 TABLET ORAL at 08:47

## 2024-05-04 RX ADMIN — PIPERACILLIN AND TAZOBACTAM 3375 MG: 3; .375 INJECTION, POWDER, LYOPHILIZED, FOR SOLUTION INTRAVENOUS at 14:00

## 2024-05-04 ASSESSMENT — PAIN DESCRIPTION - FREQUENCY
FREQUENCY: INTERMITTENT
FREQUENCY: INTERMITTENT

## 2024-05-04 ASSESSMENT — PAIN DESCRIPTION - ONSET
ONSET: ON-GOING
ONSET: ON-GOING

## 2024-05-04 ASSESSMENT — PAIN - FUNCTIONAL ASSESSMENT
PAIN_FUNCTIONAL_ASSESSMENT: PREVENTS OR INTERFERES SOME ACTIVE ACTIVITIES AND ADLS

## 2024-05-04 ASSESSMENT — PAIN DESCRIPTION - DESCRIPTORS
DESCRIPTORS: ACHING;DISCOMFORT
DESCRIPTORS: ACHING;DULL
DESCRIPTORS: ACHING;DISCOMFORT;DULL

## 2024-05-04 ASSESSMENT — PAIN DESCRIPTION - ORIENTATION
ORIENTATION: MID

## 2024-05-04 ASSESSMENT — PAIN DESCRIPTION - PAIN TYPE
TYPE: SURGICAL PAIN
TYPE: SURGICAL PAIN

## 2024-05-04 ASSESSMENT — PAIN SCALES - GENERAL
PAINLEVEL_OUTOF10: 6
PAINLEVEL_OUTOF10: 5
PAINLEVEL_OUTOF10: 3
PAINLEVEL_OUTOF10: 7
PAINLEVEL_OUTOF10: 0
PAINLEVEL_OUTOF10: 0
PAINLEVEL_OUTOF10: 5

## 2024-05-04 ASSESSMENT — PAIN DESCRIPTION - LOCATION
LOCATION: ABDOMEN

## 2024-05-04 NOTE — PROGRESS NOTES
[facility]        Progress Note    Patient Name: Sonja Mcmahan  MRN: [id number]  YOB: 1950  Date of Evaluation: 2024    Postoperative Day #12    Subjective:  No acute events overnight  Pain controlled  Tolerating liquids po, had drain placed  Passing flatus, stool  Resting in bed at this time      Vital Signs:  Patient Vitals for the past 24 hrs:   BP Temp Temp src Pulse Resp SpO2   24 1153 105/63 97.2 °F (36.2 °C) Oral 74 16 (!) 89 %   24 0917 -- -- -- -- 16 --   24 0839 118/66 99.1 °F (37.3 °C) Axillary 81 16 92 %   24 0444 (!) 98/48 98.6 °F (37 °C) Oral 81 18 93 %   24 2345 (!) 151/70 97.8 °F (36.6 °C) Oral 73 18 94 %   24 2200 -- -- -- 64 -- --   24 1945 109/66 98.3 °F (36.8 °C) Oral 82 18 95 %   24 1716 103/66 98.3 °F (36.8 °C) Oral 77 18 96 %        TEMPERATURE HISTORY 24H: Temp (24hrs), Av.2 °F (36.8 °C), Min:97.2 °F (36.2 °C), Max:99.1 °F (37.3 °C)    BLOOD PRESSURE HISTORY: Systolic (36hrs), Av , Min:91 , Max:151    Diastolic (36hrs), Av, Min:35, Max:74      Intake/Output:  I/O last 3 completed shifts:  In: 10 [I.V.:10]  Out: 1225 [Urine:1175; Drains:50]  I/O this shift:  In: 480 [P.O.:480]  Out: 625 [Urine:575; Drains:50]  Drain/tube Output:  Closed/Suction Drain RLQ-Output (ml): 50 ml    Physical Exam:  General: awake, alert, oriented to person, place, time  Lungs: unlabored respirations  Abdomen: soft, non-distended, mild incisional tenderness only, bowel sounds present  Skin/Wound: healing well, no drainage, no erythema, ecchymosis noted, well approximated with staples    Labs:  CBC:    Recent Labs     24  0610 24  0530 24  1110   WBC 15.5* 11.8* 9.5   HGB 8.6* 8.0* 8.1*   HCT 27.0* 24.1* 25.0*    222 280       BMP:    Recent Labs     24  0610 24  0530 24  1110   * 133* 133*   K 3.8 3.4* 3.6   CL 92* 97* 99   CO2 27 24 23   BUN 11  SubCUTAneous TID WC    insulin lispro  0-4 Units SubCUTAneous Nightly    montelukast  10 mg Oral Nightly    Virt-Caps  1 capsule Oral Daily    sodium chloride flush  5-40 mL IntraVENous 2 times per day    atorvastatin  20 mg Oral Nightly     Continuous Infusions:   sodium chloride      dextrose      sodium chloride 25 mL/hr at 05/02/24 1456     PRN Meds:.aluminum & magnesium hydroxide-simethicone, bisacodyl, sodium chloride flush, sodium chloride, sodium phosphate 15 mmol in sodium chloride 0.9 % 250 mL IVPB, metoprolol, hydrALAZINE, LORazepam, guaiFENesin-dextromethorphan, morphine **OR** morphine, dextrose bolus **OR** dextrose bolus, glucagon (rDNA), dextrose, LORazepam, sodium chloride flush, sodium chloride, potassium chloride **OR** potassium alternative oral replacement **OR** potassium chloride, magnesium sulfate, ondansetron **OR** ondansetron, polyethylene glycol, acetaminophen **OR** acetaminophen, oxyCODONE      Assessment:  73 y.o. female admitted with   1. Hiatal hernia    2. Bacteriuria    3. Gall stones    4. 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 Temp and VS ok, WBC down, continue antibiotics and drain  Discontinue wiggins  Continue with other routine recovery progress  Still looking at TOYA Mack MD

## 2024-05-04 NOTE — PROGRESS NOTES
Mclean cath removed, 10cc saline removed from balloon, pt tolerated removal well, 125mL urine drained and charted, see flowsheet data.

## 2024-05-04 NOTE — PROGRESS NOTES
Occupational Therapy    Attempted to see patient for occupational therapy treatment.  Patient pleasantly declining therapy at this time due to feeling to sleepy after recently taking pain medications.  Will re-attempt as schedule and patient's medical status permits.     Thanks,  Rafita Wheeler OTR/L [id number]

## 2024-05-04 NOTE — PROGRESS NOTES
Patient returned to unit from surgery with XAVIER drain in place. Site is clean, dry and intact and draining purulent drainage. Care ongoing.

## 2024-05-04 NOTE — PLAN OF CARE
Problem: Pain  Goal: Verbalizes/displays adequate comfort level or baseline comfort level  5/4/2024 1504 by Marcos Hutchins RN  Outcome: Progressing  5/4/2024 0510 by Lilly Russ RN  Outcome: Progressing     Problem: Chronic Conditions and Co-morbidities  Goal: Patient's chronic conditions and co-morbidity symptoms are monitored and maintained or improved  5/4/2024 1504 by Marcos Hutchins RN  Outcome: Progressing  5/4/2024 0510 by Lilly Russ RN  Outcome: Progressing     Problem: Nutrition Deficit:  Goal: Optimize nutritional status  5/4/2024 1504 by Marcos Hutchins RN  Outcome: Progressing  5/4/2024 0510 by Lilly Russ RN  Outcome: Progressing     Problem: Discharge Planning  Goal: Discharge to home or other facility with appropriate resources  5/4/2024 1504 by Marcos Hutchins RN  Outcome: Progressing  5/4/2024 0510 by Lilly Russ RN  Outcome: Progressing     Problem: Safety - Adult  Goal: Free from fall injury  5/4/2024 1504 by Marcos Hutchins RN  Outcome: Progressing  5/4/2024 0510 by Lilly Russ RN  Outcome: Adequate for Discharge     Problem: Cardiovascular - Adult  Goal: Maintains optimal cardiac output and hemodynamic stability  5/4/2024 1504 by Marcos Hutchins RN  Outcome: Progressing  5/4/2024 0510 by Lilly Russ RN  Outcome: Progressing     Problem: Skin/Tissue Integrity - Adult  Goal: Incisions, wounds, or drain sites healing without S/S of infection  5/4/2024 1504 by Marcos Hutchins RN  Outcome: Progressing  5/4/2024 0510 by Lilly Russ RN  Outcome: Progressing     Problem: Musculoskeletal - Adult  Goal: Return mobility to safest level of function  5/4/2024 1504 by Marcos Hutchins RN  Outcome: Progressing  5/4/2024 0510 by Lilly Russ RN  Outcome: Progressing  Goal: Return ADL status to a safe level of function  Outcome: Progressing     Problem: Gastrointestinal - Adult  Goal: Minimal or absence of nausea and vomiting  5/4/2024 1504 by Marcos Hutchins  RN  Outcome: Progressing  5/4/2024 0510 by Lilly Russ RN  Outcome: Progressing  Goal: Maintains or returns to baseline bowel function  5/4/2024 1504 by Marcos Hutchins RN  Outcome: Progressing  5/4/2024 0510 by Lilly Russ RN  Outcome: Progressing  Goal: Maintains adequate nutritional intake  5/4/2024 1504 by Marcos Hutchins RN  Outcome: Progressing  5/4/2024 0510 by Lilly Russ RN  Outcome: Progressing     Problem: Respiratory - Adult  Goal: Achieves optimal ventilation and oxygenation  5/4/2024 1504 by Marcos Hutchins RN  Outcome: Progressing  5/4/2024 0510 by Lilly Russ RN  Outcome: Progressing     Problem: Skin/Tissue Integrity  Goal: Absence of new skin breakdown  Description: 1.  Monitor for areas of redness and/or skin breakdown  2.  Assess vascular access sites hourly  3.  Every 4-6 hours minimum:  Change oxygen saturation probe site  4.  Every 4-6 hours:  If on nasal continuous positive airway pressure, respiratory therapy assess nares and determine need for appliance change or resting period.  5/4/2024 1504 by Marcos Hutchins RN  Outcome: Progressing  5/4/2024 0510 by Lilly Russ RN  Outcome: Progressing

## 2024-05-04 NOTE — PROGRESS NOTES
pattern.  Oral contrast is seen in the colon      Pleural-parenchymal disease at the lung bases.         VL Extremity Venous Bilateral   Final Result      CT CHEST PULMONARY EMBOLISM W CONTRAST   Final Result   1. Small bilateral pleural effusions.   2. 1.6 cm left thyroid nodule.  Recommend outpatient thyroid ultrasound for   further evaluation.   3. Small amount of pneumoperitoneum and moderate amount of inflammatory   stranding in the epigastric region is likely related to recent postsurgical   changes         FL UGI   Final Result   No fluoroscopic evidence of leak.         XR CHEST PORTABLE   Final Result   Bibasilar atelectasis associated small pleural effusions.         FL UGI   Final Result   1. Moderate to large sliding-type hiatal hernia, with moderate   gastroesophageal reflux.  However, no evidence of reflux esophagitis.   2. Small to moderate-sized distal esophageal diverticulum.   3. No evidence of a mass, stricture, or peptic ulcer disease of the upper GI   tract.         NM MYOCARDIAL SPECT REST EXERCISE OR RX   Final Result      CT ABDOMEN PELVIS W IV CONTRAST Additional Contrast? Oral   Final Result   1. No acute intra-abdominal or pelvic process.   2. Large hiatal hernia.   3. Cholelithiasis without evidence of acute cholecystitis.   4. Diverticulosis of the sigmoid colon without inflammatory change.             Assessment/Plan:    Active Hospital Problems    Diagnosis Date Noted    Sinus tachycardia [R00.0] 06/28/2022     Priority: Medium    PAT (paroxysmal atrial tachycardia) ([facility]) [I47.19] 04/30/2024    Mild malnutrition (HCC) [E44.1] 04/29/2024    SVT (supraventricular tachycardia) ([facility]) [I47.10] 04/29/2024    LBBB (left bundle branch block) [I44.7] 04/29/2024    Calculus of gallbladder with chronic cholecystitis without obstruction [K80.10] 04/22/2024    Gastric mass [K31.89] 04/22/2024    Gall stones [K80.20] 04/18/2024    Intractable abdominal pain [R10.9] 04/16/2024    Hiatal hernia  [K44.9] 04/15/2024    Obesity [E66.9] 04/15/2024    Diverticulosis [K57.90] 04/15/2024    History of hysterectomy [Z90.710] 04/15/2024    Hyperlipidemia [E78.5] 02/07/2022    Gastroesophageal reflux disease with esophagitis [K21.00] 09/12/2016    Benign essential HTN [I10] 04/07/2016       Acute Medical Issues Being Addressed:  73 y.o. female with a history of hiatal hernia, obesity, GERD who presented by request of GI Dr Marin after c/o dyspnea and intractable abdominal pain. Has been unable to tolerate po and patient and daughter think weight loss of unknown amount. Abdominal pain present over the past month. GI sent her to ED for abdominal imaging.   Admitted as inpatient.  Followed by Cardio, GI and Surgery.      New diagnosis of gastric adenocarcinoma grade 1 well-differentiated no lymphovascular invasion s/p laparoscopic cholecystectomy hiatal hernia repair partial gastrectomy with lymph node dissection with associated collection seroma versus abscess s/p XAVIER drain with associated sepsis  Followed by general surgery and oncology  On IV Zosyn  Leukocytosis improved  Await cultures based on cultures may be able to convert to oral antibiotics      Anemia multifactorial  On IV iron  Hemoglobin stable    Hypertension  All BP meds discontinued as blood pressure remains low normal    Tachycardia multifactorial  All resolved  CT of the chest was negative  Echocardiogram was normal  No further events    Obesity        DVT Prophylaxis: Subcu Lovenox  Diet: ADULT DIET; Clear Liquid  ADULT ORAL NUTRITION SUPPLEMENT; Breakfast, Lunch, Dinner; Clear Liquid Oral Supplement  Code Status: Full Code      Dispo - once acute medical processes have resolved    Robbie Kurtz MD

## 2024-05-04 NOTE — PROGRESS NOTES
Shift assessment completed, VSS, scheduled medications given per MAR, pt c/o pain rated 7/10, PRN pain meds given per MAR, all pt questions asked and answered, pt verbalized understanding. Breaks locked, bed in lowest position and call light within reach.

## 2024-05-04 NOTE — PLAN OF CARE
Problem: Pain  Goal: Verbalizes/displays adequate comfort level or baseline comfort level  Outcome: Progressing     Problem: Chronic Conditions and Co-morbidities  Goal: Patient's chronic conditions and co-morbidity symptoms are monitored and maintained or improved  Outcome: Progressing     Problem: Nutrition Deficit:  Goal: Optimize nutritional status  Outcome: Progressing     Problem: Discharge Planning  Goal: Discharge to home or other facility with appropriate resources  Outcome: Progressing     Problem: Cardiovascular - Adult  Goal: Maintains optimal cardiac output and hemodynamic stability  Outcome: Progressing     Problem: Skin/Tissue Integrity - Adult  Goal: Incisions, wounds, or drain sites healing without S/S of infection  Outcome: Progressing     Problem: Musculoskeletal - Adult  Goal: Return mobility to safest level of function  Outcome: Progressing     Problem: Gastrointestinal - Adult  Goal: Minimal or absence of nausea and vomiting  Outcome: Progressing  Goal: Maintains or returns to baseline bowel function  Outcome: Progressing  Goal: Maintains adequate nutritional intake  Outcome: Progressing     Problem: Respiratory - Adult  Goal: Achieves optimal ventilation and oxygenation  Outcome: Progressing     Problem: Skin/Tissue Integrity  Goal: Absence of new skin breakdown  Description: 1.  Monitor for areas of redness and/or skin breakdown  2.  Assess vascular access sites hourly  3.  Every 4-6 hours minimum:  Change oxygen saturation probe site  4.  Every 4-6 hours:  If on nasal continuous positive airway pressure, respiratory therapy assess nares and determine need for appliance change or resting period.  Outcome: Progressing     Problem: Safety - Adult  Goal: Free from fall injury  Outcome: Adequate for Discharge

## 2024-05-04 NOTE — PROGRESS NOTES
Shift assessment completed. Routine vitals stable. Scheduled medications given; except for Lovenox due to patient having abscess drained today. Patient is awake, alert and oriented. Respirations are easy and unlabored. Patient does not appear to be in distress, resting comfortably in bed at this time. Call light within reach.

## 2024-05-05 LAB
ACID FAST STN SPEC QL: NORMAL
ALBUMIN SERPL-MCNC: 1.9 G/DL (ref 3.4–5)
ALBUMIN/GLOB SERPL: 0.5 {RATIO} (ref 1.1–2.2)
ALP SERPL-CCNC: 76 U/L (ref 40–129)
ALT SERPL-CCNC: 20 U/L (ref 10–40)
ANION GAP SERPL CALCULATED.3IONS-SCNC: 11 MMOL/L (ref 3–16)
AST SERPL-CCNC: 38 U/L (ref 15–37)
BASOPHILS # BLD: 0 K/UL (ref 0–0.2)
BASOPHILS NFR BLD: 0.3 %
BILIRUB SERPL-MCNC: 0.6 MG/DL (ref 0–1)
BUN SERPL-MCNC: 10 MG/DL (ref 7–20)
CALCIUM SERPL-MCNC: 7.8 MG/DL (ref 8.3–10.6)
CHLORIDE SERPL-SCNC: 99 MMOL/L (ref 99–110)
CO2 SERPL-SCNC: 25 MMOL/L (ref 21–32)
CREAT SERPL-MCNC: 0.6 MG/DL (ref 0.6–1.2)
DEPRECATED RDW RBC AUTO: 15.8 % (ref 12.4–15.4)
EOSINOPHIL # BLD: 0.1 K/UL (ref 0–0.6)
EOSINOPHIL NFR BLD: 0.4 %
GFR SERPLBLD CREATININE-BSD FMLA CKD-EPI: >90 ML/MIN/{1.73_M2}
GLUCOSE BLD-MCNC: 115 MG/DL (ref 70–99)
GLUCOSE BLD-MCNC: 120 MG/DL (ref 70–99)
GLUCOSE BLD-MCNC: 126 MG/DL (ref 70–99)
GLUCOSE BLD-MCNC: 131 MG/DL (ref 70–99)
GLUCOSE SERPL-MCNC: 100 MG/DL (ref 70–99)
HCT VFR BLD AUTO: 25.7 % (ref 36–48)
HGB BLD-MCNC: 8.4 G/DL (ref 12–16)
LYMPHOCYTES # BLD: 0.8 K/UL (ref 1–5.1)
LYMPHOCYTES NFR BLD: 6.1 %
MCH RBC QN AUTO: 27.9 PG (ref 26–34)
MCHC RBC AUTO-ENTMCNC: 32.8 G/DL (ref 31–36)
MCV RBC AUTO: 85.2 FL (ref 80–100)
MONOCYTES # BLD: 0.9 K/UL (ref 0–1.3)
MONOCYTES NFR BLD: 7.1 %
NEUTROPHILS # BLD: 10.8 K/UL (ref 1.7–7.7)
NEUTROPHILS NFR BLD: 86.1 %
PERFORMED ON: ABNORMAL
PLATELET # BLD AUTO: 367 K/UL (ref 135–450)
PMV BLD AUTO: 7.5 FL (ref 5–10.5)
POTASSIUM SERPL-SCNC: 3.8 MMOL/L (ref 3.5–5.1)
PROT SERPL-MCNC: 5.4 G/DL (ref 6.4–8.2)
RBC # BLD AUTO: 3.02 M/UL (ref 4–5.2)
SODIUM SERPL-SCNC: 135 MMOL/L (ref 136–145)
WBC # BLD AUTO: 12.5 K/UL (ref 4–11)

## 2024-05-05 PROCEDURE — 1200000000 HC SEMI PRIVATE

## 2024-05-05 PROCEDURE — 80053 COMPREHEN METABOLIC PANEL: CPT

## 2024-05-05 PROCEDURE — 6360000002 HC RX W HCPCS: Performed by: NURSE PRACTITIONER

## 2024-05-05 PROCEDURE — 97530 THERAPEUTIC ACTIVITIES: CPT

## 2024-05-05 PROCEDURE — 85025 COMPLETE CBC W/AUTO DIFF WBC: CPT

## 2024-05-05 PROCEDURE — 97116 GAIT TRAINING THERAPY: CPT

## 2024-05-05 PROCEDURE — 2580000003 HC RX 258: Performed by: INTERNAL MEDICINE

## 2024-05-05 PROCEDURE — 6360000002 HC RX W HCPCS: Performed by: INTERNAL MEDICINE

## 2024-05-05 PROCEDURE — C9113 INJ PANTOPRAZOLE SODIUM, VIA: HCPCS | Performed by: NURSE PRACTITIONER

## 2024-05-05 PROCEDURE — 6360000002 HC RX W HCPCS: Performed by: SURGERY

## 2024-05-05 PROCEDURE — 6370000000 HC RX 637 (ALT 250 FOR IP): Performed by: SURGERY

## 2024-05-05 PROCEDURE — 99024 POSTOP FOLLOW-UP VISIT: CPT | Performed by: SURGERY

## 2024-05-05 PROCEDURE — 97535 SELF CARE MNGMENT TRAINING: CPT

## 2024-05-05 PROCEDURE — 6370000000 HC RX 637 (ALT 250 FOR IP): Performed by: NURSE PRACTITIONER

## 2024-05-05 PROCEDURE — 2580000003 HC RX 258: Performed by: NURSE PRACTITIONER

## 2024-05-05 PROCEDURE — 2580000003 HC RX 258: Performed by: SURGERY

## 2024-05-05 RX ADMIN — Medication 10 ML: at 21:34

## 2024-05-05 RX ADMIN — ATORVASTATIN CALCIUM 20 MG: 20 TABLET, FILM COATED ORAL at 21:18

## 2024-05-05 RX ADMIN — ENOXAPARIN SODIUM 40 MG: 100 INJECTION SUBCUTANEOUS at 10:47

## 2024-05-05 RX ADMIN — ACETAMINOPHEN 650 MG: 325 TABLET ORAL at 14:41

## 2024-05-05 RX ADMIN — METOPROLOL TARTRATE 12.5 MG: 25 TABLET, FILM COATED ORAL at 21:19

## 2024-05-05 RX ADMIN — Medication 10 ML: at 10:51

## 2024-05-05 RX ADMIN — OXYCODONE HYDROCHLORIDE 5 MG: 5 TABLET ORAL at 00:47

## 2024-05-05 RX ADMIN — MORPHINE SULFATE 2 MG: 2 INJECTION, SOLUTION INTRAMUSCULAR; INTRAVENOUS at 21:29

## 2024-05-05 RX ADMIN — SODIUM CHLORIDE 25 ML: 9 INJECTION, SOLUTION INTRAVENOUS at 12:12

## 2024-05-05 RX ADMIN — PIPERACILLIN AND TAZOBACTAM 3375 MG: 3; .375 INJECTION, POWDER, LYOPHILIZED, FOR SOLUTION INTRAVENOUS at 21:32

## 2024-05-05 RX ADMIN — PANTOPRAZOLE SODIUM 40 MG: 40 INJECTION, POWDER, FOR SOLUTION INTRAVENOUS at 21:19

## 2024-05-05 RX ADMIN — LORAZEPAM 0.5 MG: 0.5 TABLET ORAL at 21:21

## 2024-05-05 RX ADMIN — PIPERACILLIN AND TAZOBACTAM 3375 MG: 3; .375 INJECTION, POWDER, LYOPHILIZED, FOR SOLUTION INTRAVENOUS at 04:38

## 2024-05-05 RX ADMIN — PANTOPRAZOLE SODIUM 40 MG: 40 INJECTION, POWDER, FOR SOLUTION INTRAVENOUS at 10:48

## 2024-05-05 RX ADMIN — SODIUM CHLORIDE: 9 INJECTION, SOLUTION INTRAVENOUS at 21:24

## 2024-05-05 RX ADMIN — SODIUM CHLORIDE 25 ML: 9 INJECTION, SOLUTION INTRAVENOUS at 16:52

## 2024-05-05 RX ADMIN — MONTELUKAST SODIUM 10 MG: 10 TABLET, FILM COATED ORAL at 21:18

## 2024-05-05 RX ADMIN — OXYCODONE HYDROCHLORIDE 5 MG: 5 TABLET ORAL at 04:46

## 2024-05-05 RX ADMIN — SODIUM CHLORIDE 200 MG: 9 INJECTION, SOLUTION INTRAVENOUS at 16:53

## 2024-05-05 RX ADMIN — Medication 10 ML: at 10:50

## 2024-05-05 RX ADMIN — PIPERACILLIN AND TAZOBACTAM 3375 MG: 3; .375 INJECTION, POWDER, LYOPHILIZED, FOR SOLUTION INTRAVENOUS at 12:14

## 2024-05-05 RX ADMIN — METOPROLOL TARTRATE 12.5 MG: 25 TABLET, FILM COATED ORAL at 10:48

## 2024-05-05 ASSESSMENT — PAIN DESCRIPTION - LOCATION
LOCATION: BACK;ABDOMEN
LOCATION: ABDOMEN
LOCATION: ABDOMEN

## 2024-05-05 ASSESSMENT — PAIN SCALES - GENERAL
PAINLEVEL_OUTOF10: 7
PAINLEVEL_OUTOF10: 6
PAINLEVEL_OUTOF10: 10
PAINLEVEL_OUTOF10: 0
PAINLEVEL_OUTOF10: 6
PAINLEVEL_OUTOF10: 0
PAINLEVEL_OUTOF10: 2
PAINLEVEL_OUTOF10: 0
PAINLEVEL_OUTOF10: 8

## 2024-05-05 ASSESSMENT — PAIN SCALES - WONG BAKER
WONGBAKER_NUMERICALRESPONSE: NO HURT
WONGBAKER_NUMERICALRESPONSE: HURTS A LITTLE BIT
WONGBAKER_NUMERICALRESPONSE: NO HURT

## 2024-05-05 ASSESSMENT — PAIN - FUNCTIONAL ASSESSMENT
PAIN_FUNCTIONAL_ASSESSMENT: PREVENTS OR INTERFERES WITH MANY ACTIVE NOT PASSIVE ACTIVITIES
PAIN_FUNCTIONAL_ASSESSMENT: PREVENTS OR INTERFERES SOME ACTIVE ACTIVITIES AND ADLS

## 2024-05-05 ASSESSMENT — PAIN DESCRIPTION - FREQUENCY
FREQUENCY: INTERMITTENT
FREQUENCY: INTERMITTENT

## 2024-05-05 ASSESSMENT — PAIN DESCRIPTION - ORIENTATION
ORIENTATION: MID
ORIENTATION: MID
ORIENTATION: LOWER;LEFT

## 2024-05-05 ASSESSMENT — PAIN DESCRIPTION - DESCRIPTORS
DESCRIPTORS: ACHING
DESCRIPTORS: ACHING;DISCOMFORT

## 2024-05-05 ASSESSMENT — PAIN DESCRIPTION - PAIN TYPE
TYPE: SURGICAL PAIN
TYPE: SURGICAL PAIN

## 2024-05-05 ASSESSMENT — PAIN DESCRIPTION - ONSET
ONSET: ON-GOING
ONSET: ON-GOING

## 2024-05-05 NOTE — PROGRESS NOTES
[facility] - Inpatient Rehabilitation Department   Phone: [phone]    Physical Therapy    [] Initial Evaluation            [x] Daily Treatment Note         [] Discharge Summary      Patient: Sonja Mcmahan   : 1950   MRN: [id number]   Date of Service:  2024  Admitting Diagnosis: Hiatal hernia  Current Admission Summary:  Admitted for abdominal pain on 4/15. Imaging indicative of hiatal hernia and cholelithiasis. EGD on  showed polyps with hiatal hernia. Esophagus dilated at that time. To OR  for below:  1. Laparoscopic hiatal hernia repair with primary closure and [employer] Bio-A mesh reinforcement.  2. Laparoscopic cholecystectomy.  3. Open hemigastrectomy with gastrojejunostomy and closure of duodenal stump and overlying omental flap for duodenal stump reinforcement.  4. S/p CT guided drain placement on 5/3/24  Past Medical History:  has a past medical history of Asthma, Diabetes mellitus (HCC), Hyperlipidemia, and Hypertension.  Past Surgical History:  has a past surgical history that includes Hysterectomy, total abdominal; Elbow surgery; Upper gastrointestinal endoscopy (N/A, 2024); Upper gastrointestinal endoscopy (N/A, 2024); Upper gastrointestinal endoscopy (N/A, 2024); hiatal hernia repair (N/A, 2024); Cholecystectomy, laparoscopic (N/A, 2024); and gastrectomy (N/A, 2024).    Discharge Recommendations: Sonja Mcmahan scored a 17/24 on the AM-PAC short mobility form. Current research shows that an AM-PAC score of 17 or less is typically not associated with a discharge to the patient's home setting. Based on the patient's AM-PAC score and their current functional mobility deficits, it is recommended that the patient have 5-7 sessions per week of Physical Therapy at d/c to increase the patient's independence.  At this time, this patient demonstrates complex nursing, medical, and rehabilitative needs, and would benefit from intensive rehabilitation  to participate in PT/OT treatment session/re-evaluation.  Pain: 6/10.  Location: abdomen  Pain Interventions: patient denies pain interventions and repositioned      Functional Mobility  Bed Mobility:  Supine to Sit: stand by assistance  Scooting: supervision- toward EOB  Comments: Supine to sit: HOB slightly elevated, increased time required to complete task, use of bed rail  Transfers:  Sit to stand transfer: contact guard assistance  Stand to sit transfer: contact guard assistance  Comments: RW used for transfers, verbal cues required for hand placement  Ambulation:  Surface:level surface  Assistive Device: rolling walker  Assistance: contact guard assistance  Distance: 15' + 10'  Gait Mechanics: decreased gait speed, small/shuffling steps, increased lateral sway, no overt losses of balance  Comments:   Stair Mobility:  Stair mobility not completed on this date.  Comments:   Wheelchair Mobility:  No w/c mobility completed on this date.  Comments:  Balance:  Static Sitting Balance: fair (+): maintains balance at SBA/supervision without use of UE support  Dynamic Sitting Balance: fair: maintains balance at CGA without use of UE support  Static Standing Balance: fair (-): maintains balance at CGA with use of UE support  Dynamic Standing Balance: fair (-): maintains balance at CGA with use of UE support  Comments:     Other Therapeutic Interventions  Pt assisted with oral care, gown change, and brief change. See OT note for assist levels.    Functional Outcomes  AM-PAC Inpatient Mobility Raw Score : 17              Cognition  WFL  Orientation:    alert and oriented x 4  Command Following:   WFL    Education  Barriers To Learning: none  Patient Education: patient educated on PT role and benefits, general safety, functional mobility training, transfer training, discharge recommendations  Learning Assessment: patient verbalizes understanding, would benefit from continued reinforcement    Assessment  Activity Tolerance:

## 2024-05-05 NOTE — PROGRESS NOTES
[facility] - Inpatient Rehabilitation Department   Phone: [phone]    Occupational Therapy    [] Initial Evaluation            [x] Daily Treatment Note         [] Discharge Summary      Patient: Sonja Mcmahan   : 1950   MRN: [id number]   Date of Service:  2024    Admitting Diagnosis:  Hiatal hernia  Current Admission Summary: Admitted for abdominal pain. Imaging indicative of hiatal hernia and cholelithiasis. EGD on  showed polyps with hiatal hernia. Esophagus dilated at that time. To OR  for below:  1. Laparoscopic hiatal hernia repair with primary closure and [place] Bio-A mesh reinforcement.  2. Laparoscopic cholecystectomy.  3. Open hemigastrectomy with gastrojejunostomy and closure of duodenal stump and overlying omental flap for duodenal stump reinforcement.    5/3 - drain placed for fluid collection, has E coli and Klebsiella   Past Medical History:  has a past medical history of Asthma, Diabetes mellitus (HCC), Hyperlipidemia, and Hypertension.  Past Surgical History:  has a past surgical history that includes Hysterectomy, total abdominal; Elbow surgery; Upper gastrointestinal endoscopy (N/A, 2024); Upper gastrointestinal endoscopy (N/A, 2024); Upper gastrointestinal endoscopy (N/A, 2024); hiatal hernia repair (N/A, 2024); Cholecystectomy, laparoscopic (N/A, 2024); and gastrectomy (N/A, 2024).    Discharge Recommendations: Sonja Mcmahan scored a 16/24 on the AM-PAC ADL Inpatient form. Current research shows that an AM-PAC score of 17 or less is typically not associated with a discharge to the patient's home setting. Based on the patient's AM-PAC score and their current ADL deficits, it is recommended that the patient have 5-7 sessions per week of Occupational Therapy at d/c to increase the patient's independence.  At this time, this patient demonstrates complex nursing, medical, and rehabilitative needs, and would benefit from intensive  Group Co-treatment   Time In   1300   Time Out   1341   Minutes   41        Timed Code Treatment Minutes:  41  Total Treatment Minutes:  41 minutes        Electronically Signed By: Rafita Wheeler, OTR/L [id number]

## 2024-05-05 NOTE — PROGRESS NOTES
abnormality spurring is seen spine. Spurring is seen in the hips.  Postsurgical change seen in anterior abdominal wall.  Tiny periumbilical hernia containing fat is seen     Chest Small bilateral pleural effusions are seen with adjacent consolidation of the lung bases, similar to prior, either due to atelectasis or pneumonia. Abdomen and pelvis Postsurgical change from gastrectomy and gastric bypass is seen.  Multiple circumscribed areas of fluid and gas are seen in the abdomen, marginating the left hepatic lobe, descending duodenum, and peripancreatic-Nicolle celiac region.  Sterility of this fluid is indeterminate by CT.  This fluid and gas could be postoperative, secondary to sequelae of an anastomotic leak or early phlegmon-abscess.  No obvious oral contrast is seen in these collections however     CT ABDOMEN PELVIS W IV CONTRAST Additional Contrast? Oral (Give approx. 1/3 normal PO contrast volume)    Result Date: 5/2/2024  EXAMINATION: CT OF THE CHEST WITHOUT CONTRAST; CT OF THE ABDOMEN AND PELVIS WITH CONTRAST 5/2/2024 12:35 pm TECHNIQUE: CT of the chest was performed without the administration of intravenous contrast. Multiplanar reformatted images are provided for review. Automated exposure control, iterative reconstruction, and/or weight based adjustment of the mA/kV was utilized to reduce the radiation dose to as low as reasonably achievable.; CT of the abdomen and pelvis was performed with the administration of intravenous contrast. Multiplanar reformatted images are provided for review. Automated exposure control, iterative reconstruction, and/or weight based adjustment of the mA/kV was utilized to reduce the radiation dose to as low as reasonably achievable. COMPARISON: Chest CT 04/28/2024 Abdominal CT 04/15/2024 HISTORY: ORDERING SYSTEM PROVIDED HISTORY: SOB TECHNOLOGIST PROVIDED HISTORY: Reason for exam:->SOB Reason for Exam: SOB; ORDERING SYSTEM PROVIDED HISTORY: Post-op, minimal bowel function, n/v  mL IntraDERmal Once    sodium chloride flush  5-40 mL IntraVENous 2 times per day    enoxaparin  40 mg SubCUTAneous Daily    insulin lispro  0-4 Units SubCUTAneous TID WC    insulin lispro  0-4 Units SubCUTAneous Nightly    montelukast  10 mg Oral Nightly    Virt-Caps  1 capsule Oral Daily    sodium chloride flush  5-40 mL IntraVENous 2 times per day    atorvastatin  20 mg Oral Nightly     Continuous Infusions:   sodium chloride 25 mL (05/05/24 1212)    dextrose      sodium chloride 20 mL/hr at 05/04/24 1957     PRN Meds:.aluminum & magnesium hydroxide-simethicone, bisacodyl, sodium chloride flush, sodium chloride, sodium phosphate 15 mmol in sodium chloride 0.9 % 250 mL IVPB, metoprolol, hydrALAZINE, LORazepam, guaiFENesin-dextromethorphan, morphine **OR** morphine, dextrose bolus **OR** dextrose bolus, glucagon (rDNA), dextrose, LORazepam, sodium chloride flush, sodium chloride, potassium chloride **OR** potassium alternative oral replacement **OR** potassium chloride, magnesium sulfate, ondansetron **OR** ondansetron, polyethylene glycol, acetaminophen **OR** acetaminophen, oxyCODONE      Assessment:  73 y.o. female admitted with   1. Hiatal hernia    2. Bacteriuria    3. Gall stones    4. Generalized abdominal pain        OR Date 4/22/2024, laparoscopic hiatal hernia repair with primary closure and [employer] Bio-A mesh reinforcement, laparoscopic cholecystectomy, open hemigastrectomy with gastrojejunostomy and closure of duodenal stump and overlying omental flap for duodenal stump reinforcement for hiatal hernia sliding with paraesophageal component, GERD, cholelithiasis with chronic cholecystitis, gastric mass with high-grade dysplasia, possible adenocarcinoma--final pathology reveals adenocarcinoma  Urinary tract infection  Hypertension  Moderate aortic stenosis, bicuspid valve      Plan:    Pt passing flatus, no sig pain, drain placed for fluid collection, has E coli and Klebsiella. Temp and VS ok, WBC up a little

## 2024-05-05 NOTE — PROGRESS NOTES
Admit Date: 4/15/2024  Diet: ADULT ORAL NUTRITION SUPPLEMENT; Breakfast, Lunch, Dinner; Clear Liquid Oral Supplement  ADULT DIET; Full Liquid    CC: Abdominal pain    Interval history:   Overnight, there were no acute events. Patient's vitals remained stable    Patient was seen this morning in bed.  She endorses that she feels well.  She is happy with the progress she is making.  She does not have any new complaints. Patient denies fevers, chills, nausea, vomiting, chest pain, shortness of breath, diarrhea, constipation, dysuria, urinary frequency or urgency.     Plan:     -Continue IV Zosyn  -Probiotics  -Continue Protonix 40 mg IV twice daily  -PT/OT  -Awaiting ARU evaluation    Assessment:   Sonja Mcmahan is a 73 y.o. female with PMH of hiatal hernia, obesity, GERD who was admitted with intractable abdominal pain     New diagnosis of gastric adenocarcinoma grade 1 well-differentiated no lymphovascular invasion  Patient has s/p laparoscopic cholecystectomy hiatal hernia repair partial gastrectomy with lymph node dissection with associated collection seroma versus abscess s/p XAVIER drain with associated sepsis  Followed by general surgery and oncology  On IV Zosyn  Leukocytosis improved  Await cultures based on cultures may be able to convert to oral antibiotics       Hypertension  At home, patient is on lisinopril-HCTZ    HLD  At home, patient is on atorvastatin     Obesity  Complicating assessment and treatment. Placing patient at risk for multiple co-morbidities as well as early death and contributing to the patient's presentation.     Code Status: Full Code   FEN: ADULT ORAL NUTRITION SUPPLEMENT; Breakfast, Lunch, Dinner; Clear Liquid Oral Supplement  ADULT DIET; Full Liquid   DVT PPX: [x] Lovenox, []Heparin, [] SCDs,[] Eliquis, [] Xarelto, [] Coumadin  DISPO: Continue management of acute issues    Nicolás Luz MD  5/5/2024,  1:17 PM    This note was likely completed using voice recognition technology and  This fluid and gas   could be postoperative, secondary to sequelae of an anastomotic leak or early   phlegmon-abscess.  No obvious oral contrast is seen in these collections   however         XR CHEST PORTABLE   Final Result   No significant interval change.         XR ABDOMEN (2 VIEWS)   Final Result   Nonspecific bowel gas pattern.  Oral contrast is seen in the colon      Pleural-parenchymal disease at the lung bases.         VL Extremity Venous Bilateral   Final Result      CT CHEST PULMONARY EMBOLISM W CONTRAST   Final Result   1. Small bilateral pleural effusions.   2. 1.6 cm left thyroid nodule.  Recommend outpatient thyroid ultrasound for   further evaluation.   3. Small amount of pneumoperitoneum and moderate amount of inflammatory   stranding in the epigastric region is likely related to recent postsurgical   changes         FL UGI   Final Result   No fluoroscopic evidence of leak.         XR CHEST PORTABLE   Final Result   Bibasilar atelectasis associated small pleural effusions.         FL UGI   Final Result   1. Moderate to large sliding-type hiatal hernia, with moderate   gastroesophageal reflux.  However, no evidence of reflux esophagitis.   2. Small to moderate-sized distal esophageal diverticulum.   3. No evidence of a mass, stricture, or peptic ulcer disease of the upper GI   tract.         NM MYOCARDIAL SPECT REST EXERCISE OR RX   Final Result      CT ABDOMEN PELVIS W IV CONTRAST Additional Contrast? Oral   Final Result   1. No acute intra-abdominal or pelvic process.   2. Large hiatal hernia.   3. Cholelithiasis without evidence of acute cholecystitis.   4. Diverticulosis of the sigmoid colon without inflammatory change.             Medications:     Scheduled Meds:   ALTEplase  1 mg IntraCATHeter Once    pantoprazole  40 mg IntraVENous BID    iron sucrose (VENOFER) 200 mg in sodium chloride 0.9 % 100 mL IVPB  200 mg IntraVENous Q24H    metoprolol tartrate  12.5 mg Oral BID

## 2024-05-06 LAB
ALBUMIN SERPL-MCNC: 1.8 G/DL (ref 3.4–5)
ALBUMIN/GLOB SERPL: 0.5 {RATIO} (ref 1.1–2.2)
ALP SERPL-CCNC: 65 U/L (ref 40–129)
ALT SERPL-CCNC: 15 U/L (ref 10–40)
ANION GAP SERPL CALCULATED.3IONS-SCNC: 10 MMOL/L (ref 3–16)
ANISOCYTOSIS BLD QL SMEAR: ABNORMAL
AST SERPL-CCNC: 26 U/L (ref 15–37)
BACTERIA SPEC AEROBE CULT: ABNORMAL
BACTERIA SPEC ANAEROBE CULT: ABNORMAL
BACTERIA SPEC ANAEROBE CULT: ABNORMAL
BASOPHILS # BLD: 0 K/UL (ref 0–0.2)
BASOPHILS NFR BLD: 0 %
BILIRUB SERPL-MCNC: 0.5 MG/DL (ref 0–1)
BUN SERPL-MCNC: 7 MG/DL (ref 7–20)
CALCIUM SERPL-MCNC: 7.6 MG/DL (ref 8.3–10.6)
CHLORIDE SERPL-SCNC: 99 MMOL/L (ref 99–110)
CO2 SERPL-SCNC: 25 MMOL/L (ref 21–32)
CREAT SERPL-MCNC: 0.5 MG/DL (ref 0.6–1.2)
DEPRECATED RDW RBC AUTO: 15.7 % (ref 12.4–15.4)
EOSINOPHIL # BLD: 0.1 K/UL (ref 0–0.6)
EOSINOPHIL NFR BLD: 1 %
GFR SERPLBLD CREATININE-BSD FMLA CKD-EPI: >90 ML/MIN/{1.73_M2}
GLUCOSE BLD-MCNC: 102 MG/DL (ref 70–99)
GLUCOSE BLD-MCNC: 103 MG/DL (ref 70–99)
GLUCOSE BLD-MCNC: 106 MG/DL (ref 70–99)
GLUCOSE BLD-MCNC: 121 MG/DL (ref 70–99)
GLUCOSE SERPL-MCNC: 99 MG/DL (ref 70–99)
GRAM STN SPEC: ABNORMAL
HCT VFR BLD AUTO: 23.9 % (ref 36–48)
HGB BLD-MCNC: 8.1 G/DL (ref 12–16)
LYMPHOCYTES # BLD: 1.3 K/UL (ref 1–5.1)
LYMPHOCYTES NFR BLD: 12 %
MAGNESIUM SERPL-MCNC: 1.8 MG/DL (ref 1.8–2.4)
MCH RBC QN AUTO: 28.8 PG (ref 26–34)
MCHC RBC AUTO-ENTMCNC: 34 G/DL (ref 31–36)
MCV RBC AUTO: 84.8 FL (ref 80–100)
MONOCYTES # BLD: 0.9 K/UL (ref 0–1.3)
MONOCYTES NFR BLD: 8 %
NEUTROPHILS # BLD: 8.7 K/UL (ref 1.7–7.7)
NEUTROPHILS NFR BLD: 64 %
NEUTS BAND NFR BLD MANUAL: 15 % (ref 0–7)
ORGANISM: ABNORMAL
PERFORMED ON: ABNORMAL
PLATELET # BLD AUTO: 391 K/UL (ref 135–450)
PLATELET BLD QL SMEAR: ADEQUATE
PMV BLD AUTO: 7.3 FL (ref 5–10.5)
POLYCHROMASIA BLD QL SMEAR: ABNORMAL
POTASSIUM SERPL-SCNC: 3.3 MMOL/L (ref 3.5–5.1)
PROT SERPL-MCNC: 5.2 G/DL (ref 6.4–8.2)
RBC # BLD AUTO: 2.82 M/UL (ref 4–5.2)
SLIDE REVIEW: ABNORMAL
SODIUM SERPL-SCNC: 134 MMOL/L (ref 136–145)
WBC # BLD AUTO: 11 K/UL (ref 4–11)

## 2024-05-06 PROCEDURE — 83735 ASSAY OF MAGNESIUM: CPT

## 2024-05-06 PROCEDURE — 6360000002 HC RX W HCPCS: Performed by: INTERNAL MEDICINE

## 2024-05-06 PROCEDURE — 94760 N-INVAS EAR/PLS OXIMETRY 1: CPT

## 2024-05-06 PROCEDURE — 85025 COMPLETE CBC W/AUTO DIFF WBC: CPT

## 2024-05-06 PROCEDURE — C9113 INJ PANTOPRAZOLE SODIUM, VIA: HCPCS | Performed by: NURSE PRACTITIONER

## 2024-05-06 PROCEDURE — 2580000003 HC RX 258: Performed by: INTERNAL MEDICINE

## 2024-05-06 PROCEDURE — 2580000003 HC RX 258: Performed by: NURSE PRACTITIONER

## 2024-05-06 PROCEDURE — 80053 COMPREHEN METABOLIC PANEL: CPT

## 2024-05-06 PROCEDURE — 1200000000 HC SEMI PRIVATE

## 2024-05-06 PROCEDURE — 2580000003 HC RX 258: Performed by: SURGERY

## 2024-05-06 PROCEDURE — 6370000000 HC RX 637 (ALT 250 FOR IP): Performed by: NURSE PRACTITIONER

## 2024-05-06 PROCEDURE — 97535 SELF CARE MNGMENT TRAINING: CPT

## 2024-05-06 PROCEDURE — 6360000002 HC RX W HCPCS: Performed by: NURSE PRACTITIONER

## 2024-05-06 PROCEDURE — APPSS15 APP SPLIT SHARED TIME 0-15 MINUTES: Performed by: NURSE PRACTITIONER

## 2024-05-06 PROCEDURE — APPNB30 APP NON BILLABLE TIME 0-30 MINS: Performed by: NURSE PRACTITIONER

## 2024-05-06 PROCEDURE — 97530 THERAPEUTIC ACTIVITIES: CPT

## 2024-05-06 PROCEDURE — 6360000002 HC RX W HCPCS: Performed by: SURGERY

## 2024-05-06 PROCEDURE — 6370000000 HC RX 637 (ALT 250 FOR IP): Performed by: SURGERY

## 2024-05-06 RX ORDER — OXYCODONE HYDROCHLORIDE 5 MG/1
5 TABLET ORAL EVERY 6 HOURS PRN
Qty: 12 TABLET | Refills: 0 | Status: ON HOLD | OUTPATIENT
Start: 2024-05-06 | End: 2024-05-09

## 2024-05-06 RX ORDER — LACTOBACILLUS RHAMNOSUS GG 10B CELL
1 CAPSULE ORAL 2 TIMES DAILY WITH MEALS
Qty: 10 CAPSULE | Refills: 0 | Status: ON HOLD | OUTPATIENT
Start: 2024-05-07 | End: 2024-05-12

## 2024-05-06 RX ORDER — LACTOBACILLUS RHAMNOSUS GG 10B CELL
1 CAPSULE ORAL 2 TIMES DAILY WITH MEALS
Status: DISCONTINUED | OUTPATIENT
Start: 2024-05-06 | End: 2024-05-07 | Stop reason: HOSPADM

## 2024-05-06 RX ORDER — LEVOFLOXACIN 750 MG/1
750 TABLET, FILM COATED ORAL DAILY
Qty: 5 TABLET | Refills: 0 | Status: ON HOLD | OUTPATIENT
Start: 2024-05-07 | End: 2024-05-12

## 2024-05-06 RX ADMIN — Medication 10 ML: at 19:55

## 2024-05-06 RX ADMIN — PANTOPRAZOLE SODIUM 40 MG: 40 INJECTION, POWDER, FOR SOLUTION INTRAVENOUS at 08:27

## 2024-05-06 RX ADMIN — ENOXAPARIN SODIUM 40 MG: 100 INJECTION SUBCUTANEOUS at 08:27

## 2024-05-06 RX ADMIN — NEPHROCAP 1 MG: 1 CAP ORAL at 08:27

## 2024-05-06 RX ADMIN — Medication 10 ML: at 08:28

## 2024-05-06 RX ADMIN — METOPROLOL TARTRATE 12.5 MG: 25 TABLET, FILM COATED ORAL at 08:27

## 2024-05-06 RX ADMIN — MORPHINE SULFATE 4 MG: 4 INJECTION, SOLUTION INTRAMUSCULAR; INTRAVENOUS at 10:09

## 2024-05-06 RX ADMIN — METOPROLOL TARTRATE 12.5 MG: 25 TABLET, FILM COATED ORAL at 19:52

## 2024-05-06 RX ADMIN — OXYCODONE HYDROCHLORIDE 5 MG: 5 TABLET ORAL at 19:52

## 2024-05-06 RX ADMIN — SODIUM CHLORIDE: 9 INJECTION, SOLUTION INTRAVENOUS at 12:41

## 2024-05-06 RX ADMIN — OXYCODONE HYDROCHLORIDE 5 MG: 5 TABLET ORAL at 09:07

## 2024-05-06 RX ADMIN — MORPHINE SULFATE 2 MG: 2 INJECTION, SOLUTION INTRAMUSCULAR; INTRAVENOUS at 01:14

## 2024-05-06 RX ADMIN — PIPERACILLIN AND TAZOBACTAM 3375 MG: 3; .375 INJECTION, POWDER, LYOPHILIZED, FOR SOLUTION INTRAVENOUS at 12:42

## 2024-05-06 RX ADMIN — PIPERACILLIN AND TAZOBACTAM 3375 MG: 3; .375 INJECTION, POWDER, LYOPHILIZED, FOR SOLUTION INTRAVENOUS at 20:05

## 2024-05-06 RX ADMIN — MONTELUKAST SODIUM 10 MG: 10 TABLET, FILM COATED ORAL at 19:53

## 2024-05-06 RX ADMIN — OXYCODONE HYDROCHLORIDE 5 MG: 5 TABLET ORAL at 03:36

## 2024-05-06 RX ADMIN — PANTOPRAZOLE SODIUM 40 MG: 40 INJECTION, POWDER, FOR SOLUTION INTRAVENOUS at 19:56

## 2024-05-06 RX ADMIN — LORAZEPAM 0.5 MG: 0.5 TABLET ORAL at 19:52

## 2024-05-06 RX ADMIN — PIPERACILLIN AND TAZOBACTAM 3375 MG: 3; .375 INJECTION, POWDER, LYOPHILIZED, FOR SOLUTION INTRAVENOUS at 03:54

## 2024-05-06 RX ADMIN — OXYCODONE HYDROCHLORIDE 5 MG: 5 TABLET ORAL at 13:48

## 2024-05-06 RX ADMIN — SODIUM CHLORIDE: 9 INJECTION, SOLUTION INTRAVENOUS at 20:04

## 2024-05-06 RX ADMIN — ATORVASTATIN CALCIUM 20 MG: 20 TABLET, FILM COATED ORAL at 19:53

## 2024-05-06 ASSESSMENT — PAIN SCALES - GENERAL
PAINLEVEL_OUTOF10: 9
PAINLEVEL_OUTOF10: 0
PAINLEVEL_OUTOF10: 10
PAINLEVEL_OUTOF10: 0
PAINLEVEL_OUTOF10: 9
PAINLEVEL_OUTOF10: 5
PAINLEVEL_OUTOF10: 9
PAINLEVEL_OUTOF10: 7
PAINLEVEL_OUTOF10: 6
PAINLEVEL_OUTOF10: 9
PAINLEVEL_OUTOF10: 0
PAINLEVEL_OUTOF10: 10

## 2024-05-06 ASSESSMENT — PAIN DESCRIPTION - FREQUENCY
FREQUENCY: CONTINUOUS

## 2024-05-06 ASSESSMENT — PAIN DESCRIPTION - ORIENTATION
ORIENTATION: MID

## 2024-05-06 ASSESSMENT — PAIN - FUNCTIONAL ASSESSMENT: PAIN_FUNCTIONAL_ASSESSMENT: PREVENTS OR INTERFERES WITH MANY ACTIVE NOT PASSIVE ACTIVITIES

## 2024-05-06 ASSESSMENT — PAIN SCALES - WONG BAKER
WONGBAKER_NUMERICALRESPONSE: NO HURT

## 2024-05-06 ASSESSMENT — PAIN DESCRIPTION - ONSET
ONSET: ON-GOING

## 2024-05-06 ASSESSMENT — PAIN DESCRIPTION - DESCRIPTORS
DESCRIPTORS: ACHING
DESCRIPTORS: ACHING
DESCRIPTORS: ACHING;BURNING

## 2024-05-06 ASSESSMENT — PAIN DESCRIPTION - PAIN TYPE
TYPE: SURGICAL PAIN
TYPE: SURGICAL PAIN

## 2024-05-06 ASSESSMENT — PAIN DESCRIPTION - LOCATION
LOCATION: ABDOMEN

## 2024-05-06 NOTE — PROGRESS NOTES
Physical Therapy  Sonja Mcmahan  PT treatment attempted 2x today, once in the morning, and once in the afternoon. Both attempts patient was just getting back to bed and declined working with PT. Will follow up at later date.  Thanks, Gema Frey, [id number]

## 2024-05-06 NOTE — CARE COORDINATION
05/06/24 1456   IMM Letter   IMM Letter given to Patient/Family/Significant other/Guardian/POA/by: Given to Patient  by .   IMM Letter date given: 05/06/24   IMM Letter time given: 1453     Discharge Planning:     (CAROL ANN) spoke with patient and patient's Daughter, Natalie, who were present in patient's room. Patient agreeable to going to the [facility] Acute [facility] Unit at discharge today.      Electronically signed by ERNA Moreno on 5/6/2024 at 2:57 PM

## 2024-05-06 NOTE — PROGRESS NOTES
Sonja Mcmahan  5/6/2024  [id number]    Chief Complaint: Hiatal hernia    Subjective   Abdominal fluid culture resulted positive for E coli, K pneumoniae, S epidermidis. Remains on Zosyn.     Patient complains of sharp upper abdominal/epigastric pain this morning. Denies fevers, chills, chest pain, shortness of breath.          Objective   Objective:  Patient Vitals for the past 24 hrs:   BP Temp Temp src Pulse Resp SpO2 Height   05/06/24 1418 -- -- -- -- 16 -- --   05/06/24 1153 134/75 98 °F (36.7 °C) Oral 81 16 92 % --   05/06/24 1039 -- -- -- -- 16 -- --   05/06/24 0937 -- -- -- -- 16 -- --   05/06/24 0921 -- -- -- -- -- -- 1.6 m (5' 3\")   05/06/24 0831 -- -- -- -- 16 93 % --   05/06/24 0815 112/74 98.2 °F (36.8 °C) Oral 85 16 93 % --   05/06/24 0406 -- -- -- -- 16 -- --   05/06/24 0345 110/67 98.2 °F (36.8 °C) Oral 86 16 93 % --   05/06/24 0144 -- -- -- -- 16 -- --   05/05/24 2300 116/70 97.5 °F (36.4 °C) Oral 77 16 96 % --   05/05/24 2159 -- -- -- -- 16 -- --   05/05/24 1945 101/63 98.1 °F (36.7 °C) Oral 76 18 98 % --   05/05/24 1617 109/67 98 °F (36.7 °C) Oral 88 16 92 % --     Gen: Uncomfortable, pleasant.   HEENT: Normocephalic, atraumatic.   CV: No audible murmurs, well perfused extremities  Resp: No respiratory distress. No increased WOB  Abd: XAVIER drain with green-brown output.   Ext: No edema.   Neuro: Alert, oriented, appropriately interactive.    Laboratory data: Available via EMR.     Therapy progress:    PT    Rolling: Level of difficulty - A Little   Sit to Stand from a Chair: Level of difficulty - A Little  Supine to Sit: Level of difficulty - A Little     Bed to Chair: Physical Assistance Required - A Little  Ambulate Across Room: Physical Assistance Required - A Little  Ascend 3-5 Steps With HR: Physical Assistance Required - A Little    OT    Eating: Physical Assistance Required - None  Grooming: Physical Assistance Required - A Little  LB Dressing: Physical Assistance Required - A Lot  UB

## 2024-05-06 NOTE — PROGRESS NOTES
[facility] - Inpatient Rehabilitation Department   Phone: [phone]    Occupational Therapy    [] Initial Evaluation            [x] Daily Treatment Note         [] Discharge Summary      Patient: Sonja Mcmahan   : 1950   MRN: [id number]   Date of Service:  2024    Admitting Diagnosis:  Hiatal hernia  Current Admission Summary: Admitted for abdominal pain. Imaging indicative of hiatal hernia and cholelithiasis. EGD on  showed polyps with hiatal hernia. Esophagus dilated at that time. To OR  for below:  1. Laparoscopic hiatal hernia repair with primary closure and [place] [employer] mesh reinforcement.  2. Laparoscopic cholecystectomy.  3. Open hemigastrectomy with gastrojejunostomy and closure of duodenal stump and overlying omental flap for duodenal stump reinforcement.    5/3 - drain placed for fluid collection, has E coli and Klebsiella   Past Medical History:  has a past medical history of Asthma, Diabetes mellitus (HCC), Hyperlipidemia, and Hypertension.  Past Surgical History:  has a past surgical history that includes Hysterectomy, total abdominal; Elbow surgery; Upper gastrointestinal endoscopy (N/A, 2024); Upper gastrointestinal endoscopy (N/A, 2024); Upper gastrointestinal endoscopy (N/A, 2024); hiatal hernia repair (N/A, 2024); Cholecystectomy, laparoscopic (N/A, 2024); and gastrectomy (N/A, 2024).    Discharge Recommendations: Sonja Mcmahan scored a 16/24 on the AM-PAC ADL Inpatient form. Current research shows that an AM-PAC score of 17 or less is typically not associated with a discharge to the patient's home setting. Based on the patient's AM-PAC score and their current ADL deficits, it is recommended that the patient have 5-7 sessions per week of Occupational Therapy at d/c to increase the patient's independence.  At this time, this patient demonstrates complex nursing, medical, and rehabilitative needs, and would benefit from intensive  IADL  Prognosis: good  Clinical Assessment: The patient is a 73 y.o. female who presents below their baseline level of function due to above deficits secondary to laparoscopic hiatal hernia repair with primary closure and cholecystectomy. Typically, pt is IND with ADLs and mobility with no device.  Pt requires SBA-CGA with functional mobility and mod A for ADL. Pt limited by decreased activity tolerance and pain. Continued OT indicated in order to promote return to PLOF.   Safety Interventions: patient left in bed, bed alarm in place, call light within reach, gait belt, patient at risk for falls, nurse notified, and family/caregiver present - pt requesting to get back to bed at EOS.     Plan  Frequency: 3-5 x/per week  Current Treatment Recommendations: strengthening, balance training, functional mobility training, transfer training, endurance training, patient/caregiver education, ADL/self-care training, IADL training, home management training, and equipment evaluation/education    Goals    Short Term Goals:  Time Frame: by discharge  Patient will complete upper body ADL at supervision   Patient will complete lower body ADL at supervision   Patient will complete toileting at modified independent   Patient will complete grooming at Independent   Patient will complete functional transfers at modified independent   Patient will complete functional mobility at modified independent     Above goals reviewed on 5/6/2024.  All goals are ongoing at this time unless indicated above.       Therapy Session Time     Individual Group Co-treatment   Time In 1336     Time Out 1454     Minutes 78          Timed Code Treatment Minutes:  78 minutes  Total Treatment Minutes:  78 minutes        Electronically Signed By: Kathy Bar OTR/L [id number]

## 2024-05-06 NOTE — PLAN OF CARE
Problem: Pain  Goal: Verbalizes/displays adequate comfort level or baseline comfort level  Outcome: Progressing     Problem: Chronic Conditions and Co-morbidities  Goal: Patient's chronic conditions and co-morbidity symptoms are monitored and maintained or improved  Outcome: Progressing     Problem: Nutrition Deficit:  Goal: Optimize nutritional status  Outcome: Progressing  Flowsheets (Taken 5/6/2024 0921 by Flaquita Rey, MS, RD, LD)  Nutrient intake appropriate for improving, restoring, or maintaining nutritional needs:   Recommend appropriate diets, oral nutritional supplements, and vitamin/mineral supplements   Monitor oral intake, labs, and treatment plans     Problem: Discharge Planning  Goal: Discharge to home or other facility with appropriate resources  Outcome: Progressing     Problem: Safety - Adult  Goal: Free from fall injury  Outcome: Progressing     Problem: Cardiovascular - Adult  Goal: Maintains optimal cardiac output and hemodynamic stability  Outcome: Progressing     Problem: Skin/Tissue Integrity - Adult  Goal: Incisions, wounds, or drain sites healing without S/S of infection  Outcome: Progressing     Problem: Musculoskeletal - Adult  Goal: Return mobility to safest level of function  Outcome: Progressing  Goal: Return ADL status to a safe level of function  Outcome: Progressing     Problem: Gastrointestinal - Adult  Goal: Minimal or absence of nausea and vomiting  Outcome: Progressing  Goal: Maintains or returns to baseline bowel function  Outcome: Progressing  Goal: Maintains adequate nutritional intake  Outcome: Progressing     Problem: Respiratory - Adult  Goal: Achieves optimal ventilation and oxygenation  Outcome: Progressing     Problem: Skin/Tissue Integrity  Goal: Absence of new skin breakdown  Description: 1.  Monitor for areas of redness and/or skin breakdown  2.  Assess vascular access sites hourly  3.  Every 4-6 hours minimum:  Change oxygen saturation probe site  4.  Every 4-6

## 2024-05-06 NOTE — PROGRESS NOTES
Pt has d/c order in.  Called ARU.  They are unaware of pt's admission.  No pending orders from Dr. Rico.  Effie with case management on site and made aware of situation.

## 2024-05-06 NOTE — DISCHARGE INSTRUCTIONS
Please call and make an appointment with your PCP within 1 week  Please call and make an appointment with General Surgery, Oncology, GI,   Please take all your medications as prescribed including any new ones on discharge

## 2024-05-06 NOTE — PROGRESS NOTES
[facility]        Progress Note    Patient Name: Sonja Mcmahan  MRN: [id number]  YOB: 1950  Date of Evaluation: 2024    Postoperative Day #14    Subjective:  No acute events overnight  Pain controlled at present, reports cramping pain earlier that was intense but subsided after about 45 minutes  No nausea or vomiting, tolerating full liquids  Passing flatus and stool  Resting in bed at this time      Vital Signs:  Patient Vitals for the past 24 hrs:   BP Temp Temp src Pulse Resp SpO2 Height   24 1153 134/75 98 °F (36.7 °C) Oral 81 16 92 % --   24 1039 -- -- -- -- 16 -- --   24 0937 -- -- -- -- 16 -- --   24 0921 -- -- -- -- -- -- 1.6 m (5' 3\")   24 0831 -- -- -- -- 16 93 % --   24 0815 112/74 98.2 °F (36.8 °C) Oral 85 16 93 % --   24 0406 -- -- -- -- 16 -- --   24 0345 110/67 98.2 °F (36.8 °C) Oral 86 16 93 % --   24 0144 -- -- -- -- 16 -- --   24 2300 116/70 97.5 °F (36.4 °C) Oral 77 16 96 % --   24 2159 -- -- -- -- 16 -- --   24 1945 101/63 98.1 °F (36.7 °C) Oral 76 18 98 % --   24 1617 109/67 98 °F (36.7 °C) Oral 88 16 92 % --        TEMPERATURE HISTORY 24H: Temp (24hrs), Av °F (36.7 °C), Min:97.5 °F (36.4 °C), Max:98.2 °F (36.8 °C)    BLOOD PRESSURE HISTORY: Systolic (36hrs), Av , Min:101 , Max:134    Diastolic (36hrs), Av, Min:63, Max:75      Intake/Output:  I/O last 3 completed shifts:  In: 600 [P.O.:600]  Out: 1732 [Urine:1282; Drains:450]  No intake/output data recorded.  Drain/tube Output:  Closed/Suction Drain RLQ-Output (ml): 75 ml    Physical Exam:  General: awake, alert, oriented to person, place, time  Lungs: unlabored respirations  Abdomen: soft, non-distended, incisional tenderness only, bowel sounds present  Skin/Wound: healing well, no drainage, no erythema, ecchymosis noted, well approximated with staples  Drain: turbid/bilious drainage--tubing

## 2024-05-06 NOTE — PROGRESS NOTES
Nutrition Note    RECOMMENDATIONS  PO Diet: Advance as tolerated to regular  ONS: Changed to Ensure Plus High Protein TID  Nutrition Support: Recommend initiation of TPN is pt does not tolerate diet advancement, RD remains available to provide recommendations    ASSESSMENT   Pt triggered for follow-up. Diet advanced to full liquid 5/4 with variable documented meal intakes ranging from 1-75%. Continues to receive Ensure Clear TID. RD will continue to monitor for diet advancement/tolerance with adequate po intake.     Malnutrition Status: Mild malnutrition  Acute Illness    NUTRITION DIAGNOSIS   Inadequate protein-energy intake related to altered GI function as evidenced by other (comment) (NPO/liquid diet status throughout admission)    Goals: PO intake 50% or greater, by next RD assessment, other (specify) (diet advancement/tolerance)     NUTRITION RELATED FINDINGS  Objective: Na 134, K+ 3.3. LBM 5/5. Non-pitting generalized, BLE; +1 BUE edema.  Wounds: Surgical Incision    CURRENT NUTRITION THERAPIES  ADULT ORAL NUTRITION SUPPLEMENT; Breakfast, Lunch, Dinner; Clear Liquid Oral Supplement  ADULT DIET; Full Liquid     PO Intake: Unable to assess   PO Supplement Intake:Unable to assess    ANTHROPOMETRICS  Current Height: 160 cm (5' 3\")  Current Weight - Scale: 83.6 kg (184 lb 3.2 oz)    Ideal Body Weight (IBW): 115 lbs  (52 kg)        BMI: 32.6    COMPARATIVE STANDARDS  Total Energy Requirements (kcals/day): 1821     Protein (g):         Fluid (mL/day):  1821    EDUCATION  Education not indicated     The patient will be monitored per nutrition standards of care. Consult dietitian if additional nutrition interventions are needed prior to RD reassessment.     Flaquita Rey, MS, RD, LD    Contact: [phone]

## 2024-05-06 NOTE — PROGRESS NOTES
05/02/24  0610 05/01/24  0515 04/24/24  0827 04/23/24  0559 04/22/24  0601   * 135* 133* 133* 129* 136   < > 143 142   K 3.3* 3.8 3.6 3.4* 3.8 3.6   < > 4.5 4.0   CL 99 99 99 97* 92* 100   < > 111* 109   CO2 25 25 23 24 27 26   < > 19* 21   GLUCOSE 99 100* 105* 114* 148* 109*   < > 162* 113*   BUN 7 10 11 11 11 8   < > 9 4*   CREATININE 0.5* 0.6 0.7 0.9 1.2 0.6   < > 0.9 0.7   LABGLOM >90 >90 90 66 47* >90   < > 66 90   CALCIUM 7.6* 7.8* 7.5* 7.5* 7.8* 8.0*   < > 8.6 9.1   AGRATIO 0.5* 0.5*  --   --   --   --   --  1.2 1.2   BILITOT 0.5 0.6  --   --   --   --   --  0.3 0.5   ALKPHOS 65 76  --   --   --   --   --  64 60   ALT 15 20  --   --   --   --   --  28 16   AST 26 38*  --   --   --   --   --  53* 23   MG 1.80  --   --  2.20 1.60* 1.90   < >  --   --     < > = values in this interval not displayed.       Lab Results   Component Value Date    CALCIUM 7.6 (L) 05/06/2024    PHOS 3.5 05/03/2024       LDH:No results for input(s): \"LDH\" in the last 720 hours.    Radiology Review:  CT DRAINAGE HEMATOMA/SEROMA/FLUID COLLECTION  Narrative: PROCEDURE:  CT GUIDEDABDOMINALABSCESS DRAINAGE CATHETER PLACEMENT    MODERATE CONSCIOUS SEDATION    5/3/2024    HISTORY:  ORDERING SYSTEM PROVIDED HISTORY: Drainage of fluid collection next to liver  with cultures  TECHNOLOGIST PROVIDED HISTORY:  Reason for exam:->Drainage of fluid collection next to liver with cultures  Reason for Exam: Drainage of fluid collection next to liver with cultures    SEDATION:  0.5 mgversed and 75 mcg fentanyl were titrated intravenously for moderate  sedation monitored under my direction.  Total intraservice time of sedation  was 15 minutes.  The patient's vital signs were monitored throughout the  procedure and recorded in the patient's medical record by the nurse.    TECHNIQUE:  Informed consent was obtained after a detailed explanation of the procedure  including risks, benefits, and alternatives.  Universal protocol was  followed.  Sterile  dissection by Dr. Jackson on 4/22/2024.  The pathology showed     1.  Gastric adenocarcinoma:     -G1 well-differentiated  -pT1b pN0 M0 -stage I.     -Patient had R0 resection  -No lymphovascular invasion was noted  -16 lymph nodes were negative     Patient does not need adjuvant treatment  -She will be monitored as per NCCN guidelines.     -Will ensure outpatient follow-up in 4 to 6 weeks.     2.  Anemia:     -Multifactorial.  -Hemoglobin was normal at the time of admission  -Has iron deficiency as well as anemia of chronic disease  - Received IV iron for 3 days from 5/2/2024 to 5/5/2024  -B12 and folate are pending  -Transfuse PRBCs if hemoglobin is less than 7    3.  Abdominal abscess:    -Drained by IR.  -On Zosyn          ONCOLOGIC DISPOSITION:      Clem Dubon MD  Please contact through [employer]

## 2024-05-07 ENCOUNTER — HOSPITAL ENCOUNTER (INPATIENT)
Age: 74
DRG: 949 | End: 2024-05-07
Attending: STUDENT IN AN ORGANIZED HEALTH CARE EDUCATION/TRAINING PROGRAM | Admitting: STUDENT IN AN ORGANIZED HEALTH CARE EDUCATION/TRAINING PROGRAM
Payer: MEDICARE

## 2024-05-07 VITALS
OXYGEN SATURATION: 91 % | HEART RATE: 72 BPM | SYSTOLIC BLOOD PRESSURE: 120 MMHG | WEIGHT: 184.2 LBS | TEMPERATURE: 98.3 F | DIASTOLIC BLOOD PRESSURE: 76 MMHG | RESPIRATION RATE: 18 BRPM | HEIGHT: 63 IN | BODY MASS INDEX: 32.64 KG/M2

## 2024-05-07 DIAGNOSIS — K31.89 GASTRIC MASS: Primary | ICD-10-CM

## 2024-05-07 DIAGNOSIS — R10.9 INTRACTABLE ABDOMINAL PAIN: ICD-10-CM

## 2024-05-07 LAB
ALBUMIN SERPL-MCNC: 1.9 G/DL (ref 3.4–5)
ALBUMIN/GLOB SERPL: 0.5 {RATIO} (ref 1.1–2.2)
ALP SERPL-CCNC: 65 U/L (ref 40–129)
ALT SERPL-CCNC: 13 U/L (ref 10–40)
ANION GAP SERPL CALCULATED.3IONS-SCNC: 12 MMOL/L (ref 3–16)
ANISOCYTOSIS BLD QL SMEAR: ABNORMAL
AST SERPL-CCNC: 25 U/L (ref 15–37)
BASOPHILS # BLD: 0 K/UL (ref 0–0.2)
BASOPHILS NFR BLD: 0 %
BILIRUB SERPL-MCNC: 0.5 MG/DL (ref 0–1)
BUN SERPL-MCNC: 7 MG/DL (ref 7–20)
CALCIUM SERPL-MCNC: 7.9 MG/DL (ref 8.3–10.6)
CHLORIDE SERPL-SCNC: 99 MMOL/L (ref 99–110)
CO2 SERPL-SCNC: 24 MMOL/L (ref 21–32)
CREAT SERPL-MCNC: 0.6 MG/DL (ref 0.6–1.2)
DEPRECATED RDW RBC AUTO: 16.3 % (ref 12.4–15.4)
EOSINOPHIL # BLD: 0.1 K/UL (ref 0–0.6)
EOSINOPHIL NFR BLD: 1 %
GFR SERPLBLD CREATININE-BSD FMLA CKD-EPI: >90 ML/MIN/{1.73_M2}
GLUCOSE BLD-MCNC: 102 MG/DL (ref 70–99)
GLUCOSE BLD-MCNC: 128 MG/DL (ref 70–99)
GLUCOSE SERPL-MCNC: 99 MG/DL (ref 70–99)
HCT VFR BLD AUTO: 28.2 % (ref 36–48)
HGB BLD-MCNC: 9 G/DL (ref 12–16)
LYMPHOCYTES # BLD: 0.7 K/UL (ref 1–5.1)
LYMPHOCYTES NFR BLD: 7 %
MAGNESIUM SERPL-MCNC: 1.9 MG/DL (ref 1.8–2.4)
MCH RBC QN AUTO: 27.6 PG (ref 26–34)
MCHC RBC AUTO-ENTMCNC: 31.9 G/DL (ref 31–36)
MCV RBC AUTO: 86.5 FL (ref 80–100)
MONOCYTES # BLD: 1.3 K/UL (ref 0–1.3)
MONOCYTES NFR BLD: 12 %
NEUTROPHILS # BLD: 8.6 K/UL (ref 1.7–7.7)
NEUTROPHILS NFR BLD: 59 %
NEUTS BAND NFR BLD MANUAL: 21 % (ref 0–7)
NRBC BLD-RTO: 1 /100 WBC
PERFORMED ON: ABNORMAL
PERFORMED ON: ABNORMAL
PLATELET # BLD AUTO: 398 K/UL (ref 135–450)
PLATELET BLD QL SMEAR: ADEQUATE
PMV BLD AUTO: 8.1 FL (ref 5–10.5)
POLYCHROMASIA BLD QL SMEAR: ABNORMAL
POTASSIUM SERPL-SCNC: 3.8 MMOL/L (ref 3.5–5.1)
PROT SERPL-MCNC: 5.6 G/DL (ref 6.4–8.2)
RBC # BLD AUTO: 3.26 M/UL (ref 4–5.2)
SLIDE REVIEW: ABNORMAL
SODIUM SERPL-SCNC: 135 MMOL/L (ref 136–145)
WBC # BLD AUTO: 10.7 K/UL (ref 4–11)

## 2024-05-07 PROCEDURE — 80053 COMPREHEN METABOLIC PANEL: CPT

## 2024-05-07 PROCEDURE — 36415 COLL VENOUS BLD VENIPUNCTURE: CPT

## 2024-05-07 PROCEDURE — 1280000000 HC REHAB R&B

## 2024-05-07 PROCEDURE — 6370000000 HC RX 637 (ALT 250 FOR IP): Performed by: STUDENT IN AN ORGANIZED HEALTH CARE EDUCATION/TRAINING PROGRAM

## 2024-05-07 PROCEDURE — 85025 COMPLETE CBC W/AUTO DIFF WBC: CPT

## 2024-05-07 PROCEDURE — 83735 ASSAY OF MAGNESIUM: CPT

## 2024-05-07 PROCEDURE — APPSS15 APP SPLIT SHARED TIME 0-15 MINUTES: Performed by: NURSE PRACTITIONER

## 2024-05-07 PROCEDURE — 2580000003 HC RX 258: Performed by: NURSE PRACTITIONER

## 2024-05-07 PROCEDURE — 6360000002 HC RX W HCPCS: Performed by: NURSE PRACTITIONER

## 2024-05-07 PROCEDURE — C9113 INJ PANTOPRAZOLE SODIUM, VIA: HCPCS | Performed by: NURSE PRACTITIONER

## 2024-05-07 PROCEDURE — 97530 THERAPEUTIC ACTIVITIES: CPT

## 2024-05-07 PROCEDURE — APPNB30 APP NON BILLABLE TIME 0-30 MINS: Performed by: NURSE PRACTITIONER

## 2024-05-07 PROCEDURE — 6370000000 HC RX 637 (ALT 250 FOR IP): Performed by: SURGERY

## 2024-05-07 PROCEDURE — 82607 VITAMIN B-12: CPT

## 2024-05-07 PROCEDURE — 94760 N-INVAS EAR/PLS OXIMETRY 1: CPT

## 2024-05-07 PROCEDURE — 99024 POSTOP FOLLOW-UP VISIT: CPT | Performed by: SURGERY

## 2024-05-07 PROCEDURE — 82746 ASSAY OF FOLIC ACID SERUM: CPT

## 2024-05-07 PROCEDURE — 2580000003 HC RX 258: Performed by: INTERNAL MEDICINE

## 2024-05-07 PROCEDURE — 2580000003 HC RX 258: Performed by: SURGERY

## 2024-05-07 PROCEDURE — 6370000000 HC RX 637 (ALT 250 FOR IP): Performed by: NURSE PRACTITIONER

## 2024-05-07 PROCEDURE — 6360000002 HC RX W HCPCS: Performed by: INTERNAL MEDICINE

## 2024-05-07 RX ORDER — OXYCODONE HYDROCHLORIDE 5 MG/1
5 TABLET ORAL EVERY 6 HOURS PRN
Status: CANCELLED | OUTPATIENT
Start: 2024-05-07

## 2024-05-07 RX ORDER — MONTELUKAST SODIUM 10 MG/1
10 TABLET ORAL NIGHTLY
Status: CANCELLED | OUTPATIENT
Start: 2024-05-07

## 2024-05-07 RX ORDER — ATORVASTATIN CALCIUM 20 MG/1
20 TABLET, FILM COATED ORAL NIGHTLY
Status: DISPENSED | OUTPATIENT
Start: 2024-05-07

## 2024-05-07 RX ORDER — MULTIVITAMIN WITH IRON
1 TABLET ORAL DAILY
Status: DISPENSED | OUTPATIENT
Start: 2024-05-07

## 2024-05-07 RX ORDER — ENOXAPARIN SODIUM 100 MG/ML
40 INJECTION SUBCUTANEOUS DAILY
Status: CANCELLED | OUTPATIENT
Start: 2024-05-08

## 2024-05-07 RX ORDER — MONTELUKAST SODIUM 10 MG/1
10 TABLET ORAL NIGHTLY
Status: DISPENSED | OUTPATIENT
Start: 2024-05-07

## 2024-05-07 RX ORDER — LANOLIN ALCOHOL/MO/W.PET/CERES
3 CREAM (GRAM) TOPICAL NIGHTLY PRN
Status: DISPENSED | OUTPATIENT
Start: 2024-05-07

## 2024-05-07 RX ORDER — OXYCODONE HYDROCHLORIDE 5 MG/1
5 TABLET ORAL EVERY 6 HOURS PRN
Status: DISCONTINUED | OUTPATIENT
Start: 2024-05-07 | End: 2024-05-09

## 2024-05-07 RX ORDER — ACETAMINOPHEN 325 MG/1
650 TABLET ORAL EVERY 4 HOURS PRN
Status: CANCELLED | OUTPATIENT
Start: 2024-05-07

## 2024-05-07 RX ORDER — ACETAMINOPHEN 325 MG/1
650 TABLET ORAL EVERY 4 HOURS PRN
Status: DISPENSED | OUTPATIENT
Start: 2024-05-07

## 2024-05-07 RX ORDER — CETIRIZINE HYDROCHLORIDE 10 MG/1
10 TABLET ORAL DAILY
Status: CANCELLED | OUTPATIENT
Start: 2024-05-07

## 2024-05-07 RX ORDER — LACTOBACILLUS RHAMNOSUS GG 10B CELL
1 CAPSULE ORAL
Status: CANCELLED | OUTPATIENT
Start: 2024-05-08

## 2024-05-07 RX ORDER — ENOXAPARIN SODIUM 100 MG/ML
40 INJECTION SUBCUTANEOUS DAILY
Status: DISPENSED | OUTPATIENT
Start: 2024-05-08

## 2024-05-07 RX ORDER — LORAZEPAM 0.5 MG/1
0.5 TABLET ORAL DAILY PRN
Status: DISPENSED | OUTPATIENT
Start: 2024-05-07

## 2024-05-07 RX ORDER — LANOLIN ALCOHOL/MO/W.PET/CERES
3 CREAM (GRAM) TOPICAL NIGHTLY PRN
Status: CANCELLED | OUTPATIENT
Start: 2024-05-07

## 2024-05-07 RX ORDER — LEVOFLOXACIN 750 MG/1
750 TABLET, FILM COATED ORAL DAILY
Status: CANCELLED | OUTPATIENT
Start: 2024-05-07 | End: 2024-05-12

## 2024-05-07 RX ORDER — LORAZEPAM 0.5 MG/1
0.5 TABLET ORAL DAILY PRN
Status: CANCELLED | OUTPATIENT
Start: 2024-05-07

## 2024-05-07 RX ORDER — LEVOFLOXACIN 500 MG/1
750 TABLET, FILM COATED ORAL DAILY
Status: COMPLETED | OUTPATIENT
Start: 2024-05-07 | End: 2024-05-11

## 2024-05-07 RX ORDER — LACTOBACILLUS RHAMNOSUS GG 10B CELL
1 CAPSULE ORAL
Status: DISCONTINUED | OUTPATIENT
Start: 2024-05-08 | End: 2024-05-08

## 2024-05-07 RX ORDER — ALBUTEROL SULFATE 90 UG/1
2 AEROSOL, METERED RESPIRATORY (INHALATION) EVERY 6 HOURS PRN
Status: CANCELLED | OUTPATIENT
Start: 2024-05-07

## 2024-05-07 RX ORDER — MULTIVITAMIN WITH IRON
1 TABLET ORAL DAILY
Status: CANCELLED | OUTPATIENT
Start: 2024-05-07

## 2024-05-07 RX ORDER — ONDANSETRON 4 MG/1
4 TABLET, ORALLY DISINTEGRATING ORAL EVERY 8 HOURS PRN
Status: CANCELLED | OUTPATIENT
Start: 2024-05-07

## 2024-05-07 RX ORDER — CETIRIZINE HYDROCHLORIDE 10 MG/1
10 TABLET ORAL NIGHTLY
Status: DISPENSED | OUTPATIENT
Start: 2024-05-07

## 2024-05-07 RX ORDER — ATORVASTATIN CALCIUM 20 MG/1
20 TABLET, FILM COATED ORAL NIGHTLY
Status: CANCELLED | OUTPATIENT
Start: 2024-05-07

## 2024-05-07 RX ORDER — ASPIRIN 81 MG/1
81 TABLET, CHEWABLE ORAL DAILY
Status: DISPENSED | OUTPATIENT
Start: 2024-05-07

## 2024-05-07 RX ORDER — POLYETHYLENE GLYCOL 3350 17 G/17G
17 POWDER, FOR SOLUTION ORAL DAILY PRN
Status: CANCELLED | OUTPATIENT
Start: 2024-05-07

## 2024-05-07 RX ORDER — ASPIRIN 81 MG/1
81 TABLET, CHEWABLE ORAL DAILY
Status: CANCELLED | OUTPATIENT
Start: 2024-05-07

## 2024-05-07 RX ORDER — ALBUTEROL SULFATE 90 UG/1
2 AEROSOL, METERED RESPIRATORY (INHALATION) EVERY 6 HOURS PRN
Status: DISPENSED | OUTPATIENT
Start: 2024-05-07

## 2024-05-07 RX ORDER — POLYETHYLENE GLYCOL 3350 17 G/17G
17 POWDER, FOR SOLUTION ORAL DAILY PRN
Status: ACTIVE | OUTPATIENT
Start: 2024-05-07

## 2024-05-07 RX ORDER — ONDANSETRON 4 MG/1
4 TABLET, ORALLY DISINTEGRATING ORAL EVERY 8 HOURS PRN
Status: DISPENSED | OUTPATIENT
Start: 2024-05-07

## 2024-05-07 RX ADMIN — OXYCODONE 5 MG: 5 TABLET ORAL at 23:24

## 2024-05-07 RX ADMIN — PIPERACILLIN AND TAZOBACTAM 3375 MG: 3; .375 INJECTION, POWDER, LYOPHILIZED, FOR SOLUTION INTRAVENOUS at 04:13

## 2024-05-07 RX ADMIN — ATORVASTATIN CALCIUM 20 MG: 20 TABLET, FILM COATED ORAL at 20:03

## 2024-05-07 RX ADMIN — ENOXAPARIN SODIUM 40 MG: 100 INJECTION SUBCUTANEOUS at 09:40

## 2024-05-07 RX ADMIN — LEVOFLOXACIN 750 MG: 500 TABLET, FILM COATED ORAL at 20:03

## 2024-05-07 RX ADMIN — OXYCODONE HYDROCHLORIDE 5 MG: 5 TABLET ORAL at 10:28

## 2024-05-07 RX ADMIN — THERA TABS 1 TABLET: TAB at 16:20

## 2024-05-07 RX ADMIN — Medication 1 CAPSULE: at 09:40

## 2024-05-07 RX ADMIN — SODIUM CHLORIDE, PRESERVATIVE FREE 10 ML: 5 INJECTION INTRAVENOUS at 09:41

## 2024-05-07 RX ADMIN — METOPROLOL TARTRATE 12.5 MG: 25 TABLET, FILM COATED ORAL at 09:40

## 2024-05-07 RX ADMIN — NEPHROCAP 1 MG: 1 CAP ORAL at 09:40

## 2024-05-07 RX ADMIN — OXYCODONE HYDROCHLORIDE 5 MG: 5 TABLET ORAL at 04:41

## 2024-05-07 RX ADMIN — MONTELUKAST SODIUM 10 MG: 10 TABLET, FILM COATED ORAL at 20:03

## 2024-05-07 RX ADMIN — CETIRIZINE HYDROCHLORIDE 10 MG: 10 TABLET, FILM COATED ORAL at 20:03

## 2024-05-07 RX ADMIN — METOPROLOL TARTRATE 12.5 MG: 25 TABLET, FILM COATED ORAL at 20:03

## 2024-05-07 RX ADMIN — Medication 10 ML: at 04:20

## 2024-05-07 RX ADMIN — LORAZEPAM 0.5 MG: 0.5 TABLET ORAL at 20:08

## 2024-05-07 RX ADMIN — OXYCODONE 5 MG: 5 TABLET ORAL at 17:25

## 2024-05-07 RX ADMIN — Medication 10 ML: at 09:41

## 2024-05-07 RX ADMIN — SODIUM CHLORIDE 25 ML: 9 INJECTION, SOLUTION INTRAVENOUS at 12:38

## 2024-05-07 RX ADMIN — PANTOPRAZOLE SODIUM 40 MG: 40 INJECTION, POWDER, FOR SOLUTION INTRAVENOUS at 09:40

## 2024-05-07 RX ADMIN — ASPIRIN 81 MG 81 MG: 81 TABLET ORAL at 16:20

## 2024-05-07 RX ADMIN — PIPERACILLIN AND TAZOBACTAM 3375 MG: 3; .375 INJECTION, POWDER, LYOPHILIZED, FOR SOLUTION INTRAVENOUS at 12:39

## 2024-05-07 ASSESSMENT — PAIN DESCRIPTION - LOCATION
LOCATION: ABDOMEN
LOCATION: ABDOMEN
LOCATION: RIB CAGE;ABDOMEN
LOCATION: ABDOMEN

## 2024-05-07 ASSESSMENT — PAIN SCALES - GENERAL
PAINLEVEL_OUTOF10: 6
PAINLEVEL_OUTOF10: 5
PAINLEVEL_OUTOF10: 6
PAINLEVEL_OUTOF10: 0
PAINLEVEL_OUTOF10: 8
PAINLEVEL_OUTOF10: 5
PAINLEVEL_OUTOF10: 4
PAINLEVEL_OUTOF10: 0

## 2024-05-07 ASSESSMENT — PAIN SCALES - WONG BAKER
WONGBAKER_NUMERICALRESPONSE: NO HURT

## 2024-05-07 ASSESSMENT — PAIN DESCRIPTION - ORIENTATION
ORIENTATION: MID

## 2024-05-07 ASSESSMENT — PAIN DESCRIPTION - ONSET
ONSET: ON-GOING

## 2024-05-07 ASSESSMENT — PAIN - FUNCTIONAL ASSESSMENT
PAIN_FUNCTIONAL_ASSESSMENT: PREVENTS OR INTERFERES SOME ACTIVE ACTIVITIES AND ADLS
PAIN_FUNCTIONAL_ASSESSMENT: ACTIVITIES ARE NOT PREVENTED
PAIN_FUNCTIONAL_ASSESSMENT: PREVENTS OR INTERFERES SOME ACTIVE ACTIVITIES AND ADLS
PAIN_FUNCTIONAL_ASSESSMENT: ACTIVITIES ARE NOT PREVENTED

## 2024-05-07 ASSESSMENT — PAIN DESCRIPTION - DESCRIPTORS
DESCRIPTORS: ACHING;DISCOMFORT
DESCRIPTORS: PRESSURE
DESCRIPTORS: DISCOMFORT
DESCRIPTORS: ACHING;DISCOMFORT

## 2024-05-07 ASSESSMENT — PAIN DESCRIPTION - PAIN TYPE
TYPE: ACUTE PAIN;SURGICAL PAIN
TYPE: SURGICAL PAIN
TYPE: ACUTE PAIN;SURGICAL PAIN

## 2024-05-07 ASSESSMENT — PAIN DESCRIPTION - FREQUENCY
FREQUENCY: INTERMITTENT
FREQUENCY: INTERMITTENT
FREQUENCY: CONTINUOUS

## 2024-05-07 NOTE — DISCHARGE SUMMARY
Hospital Medicine Discharge Summary    Patient ID: Sonja Mcmahan      Patient's PCP: Abiodun Amanda PA    Admit Date: 4/15/2024     Discharge Date: 5/7/2024     Admitting Physician: Kamilah Harrison DO     Discharge Physician: MARIO CHAVEZ MD     Hospital Course: This 73-year-old female with PMHx of heart Alinia, obesity and GERD who presented with intractable abdominal pain       Grade 1 well-differentiated Gastric adenocarcinoma grade; new diagnosis.  No lymphovascular invasion noted  General surgery consulted; underwent laparoscopic cholecystectomy hiatal hernia repair, partial gastrectomy with lymph node dissection on 4/22/24 M.  Full liquid diet with supplement as tolerated for next 1 to 2 weeks  Oncology consulted; per their recs, pt does not need to want treatment.  Patient will be monitored per NCCN and guidelines.  Follow-up in 4 to 6 weeks recommended  Hold on full liquid diet with supplement.  Monitor bowel function.     Abdominal abscess s/p drainage by IR on 5/3/2024.  S/p XAVIER drain.  Treated with antibiotics      Anemia; multifactorial.  Hgb stable around 9  Iron studies suggestive of iron deficiency and AOCD.  S/p 3 doses of IV iron.        Hypertension; on lisinopril and HCTZ.  Monitor BP closely     Hyperlipidemia; continue statins        Physical Exam Performed:     /76   Pulse 72   Temp 98.3 °F (36.8 °C) (Oral)   Resp 18   Ht 1.6 m (5' 3\")   Wt 83.6 kg (184 lb 3.2 oz)   SpO2 91%   BMI 32.63 kg/m²     General appearance: No apparent distress, appears stated age and cooperative.  Eyes: Sclera clear. Pupils equal.  ENT: Moist oral mucosa. Trachea midline, no adenopathy.  Cardiovascular: Regular rhythm, normal S1, S2. No murmur.   Respiratory: Clear to auscultation bilaterally, no wheeze or crackles.   GI: Abdomen soft, no tenderness, not distended, normal bowel sounds  Musculoskeletal:  No cyanosis in digits.  No BLE edema present  Neurology: CN 2-12 grossly intact. No

## 2024-05-07 NOTE — PROGRESS NOTES
[facility]        Progress Note    Patient Name: Sonja Mcmahan  MRN: [id number]  YOB: 1950  Date of Evaluation: 2024    Postoperative Day #15    Subjective:  No acute events overnight  Pain controlled  No nausea or vomiting, tolerating full liquids  Passing flatus and stool  Up to chair at this time      Vital Signs:  Patient Vitals for the past 24 hrs:   BP Temp Temp src Pulse Resp SpO2 Height Weight   24 1401 -- -- -- -- -- -- 1.6 m (5' 2.99\") 83.2 kg (183 lb 6.4 oz)   24 1310 (!) 143/90 97.6 °F (36.4 °C) Oral 73 17 94 % -- --        TEMPERATURE HISTORY 24H: Temp (24hrs), Av.9 °F (36.6 °C), Min:97.5 °F (36.4 °C), Max:98.3 °F (36.8 °C)    BLOOD PRESSURE HISTORY: Systolic (36hrs), Av , Min:112 , Max:143    Diastolic (36hrs), Av, Min:55, Max:90      Intake/Output:  No intake/output data recorded.  I/O this shift:  In: -   Out: 70 [Drains:70]  Drain/tube Output:  Closed/Suction Drain RLQ-Output (ml): 70 ml    Physical Exam:  General: awake, alert, oriented to person, place, time  Lungs: unlabored respirations  Abdomen: soft, non-distended, incisional tenderness only, bowel sounds present  Skin/Wound: healing well, no drainage, no erythema, ecchymosis noted, well approximated with staples  Drain: turbid/bilious drainage--tubing stripped    Labs:  CBC:    Recent Labs     24  0640 24  0522 24  0546   WBC 12.5* 11.0 10.7   HGB 8.4* 8.1* 9.0*   HCT 25.7* 23.9* 28.2*    391 398       BMP:    Recent Labs     24  0640 24  0522 24  0546   * 134* 135*   K 3.8 3.3* 3.8   CL 99 99 99   CO2 25 25 24   BUN 10 7 7   CREATININE 0.6 0.5* 0.6   GLUCOSE 100* 99 99       Hepatic:    Recent Labs     24  0640 24  0522 24  0546   AST 38* 26 25   ALT 20 15 13   BILITOT 0.6 0.5 0.5   ALKPHOS 76 65 65       Amylase:  No results found for: \"AMYLASE\"  Lipase:    Lab Results   Component Value  incision healing well, XAVIER with turbid/bilious output, no nausea or vomiting, tolerating full liquids, passing flatus and stool, vitals/labs stable; continued supportive care, XAVIER drain care/monitoring output/frequent emptying to keep bulb decompressed  2. Hold at full liquid diet with supplements as tolerated--will remain on full liquids for the next 1-2 weeks; monitor bowel function  3. Monitor and correct electrolytes  4. Antibiotics, cultures growing E. coli, Klebsiella, and Staphylococcus  5. Activity as tolerated, ambulate TID, up to chair for meals--PT/OT following  6. Pulmonary toilet, incentive spirometry  7. PRN analgesics and antiemetics--minimizing narcotics as tolerated, transition to PO  8. DVT prophylaxis with  Lovenox and SCD's  9. Management of medical comorbid etiologies per primary team and consulting services  10. Disposition: Okay for discharge to ARU anytime from a surgical perspective    EDUCATION:  Educated patient on plan of care and disease process--all questions answered.    Plans discussed with patient and nursing.  Reviewed and discussed with Dr. Mack.      Signed:  AVA Pacheco - CNP  5/7/2024 4:10 PM    Surgery Staff:     I have personally performed a face to face diagnostic evaluation on this patient. I have interviewed and examined the patient and I agree with the assessment above. Time was spent reviewing patient chart (including but not limited to notes, labs, imaging and other testing), interviewing and counseling patient and present family members, performing physical exam, documentation of my findings and subsequent follow up of ordered medication and testing, placing referrals and communication with patient care providers, coordinating future care as well as documentation in the EHR. This encompassed more than 50% of the total encounter time. In summary, my findings and plan are the following:   Pt up OOB, no pain complaints, no N/V  Incision C,D,I  Drain same - less

## 2024-05-07 NOTE — PROGRESS NOTES
Unable to get blood return on patient PICC for morning labs. Called lab and pt is added to the morning lab collect.

## 2024-05-07 NOTE — PROGRESS NOTES
Patient was admitted to room 4901 at 1305.  Patient was oriented to the Call Light, Phone, TV, Thermostat, Bed Controls, Bathroom and Emergency Cord.  Patient verbalized and demonstrated understanding of all.  Patient was also given an overview of Unit Routines for Acute Rehab, including the patient's rights and responsibilities as well as the CMS IRF BLU Privacy Act Statement providing notice of data collection.  Patient states that their normal bowel regime is usually daily. Meal times were explained, including how to order food.  The white board, (which is posted on the wall by the door is used for communication) has the Therapy Scheduled that is posted each day along with the name of your doctor, nurse, and therapist for your convenience.  We recommend any family that will be care givers or any care givers the patient has, take part in therapy.  We have no set visiting hours, we suggest non-caregiver friends and family visitors come after therapy (at 4 PM or later) to allow patient to rest in between sessions.      In conjunction with the patient and patient’s family, this nurse worked to establish a tailored Fall TIPS plan to ensure patient safety and compliance:    Falls TIPS Completion    Patient identified as increased risk for harm if fall:  [x] Yes     Fall Risks  History of Falls:    [] Yes   Medication Side Effects:   [] Yes   Walking Aid:    [x] Yes   IV Pole or Equipment:   [x] Yes   Unsteady Walk:     [x] Yes   May Forget or Choose Not to Call: [] Yes     Fall Interventions   Communicate Recent Fall and/or Risk of Harm: [x] Yes   Walking Aids:  Crutches: [] Yes   Cane: [] Yes   Walker: [x] Yes   IV Assistance When Walking: [] Yes   Toileting Schedule: Every 2 Hours  Bedpan:   [] Yes   Assist to Commode: [] Yes   Assist to Bathroom: [x] Yes   Bed Alarm On: [x] Yes   Assistance Out of Bed:  Bedrest: [] Yes   1 Person: [] Yes   2 People: [x] Yes

## 2024-05-07 NOTE — PROGRESS NOTES
[facility] - Inpatient Rehabilitation Department   Phone: [phone]    Occupational Therapy    [] Initial Evaluation            [x] Daily Treatment Note         [] Discharge Summary      Patient: Sonja Mcmahan   : 1950   MRN: [id number]   Date of Service:  2024    Admitting Diagnosis:  Hiatal hernia  Current Admission Summary: Admitted for abdominal pain. Imaging indicative of hiatal hernia and cholelithiasis. EGD on  showed polyps with hiatal hernia. Esophagus dilated at that time. To OR  for below:  1. Laparoscopic hiatal hernia repair with primary closure and [place] Bio-A mesh reinforcement.  2. Laparoscopic cholecystectomy.  3. Open hemigastrectomy with gastrojejunostomy and closure of duodenal stump and overlying omental flap for duodenal stump reinforcement.    5/3 - drain placed for fluid collection, has E coli and Klebsiella   Past Medical History:  has a past medical history of Asthma, Diabetes mellitus (HCC), Hyperlipidemia, and Hypertension.  Past Surgical History:  has a past surgical history that includes Hysterectomy, total abdominal; Elbow surgery; Upper gastrointestinal endoscopy (N/A, 2024); Upper gastrointestinal endoscopy (N/A, 2024); Upper gastrointestinal endoscopy (N/A, 2024); hiatal hernia repair (N/A, 2024); Cholecystectomy, laparoscopic (N/A, 2024); and gastrectomy (N/A, 2024).    Discharge Recommendations: Sonja Mcmahan scored a 16/24 on the AM-PAC ADL Inpatient form. Current research shows that an AM-PAC score of 17 or less is typically not associated with a discharge to the patient's home setting. Based on the patient's AM-PAC score and their current ADL deficits, it is recommended that the patient have 5-7 sessions per week of Occupational Therapy at d/c to increase the patient's independence.  At this time, this patient demonstrates complex nursing, medical, and rehabilitative needs, and would benefit from intensive  memory  Comments: would recommend completion of MOCA during future OT sessions  Orientation:    alert and oriented x 4  Comments: not formally assessed this date  Command Following:   WFL     Education  Barriers To Learning: cognition  Patient Education: patient educated on goals, OT role and benefits, plan of care, ADL adaptive strategies, IADL safety, transfer training, discharge recommendations  Learning Assessment:  patient verbalizes understanding, would benefit from continued reinforcement    Assessment  Activity Tolerance: Fair  Impairments Requiring Therapeutic Intervention: decreased functional mobility, decreased ADL status, decreased safety awareness, decreased endurance, decreased balance, decreased IADL  Prognosis: good  Clinical Assessment: Pt progressing fairly at this time, pt w/ limited activity tolerance and decreased endurance this date. Pt is pleasantly confused this date w/ waxing and waning of cognition w/in the session and questionable understanding of edu for rehab therapy expectations. Would recommending completion of the MOCA during next OT session. Additionally, pt appears to be somewhat self-limiting w/ limited intrinsic motivation therefore questioning ARU recommendation this date. Continued OT indicated in order to promote return to PLOF.   Safety Interventions: patient left in chair, chair alarm in place, call light within reach, nurse notified, and RN at bedside      Plan  Frequency: 3-5 x/per week  Current Treatment Recommendations: strengthening, balance training, functional mobility training, transfer training, endurance training, patient/caregiver education, ADL/self-care training, IADL training, home management training, and equipment evaluation/education    Goals    Short Term Goals:  Time Frame: by discharge  Patient will complete upper body ADL at supervision   Patient will complete lower body ADL at supervision   Patient will complete toileting at [facility] independent

## 2024-05-07 NOTE — PROGRESS NOTES
[facility] - Inpatient Rehabilitation Department   Phone: [phone]    Physical Therapy    [] Initial Evaluation            [x] Daily Treatment Note         [] Discharge Summary      Patient: Sonja Mcmahan   : 1950   MRN: [id number]   Date of Service:  2024  Admitting Diagnosis: Hiatal hernia  Current Admission Summary:  Admitted for abdominal pain on 4/15. Imaging indicative of hiatal hernia and cholelithiasis. EGD on  showed polyps with hiatal hernia. Esophagus dilated at that time. To OR  for below:  1. Laparoscopic hiatal hernia repair with primary closure and [place] Bio-A mesh reinforcement.  2. Laparoscopic cholecystectomy.  3. Open hemigastrectomy with gastrojejunostomy and closure of duodenal stump and overlying omental flap for duodenal stump reinforcement.  4. S/p CT guided drain placement on 5/3/24  Past Medical History:  has a past medical history of Asthma, Diabetes mellitus (HCC), Hyperlipidemia, and Hypertension.  Past Surgical History:  has a past surgical history that includes Hysterectomy, total abdominal; Elbow surgery; Upper gastrointestinal endoscopy (N/A, 2024); Upper gastrointestinal endoscopy (N/A, 2024); Upper gastrointestinal endoscopy (N/A, 2024); hiatal hernia repair (N/A, 2024); Cholecystectomy, laparoscopic (N/A, 2024); and gastrectomy (N/A, 2024).    Discharge Recommendations: Sonja Mcmahan scored a 17/24 on the AM-PAC short mobility form. Current research shows that an AM-PAC score of 17 or less is typically not associated with a discharge to the patient's home setting. Based on the patient's AM-PAC score and their current functional mobility deficits, it is recommended that the patient have 5-7 sessions per week of Physical Therapy at d/c to increase the patient's independence.  At this time, this patient demonstrates complex nursing, medical, and rehabilitative needs, and would benefit from intensive rehabilitation  Tolerance: Pt limited by fatigue, low endurance  Impairments Requiring Therapeutic Intervention: decreased functional mobility, decreased ADL status, decreased strength, decreased endurance, decreased balance, increased pain, decreased posture  Prognosis: good  Clinical Assessment: Pt requires some encouragement to participate and has limited endurance.  CGA to SBA for transfers and short distances of ambulation with RW. Pt was independent with all mobility prior to hospital admit and would benefit from additional PT to improve strength/function.   Safety Interventions: patient left in chair, chair alarm in place, call light within reach, and nurse notified    Plan  Frequency: 3-5 x/per week  Current Treatment Recommendations: strengthening, balance training, functional mobility training, transfer training, gait training, stair training, endurance training, patient/caregiver education, pain management, home exercise program, safety education, equipment evaluation/education, and positioning    Goals  Patient Goals: Get home in a few days   Short Term Goals:  Time Frame: Before discharge  Patient will complete bed mobility at supervision, with use of log roll technique from flat bed without rail   Patient will complete sit<>stand transfers at supervision   Patient will ambulate 50 ft with use of rolling walker at supervision  Patient to maintain standing at supervision, with unilateral UE support for 5 minutes in order to complete ADLs        Above goals reviewed on 5/7/2024.  All goals are ongoing at this time unless indicated above.      Therapy Session Time      Individual Group Co-treatment   Time In 1019       Time Out 1046       Minutes 27         Timed Code Treatment Minutes: 27 Minutes  Total Treatment Minutes: 27 Minutes         Electronically Signed By: Marguerite Nieto, PT

## 2024-05-07 NOTE — CARE COORDINATION
DISCHARGE PLANNING:  Discharge order noted.   This CM spoke with Soren in [facility] ARU who stated they will be taking patient today.    Bedside RN called and made aware.  She will reach out to ARU to see if bed ready.    [facility]    CM team is available if further discharge needs arise.  Sally Loera RN Case Manager  [phone]

## 2024-05-07 NOTE — PROGRESS NOTES
Pts daughter at bedside stated she does not want pt to be given PRN morphine. She request before morphine is given to be called first if pt request or pain isn't adequately managed with other PRN pain medications.

## 2024-05-07 NOTE — PROGRESS NOTES
..Assessment complete. VSS. Patient resting in bed. Respirations even and easy, continuous pulse oximeter in use. Call light in reach. Pain is being managed with one time dose of oxycodone. Fall precautions in place. No needs expressed at this time. Will continue to monitor.

## 2024-05-07 NOTE — PROGRESS NOTES
ARU Admission Assessment    Ethnicity  \"Are you of , /a, or Mauritian origin?\"  Check all that apply:  [x] A.  No, not of , /a, or Mauritian Origin  [] B.  Yes, Cook Islander, Cook Islander American, Chicano/a  [] C.  Yes, Japanese  [] D.  Yes, Baljinder  [] E.  Yes, another , , or Mauritian origin  [] X.  Patient unable to respond  [] Y.  Patient declines to respond    Race  \"What is your race?\"  Check all that apply:  [x] A.  White  [] B.  Black or   [] C.   or   [] D.   Swazi  [] E.  Chinese  [] F.  Austrian  [] G. Montenegrin  [] H.  Bengali  [] I.  Mohawk  [] J.  Other   [] K.    [] L.  Burmese or Aiden  [] M.  Stateless  [] N.  Other   [] X.  Patient unable to respond  [] Y.  Patient declines to respond  [] Z.  None of the above    Language  A.  \"What is your preferred language?\"   English    B.  \"Do you need or want an  to communicate with a doctor or health care staff?\"  Check only one:  [x] 0.  No  [] 1.  Yes  [] 9.  Unable to determine    Transportation  \"Has lack of transportation kept you from medical appointments, meetings, work, or from getting things needed for daily living?\"Check all that apply:  [] A.  Yes, it has kept me from medical appointments or from getting my medications  [] B.  Yes, it has kept me from non-medical meetings, appointments, work, or from getting things that I need  [x] C.  No  [] X.  Patient unable to respond  [] Y.  Patient declines to respond    Hearing  Ability to hear (with hearing aid or hearing appliances if normally used)  [x]  0.  Adequate - no difficulty in normal conversation, social interaction, listening to TV  []  1.  Minimal difficulty - difficulty in some environments (e.g. when person speaks softly or setting is noisy)  []  2.  Moderate difficulty - speaker has to increase volume and speak distinctly   []  3.  Highly impaired - absence of  reviewed and updated as necessary, and are accurate for the admission assessment period.    Assessing/Reviewing RN: Dayanara Rivas, 5/7/24, 1332    Assessing/Reviewing RN: Bradford Hoyos.

## 2024-05-07 NOTE — PROGRESS NOTES
[facility]        Progress Note    Patient Name: Sonja Mcmahan  MRN: [id number]  YOB: 1950  Date of Evaluation: 2024    Postoperative Day #15    Subjective:  No acute events overnight  Pain controlled  No nausea or vomiting, tolerating full liquids  Passing flatus and stool  Up to chair at this time      Vital Signs:  Patient Vitals for the past 24 hrs:   BP Temp Temp src Pulse Resp SpO2   24 1127 120/76 98.3 °F (36.8 °C) Oral 72 18 91 %   24 0936 138/80 98.3 °F (36.8 °C) Oral 92 18 94 %   24 0511 -- -- -- -- 18 --   24 0430 129/75 98.1 °F (36.7 °C) Oral (!) 102 18 94 %   24 2347 132/75 97.5 °F (36.4 °C) Oral 79 16 100 %   24 -- -- -- -- 18 --   24 1945 119/69 97.7 °F (36.5 °C) Oral 94 18 94 %   24 1606 (!) 128/55 98 °F (36.7 °C) Oral 89 20 94 %   24 1418 -- -- -- -- 16 --        TEMPERATURE HISTORY 24H: Temp (24hrs), Av °F (36.7 °C), Min:97.5 °F (36.4 °C), Max:98.3 °F (36.8 °C)    BLOOD PRESSURE HISTORY: Systolic (36hrs), Av , Min:110 , Max:138    Diastolic (36hrs), Av, Min:55, Max:80      Intake/Output:  I/O last 3 completed shifts:  In: 1486.1 [P.O.:720; I.V.:174.5; IV Piggyback:591.6]  Out: 1220 [Urine:1020; Drains:200]  I/O this shift:  In: -   Out: 100 [Drains:100]  Drain/tube Output:  Closed/Suction Drain RLQ-Output (ml): 100 ml    Physical Exam:  General: awake, alert, oriented to person, place, time  Lungs: unlabored respirations  Abdomen: soft, non-distended, incisional tenderness only, bowel sounds present  Skin/Wound: healing well, no drainage, no erythema, ecchymosis noted, well approximated with staples  Drain: turbid/bilious drainage--tubing stripped    Labs:  CBC:    Recent Labs     24  0640 24  0522 24  0546   WBC 12.5* 11.0 10.7   HGB 8.4* 8.1* 9.0*   HCT 25.7* 23.9* 28.2*    391 398       BMP:    Recent Labs     24  0640

## 2024-05-07 NOTE — PLAN OF CARE
Education for:  [x] Disease/sustained injury/management     [x] Medication Use  [x] Surgical intervention  [x] Safety  [x] Body mechanics and or joint protection  [x] Health maintenance     [] Other:     PHYSICAL THERAPY:  Goals:                   Short Term Goals:  Time Frame: 7-10 days  Patient will complete bed mobility at modified independent   Patient will complete transfers at [facility]   Patient will ambulate 150 ft with use of LRAD at [facility]  Patient will ascend/descend 4 stairs with (B) handrail at modified independent  Patient will complete car transfer at [facility]  Patient will improve Timed Up and Go test in = to or better than 25 seconds  Patient will improve 5 STS test to at least 16 seconds                These goals were reviewed with this patient at the time of assessment and Sonja Mcmahan is in agreement.     Plan of Care: Pt to be seen 5 out of 7 days per week per ARU protocol ( 75 minutes with PT)                     OCCUPATIONAL THERAPY:  Goals:             Short Term Goals:  Time Frame: 7-10 days  Patient will complete lower body ADL at [facility]   Patient will complete toileting at modified independent   Patient will complete functional transfers at [facility]   Patient will increase functional standing balance to 8+ min for improved ADL completion  Patient to gather and transport IADL items at modified independent     Above goals reviewed on 5/8/2024.  All goals are ongoing at this time unless indicated above.     These goals were reviewed with this patient at the time of assessment and Sonja Mcmahan is in agreement    Plan of Care:  Pt to be seen 5 out of 7 days per week per ARU protocol ( 75 minutes with OT)     SPEECH THERAPY: Goals will be left blank if speech is not following this patient.  Goals:  Patient Goals: Get better    Time Frame: 7-10 days     Pt will improve executive function for multifactor task completion via  Fair    [] Guarded   Total expected IRF days: 10  Anticipated discharge destination: Home  [x] Home Independently   [] Home with supervision    []SNF     [] Other                                           Physician anticipated functional outcomes:  By discharge, the patient will progress to being independent with all mobility and activities.   IPOC brief synthesis: 73 y.o. female with PMHx notable for HTN, HLD, DM2, GERD who presented on 4/15 with dyspnea, intractable abdominal pain. Cardiology was consulted, recommended NM Stress Test which showed no reversible ischemia, low-risk. TTE stable from prior with LVEF 55-60%, grade 2 diastolic dysfunction, mild aortic stenosis, mild tricuspid regurgitation. She had EGD on 4/18, demonstrating large hiatal hernia, also had esophageal dilation performed and gastric antrum mass biopsied. On 4/22 she underwent laparoscopic hiatal hernia repair, cholecystectomy, and hemigastrectomy w/ gastrojejunostomy. 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 Oncology consulted, not recommending adjuvant treatment at this time. Hospital course complicated by: hypotension, leukocytosis, fevers, hypokalemia, PAT, intraabdominal abscess.       I have reviewed this initial plan of care and agree with its contents:    Title   Name    Date    Time    Physician: Alex Rico MD 05/09/24 5:00 PM     Case Mgmt: Ruth Douglas MSW, LSW 05/08/2024 @7:53AM    OT: LEXIE Reyes OTR/L, [id number] 5/8/2024 2:32 PM    PT: Marguerite Nieto, PT [id number], 5/8/24 1226    RN: Dayanara Rivas, 5/7/24, 1335    ST: Mally Farr MA CCC-SLP 5/8/2024 1246     : Aramis Franco PT, DPT [id number]  5/9/24  1:44 PM

## 2024-05-07 NOTE — PLAN OF CARE
Problem: Pain  Goal: Verbalizes/displays adequate comfort level or baseline comfort level  Outcome: Progressing     Problem: Chronic Conditions and Co-morbidities  Goal: Patient's chronic conditions and co-morbidity symptoms are monitored and maintained or improved  Outcome: Progressing     Problem: Nutrition Deficit:  Goal: Optimize nutritional status  Outcome: Progressing     Problem: Discharge Planning  Goal: Discharge to home or other facility with appropriate resources  Outcome: Progressing     Problem: Safety - Adult  Goal: Free from fall injury  Outcome: Progressing     Problem: Cardiovascular - Adult  Goal: Maintains optimal cardiac output and hemodynamic stability  Outcome: Progressing     Problem: Skin/Tissue Integrity - Adult  Goal: Incisions, wounds, or drain sites healing without S/S of infection  Outcome: Progressing     Problem: Musculoskeletal - Adult  Goal: Return mobility to safest level of function  Outcome: Progressing  Goal: Return ADL status to a safe level of function  Outcome: Progressing     Problem: Gastrointestinal - Adult  Goal: Minimal or absence of nausea and vomiting  Outcome: Progressing  Goal: Maintains or returns to baseline bowel function  Outcome: Progressing  Goal: Maintains adequate nutritional intake  Outcome: Progressing     Problem: Respiratory - Adult  Goal: Achieves optimal ventilation and oxygenation  Outcome: Progressing     Problem: Skin/Tissue Integrity  Goal: Absence of new skin breakdown  Description: 1.  Monitor for areas of redness and/or skin breakdown  2.  Assess vascular access sites hourly  3.  Every 4-6 hours minimum:  Change oxygen saturation probe site  4.  Every 4-6 hours:  If on nasal continuous positive airway pressure, respiratory therapy assess nares and determine need for appliance change or resting period.  Outcome: Progressing

## 2024-05-07 NOTE — PROGRESS NOTES
piperacillin-tazobactam (ZOSYN) 3,375 mg in sodium chloride 0.9 % 50 mL IVPB (mini-bag) infusion completed. PICC flushed with ease with positive blood return.

## 2024-05-07 NOTE — PROGRESS NOTES
4 Eyes Skin Assessment     NAME:  Sonja Mcmahan  YOB: 1950  MEDICAL RECORD NUMBER:  [id number]    The patient is being assessed for  Admission    I agree that at least one RN has performed a thorough Head to Toe Skin Assessment on the patient. ALL assessment sites listed below have been assessed.      Areas assessed by both nurses:    Head, Face, Ears, Shoulders, Back, Chest, Arms, Elbows, Hands, Sacrum. Buttock, Coccyx, Ischium, and Legs. Feet and Heels        Does the Patient have a Wound? No noted wound(s), scattered brusings, incision on abd with XAVIER drain.   Pt had sx incisions to abd and XAVIER drain       Rakesh Prevention initiated by RN: Yes  Wound Care Orders initiated by RN: Yes    Pressure Injury (Stage 3,4, Unstageable, DTI, NWPT, and Complex wounds) if present, place Wound referral order by RN under : No    New Ostomies, if present place, Ostomy referral order under : No     Nurse 1 eSignature: Electronically signed by Dayanara Rivas RN on 5/7/24 at 2:04 PM EDT    **SHARE this note so that the co-signing nurse can place an eSignature**    Nurse 2 eSignature: Electronically signed by Bradford Hoyos RN on 5/7/24 at 2:53 PM EDT

## 2024-05-07 NOTE — PROGRESS NOTES
Sonja Mcmahan  5/7/2024  [id number]    Chief Complaint: Hiatal hernia    Subjective   No significant updates. General Surgery recommending full liquid diet for 1-2 weeks post-op.     Abdominal pain doing much better today. Denies fevers, chills, chest pain, shortness of breath. She is looking forward to getting more intensive rehab on ARU.          Objective   Objective:  Patient Vitals for the past 24 hrs:   BP Temp Temp src Pulse Resp SpO2   05/07/24 1127 120/76 98.3 °F (36.8 °C) Oral 72 18 91 %   05/07/24 1058 -- -- -- -- 18 --   05/07/24 0936 138/80 98.3 °F (36.8 °C) Oral 92 18 94 %   05/07/24 0708 -- -- -- -- 16 92 %   05/07/24 0511 -- -- -- -- 18 --   05/07/24 0430 129/75 98.1 °F (36.7 °C) Oral (!) 102 18 94 %   05/06/24 2347 132/75 97.5 °F (36.4 °C) Oral 79 16 100 %   05/06/24 2022 -- -- -- -- 18 --   05/06/24 1945 119/69 97.7 °F (36.5 °C) Oral 94 18 94 %     Gen: Uncomfortable, pleasant.   HEENT: Normocephalic, atraumatic.   CV: No audible murmurs, well perfused extremities  Resp: No respiratory distress. No increased WOB  Abd: XAVIER drain with green-brown output.   Ext: No edema.   Neuro: Alert, oriented, appropriately interactive.    Laboratory data: Available via EMR.     Therapy progress:    PT    Rolling: Level of difficulty - A Little   Sit to Stand from a Chair: Level of difficulty - A Little  Supine to Sit: Level of difficulty - A Little     Bed to Chair: Physical Assistance Required - A Little  Ambulate Across Room: Physical Assistance Required - A Little  Ascend 3-5 Steps With HR: Physical Assistance Required - A Little    OT    Eating: Physical Assistance Required - None  Grooming: Physical Assistance Required - A Little  LB Dressing: Physical Assistance Required - A Lot  UB Dressing: Physical Assistance Required - A Little  Bathing: Physical Assistance Required - A Lot  Toileting: Physical Assistance Required - A Lot    SLP         Body mass index is 32.63 kg/m².       Assessment:  Patient Active

## 2024-05-08 LAB
ALBUMIN SERPL-MCNC: 2 G/DL (ref 3.4–5)
ALBUMIN/GLOB SERPL: 0.5 {RATIO} (ref 1.1–2.2)
ALP SERPL-CCNC: 63 U/L (ref 40–129)
ALT SERPL-CCNC: 11 U/L (ref 10–40)
ANION GAP SERPL CALCULATED.3IONS-SCNC: 11 MMOL/L (ref 3–16)
AST SERPL-CCNC: 18 U/L (ref 15–37)
BASOPHILS # BLD: 0 K/UL (ref 0–0.2)
BASOPHILS NFR BLD: 0 %
BILIRUB SERPL-MCNC: 0.5 MG/DL (ref 0–1)
BUN SERPL-MCNC: 6 MG/DL (ref 7–20)
CALCIUM SERPL-MCNC: 7.8 MG/DL (ref 8.3–10.6)
CHLORIDE SERPL-SCNC: 102 MMOL/L (ref 99–110)
CO2 SERPL-SCNC: 24 MMOL/L (ref 21–32)
CREAT SERPL-MCNC: <0.5 MG/DL (ref 0.6–1.2)
DEPRECATED RDW RBC AUTO: 16.1 % (ref 12.4–15.4)
EOSINOPHIL # BLD: 0.1 K/UL (ref 0–0.6)
EOSINOPHIL NFR BLD: 1 %
GFR SERPLBLD CREATININE-BSD FMLA CKD-EPI: >90 ML/MIN/{1.73_M2}
GLUCOSE SERPL-MCNC: 86 MG/DL (ref 70–99)
HCT VFR BLD AUTO: 28.1 % (ref 36–48)
HGB BLD-MCNC: 8.9 G/DL (ref 12–16)
LYMPHOCYTES # BLD: 1.2 K/UL (ref 1–5.1)
LYMPHOCYTES NFR BLD: 15 %
MAGNESIUM SERPL-MCNC: 1.7 MG/DL (ref 1.8–2.4)
MCH RBC QN AUTO: 27.4 PG (ref 26–34)
MCHC RBC AUTO-ENTMCNC: 31.7 G/DL (ref 31–36)
MCV RBC AUTO: 86.3 FL (ref 80–100)
METAMYELOCYTES NFR BLD MANUAL: 1 %
MONOCYTES # BLD: 0.5 K/UL (ref 0–1.3)
MONOCYTES NFR BLD: 6 %
NEUTROPHILS # BLD: 6.5 K/UL (ref 1.7–7.7)
NEUTROPHILS NFR BLD: 76 %
NEUTS BAND NFR BLD MANUAL: 1 % (ref 0–7)
PLATELET # BLD AUTO: 505 K/UL (ref 135–450)
PLATELET BLD QL SMEAR: ABNORMAL
PMV BLD AUTO: 7 FL (ref 5–10.5)
POTASSIUM SERPL-SCNC: 3.4 MMOL/L (ref 3.5–5.1)
PROT SERPL-MCNC: 5.7 G/DL (ref 6.4–8.2)
RBC # BLD AUTO: 3.26 M/UL (ref 4–5.2)
RBC MORPH BLD: NORMAL
SLIDE REVIEW: ABNORMAL
SODIUM SERPL-SCNC: 137 MMOL/L (ref 136–145)
WBC # BLD AUTO: 8.3 K/UL (ref 4–11)

## 2024-05-08 PROCEDURE — APPSS15 APP SPLIT SHARED TIME 0-15 MINUTES: Performed by: NURSE PRACTITIONER

## 2024-05-08 PROCEDURE — 1280000000 HC REHAB R&B

## 2024-05-08 PROCEDURE — 2580000003 HC RX 258: Performed by: STUDENT IN AN ORGANIZED HEALTH CARE EDUCATION/TRAINING PROGRAM

## 2024-05-08 PROCEDURE — 85025 COMPLETE CBC W/AUTO DIFF WBC: CPT

## 2024-05-08 PROCEDURE — 97110 THERAPEUTIC EXERCISES: CPT

## 2024-05-08 PROCEDURE — 92523 SPEECH SOUND LANG COMPREHEN: CPT

## 2024-05-08 PROCEDURE — 36415 COLL VENOUS BLD VENIPUNCTURE: CPT

## 2024-05-08 PROCEDURE — 97530 THERAPEUTIC ACTIVITIES: CPT

## 2024-05-08 PROCEDURE — 6370000000 HC RX 637 (ALT 250 FOR IP): Performed by: STUDENT IN AN ORGANIZED HEALTH CARE EDUCATION/TRAINING PROGRAM

## 2024-05-08 PROCEDURE — 97535 SELF CARE MNGMENT TRAINING: CPT

## 2024-05-08 PROCEDURE — 97165 OT EVAL LOW COMPLEX 30 MIN: CPT

## 2024-05-08 PROCEDURE — 97161 PT EVAL LOW COMPLEX 20 MIN: CPT

## 2024-05-08 PROCEDURE — 99024 POSTOP FOLLOW-UP VISIT: CPT | Performed by: SURGERY

## 2024-05-08 PROCEDURE — 6360000002 HC RX W HCPCS: Performed by: STUDENT IN AN ORGANIZED HEALTH CARE EDUCATION/TRAINING PROGRAM

## 2024-05-08 PROCEDURE — 83735 ASSAY OF MAGNESIUM: CPT

## 2024-05-08 PROCEDURE — 6370000000 HC RX 637 (ALT 250 FOR IP): Performed by: NURSE PRACTITIONER

## 2024-05-08 PROCEDURE — APPNB30 APP NON BILLABLE TIME 0-30 MINS: Performed by: NURSE PRACTITIONER

## 2024-05-08 PROCEDURE — 80053 COMPREHEN METABOLIC PANEL: CPT

## 2024-05-08 PROCEDURE — 97166 OT EVAL MOD COMPLEX 45 MIN: CPT

## 2024-05-08 RX ORDER — LANOLIN ALCOHOL/MO/W.PET/CERES
400 CREAM (GRAM) TOPICAL DAILY
Status: DISPENSED | OUTPATIENT
Start: 2024-05-08

## 2024-05-08 RX ORDER — ACETAMINOPHEN 325 MG/1
650 TABLET ORAL EVERY 6 HOURS PRN
Status: ON HOLD | COMMUNITY

## 2024-05-08 RX ORDER — PANTOPRAZOLE SODIUM 40 MG/1
40 TABLET, DELAYED RELEASE ORAL
Status: DISPENSED | OUTPATIENT
Start: 2024-05-08

## 2024-05-08 RX ORDER — BISACODYL 10 MG
10 SUPPOSITORY, RECTAL RECTAL ONCE
Status: COMPLETED | OUTPATIENT
Start: 2024-05-08 | End: 2024-05-08

## 2024-05-08 RX ORDER — LACTOBACILLUS RHAMNOSUS GG 10B CELL
1 CAPSULE ORAL 2 TIMES DAILY WITH MEALS
Status: DISPENSED | OUTPATIENT
Start: 2024-05-08

## 2024-05-08 RX ORDER — MAGNESIUM HYDROXIDE/ALUMINUM HYDROXICE/SIMETHICONE 120; 1200; 1200 MG/30ML; MG/30ML; MG/30ML
30 SUSPENSION ORAL EVERY 6 HOURS PRN
Status: DISPENSED | OUTPATIENT
Start: 2024-05-08

## 2024-05-08 RX ORDER — SODIUM CHLORIDE 0.9 % (FLUSH) 0.9 %
5-40 SYRINGE (ML) INJECTION PRN
Status: ACTIVE | OUTPATIENT
Start: 2024-05-08

## 2024-05-08 RX ORDER — SODIUM CHLORIDE 0.9 % (FLUSH) 0.9 %
5-40 SYRINGE (ML) INJECTION 2 TIMES DAILY
Status: DISCONTINUED | OUTPATIENT
Start: 2024-05-08 | End: 2024-05-09

## 2024-05-08 RX ORDER — POTASSIUM CHLORIDE 20 MEQ/1
20 TABLET, EXTENDED RELEASE ORAL ONCE
Status: COMPLETED | OUTPATIENT
Start: 2024-05-08 | End: 2024-05-08

## 2024-05-08 RX ORDER — ACETAMINOPHEN 500 MG
1000 TABLET ORAL EVERY 8 HOURS SCHEDULED
Status: DISPENSED | OUTPATIENT
Start: 2024-05-08

## 2024-05-08 RX ADMIN — Medication 400 MG: at 09:00

## 2024-05-08 RX ADMIN — ACETAMINOPHEN 1000 MG: 500 TABLET ORAL at 22:33

## 2024-05-08 RX ADMIN — BISACODYL 10 MG: 10 SUPPOSITORY RECTAL at 15:15

## 2024-05-08 RX ADMIN — THERA TABS 1 TABLET: TAB at 09:01

## 2024-05-08 RX ADMIN — ONDANSETRON 4 MG: 4 TABLET, ORALLY DISINTEGRATING ORAL at 18:44

## 2024-05-08 RX ADMIN — ASPIRIN 81 MG 81 MG: 81 TABLET ORAL at 09:01

## 2024-05-08 RX ADMIN — METOPROLOL TARTRATE 12.5 MG: 25 TABLET, FILM COATED ORAL at 08:54

## 2024-05-08 RX ADMIN — MONTELUKAST SODIUM 10 MG: 10 TABLET, FILM COATED ORAL at 22:34

## 2024-05-08 RX ADMIN — METOPROLOL TARTRATE 12.5 MG: 25 TABLET, FILM COATED ORAL at 22:33

## 2024-05-08 RX ADMIN — SODIUM CHLORIDE, PRESERVATIVE FREE 10 ML: 5 INJECTION INTRAVENOUS at 09:04

## 2024-05-08 RX ADMIN — CETIRIZINE HYDROCHLORIDE 10 MG: 10 TABLET, FILM COATED ORAL at 22:34

## 2024-05-08 RX ADMIN — POTASSIUM CHLORIDE 20 MEQ: 1500 TABLET, EXTENDED RELEASE ORAL at 09:00

## 2024-05-08 RX ADMIN — ATORVASTATIN CALCIUM 20 MG: 20 TABLET, FILM COATED ORAL at 22:34

## 2024-05-08 RX ADMIN — ACETAMINOPHEN 1000 MG: 500 TABLET ORAL at 14:45

## 2024-05-08 RX ADMIN — OXYCODONE 5 MG: 5 TABLET ORAL at 09:25

## 2024-05-08 RX ADMIN — Medication 1 CAPSULE: at 08:54

## 2024-05-08 RX ADMIN — PANTOPRAZOLE SODIUM 40 MG: 40 TABLET, DELAYED RELEASE ORAL at 16:42

## 2024-05-08 RX ADMIN — LEVOFLOXACIN 750 MG: 500 TABLET, FILM COATED ORAL at 22:34

## 2024-05-08 RX ADMIN — Medication 1 CAPSULE: at 16:42

## 2024-05-08 RX ADMIN — ENOXAPARIN SODIUM 40 MG: 100 INJECTION SUBCUTANEOUS at 08:54

## 2024-05-08 ASSESSMENT — PAIN SCALES - GENERAL
PAINLEVEL_OUTOF10: 5
PAINLEVEL_OUTOF10: 0
PAINLEVEL_OUTOF10: 6
PAINLEVEL_OUTOF10: 0
PAINLEVEL_OUTOF10: 6
PAINLEVEL_OUTOF10: 7
PAINLEVEL_OUTOF10: 7
PAINLEVEL_OUTOF10: 6

## 2024-05-08 ASSESSMENT — PAIN DESCRIPTION - LOCATION
LOCATION: ABDOMEN

## 2024-05-08 ASSESSMENT — PAIN DESCRIPTION - DESCRIPTORS
DESCRIPTORS: ACHING

## 2024-05-08 ASSESSMENT — PAIN - FUNCTIONAL ASSESSMENT
PAIN_FUNCTIONAL_ASSESSMENT: ACTIVITIES ARE NOT PREVENTED
PAIN_FUNCTIONAL_ASSESSMENT: PREVENTS OR INTERFERES SOME ACTIVE ACTIVITIES AND ADLS
PAIN_FUNCTIONAL_ASSESSMENT: PREVENTS OR INTERFERES SOME ACTIVE ACTIVITIES AND ADLS

## 2024-05-08 ASSESSMENT — PAIN DESCRIPTION - FREQUENCY: FREQUENCY: CONTINUOUS

## 2024-05-08 ASSESSMENT — PAIN DESCRIPTION - ONSET
ONSET: ON-GOING
ONSET: ON-GOING

## 2024-05-08 ASSESSMENT — PAIN DESCRIPTION - ORIENTATION
ORIENTATION: MID;RIGHT;LEFT
ORIENTATION: MID;RIGHT;LEFT
ORIENTATION: OTHER (COMMENT)

## 2024-05-08 ASSESSMENT — PAIN SCALES - WONG BAKER: WONGBAKER_NUMERICALRESPONSE: NO HURT

## 2024-05-08 ASSESSMENT — PAIN DESCRIPTION - PAIN TYPE
TYPE: ACUTE PAIN;SURGICAL PAIN
TYPE: ACUTE PAIN;SURGICAL PAIN

## 2024-05-08 NOTE — PROGRESS NOTES
[facility] - Inpatient Rehabilitation Department   Phone: [phone]    Occupational Therapy    [x] Initial Evaluation            [] Daily Treatment Note         [] Discharge Summary      Patient: Sonja Mcmahan   : 1950   MRN: [id number]   Date of Service:  2024    Admitting Diagnosis:  Hiatal hernia  Current Admission Summary: Sonja Mcmahan is a 73 y.o. female with PMHx notable for HTN, HLD, DM2, GERD who presented on 4/15 with dyspnea, intractable abdominal pain. Cardiology was consulted, recommended NM Stress Test which showed no reversible ischemia, low-risk. TTE stable from prior with LVEF 55-60%, grade 2 diastolic dysfunction, mild aortic stenosis, mild tricuspid regurgitation. She had EGD on , demonstrating large hiatal hernia, also had esophageal dilation performed and gastric antrum mass biopsied. On  she underwent laparoscopic hiatal hernia repair, cholecystectomy, and hemigastrectomy w/ gastrojejunostomy. 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 Oncology consulted, not recommending adjuvant treatment at this time. Hospital course complicated by: hypotension, leukocytosis, fevers, hypokalemia, PAT, intraabdominal abscess.   Past Medical History:  has a past medical history of Asthma, Diabetes mellitus (HCC), Hyperlipidemia, and Hypertension.  Past Surgical History:  has a past surgical history that includes Hysterectomy, total abdominal; Elbow surgery; Upper gastrointestinal endoscopy (N/A, 2024); Upper gastrointestinal endoscopy (N/A, 2024); Upper gastrointestinal endoscopy (N/A, 2024); hiatal hernia repair (N/A, 2024); Cholecystectomy, laparoscopic (N/A, 2024); and gastrectomy (N/A, 2024).    Discharge Recommendations: Home w/ HHOT     DME Required For Discharge: DME to be determined pending patient progress    Precautions/Restrictions: high fall risk, full

## 2024-05-08 NOTE — PROGRESS NOTES
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 Reports increased incisional pain this morning, better controlled after received oxycodone, incisional tenderness only on exam, incision healing well, XAVIER with turbid/bilious output, no nausea or vomiting, tolerating full liquids, passing flatus, denies recent stool, vitals/labs stable; continued supportive care, XAVIER drain care/monitoring output/frequent emptying to keep bulb decompressed, give Dulcolax suppository, remove staples  2. Hold at full liquid diet with supplements as tolerated--will remain on full liquids for the next 1-2 weeks; monitor bowel function  3. Monitor and correct electrolytes  4. Antibiotics, cultures growing E. coli, Klebsiella, and Staphylococcus  5. Activity as tolerated, ambulate TID, up to chair for meals--therapy per ARU  6. Pulmonary toilet, incentive spirometry  7. PRN analgesics and antiemetics--minimizing narcotics as tolerated, transition to PO, add scheduled Tylenol  8. DVT prophylaxis with  Lovenox and SCD's  9. Management of medical comorbid etiologies per primary team and consulting services    EDUCATION:  Educated patient on plan of care and disease process--all questions answered.    Plans discussed with patient and nursing.  Reviewed and discussed with Dr. Mack.      Signed:  VAA Pacheco - CNP  5/8/2024 10:20 AM    Surgery Staff:     I have personally performed a face to face diagnostic evaluation on this patient. I have interviewed and examined the patient and I agree with the assessment above. Time

## 2024-05-08 NOTE — PROGRESS NOTES
Assessment completed. Patient sitting up in chair, alert and oriented x 4 and able to make all her needs known; can be forgetful at times. C/o pain to abd area. XAVIER drain up upper abd in place and draining semi thick bile colored drainage with malodor. Staples to incision to mid abd in place, well approximated, and no drainage noted. Medications administered as ordered. POC and education reviewed with patient and mutually agreed upon. Ambulates with a walker and CGA and tolerates fairly well. Remains on full liquid and tolerating well but appetite poor and needs much encouragement. Safety interventions in place. Call light in reach. Will continue to monitor.

## 2024-05-08 NOTE — PROGRESS NOTES
Admission Period/Goal QM Codes for Sonja Mcmahan.    QM Admit Code Goal Code   Eating 5 6   Oral Hygiene 4 6   Toileting Hygiene 3 6   Shower/Bathing 4 6   UB Dressing 5 6   LB Dressing 4 6   Putting on/off Footwear 5 6   Rolling Left and Right 3 6   Sit To Lying 3 6   Lying to Sitting on Bedside 3 6   Sit to Stand 3 6   Chair/Bed to Chair Transfer 3 6   Toilet Transfers 3 6   Car Transfers 3 6   Walk 10 Feet 4 6   Walk 50 Feet with Two Turns 88 6   Walk 150 Feet 88 6   Walk 10 Feet on Uneven Surfaces 4 6   1 Step (Curb) 3 6   4 Steps 88 6   12 Steps 88 6   Picking up Object from Floor 88 6   Wheel 50 Feet with 2 Turns 9 -   Type - -   Wheel 150 Feet 9 -   Type - -       The above codes were determined by the treatment team to be the patient's accurate admission assessment codes based on assessment performed soon after the patient's admission and prior to the benefit of services provided by staff, or if appropriate, the patient's usual performance at admission.    OT: LEXIE Reyes OTR/L, [id number] 5/10/2024 1:55 PM     PT:  CAROLANN SommerT [id number] 5/10/2024 1206     RN:       ST:  Rosemary Tellez M.A. CCC-SLP #[id number] 5/10/24, 4940     :

## 2024-05-08 NOTE — PROGRESS NOTES
Nutrition Note    RECOMMENDATIONS  PO Diet: Full liquids per surgery  ONS: Ensure Plus HP TID  When able to advance diet beyond full liquids, RD recommends   low fiber,CCC diet  small meals & snacks  avoid foods high in sugar.  drink liquids between meals.   include protein at every meal  choose soft, well cooked foods.      ASSESSMENT   Nutrition intervention triggered for new ARU admission. Pt is s/p subtotal gastrectomy.  Surgery is managing diet orders & recommends pt remain on full liquids x 1-2 weeks.  Per AND guidelines, post gastric surgery diet includes a low fiber diet with small meals & snacks, avoid foods high in sugar, drink liquids between meals, include protein at every meal, & choose soft, well cooked foods.  Will monitor for ability to advance to this diet as tolerated.  Will con't to offer Ensure TID to promote kcal & protein intake. Pt currently meets criteria for mild malnutrition in context of acute illness.       Malnutrition Status: Mild malnutrition  Acute Illness  Findings of the 6 clinical characteristics of malnutrition:  Energy Intake:  75% or less of estimated energy requirements for 7 or more days  Weight Loss:   (3.6% in less than 2 weeks)     Body Fat Loss:  No significant body fat loss     Muscle Mass Loss:  No significant muscle mass loss    Fluid Accumulation:  Mild Extremities   Strength:  Not Performed      NUTRITION DIAGNOSIS   In context of acute illness or injury, Other (Comment) (mild malnutrition) related to altered GI function, altered GI structure as evidenced by other (comment) (NPO or clear liquid diet status for most of hospitalization)  Increased nutrient needs related to increase demand for energy/nutrients as evidenced by wounds (surgical)    Goals: PO intake 50% or greater, other (specify) (advance to post-gastric surgery diet)     NUTRITION RELATED FINDINGS  Objective: nonpitting generalized & RUE edema, +1 LUE, Trace BLE edema. LBM 5/6  Wounds: Surgical  Incision    CURRENT NUTRITION THERAPIES  ADULT ORAL NUTRITION SUPPLEMENT; Breakfast, Lunch, Dinner; Standard High Calorie/High Protein Oral Supplement  ADULT DIET; Full Liquid     PO Intake: 1-25%   PO Supplement Intake:Unable to assess    ANTHROPOMETRICS  Current Height: 157.5 cm (5' 2\")  Current Weight - Scale: 83.2 kg (183 lb 6.4 oz)    Ideal Body Weight (IBW): 110 lbs  (50 kg)        BMI: 33.5      COMPARATIVE STANDARDS  Total Energy Requirements (kcals/day): 1793     Protein (g):         Fluid (mL/day):  1793    EDUCATION  Education not indicated     The patient will be monitored per nutrition standards of care. Consult dietitian if additional nutrition interventions are needed prior to RD reassessment.     Candice Jose RD, LD    Contact: [phone]

## 2024-05-08 NOTE — PROGRESS NOTES
[facility] - Inpatient Rehabilitation Department   Phone: [phone]    Speech Therapy    [x] Initial Evaluation            [] Daily Treatment Note         [] Discharge Summary      Patient: Sonja Mcmahan   : 1950   MRN: [id number]   Date of Service:  2024  Admitting Diagnosis: Hiatal hernia  Current Admission Summary: Sonja Mcmahan is a 73 y.o. female with PMHx notable for HTN, HLD, DM2, GERD who presented on 4/15 with dyspnea, intractable abdominal pain. Cardiology was consulted, recommended NM Stress Test which showed no reversible ischemia, low-risk. TTE stable from prior with LVEF 55-60%, grade 2 diastolic dysfunction, mild aortic stenosis, mild tricuspid regurgitation. She had EGD on , demonstrating large hiatal hernia, also had esophageal dilation performed and gastric antrum mass biopsied. On  she underwent laparoscopic hiatal hernia repair, cholecystectomy, and hemigastrectomy w/ gastrojejunostomy. 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 Oncology consulted, not recommending adjuvant treatment at this time. Hospital course complicated by: hypotension, leukocytosis, fevers, hypokalemia, PAT, intraabdominal abscess.   Past Medical History:  has a past medical history of Asthma, Diabetes mellitus (HCC), Hyperlipidemia, and Hypertension.  Past Surgical History:  has a past surgical history that includes Hysterectomy, total abdominal; Elbow surgery; Upper gastrointestinal endoscopy (N/A, 2024); Upper gastrointestinal endoscopy (N/A, 2024); Upper gastrointestinal endoscopy (N/A, 2024); hiatal hernia repair (N/A, 2024); Cholecystectomy, laparoscopic (N/A, 2024); and gastrectomy (N/A, 2024).  Precautions/Restrictions: high fall risk        Pre-Admission Information   Living Status: Pt lives alone. Has two adult daughters who live locally   Occupation/School: Pt is retired  Cognitive-Linguistic intervention , Compensatory Cognitive intervention , and Patient/ Family education     Discharge  Barriers to discharge: Inability to independently manage medications, will need assist/supervision  Inability to independently manage finances, will need assist/supervision  Discharge Recommendations: Home with assistance  Continued SLP at Discharge: Yes     Goals  Patient Goals: Get better    Time Frame: 7-10 days       Pt will improve executive function for multifactor task completion via graded tasks to 80% accuracy  Pt will recall functional information (daily tasks, personal hx, etc.) with 80% accuracy.  Patient will complete functional problem-solving tasks for daily situations with 80% accuracy or given min cues.      Therapy Session Time      Session 1 Session 2   Time In 0945    Time Out 1015    Time Code Minutes 0    Individual Minutes 30      Timed Code Treatment Minutes:  0  Total Treatment Minutes:  30    Electronically Signed By: Mally Farr, SLP

## 2024-05-08 NOTE — H&P
Patient: Sonja Mcmahan  [id number]  Date: 5/8/2024    Chief Complaint: Debility secondary to hiatal hernia repair, cholecystectomy, gastric adenocarcinoma resection, intra-abdominal abscess    History of Present Illness/Hospital Course:  Sonja Mcmahan is a 73 y.o. female with PMHx notable for HTN, HLD, DM2, GERD who presented on 4/15 with dyspnea, intractable abdominal pain. Cardiology was consulted, recommended NM Stress Test which showed no reversible ischemia, low-risk. TTE stable from prior with LVEF 55-60%, grade 2 diastolic dysfunction, mild aortic stenosis, mild tricuspid regurgitation. She had EGD on 4/18, demonstrating large hiatal hernia, also had esophageal dilation performed and gastric antrum mass biopsied. On 4/22 she underwent laparoscopic hiatal hernia repair, cholecystectomy, and hemigastrectomy w/ gastrojejunostomy. 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 Oncology consulted, not recommending adjuvant treatment at this time. Hospital course complicated by: hypotension, leukocytosis, fevers, hypokalemia, PAT, intraabdominal abscess.     Patient reports epigastric and left upper quadrant abdominal pain this morning.  Denies any nausea or emesis.  Denies any fevers or chills.  She is agreeable to working with therapists today    Prior Level of Function:  Independent with mobility, self care, IADLs.   Lives alone in one-level duplex with level entry.     Current Level of Function:  PT  Bed Mobility:  Supine to Sit: stand by assistance  Sit to Supine: minimal assistance  Scooting: stand by assistance  Comments:  HOB elevated per pt request for sup to sit. Flat for sit to sup with min A of LEs. Use of bed railing.  Transfers:  Sit to stand transfer: contact guard assistance  Stand to sit transfer: contact guard assistance  Comments: RW used for transfers, verbal cues required for hand placement. X 3 reps from

## 2024-05-08 NOTE — PLAN OF CARE
Problem: Safety - Adult  Goal: Free from fall injury  Outcome: Progressing  Flowsheets (Taken 5/7/2024 1337 by Dayanara Rivas RN)  Free From Fall Injury: Instruct family/caregiver on patient safety     Problem: Pain  Goal: Verbalizes/displays adequate comfort level or baseline comfort level  Outcome: Progressing  Flowsheets (Taken 5/7/2024 1310 by Dayanara Rivas RN)  Verbalizes/displays adequate comfort level or baseline comfort level:   Encourage patient to monitor pain and request assistance   Assess pain using appropriate pain scale   Administer analgesics based on type and severity of pain and evaluate response   Implement non-pharmacological measures as appropriate and evaluate response     Problem: Skin/Tissue Integrity  Goal: Absence of new skin breakdown  Description: 1.  Monitor for areas of redness and/or skin breakdown  2.  Assess vascular access sites hourly  3.  Every 4-6 hours minimum:  Change oxygen saturation probe site  4.  Every 4-6 hours:  If on nasal continuous positive airway pressure, respiratory therapy assess nares and determine need for appliance change or resting period.  Outcome: Progressing     Problem: ABCDS Injury Assessment  Goal: Absence of physical injury  Outcome: Progressing     Problem: Discharge Planning  Goal: Discharge to home or other facility with appropriate resources  Outcome: Progressing  Flowsheets  Taken 5/7/2024 1404 by Dayanara Rivas RN  Discharge to home or other facility with appropriate resources:   Identify barriers to discharge with patient and caregiver   Identify discharge learning needs (meds, wound care, etc)  Taken 5/7/2024 1353 by Dayanara Rivas RN  Discharge to home or other facility with appropriate resources:   Identify barriers to discharge with patient and caregiver   Identify discharge learning needs (meds, wound care, etc)

## 2024-05-08 NOTE — PROGRESS NOTES
[facility] - Inpatient Rehabilitation Department   Phone: [phone]    Physical Therapy    [x] Initial Evaluation            [] Daily Treatment Note         [] Discharge Summary      Patient: Sonja Mcmahan   : 1950   MRN: [id number]   Date of Service:  2024  Admitting Diagnosis: Hiatal hernia  Current Admission Summary: Sonja Mcmahan is a 73 y.o. female with PMHx notable for HTN, HLD, DM2, GERD who presented on 4/15 with dyspnea, intractable abdominal pain. Cardiology was consulted, recommended NM Stress Test which showed no reversible ischemia, low-risk. TTE stable from prior with LVEF 55-60%, grade 2 diastolic dysfunction, mild aortic stenosis, mild tricuspid regurgitation. She had EGD on , demonstrating large hiatal hernia, also had esophageal dilation performed and gastric antrum mass biopsied. On  she underwent laparoscopic hiatal hernia repair, cholecystectomy, and hemigastrectomy w/ gastrojejunostomy. 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 Oncology consulted, not recommending adjuvant treatment at this time. Hospital course complicated by: hypotension, leukocytosis, fevers, hypokalemia, PAT, intraabdominal abscess.   Past Medical History:  has a past medical history of Asthma, Diabetes mellitus (HCC), Hyperlipidemia, and Hypertension.  Past Surgical History:  has a past surgical history that includes Hysterectomy, total abdominal; Elbow surgery; Upper gastrointestinal endoscopy (N/A, 2024); Upper gastrointestinal endoscopy (N/A, 2024); Upper gastrointestinal endoscopy (N/A, 2024); hiatal hernia repair (N/A, 2024); Cholecystectomy, laparoscopic (N/A, 2024); and gastrectomy (N/A, 2024).  Discharge Recommendations: home with HHPT services  DME Required For Discharge: DME to be determined pending patient progress  Precautions/Restrictions: high fall risk, Full liquid

## 2024-05-08 NOTE — PROGRESS NOTES
0.5 0.5 0.5   ALKPHOS 65 65 63     Troponin: No results for input(s): \"TROPONINI\" in the last 72 hours.  BNP: No results for input(s): \"BNP\" in the last 72 hours.  Lipids: No results for input(s): \"CHOL\", \"HDL\" in the last 72 hours.    Invalid input(s): \"LDLCALCU\", \"TRIGLYCERIDE\"  ABGs:  No results for input(s): \"PHART\", \"CYQ1SIO\", \"PO2ART\", \"YKS4MDU\", \"BEART\", \"THGBART\", \"K7VALCFF\", \"YCH1HYG\" in the last 72 hours.    INR: No results for input(s): \"INR\" in the last 72 hours.  Lactate: No results for input(s): \"LACTATE\" in the last 72 hours.  Cultures:  -----------------------------------------------------------------  RAD:   No orders to display       Medications:     Scheduled Meds:   magnesium oxide  400 mg Oral Daily    sodium chloride flush  5-40 mL IntraVENous BID    lactobacillus  1 capsule Oral BID WC    pantoprazole  40 mg Oral BID AC    acetaminophen  1,000 mg Oral 3 times per day    bisacodyl  10 mg Rectal Once    enoxaparin  40 mg SubCUTAneous Daily    aspirin  81 mg Oral Daily    atorvastatin  20 mg Oral Nightly    metoprolol tartrate  12.5 mg Oral BID    montelukast  10 mg Oral Nightly    multivitamin  1 tablet Oral Daily    cetirizine  10 mg Oral Nightly    levoFLOXacin  750 mg Oral Daily     Continuous Infusions:  PRN Meds:aluminum & magnesium hydroxide-simethicone, sodium chloride flush, acetaminophen, polyethylene glycol, ondansetron, oxyCODONE, LORazepam, albuterol sulfate HFA, melatonin

## 2024-05-08 NOTE — PROGRESS NOTES
Order noted to remove staples. 19 staples removed from midline incisions. 6 staples were removed from laparoscopic site.Incision well approximated. No drainage noted. XAVIER dressing changed same time. Old dressing with dry brownish drainage noted. Steristrips applied to all incisions. DSD applied to XAVIER site. Patient cooperative and denied any pain during the procedure.

## 2024-05-08 NOTE — PROGRESS NOTES
Shift assessment done. All night time medications given and patient tolerated well. Surgical incision on abdomen, clean , dry and intact. XAVIER drain to bulb suction with thick yellowish to greenish output. All fall precautions implemented. All needs attended. Electronically signed by Jyotsna Sol RN on 5/7/2024 at 10:30 PM

## 2024-05-08 NOTE — PLAN OF CARE
Problem: Discharge Planning  Goal: Discharge to home or other facility with appropriate resources  5/8/2024 0949 by Dayanara Rivas RN  Outcome: Progressing  Flowsheets (Taken 5/8/2024 0905)  Discharge to home or other facility with appropriate resources:   Identify barriers to discharge with patient and caregiver   Identify discharge learning needs (meds, wound care, etc)  5/7/2024 2228 by Jyotsna Sol RN  Outcome: Progressing  Flowsheets  Taken 5/7/2024 1404 by Dayanara Rivas RN  Discharge to home or other facility with appropriate resources:   Identify barriers to discharge with patient and caregiver   Identify discharge learning needs (meds, wound care, etc)  Taken 5/7/2024 1353 by Dayanara Rivas RN  Discharge to home or other facility with appropriate resources:   Identify barriers to discharge with patient and caregiver   Identify discharge learning needs (meds, wound care, etc)     Problem: Safety - Adult  Goal: Free from fall injury  5/8/2024 0949 by Dayanara Rivas RN  Outcome: Progressing  5/7/2024 2228 by Jyotsna Sol RN  Outcome: Progressing  Flowsheets (Taken 5/7/2024 1337 by Dayanara Rivas RN)  Free From Fall Injury: Instruct family/caregiver on patient safety     Problem: Pain  Goal: Verbalizes/displays adequate comfort level or baseline comfort level  5/8/2024 0949 by Dayanara Rivas RN  Outcome: Progressing  Flowsheets (Taken 5/8/2024 0845)  Verbalizes/displays adequate comfort level or baseline comfort level:   Encourage patient to monitor pain and request assistance   Assess pain using appropriate pain scale   Administer analgesics based on type and severity of pain and evaluate response   Implement non-pharmacological measures as appropriate and evaluate response  5/7/2024 2228 by Jyotsna Sol RN  Outcome: Progressing  Flowsheets (Taken 5/7/2024 1310 by Dayanara Rivas RN)  Verbalizes/displays adequate comfort level or baseline comfort level:   Encourage patient to monitor

## 2024-05-08 NOTE — CARE COORDINATION
Social Work Admission Assessment    Objective:  Met with pt to complete initial assessment and review role of  in rehab process.  Pt oriented to unit.  Pt states understanding of this.     Current Home Situation:  Patient lives in a duplex alone. The patient reports prior to coming into the hospital she was independent. The patient states she has good family support at home and her daughter lives 5 minutes away. The patient reports her daughter comes daily to help her.     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       Accessibility to community resources/transportation: The patient was a active  prior to her hospital stay.      Has pt experienced a recent loss or signigicant life event that would impact their care or ability to participate?-None      Has pt ever been treated for emotional disorders?  Denied     How does pt and family cope with stressful events and this hospitalization?  The patient reports she has been having a hard time coping with the hospital stay due to the pain she is currently in. The patient expressed that she is not sure if she will be able to complete the therapy required at the ARU level of care. The GIANNI informed the MD of the patient's concerns. The patient states when she is at home she usually enjoys to read to cope with stress.    Special Problem Areas: None      Discharge Plan:TBD. The patient reports if she returns home she would like home health care services set up with [facility] in [place].    Impression/Plan:  Sonja Mcmahan is a 73 female that has been admitted to ARU. Provided patient with this SW's card to contact as needed. Will continue to follow for support and discharge planning.

## 2024-05-08 NOTE — PATIENT CARE CONFERENCE
[facility]  Inpatient Rehabilitation  Weekly Team Conference Note    Patient Name: Sonja Mcmahan        MRN: [id number]    : 1950  (73 y.o.)  Gender: female           The team conference for this patient was held on 2024 at 11am and led by:  Alex Rico MD     CASE MANAGEMENT:  Assessment: Sonja Mcmahan is a 73 female that has been admitted to ARU. Patient lives in a duplex alone. The patient expects to discharge home with needed Supports. The SW will continue to follow and update the discharge plan as needed.     PHYSICAL THERAPY:    Bed Mobility:  Supine to Sit: minimal assistance  Sit to Supine: stand by assistance  Rolling Left: stand by assistance, with railing  Rolling Right: stand by assistance, with railing  Scooting: stand by assistance  Comments:  Transfers:  Sit to stand transfer: minimal assistance, mostly CGA but with fatigue/weakness min A required towards end of session  Stand to sit transfer: minimal assistance, poor eccentric control, not following commands for proper hand placement  Car transfer: minimal assistance, LLE assist into car only   Comments:  Ambulation:  Surface:level surface  Assistive Device: rolling walker  Assistance: contact guard assistance  Distance: 41 ft and 15 ft   Gait Mechanics: partial step through pattern, very slow jose, decreased step height   Comments:  Distances limited by fatigue, low endurance     Ambulation Trial 2  Surface:carpet  Assistive Device: rolling walker  Assistance: contact guard assistance  Distance: 15 ft   Gait Mechanics: same as above  Comments:       Stair Mobility:  Number of Steps: 2  Step Height: 6 inch  Hand Rails: (B) handrail  Assistance: minimal assistance  Comments: B LE weakness noted, unable to proceed with additional steps due to weakness    QM:  Roll Left and Right  Assistance Needed: Supervision or touching assistance  CARE Score: 4  Discharge Goal: Independent  Sit to Lying  Assistance  energy conservation techniques, activities tolerating global strength and activity tolerance, cognitive intervention/ functional memory and problem solving   Goals still appropriate:  Yes  Modifications to goals: None  Continue Current Plan of Care:  Yes  Modifications to Plan of Care: None    Rehab Team Members in attendance for Team Conference:  Ruth Douglas, MSW, LSW    Candice Jose, RD, LD    Radha Cevallos, OTR/SHAINA Nieto, PT, DPT    Rosemary Tellez M.A., CCC-SLP    SHERIDAN GamezN, RN, CRRCHON Franco PT, DPT,     I, the Rehab Physician, led the above team conference and agree with all decisions made, and approve the established interdisciplinary plan of care as documented within the medical record of Sonja Mcmahan.    Alex Rico MD

## 2024-05-09 LAB
FOLATE SERPL-MCNC: 18.79 NG/ML (ref 4.78–24.2)
GLUCOSE BLD-MCNC: 88 MG/DL (ref 70–99)
PERFORMED ON: NORMAL
VIT B12 SERPL-MCNC: >2000 PG/ML (ref 211–911)

## 2024-05-09 PROCEDURE — 6360000002 HC RX W HCPCS: Performed by: STUDENT IN AN ORGANIZED HEALTH CARE EDUCATION/TRAINING PROGRAM

## 2024-05-09 PROCEDURE — 97116 GAIT TRAINING THERAPY: CPT

## 2024-05-09 PROCEDURE — 97530 THERAPEUTIC ACTIVITIES: CPT

## 2024-05-09 PROCEDURE — 97110 THERAPEUTIC EXERCISES: CPT

## 2024-05-09 PROCEDURE — 97129 THER IVNTJ 1ST 15 MIN: CPT

## 2024-05-09 PROCEDURE — 6370000000 HC RX 637 (ALT 250 FOR IP): Performed by: NURSE PRACTITIONER

## 2024-05-09 PROCEDURE — 6370000000 HC RX 637 (ALT 250 FOR IP): Performed by: STUDENT IN AN ORGANIZED HEALTH CARE EDUCATION/TRAINING PROGRAM

## 2024-05-09 PROCEDURE — 97130 THER IVNTJ EA ADDL 15 MIN: CPT

## 2024-05-09 PROCEDURE — 97535 SELF CARE MNGMENT TRAINING: CPT

## 2024-05-09 PROCEDURE — 1280000000 HC REHAB R&B

## 2024-05-09 PROCEDURE — APPSS15 APP SPLIT SHARED TIME 0-15 MINUTES: Performed by: NURSE PRACTITIONER

## 2024-05-09 PROCEDURE — 99024 POSTOP FOLLOW-UP VISIT: CPT | Performed by: SURGERY

## 2024-05-09 PROCEDURE — APPNB30 APP NON BILLABLE TIME 0-30 MINS: Performed by: NURSE PRACTITIONER

## 2024-05-09 RX ORDER — SODIUM CHLORIDE 0.9 % (FLUSH) 0.9 %
5-40 SYRINGE (ML) INJECTION PRN
Status: ACTIVE | OUTPATIENT
Start: 2024-05-09

## 2024-05-09 RX ORDER — IBUPROFEN 600 MG/1
600 TABLET ORAL EVERY 6 HOURS PRN
Status: DISCONTINUED | OUTPATIENT
Start: 2024-05-09 | End: 2024-05-11

## 2024-05-09 RX ADMIN — Medication 400 MG: at 08:42

## 2024-05-09 RX ADMIN — MONTELUKAST SODIUM 10 MG: 10 TABLET, FILM COATED ORAL at 21:25

## 2024-05-09 RX ADMIN — ATORVASTATIN CALCIUM 20 MG: 20 TABLET, FILM COATED ORAL at 21:26

## 2024-05-09 RX ADMIN — IBUPROFEN 600 MG: 600 TABLET, FILM COATED ORAL at 13:35

## 2024-05-09 RX ADMIN — THERA TABS 1 TABLET: TAB at 08:42

## 2024-05-09 RX ADMIN — ENOXAPARIN SODIUM 40 MG: 100 INJECTION SUBCUTANEOUS at 08:41

## 2024-05-09 RX ADMIN — METOPROLOL TARTRATE 12.5 MG: 25 TABLET, FILM COATED ORAL at 21:26

## 2024-05-09 RX ADMIN — Medication 1 CAPSULE: at 08:42

## 2024-05-09 RX ADMIN — OXYCODONE 5 MG: 5 TABLET ORAL at 01:53

## 2024-05-09 RX ADMIN — LEVOFLOXACIN 750 MG: 500 TABLET, FILM COATED ORAL at 21:25

## 2024-05-09 RX ADMIN — PANTOPRAZOLE SODIUM 40 MG: 40 TABLET, DELAYED RELEASE ORAL at 17:04

## 2024-05-09 RX ADMIN — PANTOPRAZOLE SODIUM 40 MG: 40 TABLET, DELAYED RELEASE ORAL at 07:32

## 2024-05-09 RX ADMIN — ASPIRIN 81 MG 81 MG: 81 TABLET ORAL at 08:42

## 2024-05-09 RX ADMIN — LORAZEPAM 0.5 MG: 0.5 TABLET ORAL at 21:34

## 2024-05-09 RX ADMIN — ONDANSETRON 4 MG: 4 TABLET, ORALLY DISINTEGRATING ORAL at 09:43

## 2024-05-09 RX ADMIN — IBUPROFEN 600 MG: 600 TABLET, FILM COATED ORAL at 21:25

## 2024-05-09 RX ADMIN — ONDANSETRON 4 MG: 4 TABLET, ORALLY DISINTEGRATING ORAL at 02:39

## 2024-05-09 RX ADMIN — OXYCODONE 5 MG: 5 TABLET ORAL at 08:46

## 2024-05-09 RX ADMIN — METOPROLOL TARTRATE 12.5 MG: 25 TABLET, FILM COATED ORAL at 08:42

## 2024-05-09 RX ADMIN — CETIRIZINE HYDROCHLORIDE 10 MG: 10 TABLET, FILM COATED ORAL at 21:26

## 2024-05-09 ASSESSMENT — PAIN DESCRIPTION - PAIN TYPE
TYPE: SURGICAL PAIN
TYPE: ACUTE PAIN;SURGICAL PAIN

## 2024-05-09 ASSESSMENT — PAIN DESCRIPTION - ORIENTATION
ORIENTATION: MID
ORIENTATION: OTHER (COMMENT)

## 2024-05-09 ASSESSMENT — PAIN - FUNCTIONAL ASSESSMENT
PAIN_FUNCTIONAL_ASSESSMENT: PREVENTS OR INTERFERES SOME ACTIVE ACTIVITIES AND ADLS
PAIN_FUNCTIONAL_ASSESSMENT: ACTIVITIES ARE NOT PREVENTED

## 2024-05-09 ASSESSMENT — PAIN SCALES - GENERAL
PAINLEVEL_OUTOF10: 6
PAINLEVEL_OUTOF10: 7
PAINLEVEL_OUTOF10: 0
PAINLEVEL_OUTOF10: 5
PAINLEVEL_OUTOF10: 7
PAINLEVEL_OUTOF10: 7
PAINLEVEL_OUTOF10: 4
PAINLEVEL_OUTOF10: 3
PAINLEVEL_OUTOF10: 3

## 2024-05-09 ASSESSMENT — PAIN DESCRIPTION - DESCRIPTORS
DESCRIPTORS: NAGGING
DESCRIPTORS: ACHING

## 2024-05-09 ASSESSMENT — PAIN DESCRIPTION - LOCATION
LOCATION: ABDOMEN

## 2024-05-09 ASSESSMENT — PAIN SCALES - WONG BAKER: WONGBAKER_NUMERICALRESPONSE: NO HURT

## 2024-05-09 ASSESSMENT — PAIN DESCRIPTION - FREQUENCY
FREQUENCY: CONTINUOUS
FREQUENCY: CONTINUOUS

## 2024-05-09 ASSESSMENT — PAIN DESCRIPTION - ONSET
ONSET: ON-GOING
ONSET: ON-GOING

## 2024-05-09 NOTE — PROGRESS NOTES
[facility] - Inpatient Rehabilitation Department   Phone: [phone]    Speech Therapy    [] Initial Evaluation            [x] Daily Treatment Note         [] Discharge Summary      Patient: Sonja Mcmahan   : 1950   MRN: [id number]   Date of Service:  2024  Admitting Diagnosis: Hiatal hernia  Current Admission Summary: Sonja Mcmahan is a 73 y.o. female with PMHx notable for HTN, HLD, DM2, GERD who presented on 4/15 with dyspnea, intractable abdominal pain. Cardiology was consulted, recommended NM Stress Test which showed no reversible ischemia, low-risk. TTE stable from prior with LVEF 55-60%, grade 2 diastolic dysfunction, mild aortic stenosis, mild tricuspid regurgitation. She had EGD on , demonstrating large hiatal hernia, also had esophageal dilation performed and gastric antrum mass biopsied. On  she underwent laparoscopic hiatal hernia repair, cholecystectomy, and hemigastrectomy w/ gastrojejunostomy. 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 Oncology consulted, not recommending adjuvant treatment at this time. Hospital course complicated by: hypotension, leukocytosis, fevers, hypokalemia, PAT, intraabdominal abscess.   Past Medical History:  has a past medical history of Asthma, Diabetes mellitus (HCC), Hyperlipidemia, and Hypertension.  Past Surgical History:  has a past surgical history that includes Hysterectomy, total abdominal; Elbow surgery; Upper gastrointestinal endoscopy (N/A, 2024); Upper gastrointestinal endoscopy (N/A, 2024); Upper gastrointestinal endoscopy (N/A, 2024); hiatal hernia repair (N/A, 2024); Cholecystectomy, laparoscopic (N/A, 2024); and gastrectomy (N/A, 2024).  Precautions/Restrictions: high fall risk        Pre-Admission Information   Living Status: Pt lives alone. Has two adult daughters who live locally   Occupation/School: Pt is retired

## 2024-05-09 NOTE — PROGRESS NOTES
Sonja Mcmahan  5/9/2024  [id number]    Chief Complaint: Hiatal hernia    Subjective:   Some vomiting nausea w/ emesis overnight. Improved with PRN Zofran. Staples removed, narcotic pain meds stopped by General Surgery.     Appetite is poor. Denies fevers/chills, chest pain, dyspnea, abdominal pain.     Last BM: Stool Occurrence: 1 (small) (05/08/24 1844)    Objective:  Patient Vitals for the past 24 hrs:   BP Temp Temp src Pulse Resp SpO2 Weight   05/09/24 0916 -- -- -- -- 18 -- --   05/09/24 0846 -- -- -- -- 18 -- --   05/09/24 0800 126/76 98.1 °F (36.7 °C) Oral 75 17 95 % --   05/09/24 0242 -- -- -- -- 18 -- --   05/09/24 0206 -- -- -- -- -- -- 82.3 kg (181 lb 8 oz)   05/09/24 0153 -- -- -- -- 16 -- --   05/08/24 2330 -- -- -- -- 14 -- --   05/08/24 2229 126/72 98.1 °F (36.7 °C) Oral 73 14 94 % --     Gen: No distress, pleasant.   HEENT: Normocephalic, atraumatic.   CV: Regular rate and rhythm. Extremities warm, well perfused.   Resp: No respiratory distress. CTAB.  Abd: Soft, nontender.XAVIER drain still with yellow-green output.  Ext: No edema.   Neuro: Alert, oriented, appropriately interactive.     Laboratory data: Available via EMR.     Therapy progress:       PT    Supine to Sit: Partial/moderate assistance  Sit to Supine: Supervision or touching assistance   Sit to Stand: Partial/moderate assistance  Chair/Bed to Chair Transfer: Supervision or touching assistance  Car Transfer: Partial/moderate assistance  Ambulation 10 ft: Supervision or touching assistance  Ambulation 50 ft:    Ambulation 150 ft:    Stairs - 1 Step: Partial/moderate assistance  Stairs - 4 Step:    Stairs - 12 Step:      OT    Eating: Setup or clean-up assistance  Oral Hygiene: Supervision or touching assistance  Bathing: Supervision or touching assistance  Upper Body Dressing: Setup or clean-up assistance  Lower Body Dressing: Supervision or touching assistance  Toilet Transfer: Supervision or touching assistance  Toilet Hygiene:  Metoprolol 12.5 mg BID.  Holding home lisinopril and hydrochlorothiazide  HLD: Atorvastatin  DM2: Recent A1c 5.9%. POCT glucose checks daily. Resume home metformin as able.   GERD: PPI BID  MARC: Ativan 0.5 mg daily PRN (uses q8h PRN at home).  Use judiciously with concomitant opioid pain medication.      CODE: Full Code  Diet: ADULT DIET; Full Liquid  ADULT ORAL NUTRITION SUPPLEMENT; Breakfast, Dinner; Clear Liquid Oral Supplement  ADULT ORAL NUTRITION SUPPLEMENT; Lunch; Standard 4 oz Oral Supplement  ADULT DIET; Full Liquid  Bowels: Per protocol  Bladder: Per protocol   Sleep: Melatonin PRN  Pain: Tylenol 650 mg every 4 hours as needed, Ibuprofen 600 mg q6h PRN  DVT PPx: Lovenox  Follow-ups: General surgery, GI, Oncology, PCP  ELOS: 11 days    Interdisciplinary team conference was held today with entire rehab treatment team including PT, OT, SLP (if applicable), Dietician, RN, and SW. Discussion focused on progress toward rehab goals and discharge planning. Making progress. Overall CGA for mobility, and up to CGA for ADLs, still needing assistance/supervision for med management. Barriers include cognitive deficits, XAVIER drain. We as a medical team, and I as the physician , made a plan to work on these barriers to facilitate safe discharge. Plan will be presented to patient/family (if available).     Alex Rico MD 5/9/2024, 4:53 PM    * This document was created using dictation software.  While all precautions were taken to ensure accuracy, errors may have occurred.  Please disregard any typographical errors.

## 2024-05-09 NOTE — PROGRESS NOTES
The pt had cream of wheat and had oxycodone 5mg at 0846.  The pt c/o nausea at 0940.  Administered zofran.  Oxycodone d/sammie.  Ibuprofen has been ordered.

## 2024-05-09 NOTE — PROGRESS NOTES
Assessment complete. Alert. Oriented to person, place, situation. Patient believed that it was morning instead of night. Easily reoriented.. Reports pain to abdominal incision. Denies need for pharmaceutical intervention beyond scheduled Tylenol. The care plan and education has been reviewed and mutually agreed upon with the patient. In bed, alarm on, bed in lowest position, call light and table within reach. No further needs expressed at this time.    0200  Patient called with request for PRN medication for relief of pain at abdominal incision. Given PRN oxycodone 5 mg. Ambulated to bathroom using walker and standby assistance. Voided. Continent.    0240  Complaining of sudden nausea without vomiting. Spitting up mucous sputum. Given PRN Zofran for nausea relief. Patient reports that nausea is beginning to improve.    0730  Patient reports feeling better this morning. Denies need for scheduled Tylenol.

## 2024-05-09 NOTE — PROGRESS NOTES
EOB this date; set up to amanda/doff shirt EOB; set up to amanda B socks via figure four w/ increased time d/t nausea  Toileting: stand by assistance contact guard assistance.    Toileting Equipment: none  Toileting Comments: pt voided urine seated on toilet w/ increased time; briefs soiled in smears of stool and urine; DEP to change briefs d/t time constraints; caesar care completed in stance CGA; clothing management over/under hips in stance SBA   General Comments: increased time for dressing tasks this date  Instrumental Activities of Daily Living  Medication Management: minimal assistance.  Medication Management Comments: pt completed med management simulation activity this date seated at table top. Pt tasked w/ locating 5/7 meds listed on med list and to organize pills in org for entire week. During completion, pt demo'd good awareness of not taking meds not on list. However, pt demo'ing 3 errors during task completion: pt forgetting pill for one day x1, pt adding pills to AM and PM slots but only supposed to take for AM or PM (based on directions) x2. Pt able to state what pharmacy pt uses - pt also able to report appropriate ways to dispose extra, unused, or  medications. Extensive time for completion requiring 10:40 min.    Functional Mobility  Bed Mobility:  Supine to Sit: stand by assistance  Rolling Right: stand by assistance  Scooting: stand by assistance  Comments: increased time for completion; HOB flat; use of bed rail on R  Transfers:  Sit to stand transfer:stand by assistance  Stand to sit transfer: stand by assistance  Stand step transfer: stand by assistance  Toilet transfer: stand by assistance  Toilet transfer equipment: standard toilet, grab bars, walker  Toilet transfer comments: use of grab bar on R  Comments: good hand placement for task completion  Functional Mobility  Functional Mobility Activity: to/from bathroom, 15' + 10'  Device Use: rolling walker  Required Assistance: stand by  transfers at modified independent   Patient will increase functional standing balance to 8+ min for improved ADL completion  Patient to gather and transport IADL items at modified independent     Above goals reviewed on 5/9/2024.  All goals are ongoing at this time unless indicated above.       Therapy Session Time     Individual Group Co-treatment   Time In 0945      Time Out 1030      Minutes 45          Individual Group Co-treatment   Time In 1445      Time Out 1515      Minutes 30        Timed Code Treatment Minutes: 45 minutes+30    Total Treatment Minutes: 75       Electronically Signed By: Radha Cevallos OT  1st session: LEXIE Reyes OTR/L, [id number] 5/9/2024 12:06 PM    2nd session: Corin Quiles OTR/L, [id number] 5/9/2024 3:55 PM

## 2024-05-09 NOTE — PROGRESS NOTES
The pt has less than small amount of breakfast.   Stated she feels like she can tolerate only small amount of fluid or po intake every 3 hours to prevent nausea or feeling too full.  The pt drank ensure plus with ice to thin it but the pt refused it after few sips.  The pt stated it causes her to have mucous and nausea.  Obtained order for clear ensure and compact ensure.  Stated she enjoys drinking ice tea at home.

## 2024-05-09 NOTE — PLAN OF CARE
Problem: Safety - Adult  Goal: Free from fall injury  Outcome: Progressing     Problem: Pain  Goal: Verbalizes/displays adequate comfort level or baseline comfort level  Outcome: Progressing  Flowsheets (Taken 5/8/2024 7122)  Verbalizes/displays adequate comfort level or baseline comfort level: Encourage patient to monitor pain and request assistance     Problem: Skin/Tissue Integrity  Goal: Absence of new skin breakdown  Description: 1.  Monitor for areas of redness and/or skin breakdown  2.  Assess vascular access sites hourly  3.  Every 4-6 hours minimum:  Change oxygen saturation probe site  4.  Every 4-6 hours:  If on nasal continuous positive airway pressure, respiratory therapy assess nares and determine need for appliance change or resting period.  Outcome: Progressing     Problem: ABCDS Injury Assessment  Goal: Absence of physical injury  Outcome: Progressing  Flowsheets (Taken 5/9/2024 8535)  Absence of Physical Injury: Implement safety measures based on patient assessment

## 2024-05-09 NOTE — PROGRESS NOTES
[facility]        Progress Note    Patient Name: Sonja Mcmahan  MRN: [id number]  YOB: 1950  Date of Evaluation: 2024    Postoperative Day #17    Subjective:  No acute events overnight  Pain is better controlled this morning, minimal narcotic use  Had vomiting following analgesic yesterday, none since, tolerating full liquids  Passing flatus and stool  Up to chair at this time, in good spirits      Vital Signs:  Patient Vitals for the past 24 hrs:   BP Temp Temp src Pulse Resp SpO2 Weight   24 0916 -- -- -- -- 18 -- --   24 0846 -- -- -- -- 18 -- --   24 0800 126/76 98.1 °F (36.7 °C) Oral 75 17 95 % --   24 0242 -- -- -- -- 18 -- --   24 0206 -- -- -- -- -- -- 82.3 kg (181 lb 8 oz)   24 0153 -- -- -- -- 16 -- --   24 2330 -- -- -- -- 14 -- --   24 2229 126/72 98.1 °F (36.7 °C) Oral 73 14 94 % --        TEMPERATURE HISTORY 24H: Temp (24hrs), Av.1 °F (36.7 °C), Min:98.1 °F (36.7 °C), Max:98.1 °F (36.7 °C)    BLOOD PRESSURE HISTORY: Systolic (36hrs), Av , Min:126 , Max:134    Diastolic (36hrs), Av, Min:72, Max:84      Intake/Output:  I/O last 3 completed shifts:  In: 880 [P.O.:880]  Out: 240 [Drains:240]  I/O this shift:  In: -   Out: 40 [Drains:40]  Drain/tube Output:  Closed/Suction Drain RLQ-Output (ml): 40 ml    Physical Exam:  General: awake, alert, oriented to person, place, time  Lungs: unlabored respirations  Abdomen: soft, non-distended, incisional tenderness only, bowel sounds present  Skin/Wound: healing well, no drainage, no erythema, ecchymosis noted, well approximated, Steri-Strips in place  Drain: turbid/bilious drainage--tubing stripped    Labs:  CBC:    Recent Labs     24  0546 24  0555   WBC 10.7 8.3   HGB 9.0* 8.9*   HCT 28.2* 28.1*    505*       BMP:    Recent Labs     24  0546 24  0555   * 137   K 3.8 3.4*   CL 99 102   CO2 24 24   BUN 7 6*  report.     pT Category   pT1b: Tumor invades the submucosa     pN Category#   # Metastatic tumor deposits in the subserosal fat adjacent to a gastric   carcinoma, without evidence of residual lymph node tissue, are considered   regional lymph node metastases for purposes of gastric cancer staging.   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 Correlation with laboratory tests (serum gastrin, vitamin B12   levels, serologies for anti-intrinsic factor and anti-parietal cell) may   be considered.     SPECIAL STUDIES   HER2 (ERBB2) Biomarker Testing will be performed at an external   institution; an addendum will follow.     Imaging:  I have personally reviewed the following films:    No results found.    Scheduled Meds:   magnesium oxide  400 mg Oral Daily    lactobacillus  1 capsule Oral BID WC    pantoprazole  40 mg Oral BID AC    acetaminophen  1,000 mg Oral 3 times per day    enoxaparin  40 mg SubCUTAneous Daily    aspirin  81 mg Oral Daily    atorvastatin  20 mg Oral Nightly    metoprolol tartrate  12.5 mg Oral BID    montelukast  10 mg Oral Nightly    multivitamin  1 tablet Oral Daily    cetirizine  10 mg Oral Nightly    levoFLOXacin  750 mg Oral Daily     Continuous Infusions:      PRN Meds:.sodium chloride flush, ibuprofen, aluminum & magnesium hydroxide-simethicone, sodium chloride flush, acetaminophen, polyethylene glycol, ondansetron, LORazepam, albuterol sulfate HFA, melatonin      Assessment:  73 y.o. female admitted with   No diagnosis found.      OR Date 4/22/2024, laparoscopic hiatal hernia repair with primary closure and [place] [employer] mesh reinforcement, laparoscopic cholecystectomy, open hemigastrectomy with gastrojejunostomy and closure of duodenal stump and

## 2024-05-09 NOTE — PROGRESS NOTES
[facility] - Inpatient Rehabilitation Department   Phone: [phone]    Physical Therapy    [] Initial Evaluation            [x] Daily Treatment Note         [] Discharge Summary      Patient: Sonja Mcmahan   : 1950   MRN: [id number]   Date of Service:  2024  Admitting Diagnosis: Hiatal hernia  Current Admission Summary: Sonja Mcmahan is a 73 y.o. female with PMHx notable for HTN, HLD, DM2, GERD who presented on 4/15 with dyspnea, intractable abdominal pain. Cardiology was consulted, recommended NM Stress Test which showed no reversible ischemia, low-risk. TTE stable from prior with LVEF 55-60%, grade 2 diastolic dysfunction, mild aortic stenosis, mild tricuspid regurgitation. She had EGD on , demonstrating large hiatal hernia, also had esophageal dilation performed and gastric antrum mass biopsied. On  she underwent laparoscopic hiatal hernia repair, cholecystectomy, and hemigastrectomy w/ gastrojejunostomy. 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 Oncology consulted, not recommending adjuvant treatment at this time. Hospital course complicated by: hypotension, leukocytosis, fevers, hypokalemia, PAT, intraabdominal abscess.   Past Medical History:  has a past medical history of Asthma, Diabetes mellitus (HCC), Hyperlipidemia, and Hypertension.  Past Surgical History:  has a past surgical history that includes Hysterectomy, total abdominal; Elbow surgery; Upper gastrointestinal endoscopy (N/A, 2024); Upper gastrointestinal endoscopy (N/A, 2024); Upper gastrointestinal endoscopy (N/A, 2024); hiatal hernia repair (N/A, 2024); Cholecystectomy, laparoscopic (N/A, 2024); and gastrectomy (N/A, 2024).  Discharge Recommendations: home with HHPT services  DME Required For Discharge: DME to be determined pending patient progress  Precautions/Restrictions: high fall risk, Full liquid  goals reviewed on 5/9/2024.  All goals are ongoing at this time unless indicated above.      Therapy Session Time      Individual Group Co-treatment   Time In 0845       Time Out 0915       Minutes 30         Second Session Therapy Time:   Individual Concurrent Group Co-treatment   Time In 1245         Time Out 1330         Minutes 45           Timed Code Treatment Minutes:  75    Total Treatment Minutes:  75      Electronically Signed By: Marguerite Nieto PT

## 2024-05-09 NOTE — PROGRESS NOTES
The pt stated she wants people to speak loud and slow.  State she can't hear well since the ER visit.  She is not congested but she feels like as if she is in a tunnel.

## 2024-05-09 NOTE — PLAN OF CARE
Problem: Discharge Planning  Goal: Discharge to home or other facility with appropriate resources  Outcome: Progressing  Flowsheets (Taken 5/8/2024 2254 by Jimena Duffy, RN)  Discharge to home or other facility with appropriate resources: Identify discharge learning needs (meds, wound care, etc)     Problem: Safety - Adult  Goal: Free from fall injury  5/9/2024 1041 by Zhao Ram RN  Outcome: Progressing     Problem: Pain  Goal: Verbalizes/displays adequate comfort level or baseline comfort level  5/9/2024 1041 by Zhao Ram RN  Outcome: Progressing     Problem: Skin/Tissue Integrity  Goal: Absence of new skin breakdown  Description: 1.  Monitor for areas of redness and/or skin breakdown  2.  Assess vascular access sites hourly  3.  Every 4-6 hours minimum:  Change oxygen saturation probe site  4.  Every 4-6 hours:  If on nasal continuous positive airway pressure, respiratory therapy assess nares and determine need for appliance change or resting period.  5/9/2024 1041 by Zhao Ram RN  Outcome: Progressing     Problem: ABCDS Injury Assessment  Goal: Absence of physical injury  5/9/2024 1041 by Zhao Ram RN  Outcome: Progressing     Problem: Nutrition Deficit:  Goal: Optimize nutritional status  Outcome: Progressing

## 2024-05-09 NOTE — CARE COORDINATION
Team conference/Weekly Summary      Team conference held today. Spoke with patient and family to discuss progress with therapy, barriers to discharge, and plans to return home. Estimated discharge date set for 5/16/2024. Patient anticipates discharging to Home with [facility] and needed supports. Will continue to follow for support and discharge planning.    Electronically signed by ERNA Anthony on 5/9/2024 at 1:36 PM

## 2024-05-10 LAB
ANION GAP SERPL CALCULATED.3IONS-SCNC: 12 MMOL/L (ref 3–16)
ANISOCYTOSIS BLD QL SMEAR: ABNORMAL
BASOPHILS # BLD: 0 K/UL (ref 0–0.2)
BASOPHILS NFR BLD: 0 %
BUN SERPL-MCNC: 6 MG/DL (ref 7–20)
CALCIUM SERPL-MCNC: 8.1 MG/DL (ref 8.3–10.6)
CHLORIDE SERPL-SCNC: 105 MMOL/L (ref 99–110)
CO2 SERPL-SCNC: 22 MMOL/L (ref 21–32)
CREAT SERPL-MCNC: 0.7 MG/DL (ref 0.6–1.2)
DEPRECATED RDW RBC AUTO: 16.5 % (ref 12.4–15.4)
EOSINOPHIL # BLD: 0 K/UL (ref 0–0.6)
EOSINOPHIL NFR BLD: 0 %
GFR SERPLBLD CREATININE-BSD FMLA CKD-EPI: 90 ML/MIN/{1.73_M2}
GLUCOSE BLD-MCNC: 90 MG/DL (ref 70–99)
GLUCOSE SERPL-MCNC: 104 MG/DL (ref 70–99)
HCT VFR BLD AUTO: 29.9 % (ref 36–48)
HGB BLD-MCNC: 9.4 G/DL (ref 12–16)
LYMPHOCYTES # BLD: 0.9 K/UL (ref 1–5.1)
LYMPHOCYTES NFR BLD: 9 %
MAGNESIUM SERPL-MCNC: 1.8 MG/DL (ref 1.8–2.4)
MCH RBC QN AUTO: 27.8 PG (ref 26–34)
MCHC RBC AUTO-ENTMCNC: 31.5 G/DL (ref 31–36)
MCV RBC AUTO: 88.2 FL (ref 80–100)
MICROCYTES BLD QL SMEAR: ABNORMAL
MONOCYTES # BLD: 0.2 K/UL (ref 0–1.3)
MONOCYTES NFR BLD: 2 %
MYELOCYTES NFR BLD MANUAL: 3 %
NEUTROPHILS # BLD: 9 K/UL (ref 1.7–7.7)
NEUTROPHILS NFR BLD: 77 %
NEUTS BAND NFR BLD MANUAL: 9 % (ref 0–7)
PERFORMED ON: NORMAL
PLATELET # BLD AUTO: 432 K/UL (ref 135–450)
PMV BLD AUTO: 6.7 FL (ref 5–10.5)
POLYCHROMASIA BLD QL SMEAR: ABNORMAL
POTASSIUM SERPL-SCNC: 3.7 MMOL/L (ref 3.5–5.1)
RBC # BLD AUTO: 3.39 M/UL (ref 4–5.2)
SODIUM SERPL-SCNC: 139 MMOL/L (ref 136–145)
WBC # BLD AUTO: 10.1 K/UL (ref 4–11)

## 2024-05-10 PROCEDURE — 1280000000 HC REHAB R&B

## 2024-05-10 PROCEDURE — 97530 THERAPEUTIC ACTIVITIES: CPT

## 2024-05-10 PROCEDURE — 97129 THER IVNTJ 1ST 15 MIN: CPT

## 2024-05-10 PROCEDURE — 97535 SELF CARE MNGMENT TRAINING: CPT

## 2024-05-10 PROCEDURE — 85025 COMPLETE CBC W/AUTO DIFF WBC: CPT

## 2024-05-10 PROCEDURE — 97116 GAIT TRAINING THERAPY: CPT

## 2024-05-10 PROCEDURE — 36415 COLL VENOUS BLD VENIPUNCTURE: CPT

## 2024-05-10 PROCEDURE — 6360000002 HC RX W HCPCS: Performed by: STUDENT IN AN ORGANIZED HEALTH CARE EDUCATION/TRAINING PROGRAM

## 2024-05-10 PROCEDURE — 97130 THER IVNTJ EA ADDL 15 MIN: CPT

## 2024-05-10 PROCEDURE — 99024 POSTOP FOLLOW-UP VISIT: CPT | Performed by: SURGERY

## 2024-05-10 PROCEDURE — 6370000000 HC RX 637 (ALT 250 FOR IP): Performed by: NURSE PRACTITIONER

## 2024-05-10 PROCEDURE — 6370000000 HC RX 637 (ALT 250 FOR IP): Performed by: STUDENT IN AN ORGANIZED HEALTH CARE EDUCATION/TRAINING PROGRAM

## 2024-05-10 PROCEDURE — 80048 BASIC METABOLIC PNL TOTAL CA: CPT

## 2024-05-10 PROCEDURE — 83735 ASSAY OF MAGNESIUM: CPT

## 2024-05-10 PROCEDURE — 97110 THERAPEUTIC EXERCISES: CPT

## 2024-05-10 RX ORDER — MIRTAZAPINE 15 MG/1
7.5 TABLET, FILM COATED ORAL NIGHTLY
Status: DISPENSED | OUTPATIENT
Start: 2024-05-10

## 2024-05-10 RX ADMIN — CETIRIZINE HYDROCHLORIDE 10 MG: 10 TABLET, FILM COATED ORAL at 20:46

## 2024-05-10 RX ADMIN — PANTOPRAZOLE SODIUM 40 MG: 40 TABLET, DELAYED RELEASE ORAL at 05:56

## 2024-05-10 RX ADMIN — METOPROLOL TARTRATE 12.5 MG: 25 TABLET, FILM COATED ORAL at 20:46

## 2024-05-10 RX ADMIN — ENOXAPARIN SODIUM 40 MG: 100 INJECTION SUBCUTANEOUS at 07:53

## 2024-05-10 RX ADMIN — Medication 1 CAPSULE: at 07:53

## 2024-05-10 RX ADMIN — PANTOPRAZOLE SODIUM 40 MG: 40 TABLET, DELAYED RELEASE ORAL at 17:22

## 2024-05-10 RX ADMIN — ATORVASTATIN CALCIUM 20 MG: 20 TABLET, FILM COATED ORAL at 20:47

## 2024-05-10 RX ADMIN — IBUPROFEN 600 MG: 600 TABLET, FILM COATED ORAL at 07:53

## 2024-05-10 RX ADMIN — MONTELUKAST SODIUM 10 MG: 10 TABLET, FILM COATED ORAL at 20:46

## 2024-05-10 RX ADMIN — LEVOFLOXACIN 750 MG: 500 TABLET, FILM COATED ORAL at 20:47

## 2024-05-10 RX ADMIN — IBUPROFEN 600 MG: 600 TABLET, FILM COATED ORAL at 14:20

## 2024-05-10 RX ADMIN — LORAZEPAM 0.5 MG: 0.5 TABLET ORAL at 20:47

## 2024-05-10 RX ADMIN — MIRTAZAPINE 7.5 MG: 15 TABLET, FILM COATED ORAL at 20:46

## 2024-05-10 RX ADMIN — ASPIRIN 81 MG 81 MG: 81 TABLET ORAL at 07:53

## 2024-05-10 RX ADMIN — ACETAMINOPHEN 650 MG: 325 TABLET ORAL at 18:08

## 2024-05-10 RX ADMIN — METOPROLOL TARTRATE 12.5 MG: 25 TABLET, FILM COATED ORAL at 07:53

## 2024-05-10 RX ADMIN — IBUPROFEN 600 MG: 600 TABLET, FILM COATED ORAL at 20:46

## 2024-05-10 RX ADMIN — Medication 400 MG: at 07:53

## 2024-05-10 RX ADMIN — ONDANSETRON 4 MG: 4 TABLET, ORALLY DISINTEGRATING ORAL at 00:15

## 2024-05-10 RX ADMIN — NYSTATIN 500000 UNITS: 100000 SUSPENSION ORAL at 20:47

## 2024-05-10 RX ADMIN — ACETAMINOPHEN 1000 MG: 500 TABLET ORAL at 05:54

## 2024-05-10 RX ADMIN — Medication 1 CAPSULE: at 17:22

## 2024-05-10 RX ADMIN — THERA TABS 1 TABLET: TAB at 07:54

## 2024-05-10 ASSESSMENT — PAIN SCALES - GENERAL
PAINLEVEL_OUTOF10: 4
PAINLEVEL_OUTOF10: 3
PAINLEVEL_OUTOF10: 7
PAINLEVEL_OUTOF10: 2
PAINLEVEL_OUTOF10: 9
PAINLEVEL_OUTOF10: 4
PAINLEVEL_OUTOF10: 3

## 2024-05-10 ASSESSMENT — PAIN DESCRIPTION - ORIENTATION
ORIENTATION: LEFT
ORIENTATION: MID

## 2024-05-10 ASSESSMENT — PAIN DESCRIPTION - DESCRIPTORS
DESCRIPTORS: ACHING
DESCRIPTORS: DISCOMFORT

## 2024-05-10 ASSESSMENT — PAIN DESCRIPTION - LOCATION
LOCATION: ABDOMEN

## 2024-05-10 ASSESSMENT — PAIN SCALES - WONG BAKER: WONGBAKER_NUMERICALRESPONSE: NO HURT

## 2024-05-10 ASSESSMENT — PAIN - FUNCTIONAL ASSESSMENT
PAIN_FUNCTIONAL_ASSESSMENT: ACTIVITIES ARE NOT PREVENTED

## 2024-05-10 ASSESSMENT — PAIN DESCRIPTION - ONSET: ONSET: ON-GOING

## 2024-05-10 ASSESSMENT — PAIN DESCRIPTION - FREQUENCY: FREQUENCY: INTERMITTENT

## 2024-05-10 ASSESSMENT — PAIN DESCRIPTION - PAIN TYPE: TYPE: SURGICAL PAIN

## 2024-05-10 NOTE — PLAN OF CARE
Problem: Discharge Planning  Goal: Discharge to home or other facility with appropriate resources  Outcome: Progressing     Problem: Safety - Adult  Goal: Free from fall injury  5/10/2024 0846 by Bradford Hoyos RN  Outcome: Progressing     Problem: Pain  Goal: Verbalizes/displays adequate comfort level or baseline comfort level  5/10/2024 0846 by Bradford Hoyos RN  Outcome: Progressing

## 2024-05-10 NOTE — PLAN OF CARE
Problem: Safety - Adult  Goal: Free from fall injury  5/9/2024 2247 by Chelsey Lazaro, RN  Outcome: Progressing     Problem: Pain  Goal: Verbalizes/displays adequate comfort level or baseline comfort level  5/9/2024 2247 by Chelsey Lazaro, RN  Outcome: Progressing     Problem: Skin/Tissue Integrity  Goal: Absence of new skin breakdown  Description: 1.  Monitor for areas of redness and/or skin breakdown  2.  Assess vascular access sites hourly  3.  Every 4-6 hours minimum:  Change oxygen saturation probe site  4.  Every 4-6 hours:  If on nasal continuous positive airway pressure, respiratory therapy assess nares and determine need for appliance change or resting period.  5/9/2024 2247 by Chelsey Lazaro, RN  Outcome: Progressing

## 2024-05-10 NOTE — PROGRESS NOTES
[facility]        Progress Note    Patient Name: Sonja Mcmahan  MRN: [id number]  YOB: 1950  Date of Evaluation: 5/10/2024    Postoperative Day #18    Subjective:  No acute events overnight  Pain is fine  Eating some / fulls  Up to chair at this time, in good spirits, about to do shower with OT      Vital Signs:  Patient Vitals for the past 24 hrs:   BP Temp Temp src Pulse Resp SpO2 Weight   05/10/24 0751 133/82 97.9 °F (36.6 °C) Oral 63 16 91 % --   05/10/24 0500 -- -- -- -- -- -- 82.1 kg (180 lb 14.4 oz)   24 2100 (!) 151/64 98.5 °F (36.9 °C) Oral 74 18 91 % --        TEMPERATURE HISTORY 24H: Temp (24hrs), Av.2 °F (36.8 °C), Min:97.9 °F (36.6 °C), Max:98.5 °F (36.9 °C)    BLOOD PRESSURE HISTORY: Systolic (36hrs), Av , Min:126 , Max:151    Diastolic (36hrs), Av, Min:64, Max:82      Intake/Output:  I/O last 3 completed shifts:  In: 300 [P.O.:300]  Out: 135 [Drains:135]  No intake/output data recorded.  Drain/tube Output:  Closed/Suction Drain RLQ-Output (ml): 5 ml    Physical Exam:  General: awake, alert, oriented to person, place, time  Lungs: unlabored respirations  Abdomen: soft, non-distended, incisional tenderness only, bowel sounds present  Skin/Wound: healing well, no drainage, no erythema, well approximated, Steri-Strips in place  Drain: turbid/bilious drainage--tubing stripped, 75 / last 24 hrs in I/O    Labs:  CBC:    Recent Labs     24  0555   WBC 8.3   HGB 8.9*   HCT 28.1*   *       BMP:    Recent Labs     24  0555      K 3.4*      CO2 24   BUN 6*   CREATININE <0.5*   GLUCOSE 86       Hepatic:    Recent Labs     24  0555   AST 18   ALT 11   BILITOT 0.5   ALKPHOS 63       Amylase:  No results found for: \"AMYLASE\"  Lipase:    Lab Results   Component Value Date/Time    LIPASE 18.0 2024 05:59 AM    LIPASE 59.0 04/15/2024 05:56 PM      Mag:    Lab Results   Component Value Date/Time    MG  section of the duodenum)     TUMOR     Tumor Site   Use the esophageal checklist if the tumor involves the EGJ and the tumor midpoint is 2 cm or less into the proximal stomach.   Mid body (per OR note)     Histologic Type   Adenocarcinoma    Adenocarcinoma Classification (based on WHO)    Tubular adenocarcinoma     Histologic Grade   G1, well differentiated     Tumor Size   Cannot be determined (explain): The adenoma polyp measures 4cm with   invasion in deep portion     Tumor Extent   Invades submucosa     Treatment Effect   No known presurgical therapy     Lymphatic and / or Vascular Invasion   Not identified     MARGINS     Margin Status for Invasive Carcinoma   All margins negative for invasive carcinoma   Closest Margin(s) to Invasive Carcinoma   At least 2 cm from the presumed proximal staple line and 16.5 cm from the   presumed distal staple line     Margin Status for Dysplasia   All margins negative for dysplasia     REGIONAL LYMPH NODES     Regional Lymph Node Status   Regional lymph nodes present    All regional lymph nodes negative for tumor    Number of Lymph Nodes Examined    Exact number (specify): 16     pTNM CLASSIFICATION (AJCC 8th Edition)   Reporting of pT, pN, and (when applicable) pM categories is based on   information available to the pathologist at the time the report is   issued. As per the AJCC (Chapter 1, 8th Ed.) it is the managing   physician's responsibility to establish the final pathologic stage based   upon all pertinent information, including but potentially not limited to   this pathology report.     pT Category   pT1b: Tumor invades the submucosa     pN Category#   # Metastatic tumor deposits in the subserosal fat adjacent to a gastric   carcinoma, without evidence of residual lymph node tissue, are considered   regional lymph node metastases for purposes of gastric cancer staging.   pN0: No regional lymph node metastasis     ADDITIONAL FINDINGS   The background stomach tissue

## 2024-05-10 NOTE — PROGRESS NOTES
[facility] - Inpatient Rehabilitation Department   Phone: [phone]    Occupational Therapy    [] Initial Evaluation            [x] Daily Treatment Note         [] Discharge Summary      Patient: Sonja Mcmahan   : 1950   MRN: [id number]   Date of Service:  5/10/2024    Admitting Diagnosis:  Hiatal hernia  Current Admission Summary: Sonja Mcmahan is a 73 y.o. female with PMHx notable for HTN, HLD, DM2, GERD who presented on 4/15 with dyspnea, intractable abdominal pain. Cardiology was consulted, recommended NM Stress Test which showed no reversible ischemia, low-risk. TTE stable from prior with LVEF 55-60%, grade 2 diastolic dysfunction, mild aortic stenosis, mild tricuspid regurgitation. She had EGD on , demonstrating large hiatal hernia, also had esophageal dilation performed and gastric antrum mass biopsied. On  she underwent laparoscopic hiatal hernia repair, cholecystectomy, and hemigastrectomy w/ gastrojejunostomy. 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 Oncology consulted, not recommending adjuvant treatment at this time. Hospital course complicated by: hypotension, leukocytosis, fevers, hypokalemia, PAT, intraabdominal abscess.   Past Medical History:  has a past medical history of Asthma, Diabetes mellitus (HCC), Hyperlipidemia, and Hypertension.  Past Surgical History:  has a past surgical history that includes Hysterectomy, total abdominal; Elbow surgery; Upper gastrointestinal endoscopy (N/A, 2024); Upper gastrointestinal endoscopy (N/A, 2024); Upper gastrointestinal endoscopy (N/A, 2024); hiatal hernia repair (N/A, 2024); Cholecystectomy, laparoscopic (N/A, 2024); and gastrectomy (N/A, 2024).    Discharge Recommendations: Home w/ HHOT     DME Required For Discharge: DME to be determined pending patient progress    Precautions/Restrictions: high fall risk, full

## 2024-05-10 NOTE — PROGRESS NOTES
Morning assessment completed, vss, alert and oriented, schedule meds given, The care plan and education has been reviewed and mutually agreed upon with the patient. Pt remains free from falls. Fall precautions in place--bed in lowest position, call light within reach, bed alarm in use. Pt aware to call for assistance before getting up.

## 2024-05-10 NOTE — PROGRESS NOTES
Requiring Therapeutic Intervention: decreased functional mobility, decreased strength, decreased endurance, decreased balance, increased pain  Prognosis: good  Clinical Assessment: Pt making some progress with tolerating more activity and walking further distances.  Pt still requires frequent rest breaks between each activity due to some abdominal pain, nausea, and fatigue. Pt requires SBA w/ mobility, transfers, and ambulation. Pt remains limited by endurance for standing activities and requires frequent rest breaks. Continued skilled PT to promote functional gains towards her baseline.  Safety Interventions: patient left in chair, call light within reach, gait belt, and patient at risk for falls    Plan  Frequency: 5 x/week, 75 min/day  Current Treatment Recommendations: strengthening, balance training, functional mobility training, transfer training, gait training, stair training, and endurance training    Goals  Patient Goals: to regain strength    Short Term Goals:  Time Frame: 7-10 days  Patient will complete bed mobility at modified independent   Patient will complete transfers at [facility]   Patient will ambulate 150 ft with use of LRAD at [facility]  Patient will ascend/descend 4 stairs with (B) handrail at modified independent  Patient will complete car transfer at [facility]  Patient will improve Timed Up and Go test in = to or better than 25 seconds  Patient will improve 5 STS test to at least 16 seconds       Above goals reviewed on 5/10/2024.  All goals are ongoing at this time unless indicated above.      Therapy Session Time      Individual Group Co-treatment   Time In 1122       Time Out 1152       Minutes 30         Second Session Therapy Time:   Individual Concurrent Group Co-treatment   Time In 1250         Time Out 1350         Minutes 60           Timed Code Treatment Minutes: 30 Minutes + 60 minutes    Total Treatment Minutes:  90      Electronically Signed By:

## 2024-05-10 NOTE — PROGRESS NOTES
[facility] - Inpatient Rehabilitation Department   Phone: [phone]    Speech Therapy    [] Initial Evaluation            [x] Daily Treatment Note         [] Discharge Summary      Patient: Sonja Mcmahan   : 1950   MRN: [id number]   Date of Service:  5/10/2024  Admitting Diagnosis: Hiatal hernia  Current Admission Summary: Sonja Mcmahan is a 73 y.o. female with PMHx notable for HTN, HLD, DM2, GERD who presented on 4/15 with dyspnea, intractable abdominal pain. Cardiology was consulted, recommended NM Stress Test which showed no reversible ischemia, low-risk. TTE stable from prior with LVEF 55-60%, grade 2 diastolic dysfunction, mild aortic stenosis, mild tricuspid regurgitation. She had EGD on , demonstrating large hiatal hernia, also had esophageal dilation performed and gastric antrum mass biopsied. On  she underwent laparoscopic hiatal hernia repair, cholecystectomy, and hemigastrectomy w/ gastrojejunostomy. 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 Oncology consulted, not recommending adjuvant treatment at this time. Hospital course complicated by: hypotension, leukocytosis, fevers, hypokalemia, PAT, intraabdominal abscess.   Past Medical History:  has a past medical history of Asthma, Diabetes mellitus (HCC), Hyperlipidemia, and Hypertension.  Past Surgical History:  has a past surgical history that includes Hysterectomy, total abdominal; Elbow surgery; Upper gastrointestinal endoscopy (N/A, 2024); Upper gastrointestinal endoscopy (N/A, 2024); Upper gastrointestinal endoscopy (N/A, 2024); hiatal hernia repair (N/A, 2024); Cholecystectomy, laparoscopic (N/A, 2024); and gastrectomy (N/A, 2024).  Precautions/Restrictions: high fall risk        Pre-Admission Information   Living Status: Pt lives alone. Has two adult daughters who live locally   Occupation/School: Pt is

## 2024-05-11 LAB
GLUCOSE BLD-MCNC: 103 MG/DL (ref 70–99)
PERFORMED ON: ABNORMAL

## 2024-05-11 PROCEDURE — 97110 THERAPEUTIC EXERCISES: CPT

## 2024-05-11 PROCEDURE — 97130 THER IVNTJ EA ADDL 15 MIN: CPT

## 2024-05-11 PROCEDURE — 97530 THERAPEUTIC ACTIVITIES: CPT

## 2024-05-11 PROCEDURE — 1280000000 HC REHAB R&B

## 2024-05-11 PROCEDURE — 6370000000 HC RX 637 (ALT 250 FOR IP): Performed by: NURSE PRACTITIONER

## 2024-05-11 PROCEDURE — 6370000000 HC RX 637 (ALT 250 FOR IP): Performed by: STUDENT IN AN ORGANIZED HEALTH CARE EDUCATION/TRAINING PROGRAM

## 2024-05-11 PROCEDURE — 97129 THER IVNTJ 1ST 15 MIN: CPT

## 2024-05-11 PROCEDURE — 6360000002 HC RX W HCPCS: Performed by: STUDENT IN AN ORGANIZED HEALTH CARE EDUCATION/TRAINING PROGRAM

## 2024-05-11 PROCEDURE — 97535 SELF CARE MNGMENT TRAINING: CPT

## 2024-05-11 PROCEDURE — 6370000000 HC RX 637 (ALT 250 FOR IP): Performed by: SURGERY

## 2024-05-11 PROCEDURE — 97116 GAIT TRAINING THERAPY: CPT

## 2024-05-11 RX ORDER — OXYCODONE HYDROCHLORIDE 5 MG/1
5 TABLET ORAL EVERY 4 HOURS PRN
Status: DISCONTINUED | OUTPATIENT
Start: 2024-05-11 | End: 2024-05-11

## 2024-05-11 RX ORDER — HYDROCODONE BITARTRATE AND ACETAMINOPHEN 5; 325 MG/1; MG/1
2 TABLET ORAL EVERY 4 HOURS PRN
Status: ACTIVE | OUTPATIENT
Start: 2024-05-11

## 2024-05-11 RX ORDER — MORPHINE SULFATE 2 MG/ML
4 INJECTION, SOLUTION INTRAMUSCULAR; INTRAVENOUS
Status: ACTIVE | OUTPATIENT
Start: 2024-05-11

## 2024-05-11 RX ORDER — MORPHINE SULFATE 2 MG/ML
2 INJECTION, SOLUTION INTRAMUSCULAR; INTRAVENOUS
Status: ACTIVE | OUTPATIENT
Start: 2024-05-11

## 2024-05-11 RX ORDER — HYDROCODONE BITARTRATE AND ACETAMINOPHEN 5; 325 MG/1; MG/1
1 TABLET ORAL EVERY 4 HOURS PRN
Status: DISPENSED | OUTPATIENT
Start: 2024-05-11

## 2024-05-11 RX ORDER — OXYCODONE HYDROCHLORIDE 5 MG/1
10 TABLET ORAL EVERY 4 HOURS PRN
Status: DISCONTINUED | OUTPATIENT
Start: 2024-05-11 | End: 2024-05-11

## 2024-05-11 RX ADMIN — ASPIRIN 81 MG 81 MG: 81 TABLET ORAL at 12:51

## 2024-05-11 RX ADMIN — ACETAMINOPHEN 1000 MG: 500 TABLET ORAL at 20:49

## 2024-05-11 RX ADMIN — NYSTATIN 500000 UNITS: 100000 SUSPENSION ORAL at 14:07

## 2024-05-11 RX ADMIN — Medication 400 MG: at 12:44

## 2024-05-11 RX ADMIN — MONTELUKAST SODIUM 10 MG: 10 TABLET, FILM COATED ORAL at 20:49

## 2024-05-11 RX ADMIN — NYSTATIN 500000 UNITS: 100000 SUSPENSION ORAL at 20:49

## 2024-05-11 RX ADMIN — IBUPROFEN 600 MG: 600 TABLET, FILM COATED ORAL at 09:26

## 2024-05-11 RX ADMIN — ATORVASTATIN CALCIUM 20 MG: 20 TABLET, FILM COATED ORAL at 20:50

## 2024-05-11 RX ADMIN — NYSTATIN 500000 UNITS: 100000 SUSPENSION ORAL at 17:16

## 2024-05-11 RX ADMIN — METOPROLOL TARTRATE 12.5 MG: 25 TABLET, FILM COATED ORAL at 12:45

## 2024-05-11 RX ADMIN — PANTOPRAZOLE SODIUM 40 MG: 40 TABLET, DELAYED RELEASE ORAL at 05:26

## 2024-05-11 RX ADMIN — MIRTAZAPINE 7.5 MG: 15 TABLET, FILM COATED ORAL at 20:50

## 2024-05-11 RX ADMIN — OXYCODONE 5 MG: 5 TABLET ORAL at 23:03

## 2024-05-11 RX ADMIN — ACETAMINOPHEN 1000 MG: 500 TABLET ORAL at 14:06

## 2024-05-11 RX ADMIN — OXYCODONE 5 MG: 5 TABLET ORAL at 11:07

## 2024-05-11 RX ADMIN — THERA TABS 1 TABLET: TAB at 12:45

## 2024-05-11 RX ADMIN — OXYCODONE 5 MG: 5 TABLET ORAL at 17:16

## 2024-05-11 RX ADMIN — ONDANSETRON 4 MG: 4 TABLET, ORALLY DISINTEGRATING ORAL at 11:07

## 2024-05-11 RX ADMIN — Medication 1 CAPSULE: at 12:44

## 2024-05-11 RX ADMIN — ACETAMINOPHEN 1000 MG: 500 TABLET ORAL at 05:25

## 2024-05-11 RX ADMIN — METOPROLOL TARTRATE 12.5 MG: 25 TABLET, FILM COATED ORAL at 20:50

## 2024-05-11 RX ADMIN — CETIRIZINE HYDROCHLORIDE 10 MG: 10 TABLET, FILM COATED ORAL at 20:50

## 2024-05-11 RX ADMIN — PANTOPRAZOLE SODIUM 40 MG: 40 TABLET, DELAYED RELEASE ORAL at 17:16

## 2024-05-11 RX ADMIN — Medication 1 CAPSULE: at 17:16

## 2024-05-11 RX ADMIN — ENOXAPARIN SODIUM 40 MG: 100 INJECTION SUBCUTANEOUS at 11:07

## 2024-05-11 RX ADMIN — LEVOFLOXACIN 750 MG: 500 TABLET, FILM COATED ORAL at 20:50

## 2024-05-11 ASSESSMENT — PAIN DESCRIPTION - ORIENTATION
ORIENTATION: MID
ORIENTATION: MID;LEFT

## 2024-05-11 ASSESSMENT — PAIN SCALES - GENERAL
PAINLEVEL_OUTOF10: 3
PAINLEVEL_OUTOF10: 4
PAINLEVEL_OUTOF10: 6
PAINLEVEL_OUTOF10: 10
PAINLEVEL_OUTOF10: 5
PAINLEVEL_OUTOF10: 4
PAINLEVEL_OUTOF10: 3
PAINLEVEL_OUTOF10: 2
PAINLEVEL_OUTOF10: 7
PAINLEVEL_OUTOF10: 1
PAINLEVEL_OUTOF10: 4

## 2024-05-11 ASSESSMENT — PAIN DESCRIPTION - DESCRIPTORS
DESCRIPTORS: ACHING
DESCRIPTORS: BURNING;HEAVINESS
DESCRIPTORS: ACHING

## 2024-05-11 ASSESSMENT — PAIN DESCRIPTION - LOCATION
LOCATION: ABDOMEN

## 2024-05-11 ASSESSMENT — PAIN - FUNCTIONAL ASSESSMENT
PAIN_FUNCTIONAL_ASSESSMENT: ACTIVITIES ARE NOT PREVENTED

## 2024-05-11 ASSESSMENT — PAIN DESCRIPTION - FREQUENCY: FREQUENCY: INTERMITTENT

## 2024-05-11 ASSESSMENT — PAIN DESCRIPTION - PAIN TYPE: TYPE: SURGICAL PAIN

## 2024-05-11 ASSESSMENT — PAIN DESCRIPTION - ONSET: ONSET: ON-GOING

## 2024-05-11 NOTE — PROGRESS NOTES
[facility] - Inpatient Rehabilitation Department   Phone: [phone]    Physical Therapy    [] Initial Evaluation            [x] Daily Treatment Note         [] Discharge Summary      Patient: Sonja Mcmahan   : 1950   MRN: [id number]   Date of Service:  2024  Admitting Diagnosis: Hiatal hernia  Current Admission Summary: Sonja Mcmahan is a 73 y.o. female with PMHx notable for HTN, HLD, DM2, GERD who presented on 4/15 with dyspnea, intractable abdominal pain. Cardiology was consulted, recommended NM Stress Test which showed no reversible ischemia, low-risk. TTE stable from prior with LVEF 55-60%, grade 2 diastolic dysfunction, mild aortic stenosis, mild tricuspid regurgitation. She had EGD on , demonstrating large hiatal hernia, also had esophageal dilation performed and gastric antrum mass biopsied. On  she underwent laparoscopic hiatal hernia repair, cholecystectomy, and hemigastrectomy w/ gastrojejunostomy. 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 Oncology consulted, not recommending adjuvant treatment at this time. Hospital course complicated by: hypotension, leukocytosis, fevers, hypokalemia, PAT, intraabdominal abscess.   Past Medical History:  has a past medical history of Asthma, Diabetes mellitus (HCC), Hyperlipidemia, and Hypertension.  Past Surgical History:  has a past surgical history that includes Hysterectomy, total abdominal; Elbow surgery; Upper gastrointestinal endoscopy (N/A, 2024); Upper gastrointestinal endoscopy (N/A, 2024); Upper gastrointestinal endoscopy (N/A, 2024); hiatal hernia repair (N/A, 2024); Cholecystectomy, laparoscopic (N/A, 2024); and gastrectomy (N/A, 2024).  Discharge Recommendations: home with HHPT services  DME Required For Discharge: DME to be determined pending patient progress  Precautions/Restrictions: high fall risk, Full  give afternoon meds and pain medication, reports they have been in contact with Dr. Blackmon and Dr. Carrasco about pain. Pt given ice pack and instructed in 20 minutes on/off for pain control.   Supine>sit with HOB elevated and use of rail. Sit<>stand from EOB and recliner to RW with SBA, cues for hand placement on 1 trial only. Seated exercises including:  - LAQ with green resistance band 2 x10 reps (B)  - Hamstring curls with green resistance band 2 x10 reps (B)  - Hip abduction/adduction with green resistance band 2 x10 reps   - Marches with 1# ankle weight 2 x10 reps (B)  Sit>supine with bed flat at SBA. Pt positioned for comfort. Supine in bed, bed alarm on, call light and all needs within reach.     Functional Outcomes                 Cognition  Overall Cognitive Status: WFL  Orientation:    oriented to person, oriented to place, and oriented to situation - further orientation not assessed this date   Command Following:   WFL    Education  Barriers To Learning: cognition - appears WFL on 5/11  Patient Education: patient educated on goals, PT role and benefits, plan of care, general safety, functional mobility training, transfer training  Learning Assessment:  patient verbalizes understanding, would benefit from continued reinforcement    Assessment  Activity Tolerance: Limited by increased pain and overall fatigue this date following increased activity yesterday.   Impairments Requiring Therapeutic Intervention: decreased functional mobility, decreased strength, decreased endurance, decreased balance, increased pain  Prognosis: good  Clinical Assessment: Pt with decreased tolerance to activity this date due to increased pain and fatigue following a day of increased activity and reaching tasks yesterday. The patient was educated on the progression/regression of activity tolerance during recovery. Rest breaks required between all bouts of activity this date. Continued skilled PT to promote functional gains towards her

## 2024-05-11 NOTE — PROGRESS NOTES
[facility] - Inpatient Rehabilitation Department   Phone: [phone]    Occupational Therapy    [] Initial Evaluation            [x] Daily Treatment Note         [] Discharge Summary      Patient: Sonja Mcmahan   : 1950   MRN: [id number]   Date of Service:  2024    Admitting Diagnosis:  Hiatal hernia  Current Admission Summary: Sonja Mcmahan is a 73 y.o. female with PMHx notable for HTN, HLD, DM2, GERD who presented on 4/15 with dyspnea, intractable abdominal pain. Cardiology was consulted, recommended NM Stress Test which showed no reversible ischemia, low-risk. TTE stable from prior with LVEF 55-60%, grade 2 diastolic dysfunction, mild aortic stenosis, mild tricuspid regurgitation. She had EGD on , demonstrating large hiatal hernia, also had esophageal dilation performed and gastric antrum mass biopsied. On  she underwent laparoscopic hiatal hernia repair, cholecystectomy, and hemigastrectomy w/ gastrojejunostomy. 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 Oncology consulted, not recommending adjuvant treatment at this time. Hospital course complicated by: hypotension, leukocytosis, fevers, hypokalemia, PAT, intraabdominal abscess.   Past Medical History:  has a past medical history of Asthma, Diabetes mellitus (HCC), Hyperlipidemia, and Hypertension.  Past Surgical History:  has a past surgical history that includes Hysterectomy, total abdominal; Elbow surgery; Upper gastrointestinal endoscopy (N/A, 2024); Upper gastrointestinal endoscopy (N/A, 2024); Upper gastrointestinal endoscopy (N/A, 2024); hiatal hernia repair (N/A, 2024); Cholecystectomy, laparoscopic (N/A, 2024); and gastrectomy (N/A, 2024).    Discharge Recommendations: Home w/ HHOT     DME Required For Discharge: DME to be determined pending patient progress    Precautions/Restrictions: high fall risk, full

## 2024-05-11 NOTE — PROGRESS NOTES
Sonja Mcmahan  5/10/2024  [id number]    Chief Complaint: Hiatal hernia    Subjective:   Patient reports poor sleep overnight. Endorses some heartburn/indigestion. Appetite remains poor. Abdominal incisional pain improved. Endorses some depressed and anxious mood.     Last BM: Stool Occurrence: 1 (05/10/24 1614)    Objective:  Patient Vitals for the past 24 hrs:   BP Temp Temp src Pulse Resp SpO2 Weight   05/10/24 0751 133/82 97.9 °F (36.6 °C) Oral 63 16 91 % --   05/10/24 0500 -- -- -- -- -- -- 82.1 kg (180 lb 14.4 oz)   05/09/24 2100 (!) 151/64 98.5 °F (36.9 °C) Oral 74 18 91 % --     Gen: No distress, pleasant.   HEENT: Normocephalic, atraumatic.   CV: Regular rate and rhythm. Extremities warm, well perfused.   Resp: No respiratory distress. CTAB.  Abd: Soft, nontender. XAVIER drain still with yellow-green output.  Ext: No edema.   Neuro: Alert, oriented, appropriately interactive.     Laboratory data: Available via EMR.     Therapy progress:       PT    Supine to Sit: Partial/moderate assistance  Sit to Supine: Supervision or touching assistance   Sit to Stand: Partial/moderate assistance  Chair/Bed to Chair Transfer: Supervision or touching assistance  Car Transfer: Partial/moderate assistance  Ambulation 10 ft: Supervision or touching assistance  Ambulation 50 ft:    Ambulation 150 ft:    Stairs - 1 Step: Partial/moderate assistance  Stairs - 4 Step:    Stairs - 12 Step:      OT    Eating: Setup or clean-up assistance  Oral Hygiene: Supervision or touching assistance  Bathing: Supervision or touching assistance  Upper Body Dressing: Setup or clean-up assistance  Lower Body Dressing: Supervision or touching assistance  Toilet Transfer: Supervision or touching assistance  Toilet Hygiene: Supervision or touching assistance    Speech Therapy    Pt presents with cognitive linguistic deficits exacerbated by her current medical situation (pain, pain medications, surgical recovery). She demonstrates deficits within  orientation, short term memory, and problem solving. Pt's speech was also limited due to her alertness with low volume and reduced articulation precision. Pt was aware of her errors and her extent of fatigue. She reported feeling like this is \"too much\" for her right now. Recommend SLP intervention for safe return to prior level of independence.    Body mass index is 33.09 kg/m².    Assessment/Plan:  Functional progress: 114' SBA w/ RW  This patient continues to require an ARU level of care from all disciplines to address the following issues:    #. Hiatal hernia   #. Cholelithiasis w/ chronic cholecystitis  #. Gastric adenocarcinoma  - s/p laparoscopic hiatal hernia repair with primary closure and mesh reinforcement, laparoscopic cholecystectomy, open william gastrectomy with gastrojejunostomy on 4/22  - Pathology of gastric antral mass: grade 1, stage pT1b pN0 M0, R0 resection              - Oncology: Does not need adjuvant treatment, monitor as outpatient.   - Renal and hepatic function stable on admission. No leukocytosis.   - General Surgery following peripherally while on ARU.    - staples removed on 5/8    #. Intra-abdominal abscess  - status post IR drainage and XAVIER drain placement on 5/3/2024  - IV Zosyn initially, now transitioned to levofloxacin 750 mg daily x 5 days  - Probiotic twice daily with meals while finishing antibiotics     #. Hypokalemia, improved  #. Hypomagnesemia, improved  - Repleted PO, continue magnesium repletion for now.      #. Normocytic anemia  - Iron studies suggestive of iron deficiency and AOCD  - s/p IV iron x3  - stable       #. Asymptomatic PAT  - Noted on telemetry  - Cardiology consulted, nothing to do unless patient becomes symptomatic     #. Diminished appetite  #. Depressed mood  #. Insomnia  - Mirtazapine 7.5 mg nightly    #. Thrush  - Add nystatin    Chronic Conditions:   HTN: Metoprolol 12.5 mg BID.  Holding home lisinopril and hydrochlorothiazide  HLD: Atorvastatin  DM2:

## 2024-05-11 NOTE — PLAN OF CARE
Problem: Discharge Planning  Goal: Discharge to home or other facility with appropriate resources  Outcome: Progressing  Flowsheets (Taken 5/11/2024 0910)  Discharge to home or other facility with appropriate resources:   Identify barriers to discharge with patient and caregiver   Identify discharge learning needs (meds, wound care, etc)     Problem: Safety - Adult  Goal: Free from fall injury  5/11/2024 1142 by Giana Birmingham, RN  Outcome: Progressing     Problem: Pain  Goal: Verbalizes/displays adequate comfort level or baseline comfort level  Outcome: Progressing  Flowsheets (Taken 5/11/2024 0900)  Verbalizes/displays adequate comfort level or baseline comfort level:   Encourage patient to monitor pain and request assistance   Assess pain using appropriate pain scale   Administer analgesics based on type and severity of pain and evaluate response   Implement non-pharmacological measures as appropriate and evaluate response     Problem: Skin/Tissue Integrity  Goal: Absence of new skin breakdown  Description: 1.  Monitor for areas of redness and/or skin breakdown  2.  Assess vascular access sites hourly  3.  Every 4-6 hours minimum:  Change oxygen saturation probe site  4.  Every 4-6 hours:  If on nasal continuous positive airway pressure, respiratory therapy assess nares and determine need for appliance change or resting period.  5/11/2024 1142 by Giana Birmingham, RN  Outcome: Progressing     Problem: Nutrition Deficit:  Goal: Optimize nutritional status  Outcome: Progressing

## 2024-05-11 NOTE — PROGRESS NOTES
[facility] - Inpatient Rehabilitation Department   Phone: [phone]    Speech Therapy    [] Initial Evaluation            [x] Daily Treatment Note         [] Discharge Summary      Patient: Sonja Mcmahan   : 1950   MRN: [id number]   Date of Service:  2024  Admitting Diagnosis: Hiatal hernia  Current Admission Summary: Sonja Mcmahan is a 73 y.o. female with PMHx notable for HTN, HLD, DM2, GERD who presented on 4/15 with dyspnea, intractable abdominal pain. Cardiology was consulted, recommended NM Stress Test which showed no reversible ischemia, low-risk. TTE stable from prior with LVEF 55-60%, grade 2 diastolic dysfunction, mild aortic stenosis, mild tricuspid regurgitation. She had EGD on , demonstrating large hiatal hernia, also had esophageal dilation performed and gastric antrum mass biopsied. On  she underwent laparoscopic hiatal hernia repair, cholecystectomy, and hemigastrectomy w/ gastrojejunostomy. 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 Oncology consulted, not recommending adjuvant treatment at this time. Hospital course complicated by: hypotension, leukocytosis, fevers, hypokalemia, PAT, intraabdominal abscess.   Past Medical History:  has a past medical history of Asthma, Diabetes mellitus (HCC), Hyperlipidemia, and Hypertension.  Past Surgical History:  has a past surgical history that includes Hysterectomy, total abdominal; Elbow surgery; Upper gastrointestinal endoscopy (N/A, 2024); Upper gastrointestinal endoscopy (N/A, 2024); Upper gastrointestinal endoscopy (N/A, 2024); hiatal hernia repair (N/A, 2024); Cholecystectomy, laparoscopic (N/A, 2024); and gastrectomy (N/A, 2024).  Precautions/Restrictions: high fall risk        Pre-Admission Information   Living Status: Pt lives alone. Has two adult daughters who live locally   Occupation/School: Pt is

## 2024-05-12 VITALS
OXYGEN SATURATION: 93 % | BODY MASS INDEX: 32.94 KG/M2 | DIASTOLIC BLOOD PRESSURE: 72 MMHG | SYSTOLIC BLOOD PRESSURE: 129 MMHG | HEART RATE: 82 BPM | TEMPERATURE: 98.4 F | RESPIRATION RATE: 16 BRPM | HEIGHT: 62 IN | WEIGHT: 179 LBS

## 2024-05-12 LAB
GLUCOSE BLD-MCNC: 108 MG/DL (ref 70–99)
GLUCOSE BLD-MCNC: 94 MG/DL (ref 70–99)
PERFORMED ON: ABNORMAL
PERFORMED ON: NORMAL

## 2024-05-12 PROCEDURE — 6360000002 HC RX W HCPCS: Performed by: STUDENT IN AN ORGANIZED HEALTH CARE EDUCATION/TRAINING PROGRAM

## 2024-05-12 PROCEDURE — 6370000000 HC RX 637 (ALT 250 FOR IP): Performed by: STUDENT IN AN ORGANIZED HEALTH CARE EDUCATION/TRAINING PROGRAM

## 2024-05-12 PROCEDURE — 1280000000 HC REHAB R&B

## 2024-05-12 PROCEDURE — 6370000000 HC RX 637 (ALT 250 FOR IP): Performed by: NURSE PRACTITIONER

## 2024-05-12 RX ADMIN — NYSTATIN 500000 UNITS: 100000 SUSPENSION ORAL at 14:10

## 2024-05-12 RX ADMIN — ENOXAPARIN SODIUM 40 MG: 100 INJECTION SUBCUTANEOUS at 09:22

## 2024-05-12 RX ADMIN — NYSTATIN 500000 UNITS: 100000 SUSPENSION ORAL at 09:20

## 2024-05-12 RX ADMIN — NYSTATIN 500000 UNITS: 100000 SUSPENSION ORAL at 16:59

## 2024-05-12 RX ADMIN — CETIRIZINE HYDROCHLORIDE 10 MG: 10 TABLET, FILM COATED ORAL at 20:18

## 2024-05-12 RX ADMIN — METOPROLOL TARTRATE 12.5 MG: 25 TABLET, FILM COATED ORAL at 20:18

## 2024-05-12 RX ADMIN — HYDROCODONE BITARTRATE AND ACETAMINOPHEN 1 TABLET: 5; 325 TABLET ORAL at 18:25

## 2024-05-12 RX ADMIN — MIRTAZAPINE 7.5 MG: 15 TABLET, FILM COATED ORAL at 20:18

## 2024-05-12 RX ADMIN — METOPROLOL TARTRATE 12.5 MG: 25 TABLET, FILM COATED ORAL at 09:21

## 2024-05-12 RX ADMIN — ASPIRIN 81 MG 81 MG: 81 TABLET ORAL at 09:21

## 2024-05-12 RX ADMIN — THERA TABS 1 TABLET: TAB at 09:21

## 2024-05-12 RX ADMIN — ATORVASTATIN CALCIUM 20 MG: 20 TABLET, FILM COATED ORAL at 20:19

## 2024-05-12 RX ADMIN — MUPIROCIN: 20 OINTMENT TOPICAL at 09:22

## 2024-05-12 RX ADMIN — HYDROCODONE BITARTRATE AND ACETAMINOPHEN 1 TABLET: 5; 325 TABLET ORAL at 13:47

## 2024-05-12 RX ADMIN — ONDANSETRON 4 MG: 4 TABLET, ORALLY DISINTEGRATING ORAL at 20:18

## 2024-05-12 RX ADMIN — ACETAMINOPHEN 1000 MG: 500 TABLET ORAL at 20:18

## 2024-05-12 RX ADMIN — Medication 400 MG: at 09:21

## 2024-05-12 RX ADMIN — NYSTATIN 500000 UNITS: 100000 SUSPENSION ORAL at 20:18

## 2024-05-12 RX ADMIN — HYDROCODONE BITARTRATE AND ACETAMINOPHEN 1 TABLET: 5; 325 TABLET ORAL at 09:20

## 2024-05-12 RX ADMIN — ONDANSETRON 4 MG: 4 TABLET, ORALLY DISINTEGRATING ORAL at 13:48

## 2024-05-12 RX ADMIN — MELATONIN TAB 3 MG 3 MG: 3 TAB at 20:18

## 2024-05-12 RX ADMIN — PANTOPRAZOLE SODIUM 40 MG: 40 TABLET, DELAYED RELEASE ORAL at 16:59

## 2024-05-12 RX ADMIN — MUPIROCIN: 20 OINTMENT TOPICAL at 20:28

## 2024-05-12 RX ADMIN — MONTELUKAST SODIUM 10 MG: 10 TABLET, FILM COATED ORAL at 20:19

## 2024-05-12 RX ADMIN — ACETAMINOPHEN 1000 MG: 500 TABLET ORAL at 05:46

## 2024-05-12 RX ADMIN — Medication 1 CAPSULE: at 16:58

## 2024-05-12 RX ADMIN — PANTOPRAZOLE SODIUM 40 MG: 40 TABLET, DELAYED RELEASE ORAL at 05:46

## 2024-05-12 RX ADMIN — Medication 1 CAPSULE: at 09:21

## 2024-05-12 ASSESSMENT — PAIN SCALES - GENERAL
PAINLEVEL_OUTOF10: 3
PAINLEVEL_OUTOF10: 2
PAINLEVEL_OUTOF10: 3
PAINLEVEL_OUTOF10: 7
PAINLEVEL_OUTOF10: 3
PAINLEVEL_OUTOF10: 2

## 2024-05-12 ASSESSMENT — PAIN DESCRIPTION - LOCATION
LOCATION: ABDOMEN

## 2024-05-12 ASSESSMENT — PAIN - FUNCTIONAL ASSESSMENT
PAIN_FUNCTIONAL_ASSESSMENT: ACTIVITIES ARE NOT PREVENTED

## 2024-05-12 ASSESSMENT — PAIN DESCRIPTION - PAIN TYPE: TYPE: SURGICAL PAIN

## 2024-05-12 ASSESSMENT — PAIN DESCRIPTION - ONSET: ONSET: ON-GOING

## 2024-05-12 ASSESSMENT — PAIN DESCRIPTION - DESCRIPTORS
DESCRIPTORS: ACHING

## 2024-05-12 ASSESSMENT — PAIN DESCRIPTION - ORIENTATION
ORIENTATION: MID
ORIENTATION: MID

## 2024-05-12 ASSESSMENT — PAIN DESCRIPTION - FREQUENCY: FREQUENCY: INTERMITTENT

## 2024-05-12 NOTE — PROGRESS NOTES
Pt was sitting up in a recliner chair bedside holding a vomit bag and kleenex when  entered the room. Pt was in great emotional distress with her long suffering of vomiting. Pt is in emotional distress about being ill for so long, surgery, slow healing, being away from her home, missing her grandchildren and great grandchildren. This  provided emotional and spiritual support, care, by reflective active listening, emotional support, empathy, compassion, exploring different perspectives, exploring God amidst pt suffering. Pt spoke of  , going to heaven one day and being with pt Lord. Pt spoke of Jose Robert as her savior and healer. Pt and  had a time of prayer and fellowship. Pt verbally expressed gratitude to  for visiting and thanked God that  came in the room as just the right moment in time.

## 2024-05-12 NOTE — PLAN OF CARE
Problem: Safety - Adult  Goal: Free from fall injury  5/12/2024 1048 by Giana Birmingham, RN  Outcome: Progressing     Problem: Pain  Goal: Verbalizes/displays adequate comfort level or baseline comfort level  5/12/2024 1048 by Giana Birmingham, RN  Outcome: Progressing  Flowsheets (Taken 5/12/2024 0914)  Verbalizes/displays adequate comfort level or baseline comfort level:   Encourage patient to monitor pain and request assistance   Assess pain using appropriate pain scale   Administer analgesics based on type and severity of pain and evaluate response   Implement non-pharmacological measures as appropriate and evaluate response     Problem: Skin/Tissue Integrity  Goal: Absence of new skin breakdown  Description: 1.  Monitor for areas of redness and/or skin breakdown  2.  Assess vascular access sites hourly  3.  Every 4-6 hours minimum:  Change oxygen saturation probe site  4.  Every 4-6 hours:  If on nasal continuous positive airway pressure, respiratory therapy assess nares and determine need for appliance change or resting period.  5/12/2024 1048 by Giana Birmingham, RN  Outcome: Progressing     Problem: ABCDS Injury Assessment  Goal: Absence of physical injury  5/12/2024 1048 by Giana Birmingham, RN  Outcome: Progressing     Problem: Nutrition Deficit:  Goal: Optimize nutritional status  Outcome: Progressing

## 2024-05-12 NOTE — PLAN OF CARE
Problem: Safety - Adult  Goal: Free from fall injury  5/11/2024 2144 by Chelsey Lazaro, RN  Outcome: Progressing     Problem: Pain  Goal: Verbalizes/displays adequate comfort level or baseline comfort level  5/11/2024 2144 by Chelsey Lazaro, RN  Outcome: Progressing     Problem: Skin/Tissue Integrity  Goal: Absence of new skin breakdown  Description: 1.  Monitor for areas of redness and/or skin breakdown  2.  Assess vascular access sites hourly  3.  Every 4-6 hours minimum:  Change oxygen saturation probe site  4.  Every 4-6 hours:  If on nasal continuous positive airway pressure, respiratory therapy assess nares and determine need for appliance change or resting period.  5/11/2024 2144 by Chelsey Lazaro, RN  Outcome: Progressing     Problem: ABCDS Injury Assessment  Goal: Absence of physical injury  5/11/2024 2144 by Chelsey Lazaro, RN  Outcome: Progressing

## 2024-05-12 NOTE — PROGRESS NOTES
Sonja Mcmahan  5/11/2024  [id number]    Chief Complaint: Hiatal hernia    Subjective:   Patient states some pain at her XAVIER site area, redness and erythema. Requests alternative pain control medication as she has some itching with oxycodone and morphine.    Last BM: Stool Occurrence: 1 (05/11/24 0630)    Objective:  Patient Vitals for the past 24 hrs:   BP Temp Temp src Pulse Resp SpO2 Weight   05/11/24 2045 (!) 146/72 97.8 °F (36.6 °C) Oral 74 16 92 % --   05/11/24 1746 -- -- -- -- 18 -- --   05/11/24 1716 -- -- -- -- 18 -- --   05/11/24 1137 -- -- -- -- 18 -- --   05/11/24 1107 -- -- -- -- 18 -- --   05/11/24 0900 135/75 97 °F (36.1 °C) Oral 80 18 92 % --   05/11/24 0630 -- -- -- -- -- -- 81.2 kg (179 lb)     Gen: No distress, pleasant.   HEENT: Normocephalic, atraumatic.   CV: Regular rate and rhythm. Extremities warm, well perfused.   Resp: No respiratory distress. CTAB.  Abd: Soft, nontender. XAVIER drain still with yellow-green output. Mild erythema at tube entry site.  Ext: No edema.   Neuro: Alert, oriented, appropriately interactive.     Laboratory data: Available via EMR.     Therapy progress:       PT    Supine to Sit: Partial/moderate assistance  Sit to Supine: Supervision or touching assistance   Sit to Stand: Partial/moderate assistance  Chair/Bed to Chair Transfer: Supervision or touching assistance  Car Transfer: Partial/moderate assistance  Ambulation 10 ft: Supervision or touching assistance  Ambulation 50 ft:    Ambulation 150 ft:    Stairs - 1 Step: Partial/moderate assistance  Stairs - 4 Step:    Stairs - 12 Step:      OT    Eating: Setup or clean-up assistance  Oral Hygiene: Supervision or touching assistance  Bathing: Supervision or touching assistance  Upper Body Dressing: Setup or clean-up assistance  Lower Body Dressing: Supervision or touching assistance  Toilet Transfer: Supervision or touching assistance  Toilet Hygiene: Supervision or touching assistance    Speech Therapy    Pt presents

## 2024-05-13 LAB
ANION GAP SERPL CALCULATED.3IONS-SCNC: 10 MMOL/L (ref 3–16)
ANISOCYTOSIS BLD QL SMEAR: ABNORMAL
BASOPHILS # BLD: 0 K/UL (ref 0–0.2)
BASOPHILS NFR BLD: 0 %
BUN SERPL-MCNC: 7 MG/DL (ref 7–20)
CALCIUM SERPL-MCNC: 7.6 MG/DL (ref 8.3–10.6)
CHLORIDE SERPL-SCNC: 105 MMOL/L (ref 99–110)
CO2 SERPL-SCNC: 23 MMOL/L (ref 21–32)
CREAT SERPL-MCNC: 0.5 MG/DL (ref 0.6–1.2)
DEPRECATED RDW RBC AUTO: 17.6 % (ref 12.4–15.4)
EOSINOPHIL # BLD: 0 K/UL (ref 0–0.6)
EOSINOPHIL NFR BLD: 0 %
FUNGUS SPEC CULT: NORMAL
GFR SERPLBLD CREATININE-BSD FMLA CKD-EPI: >90 ML/MIN/{1.73_M2}
GLUCOSE BLD-MCNC: 101 MG/DL (ref 70–99)
GLUCOSE SERPL-MCNC: 98 MG/DL (ref 70–99)
HCT VFR BLD AUTO: 27.8 % (ref 36–48)
HGB BLD-MCNC: 9.1 G/DL (ref 12–16)
LOEFFLER MB STN SPEC: NORMAL
LYMPHOCYTES # BLD: 1.6 K/UL (ref 1–5.1)
LYMPHOCYTES NFR BLD: 20 %
MAGNESIUM SERPL-MCNC: 1.8 MG/DL (ref 1.8–2.4)
MCH RBC QN AUTO: 28.7 PG (ref 26–34)
MCHC RBC AUTO-ENTMCNC: 32.6 G/DL (ref 31–36)
MCV RBC AUTO: 87.9 FL (ref 80–100)
METAMYELOCYTES NFR BLD MANUAL: 1 %
MONOCYTES # BLD: 0.2 K/UL (ref 0–1.3)
MONOCYTES NFR BLD: 2 %
MYELOCYTES NFR BLD MANUAL: 1 %
NEUTROPHILS # BLD: 6.2 K/UL (ref 1.7–7.7)
NEUTROPHILS NFR BLD: 74 %
NEUTS BAND NFR BLD MANUAL: 2 % (ref 0–7)
OVALOCYTES BLD QL SMEAR: ABNORMAL
PERFORMED ON: ABNORMAL
PLATELET # BLD AUTO: 321 K/UL (ref 135–450)
PMV BLD AUTO: 7.3 FL (ref 5–10.5)
POLYCHROMASIA BLD QL SMEAR: ABNORMAL
POTASSIUM SERPL-SCNC: 3.5 MMOL/L (ref 3.5–5.1)
RBC # BLD AUTO: 3.17 M/UL (ref 4–5.2)
SODIUM SERPL-SCNC: 138 MMOL/L (ref 136–145)
WBC # BLD AUTO: 7.9 K/UL (ref 4–11)

## 2024-05-13 PROCEDURE — 97116 GAIT TRAINING THERAPY: CPT

## 2024-05-13 PROCEDURE — 6370000000 HC RX 637 (ALT 250 FOR IP): Performed by: STUDENT IN AN ORGANIZED HEALTH CARE EDUCATION/TRAINING PROGRAM

## 2024-05-13 PROCEDURE — 6370000000 HC RX 637 (ALT 250 FOR IP): Performed by: NURSE PRACTITIONER

## 2024-05-13 PROCEDURE — APPSS15 APP SPLIT SHARED TIME 0-15 MINUTES: Performed by: NURSE PRACTITIONER

## 2024-05-13 PROCEDURE — 36415 COLL VENOUS BLD VENIPUNCTURE: CPT

## 2024-05-13 PROCEDURE — 97535 SELF CARE MNGMENT TRAINING: CPT

## 2024-05-13 PROCEDURE — 97130 THER IVNTJ EA ADDL 15 MIN: CPT

## 2024-05-13 PROCEDURE — 1280000000 HC REHAB R&B

## 2024-05-13 PROCEDURE — 97110 THERAPEUTIC EXERCISES: CPT

## 2024-05-13 PROCEDURE — 97129 THER IVNTJ 1ST 15 MIN: CPT

## 2024-05-13 PROCEDURE — APPNB30 APP NON BILLABLE TIME 0-30 MINS: Performed by: NURSE PRACTITIONER

## 2024-05-13 PROCEDURE — 6360000002 HC RX W HCPCS: Performed by: STUDENT IN AN ORGANIZED HEALTH CARE EDUCATION/TRAINING PROGRAM

## 2024-05-13 PROCEDURE — 97530 THERAPEUTIC ACTIVITIES: CPT

## 2024-05-13 PROCEDURE — 80048 BASIC METABOLIC PNL TOTAL CA: CPT

## 2024-05-13 PROCEDURE — 99024 POSTOP FOLLOW-UP VISIT: CPT | Performed by: SURGERY

## 2024-05-13 PROCEDURE — 83735 ASSAY OF MAGNESIUM: CPT

## 2024-05-13 PROCEDURE — 85025 COMPLETE CBC W/AUTO DIFF WBC: CPT

## 2024-05-13 RX ORDER — HYDROCODONE BITARTRATE AND ACETAMINOPHEN 5; 325 MG/1; MG/1
1 TABLET ORAL EVERY 6 HOURS PRN
Status: DISCONTINUED | OUTPATIENT
Start: 2024-05-13 | End: 2024-05-16 | Stop reason: HOSPADM

## 2024-05-13 RX ADMIN — ATORVASTATIN CALCIUM 20 MG: 20 TABLET, FILM COATED ORAL at 21:38

## 2024-05-13 RX ADMIN — METOPROLOL TARTRATE 12.5 MG: 25 TABLET, FILM COATED ORAL at 10:12

## 2024-05-13 RX ADMIN — MIRTAZAPINE 7.5 MG: 15 TABLET, FILM COATED ORAL at 21:37

## 2024-05-13 RX ADMIN — CETIRIZINE HYDROCHLORIDE 10 MG: 10 TABLET, FILM COATED ORAL at 21:38

## 2024-05-13 RX ADMIN — ASPIRIN 81 MG 81 MG: 81 TABLET ORAL at 10:12

## 2024-05-13 RX ADMIN — HYDROCODONE BITARTRATE AND ACETAMINOPHEN 1 TABLET: 5; 325 TABLET ORAL at 11:56

## 2024-05-13 RX ADMIN — ENOXAPARIN SODIUM 40 MG: 100 INJECTION SUBCUTANEOUS at 10:11

## 2024-05-13 RX ADMIN — NYSTATIN 500000 UNITS: 100000 SUSPENSION ORAL at 13:38

## 2024-05-13 RX ADMIN — PANTOPRAZOLE SODIUM 40 MG: 40 TABLET, DELAYED RELEASE ORAL at 17:45

## 2024-05-13 RX ADMIN — Medication 400 MG: at 10:12

## 2024-05-13 RX ADMIN — HYDROCODONE BITARTRATE AND ACETAMINOPHEN 2 TABLET: 5; 325 TABLET ORAL at 02:35

## 2024-05-13 RX ADMIN — MUPIROCIN: 20 OINTMENT TOPICAL at 13:38

## 2024-05-13 RX ADMIN — Medication 1 CAPSULE: at 13:38

## 2024-05-13 RX ADMIN — Medication 1 CAPSULE: at 17:45

## 2024-05-13 RX ADMIN — METOPROLOL TARTRATE 12.5 MG: 25 TABLET, FILM COATED ORAL at 21:38

## 2024-05-13 RX ADMIN — THERA TABS 1 TABLET: TAB at 10:12

## 2024-05-13 RX ADMIN — MONTELUKAST SODIUM 10 MG: 10 TABLET, FILM COATED ORAL at 21:38

## 2024-05-13 RX ADMIN — ACETAMINOPHEN 1000 MG: 500 TABLET ORAL at 21:38

## 2024-05-13 RX ADMIN — LORAZEPAM 0.5 MG: 0.5 TABLET ORAL at 21:46

## 2024-05-13 RX ADMIN — NYSTATIN 500000 UNITS: 100000 SUSPENSION ORAL at 17:45

## 2024-05-13 RX ADMIN — HYDROCODONE BITARTRATE AND ACETAMINOPHEN 1 TABLET: 5; 325 TABLET ORAL at 21:38

## 2024-05-13 RX ADMIN — NYSTATIN 500000 UNITS: 100000 SUSPENSION ORAL at 10:12

## 2024-05-13 RX ADMIN — HYDROCODONE BITARTRATE AND ACETAMINOPHEN 2 TABLET: 5; 325 TABLET ORAL at 06:41

## 2024-05-13 RX ADMIN — PANTOPRAZOLE SODIUM 40 MG: 40 TABLET, DELAYED RELEASE ORAL at 06:33

## 2024-05-13 ASSESSMENT — PAIN SCALES - GENERAL
PAINLEVEL_OUTOF10: 5
PAINLEVEL_OUTOF10: 3
PAINLEVEL_OUTOF10: 4
PAINLEVEL_OUTOF10: 7
PAINLEVEL_OUTOF10: 4
PAINLEVEL_OUTOF10: 7
PAINLEVEL_OUTOF10: 7

## 2024-05-13 ASSESSMENT — PAIN DESCRIPTION - ORIENTATION
ORIENTATION: LEFT
ORIENTATION: UPPER;LEFT

## 2024-05-13 ASSESSMENT — PAIN DESCRIPTION - LOCATION
LOCATION: ABDOMEN
LOCATION: ABDOMEN

## 2024-05-13 ASSESSMENT — PAIN DESCRIPTION - DESCRIPTORS
DESCRIPTORS: ACHING
DESCRIPTORS: THROBBING

## 2024-05-13 NOTE — PROGRESS NOTES
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 Correlation with laboratory tests (serum gastrin, vitamin B12   levels, serologies for anti-intrinsic factor and anti-parietal cell) may   be considered.     SPECIAL STUDIES   HER2 (ERBB2) Biomarker Testing will be performed at an external   institution; an addendum will follow.     Imaging:  I have personally reviewed the following films:    No results found.    Scheduled Meds:   mupirocin   Topical BID    mirtazapine  7.5 mg Oral Nightly    nystatin  5 mL Oral 4x Daily    magnesium oxide  400 mg Oral Daily    lactobacillus  1 capsule Oral BID WC    pantoprazole  40 mg Oral BID AC    acetaminophen  1,000 mg Oral 3 times per day    enoxaparin  40 mg SubCUTAneous Daily    aspirin  81 mg Oral Daily    atorvastatin  20 mg Oral Nightly    metoprolol tartrate  12.5 mg Oral BID    montelukast  10 mg Oral Nightly    multivitamin  1 tablet Oral Daily    cetirizine  10 mg Oral Nightly     Continuous Infusions:      PRN Meds:.HYDROcodone 5 mg - acetaminophen, sodium chloride flush, aluminum & magnesium hydroxide-simethicone, sodium chloride flush, acetaminophen, polyethylene glycol, ondansetron, LORazepam, albuterol sulfate HFA, melatonin      Assessment:  73 y.o. female admitted with   No diagnosis found.      OR Date 4/22/2024, laparoscopic hiatal hernia repair with primary closure and [employer] mesh reinforcement, laparoscopic cholecystectomy, open hemigastrectomy with gastrojejunostomy and closure of duodenal stump and overlying omental flap for duodenal stump reinforcement for hiatal hernia sliding with paraesophageal component, GERD, cholelithiasis with chronic cholecystitis, gastric mass with high-grade dysplasia, possible

## 2024-05-13 NOTE — PROGRESS NOTES
CALORIE COUNT and BRIEF NUTRITION NOTE    Patient Name: Sonja Mcmahan Admission Date: 5/7/2024    Calorie Count x 3 days: 5/13-5/16. Current meal & supplement consumption estimated to be less than 50% of assessed nutritional needs.      Calorie count requires complete meal documentation from 9 consecutive meals.     Current diet and supplement order   ADULT DIET; Regular; Low Fiber  ADULT ORAL NUTRITION SUPPLEMENT; Breakfast, Dinner; Clear Liquid Oral Supplement  ADULT ORAL NUTRITION SUPPLEMENT; Lunch; Standard 4 oz Oral Supplement       COMPARATIVE STANDARDS  Total Energy Requirements (kcals/day): 1793     Protein (g):         Fluid (mL/day):  1793      Nutrition Goal    Meet greater than 50% of estimated needs by mouth.    Date Consumed PO Intake Kcal %   Kcal met PO Intake grams protein %  Protein Met   Comments                                                        Information above may not be true assessment on calorie and protein consumed d/t limited information available.      Intervention & Recommendations:   1.  Results will be posted daily as available  2.  Ensure clear BID, Ensure compact once/day    Candice Jose RD, LD  Contact Number: [phone]

## 2024-05-13 NOTE — PROGRESS NOTES
[facility] - Inpatient Rehabilitation Department   Phone: [phone]    Physical Therapy    [] Initial Evaluation            [x] Daily Treatment Note         [] Discharge Summary      Patient: Sonja Mcmahan   : 1950   MRN: [id number]   Date of Service:  2024  Admitting Diagnosis: Hiatal hernia  Current Admission Summary: Sonja Mcmahan is a 73 y.o. female with PMHx notable for HTN, HLD, DM2, GERD who presented on 4/15 with dyspnea, intractable abdominal pain. Cardiology was consulted, recommended NM Stress Test which showed no reversible ischemia, low-risk. TTE stable from prior with LVEF 55-60%, grade 2 diastolic dysfunction, mild aortic stenosis, mild tricuspid regurgitation. She had EGD on , demonstrating large hiatal hernia, also had esophageal dilation performed and gastric antrum mass biopsied. On  she underwent laparoscopic hiatal hernia repair, cholecystectomy, and hemigastrectomy w/ gastrojejunostomy. 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 Oncology consulted, not recommending adjuvant treatment at this time. Hospital course complicated by: hypotension, leukocytosis, fevers, hypokalemia, PAT, intraabdominal abscess.   Past Medical History:  has a past medical history of Asthma, Diabetes mellitus (HCC), Hyperlipidemia, and Hypertension.  Past Surgical History:  has a past surgical history that includes Hysterectomy, total abdominal; Elbow surgery; Upper gastrointestinal endoscopy (N/A, 2024); Upper gastrointestinal endoscopy (N/A, 2024); Upper gastrointestinal endoscopy (N/A, 2024); hiatal hernia repair (N/A, 2024); Cholecystectomy, laparoscopic (N/A, 2024); and gastrectomy (N/A, 2024).  Discharge Recommendations: home with HHPT services  DME Required For Discharge: DME to be determined pending patient progress  Precautions/Restrictions: high fall risk, Full

## 2024-05-13 NOTE — PROGRESS NOTES
Nutrition Note    RECOMMENDATIONS  PO Diet: Low fiber (suggest small meals, liquids & supplements between meals for optimal tolerance)  ONS: Ensure compact @ lunch; Ensure clear BID     ASSESSMENT   Nutrition intervention triggered for f/u.  Diet advanced from full liquids to low fiber.  Pt has been taking very little on full liquids, continuously c/o nausea & abdominal discomfort.  Pt has tried Ensure clear (BID) & Ensure Compact(once daily).  Agrees to con't as ordered.  Pt states is not a big eater at home, but usually grazes throughout day.  Discussed dietary recommendations post gastric surgery.  Recommended to keep supplements & all beverages for between meals, include good sources of protein with each meal/snack, avoid raw fruits & veg, & consume small, frequent meals. Will start kcal ct to monitor po intake.       Malnutrition Status: Mild malnutrition  Acute Illness    NUTRITION DIAGNOSIS   In context of acute illness or injury, Other (Comment) (mild malnutrition) related to altered GI function, altered GI structure, inadequate protein-energy intake as evidenced by nausea, intake 0-25%  Increased nutrient needs related to increase demand for energy/nutrients as evidenced by wounds (surgical)    Goals: PO intake 50% or greater, other (specify) (advance to post-gastric surgery diet)     NUTRITION RELATED FINDINGS  Objective: LBM 5/11; +2 pitting edema BLE; labs reviewed  Wounds: Surgical Incision    CURRENT NUTRITION THERAPIES  ADULT DIET; Regular; Low Fiber  ADULT ORAL NUTRITION SUPPLEMENT; Breakfast, Dinner; Clear Liquid Oral Supplement  ADULT ORAL NUTRITION SUPPLEMENT; Lunch; Standard 4 oz Oral Supplement     PO Intake: 0%, 1-25%   PO Supplement Intake:0%, 1-25%    ANTHROPOMETRICS  Current Height: 157.5 cm (5' 2\")  Current Weight - Scale: 81.2 kg (179 lb)    Ideal Body Weight (IBW): 110 lbs  (50 kg)        BMI: 32.7      COMPARATIVE STANDARDS  Total Energy Requirements (kcals/day): 1793     Protein (g):

## 2024-05-13 NOTE — PROGRESS NOTES
Treatment Recommendations: strengthening, ROM, balance training, functional mobility training, transfer training, patient/caregiver education, ADL/self-care training, IADL training, home management training, cognitive/perceptual training, pain management, home exercise program, safety education, equipment evaluation/education, and positioning    Goals  Patient Goals: \"to get back home, I am so far away\"   Short Term Goals:  Time Frame: 7-10 days  Patient will complete lower body ADL at [facility]   Patient will complete toileting at modified independent   Patient will complete functional transfers at [facility]   Patient will increase functional standing balance to 8+ min for improved ADL completion  Patient to gather and transport IADL items at modified independent     Above goals reviewed on 5/13/2024.  All goals are ongoing at this time unless indicated above.       Therapy Session Time    First Session Therapy Time:   Individual Concurrent Group Co-treatment   Time In 1015         Time Out 1100         Minutes 45           Second Session Therapy Time:   Individual Concurrent Group Co-treatment   Time In 1245         Time Out 1315         Minutes 30           Timed Code Treatment Minutes:  45  + 30 minutes     Total Treatment Minutes:  75 minutes          Electronically Signed By: LALITO Gill MOT OTR/L [id number]

## 2024-05-13 NOTE — PROGRESS NOTES
Sonja Mcmahan  5/12/2024  [id number]    Chief Complaint: Hiatal hernia    Subjective:   Patient states that new pain medication is working well, and discussed redness at her drain site.    Last BM: Stool Occurrence: 1 (05/11/24 0630)    Objective:  Patient Vitals for the past 24 hrs:   BP Temp Temp src Pulse Resp SpO2   05/12/24 2015 129/72 98.4 °F (36.9 °C) Oral 82 16 93 %   05/12/24 1417 -- -- -- -- 16 --   05/12/24 1347 -- -- -- -- 18 --   05/12/24 0950 -- -- -- -- 18 --   05/12/24 0914 113/75 98 °F (36.7 °C) Oral 88 16 94 %   05/11/24 2333 -- -- -- -- 16 --     Gen: No distress, pleasant.   HEENT: Normocephalic, atraumatic.   CV: Regular rate and rhythm. Extremities warm, well perfused.   Resp: No respiratory distress. CTAB.  Abd: Soft, nontender. XAVIER drain still with yellow-green output. Mild erythema at tube entry site.  Ext: No edema.   Neuro: Alert, oriented, appropriately interactive.     Laboratory data: Available via EMR.     Therapy progress:       PT    Supine to Sit: Partial/moderate assistance  Sit to Supine: Supervision or touching assistance   Sit to Stand: Partial/moderate assistance  Chair/Bed to Chair Transfer: Supervision or touching assistance  Car Transfer: Partial/moderate assistance  Ambulation 10 ft: Supervision or touching assistance  Ambulation 50 ft:    Ambulation 150 ft:    Stairs - 1 Step: Partial/moderate assistance  Stairs - 4 Step:    Stairs - 12 Step:      OT    Eating: Setup or clean-up assistance  Oral Hygiene: Supervision or touching assistance  Bathing: Supervision or touching assistance  Upper Body Dressing: Setup or clean-up assistance  Lower Body Dressing: Supervision or touching assistance  Toilet Transfer: Supervision or touching assistance  Toilet Hygiene: Supervision or touching assistance    Speech Therapy    Pt presents with cognitive linguistic deficits exacerbated by her current medical situation (pain, pain medications, surgical recovery). She demonstrates deficits

## 2024-05-13 NOTE — PLAN OF CARE
Problem: Discharge Planning  Goal: Discharge to home or other facility with appropriate resources  Outcome: Progressing     Problem: Safety - Adult  Goal: Free from fall injury  Outcome: Progressing     Problem: Pain  Goal: Verbalizes/displays adequate comfort level or baseline comfort level  Outcome: Progressing     Problem: Skin/Tissue Integrity  Goal: Absence of new skin breakdown  Description: 1.  Monitor for areas of redness and/or skin breakdown  2.  Assess vascular access sites hourly  3.  Every 4-6 hours minimum:  Change oxygen saturation probe site  4.  Every 4-6 hours:  If on nasal continuous positive airway pressure, respiratory therapy assess nares and determine need for appliance change or resting period.  Outcome: Progressing     Problem: ABCDS Injury Assessment  Goal: Absence of physical injury  Outcome: Progressing     Problem: Nutrition Deficit:  Goal: Optimize nutritional status  Outcome: Progressing

## 2024-05-13 NOTE — PROGRESS NOTES
[facility] - Inpatient Rehabilitation Department   Phone: [phone]    Speech Therapy    [] Initial Evaluation            [x] Daily Treatment Note         [] Discharge Summary      Patient: Sonja Mcmahan   : 1950   MRN: [id number]   Date of Service:  2024  Admitting Diagnosis: Hiatal hernia  Current Admission Summary: Sonja Mcmahan is a 73 y.o. female with PMHx notable for HTN, HLD, DM2, GERD who presented on 4/15 with dyspnea, intractable abdominal pain. Cardiology was consulted, recommended NM Stress Test which showed no reversible ischemia, low-risk. TTE stable from prior with LVEF 55-60%, grade 2 diastolic dysfunction, mild aortic stenosis, mild tricuspid regurgitation. She had EGD on , demonstrating large hiatal hernia, also had esophageal dilation performed and gastric antrum mass biopsied. On  she underwent laparoscopic hiatal hernia repair, cholecystectomy, and hemigastrectomy w/ gastrojejunostomy. 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 Oncology consulted, not recommending adjuvant treatment at this time. Hospital course complicated by: hypotension, leukocytosis, fevers, hypokalemia, PAT, intraabdominal abscess.   Past Medical History:  has a past medical history of Asthma, Diabetes mellitus (HCC), Hyperlipidemia, and Hypertension.  Past Surgical History:  has a past surgical history that includes Hysterectomy, total abdominal; Elbow surgery; Upper gastrointestinal endoscopy (N/A, 2024); Upper gastrointestinal endoscopy (N/A, 2024); Upper gastrointestinal endoscopy (N/A, 2024); hiatal hernia repair (N/A, 2024); Cholecystectomy, laparoscopic (N/A, 2024); and gastrectomy (N/A, 2024).  Precautions/Restrictions: high fall risk        Pre-Admission Information   Living Status: Pt lives alone. Has two adult daughters who live locally   Occupation/School: Pt is

## 2024-05-13 NOTE — PROGRESS NOTES
Sonja Mcmahan  5/13/2024  [id number]    Chief Complaint: Hiatal hernia    Subjective:   Experience some intermittent nausea over the weekend.     Nausea improved this morning.  Denies any abdominal pain.  Continues to report diminished appetite.  Aware of the plan per general surgery to advance her diet to low fiber, looking forward to having more dietary options    Last BM: Stool Occurrence: 0 (05/13/24 0645)    Objective:  Patient Vitals for the past 24 hrs:   BP Temp Temp src Pulse Resp SpO2 Height   05/13/24 1156 -- -- -- -- 18 -- --   05/13/24 1031 -- -- -- -- -- -- 1.575 m (5' 2\")   05/13/24 1015 121/73 97.6 °F (36.4 °C) -- 78 16 97 % --   05/12/24 2015 129/72 98.4 °F (36.9 °C) Oral 82 16 93 % --   05/12/24 1417 -- -- -- -- 16 -- --   05/12/24 1347 -- -- -- -- 18 -- --     Gen: No distress, pleasant.   HEENT: Normocephalic, atraumatic.   CV: Regular rate and rhythm. Extremities warm, well perfused.   Resp: No respiratory distress. CTAB.  Abd: Soft, nontender. XAVIER drain still with yellow-green output. Mild erythema at tube entry site.  Ext: No edema.   Neuro: Alert, oriented, appropriately interactive.     Laboratory data: Available via EMR.     Therapy progress:       PT    Supine to Sit: Partial/moderate assistance  Sit to Supine: Supervision or touching assistance   Sit to Stand: Partial/moderate assistance  Chair/Bed to Chair Transfer: Supervision or touching assistance  Car Transfer: Partial/moderate assistance  Ambulation 10 ft: Supervision or touching assistance  Ambulation 50 ft:    Ambulation 150 ft:    Stairs - 1 Step: Partial/moderate assistance  Stairs - 4 Step:    Stairs - 12 Step:      OT    Eating: Setup or clean-up assistance  Oral Hygiene: Supervision or touching assistance  Bathing: Supervision or touching assistance  Upper Body Dressing: Setup or clean-up assistance  Lower Body Dressing: Supervision or touching assistance  Toilet Transfer: Supervision or touching assistance  Toilet Hygiene:

## 2024-05-14 LAB
ACID FAST STN SPEC QL: NORMAL
GLUCOSE BLD-MCNC: 134 MG/DL (ref 70–99)
MYCOBACTERIUM SPEC CULT: NORMAL
PERFORMED ON: ABNORMAL

## 2024-05-14 PROCEDURE — 6370000000 HC RX 637 (ALT 250 FOR IP): Performed by: STUDENT IN AN ORGANIZED HEALTH CARE EDUCATION/TRAINING PROGRAM

## 2024-05-14 PROCEDURE — APPSS15 APP SPLIT SHARED TIME 0-15 MINUTES: Performed by: NURSE PRACTITIONER

## 2024-05-14 PROCEDURE — 6360000002 HC RX W HCPCS: Performed by: STUDENT IN AN ORGANIZED HEALTH CARE EDUCATION/TRAINING PROGRAM

## 2024-05-14 PROCEDURE — 6370000000 HC RX 637 (ALT 250 FOR IP): Performed by: NURSE PRACTITIONER

## 2024-05-14 PROCEDURE — 97530 THERAPEUTIC ACTIVITIES: CPT

## 2024-05-14 PROCEDURE — APPNB30 APP NON BILLABLE TIME 0-30 MINS: Performed by: NURSE PRACTITIONER

## 2024-05-14 PROCEDURE — 97129 THER IVNTJ 1ST 15 MIN: CPT

## 2024-05-14 PROCEDURE — 1280000000 HC REHAB R&B

## 2024-05-14 PROCEDURE — 99024 POSTOP FOLLOW-UP VISIT: CPT | Performed by: SURGERY

## 2024-05-14 PROCEDURE — 97110 THERAPEUTIC EXERCISES: CPT

## 2024-05-14 PROCEDURE — 97535 SELF CARE MNGMENT TRAINING: CPT

## 2024-05-14 PROCEDURE — 97130 THER IVNTJ EA ADDL 15 MIN: CPT

## 2024-05-14 RX ADMIN — MIRTAZAPINE 7.5 MG: 15 TABLET, FILM COATED ORAL at 21:16

## 2024-05-14 RX ADMIN — MUPIROCIN: 20 OINTMENT TOPICAL at 08:15

## 2024-05-14 RX ADMIN — ACETAMINOPHEN 1000 MG: 500 TABLET ORAL at 21:18

## 2024-05-14 RX ADMIN — METOPROLOL TARTRATE 12.5 MG: 25 TABLET, FILM COATED ORAL at 08:06

## 2024-05-14 RX ADMIN — ACETAMINOPHEN 1000 MG: 500 TABLET ORAL at 13:41

## 2024-05-14 RX ADMIN — NYSTATIN 500000 UNITS: 100000 SUSPENSION ORAL at 08:07

## 2024-05-14 RX ADMIN — Medication 1 CAPSULE: at 08:07

## 2024-05-14 RX ADMIN — MONTELUKAST SODIUM 10 MG: 10 TABLET, FILM COATED ORAL at 21:17

## 2024-05-14 RX ADMIN — MUPIROCIN: 20 OINTMENT TOPICAL at 21:22

## 2024-05-14 RX ADMIN — ALUMINUM HYDROXIDE, MAGNESIUM HYDROXIDE, AND SIMETHICONE 30 ML: 200; 200; 20 SUSPENSION ORAL at 15:07

## 2024-05-14 RX ADMIN — ATORVASTATIN CALCIUM 20 MG: 20 TABLET, FILM COATED ORAL at 21:19

## 2024-05-14 RX ADMIN — ENOXAPARIN SODIUM 40 MG: 100 INJECTION SUBCUTANEOUS at 08:08

## 2024-05-14 RX ADMIN — Medication 400 MG: at 08:07

## 2024-05-14 RX ADMIN — CETIRIZINE HYDROCHLORIDE 10 MG: 10 TABLET, FILM COATED ORAL at 21:19

## 2024-05-14 RX ADMIN — HYDROCODONE BITARTRATE AND ACETAMINOPHEN 1 TABLET: 5; 325 TABLET ORAL at 09:09

## 2024-05-14 RX ADMIN — ASPIRIN 81 MG 81 MG: 81 TABLET ORAL at 08:06

## 2024-05-14 RX ADMIN — ACETAMINOPHEN 1000 MG: 500 TABLET ORAL at 06:03

## 2024-05-14 RX ADMIN — METOPROLOL TARTRATE 12.5 MG: 25 TABLET, FILM COATED ORAL at 21:17

## 2024-05-14 RX ADMIN — PANTOPRAZOLE SODIUM 40 MG: 40 TABLET, DELAYED RELEASE ORAL at 06:03

## 2024-05-14 RX ADMIN — Medication 1 CAPSULE: at 18:12

## 2024-05-14 RX ADMIN — NYSTATIN 500000 UNITS: 100000 SUSPENSION ORAL at 12:18

## 2024-05-14 RX ADMIN — THERA TABS 1 TABLET: TAB at 08:06

## 2024-05-14 RX ADMIN — PANTOPRAZOLE SODIUM 40 MG: 40 TABLET, DELAYED RELEASE ORAL at 15:08

## 2024-05-14 RX ADMIN — NYSTATIN 500000 UNITS: 100000 SUSPENSION ORAL at 18:12

## 2024-05-14 ASSESSMENT — PAIN SCALES - GENERAL
PAINLEVEL_OUTOF10: 0
PAINLEVEL_OUTOF10: 7
PAINLEVEL_OUTOF10: 0
PAINLEVEL_OUTOF10: 5

## 2024-05-14 ASSESSMENT — ENCOUNTER SYMPTOMS
VOMITING: 1
NAUSEA: 1

## 2024-05-14 ASSESSMENT — PAIN - FUNCTIONAL ASSESSMENT: PAIN_FUNCTIONAL_ASSESSMENT: ACTIVITIES ARE NOT PREVENTED

## 2024-05-14 ASSESSMENT — PAIN DESCRIPTION - DESCRIPTORS: DESCRIPTORS: ACHING

## 2024-05-14 ASSESSMENT — PAIN DESCRIPTION - LOCATION: LOCATION: ABDOMEN

## 2024-05-14 ASSESSMENT — PAIN DESCRIPTION - ORIENTATION: ORIENTATION: MID;UPPER

## 2024-05-14 NOTE — PROGRESS NOTES
In summary, my findings and plan are the following:   Pt enjoying diet plan  Abd exam remains the same, no new issues  Drain less, no fever  Will continue same plan  Will have drain at discharge Thursday    David Mack MD

## 2024-05-14 NOTE — PROGRESS NOTES
[facility] - Inpatient Rehabilitation Department   Phone: [phone]    Physical Therapy    [] Initial Evaluation            [x] Daily Treatment Note         [] Discharge Summary      Patient: Sonja Mcmahan   : 1950   MRN: [id number]   Date of Service:  2024  Admitting Diagnosis: Hiatal hernia  Current Admission Summary: Sonja Mcmahan is a 73 y.o. female with PMHx notable for HTN, HLD, DM2, GERD who presented on 4/15 with dyspnea, intractable abdominal pain. Cardiology was consulted, recommended NM Stress Test which showed no reversible ischemia, low-risk. TTE stable from prior with LVEF 55-60%, grade 2 diastolic dysfunction, mild aortic stenosis, mild tricuspid regurgitation. She had EGD on , demonstrating large hiatal hernia, also had esophageal dilation performed and gastric antrum mass biopsied. On  she underwent laparoscopic hiatal hernia repair, cholecystectomy, and hemigastrectomy w/ gastrojejunostomy. 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 Oncology consulted, not recommending adjuvant treatment at this time. Hospital course complicated by: hypotension, leukocytosis, fevers, hypokalemia, PAT, intraabdominal abscess.   Past Medical History:  has a past medical history of Asthma, Diabetes mellitus (HCC), Hyperlipidemia, and Hypertension.  Past Surgical History:  has a past surgical history that includes Hysterectomy, total abdominal; Elbow surgery; Upper gastrointestinal endoscopy (N/A, 2024); Upper gastrointestinal endoscopy (N/A, 2024); Upper gastrointestinal endoscopy (N/A, 2024); hiatal hernia repair (N/A, 2024); Cholecystectomy, laparoscopic (N/A, 2024); and gastrectomy (N/A, 2024).  Discharge Recommendations: home with HHPT services  DME Required For Discharge: DME to be determined pending patient progress  Precautions/Restrictions: high fall risk, Full

## 2024-05-14 NOTE — PROGRESS NOTES
[facility] - Inpatient Rehabilitation Department   Phone: [phone]    Speech Therapy    [] Initial Evaluation            [x] Daily Treatment Note         [] Discharge Summary      Patient: Sonja Mcmahan   : 1950   MRN: [id number]   Date of Service:  2024  Admitting Diagnosis: Hiatal hernia  Current Admission Summary: Sonja Mcmahan is a 73 y.o. female with PMHx notable for HTN, HLD, DM2, GERD who presented on 4/15 with dyspnea, intractable abdominal pain. Cardiology was consulted, recommended NM Stress Test which showed no reversible ischemia, low-risk. TTE stable from prior with LVEF 55-60%, grade 2 diastolic dysfunction, mild aortic stenosis, mild tricuspid regurgitation. She had EGD on , demonstrating large hiatal hernia, also had esophageal dilation performed and gastric antrum mass biopsied. On  she underwent laparoscopic hiatal hernia repair, cholecystectomy, and hemigastrectomy w/ gastrojejunostomy. 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 Oncology consulted, not recommending adjuvant treatment at this time. Hospital course complicated by: hypotension, leukocytosis, fevers, hypokalemia, PAT, intraabdominal abscess.   Past Medical History:  has a past medical history of Asthma, Diabetes mellitus (HCC), Hyperlipidemia, and Hypertension.  Past Surgical History:  has a past surgical history that includes Hysterectomy, total abdominal; Elbow surgery; Upper gastrointestinal endoscopy (N/A, 2024); Upper gastrointestinal endoscopy (N/A, 2024); Upper gastrointestinal endoscopy (N/A, 2024); hiatal hernia repair (N/A, 2024); Cholecystectomy, laparoscopic (N/A, 2024); and gastrectomy (N/A, 2024).  Precautions/Restrictions: high fall risk      Pre-Admission Information   Living Status: Pt lives alone. Has two adult daughters who live locally   Occupation/School: Pt is retired

## 2024-05-14 NOTE — PROGRESS NOTES
[facility] - Inpatient Rehabilitation Department   Phone: [phone]    Occupational Therapy    [] Initial Evaluation            [x] Daily Treatment Note         [] Discharge Summary      Patient: Sonja Mcmahan   : 1950   MRN: [id number]   Date of Service:  2024    Admitting Diagnosis:  Hiatal hernia  Current Admission Summary: Sonja Mcmahan is a 73 y.o. female with PMHx notable for HTN, HLD, DM2, GERD who presented on 4/15 with dyspnea, intractable abdominal pain. Cardiology was consulted, recommended NM Stress Test which showed no reversible ischemia, low-risk. TTE stable from prior with LVEF 55-60%, grade 2 diastolic dysfunction, mild aortic stenosis, mild tricuspid regurgitation. She had EGD on , demonstrating large hiatal hernia, also had esophageal dilation performed and gastric antrum mass biopsied. On  she underwent laparoscopic hiatal hernia repair, cholecystectomy, and hemigastrectomy w/ gastrojejunostomy. 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 Oncology consulted, not recommending adjuvant treatment at this time. Hospital course complicated by: hypotension, leukocytosis, fevers, hypokalemia, PAT, intraabdominal abscess.   Past Medical History:  has a past medical history of Asthma, Diabetes mellitus (HCC), Hyperlipidemia, and Hypertension.  Past Surgical History:  has a past surgical history that includes Hysterectomy, total abdominal; Elbow surgery; Upper gastrointestinal endoscopy (N/A, 2024); Upper gastrointestinal endoscopy (N/A, 2024); Upper gastrointestinal endoscopy (N/A, 2024); hiatal hernia repair (N/A, 2024); Cholecystectomy, laparoscopic (N/A, 2024); and gastrectomy (N/A, 2024).    Discharge Recommendations: Home w/ HHOT     DME Required For Discharge: tub transfer bench, walker basket, reports already having tub transfer bench

## 2024-05-14 NOTE — PLAN OF CARE
Problem: Discharge Planning  Goal: Discharge to home or other facility with appropriate resources  5/14/2024 0919 by Dayanara Rivas RN  Outcome: Progressing  Flowsheets (Taken 5/14/2024 0817)  Discharge to home or other facility with appropriate resources:   Identify barriers to discharge with patient and caregiver   Identify discharge learning needs (meds, wound care, etc)  5/14/2024 0019 by Melissa Taylor RN  Outcome: Progressing     Problem: Safety - Adult  Goal: Free from fall injury  5/14/2024 0919 by Dayanara Rivas RN  Outcome: Progressing  5/14/2024 0019 by Melissa Taylor RN  Outcome: Progressing     Problem: Pain  Goal: Verbalizes/displays adequate comfort level or baseline comfort level  5/14/2024 0919 by Dayanara Rivas RN  Outcome: Progressing  Flowsheets (Taken 5/14/2024 0804)  Verbalizes/displays adequate comfort level or baseline comfort level:   Encourage patient to monitor pain and request assistance   Assess pain using appropriate pain scale   Administer analgesics based on type and severity of pain and evaluate response   Implement non-pharmacological measures as appropriate and evaluate response  5/14/2024 0019 by Melissa Taylor RN  Outcome: Progressing     Problem: Skin/Tissue Integrity  Goal: Absence of new skin breakdown  Description: 1.  Monitor for areas of redness and/or skin breakdown  2.  Assess vascular access sites hourly  3.  Every 4-6 hours minimum:  Change oxygen saturation probe site  4.  Every 4-6 hours:  If on nasal continuous positive airway pressure, respiratory therapy assess nares and determine need for appliance change or resting period.  5/14/2024 0919 by Dayanara Rivas RN  Outcome: Progressing  5/14/2024 0019 by Melissa Taylor RN  Outcome: Progressing     Problem: ABCDS Injury Assessment  Goal: Absence of physical injury  5/14/2024 0919 by Dayanara Rivas RN  Outcome: Progressing  5/14/2024 0019 by Melissa Taylor RN  Outcome:

## 2024-05-14 NOTE — PROGRESS NOTES
Physician Progress Note      PATIENT:               GIANCARLO CARNEY  CSN #:                  [id number]  :                       1950  ADMIT DATE:       4/15/2024 5:23 PM  DISCH DATE:        2024 1:05 PM  RESPONDING  PROVIDER #:        Mehnaz Evans MD          QUERY TEXT:    Documentation reflects sepsis s/p DAQUAN drain in IM note(s) dated .  If   possible, please document in the progress notes and discharge summary if   sepsis was:    The medical record reflects the following:  Risk Factors: Abdominal abscess, obesity, S/p DAQUAN drain  Clinical Indicators: WBC 13.9, , Temp 99.2, , + wound cx, Daquan with   turbid/bilious output  Treatment: Blood cx, urine cx, wound culture, Antibiotics, cultures growing E.   coli, Klebsiella, and Staphylococcus  Options provided:  -- Sepsis POA confirmed after study  -- Sepsis not POA confirmed after study  -- Sepsis treated and resolved  -- Sepsis ruled out after study  -- Other - I will add my own diagnosis  -- Disagree - Not applicable / Not valid  -- Disagree - Clinically unable to determine / Unknown  -- Refer to Clinical Documentation Reviewer    PROVIDER RESPONSE TEXT:    Sepsis POA confirmed after study.    Query created by: Kaylynn Patterson on 2024 9:42 AM      Electronically signed by:  Mehnaz Evans MD 2024 3:55 PM

## 2024-05-14 NOTE — PROGRESS NOTES
demonstrates deficits within orientation, short term memory, and problem solving. Pt's speech was also limited due to her alertness with low volume and reduced articulation precision. Pt was aware of her errors and her extent of fatigue. She reported feeling like this is \"too much\" for her right now. Recommend SLP intervention for safe return to prior level of independence.    Body mass index is 32.74 kg/m².    Assessment/Plan:  Functional progress: Max assist for upper and lower body dressing.  This patient continues to require an ARU level of care from all disciplines to address the following issues:    #. Hiatal hernia   #. Cholelithiasis w/ chronic cholecystitis  #. Gastric adenocarcinoma  - s/p laparoscopic hiatal hernia repair with primary closure and mesh reinforcement, laparoscopic cholecystectomy, open william gastrectomy with gastrojejunostomy on 4/22  - Pathology of gastric antral mass: grade 1, stage pT1b pN0 M0, R0 resection              - Oncology: Does not need adjuvant treatment, monitor as outpatient.   - Renal and hepatic function stable on admission. No leukocytosis.   - General Surgery following peripherally while on ARU.    - staples removed on 5/8  - Continue mupirocin at tube site   - PO intake gradually improving, continue calorie counts.     #. Intra-abdominal abscess  - status post IR drainage and XAVIER drain placement on 5/3/2024  - IV Zosyn initially, now transitioned to levofloxacin 750 mg daily x 5 days  - Probiotic twice daily with meals while finishing antibiotics     #. Normocytic anemia  - Iron studies suggestive of iron deficiency and AOCD  - s/p IV iron x3  - stable       #. Asymptomatic PAT  - Noted on telemetry  - Cardiology consulted, nothing to do unless patient becomes symptomatic     #. Diminished appetite  #. Depressed mood  #. Insomnia  - Mirtazapine 7.5 mg nightly    #. Thrush  - Continue Nystatin x 7 days (EOT 5/17)    #. Hypokalemia, improved  #. Hypomagnesemia, improved  -

## 2024-05-14 NOTE — PROGRESS NOTES
CALORIE COUNT and BRIEF NUTRITION NOTE    Patient Name: Sonja Mcmahan Admission Date: 5/7/2024    Calorie Count x 3 days: 5/13-5/16. Current meal & supplement consumption estimated to be less than 50% of assessed nutritional needs.      Calorie count requires complete meal documentation from 9 consecutive meals.     Current diet and supplement order   ADULT DIET; Regular; Low Fiber  ADULT ORAL NUTRITION SUPPLEMENT; Breakfast, Dinner; Clear Liquid Oral Supplement  ADULT ORAL NUTRITION SUPPLEMENT; Lunch; Standard 4 oz Oral Supplement       COMPARATIVE STANDARDS  Total Energy Requirements (kcals/day): 1793     Protein (g):         Fluid (mL/day):  1793      Nutrition Goal    Meet greater than 50% of estimated needs by mouth.    Date Consumed PO Intake Kcal %   Kcal met PO Intake grams protein %  Protein Met   Comments   5/13 251  11  X 1meal     5/14 100  3  X 1meal                                          Information above may not be true assessment on calorie and protein consumed d/t limited information available.      Intervention & Recommendations:   1.  Results will be posted daily as available  2.  Ensure clear BID, Ensure compact once/day    Candice Jose RD, LD  Contact Number: [phone]

## 2024-05-14 NOTE — PLAN OF CARE
Problem: Discharge Planning  Goal: Discharge to home or other facility with appropriate resources  5/14/2024 0019 by Melissa Taylor RN  Outcome: Progressing     Problem: Safety - Adult  Goal: Free from fall injury  5/14/2024 0019 by Melissa Taylor RN  Outcome: Progressing     Problem: Pain  Goal: Verbalizes/displays adequate comfort level or baseline comfort level  5/14/2024 0019 by Melissa Taylor RN  Outcome: Progressing     Problem: Skin/Tissue Integrity  Goal: Absence of new skin breakdown  Description: 1.  Monitor for areas of redness and/or skin breakdown  2.  Assess vascular access sites hourly  3.  Every 4-6 hours minimum:  Change oxygen saturation probe site  4.  Every 4-6 hours:  If on nasal continuous positive airway pressure, respiratory therapy assess nares and determine need for appliance change or resting period.  5/14/2024 0019 by Melissa Taylor RN  Outcome: Progressing    Problem: ABCDS Injury Assessment  Goal: Absence of physical injury  5/14/2024 0019 by Melissa Taylor RN  Outcome: Progressing     Problem: Nutrition Deficit:  Goal: Optimize nutritional status  5/14/2024 0019 by Melissa Taylor RN  Outcome: Progressing  Nutrient intake appropriate for improving, restoring, or maintaining nutritional needs:   Assess nutritional status and recommend course of action   Monitor oral intake, labs, and treatment plans   Recommend appropriate diets, oral nutritional supplements, and vitamin/mineral supplements

## 2024-05-14 NOTE — PROGRESS NOTES
Assessment completed. Patient in bed, alert and oriented x 4 and able to make all her needs known. Denied any pain. XAVIER drain to upper mid abd in place and draining scant amount of brown liquid; dressing to site CDI, some redness noted to site. Steri strips to incision sites in place. No drainage or s/s of infection noted. Medication administered as ordered. POC and education reviewed to patient and mutually agreed upon. Continent of B&B. Reported to have had a small BM this AM. Ambulates with a walker and CGA x 1 and tolerates fairly well. All needs met. Safety interventions in place. Call light in reach. Sitting up in chair and set up for breakfast. Will continue to monitor.

## 2024-05-14 NOTE — DISCHARGE INSTRUCTIONS
HOW TO EMPTY YOUR DRAIN    Pull the stopper out of the top of the drain.   Pour the fluid into the container you were provided.   After the drain is empty, squeeze the drain flat and push the stopper back into the top of the drain.   Remember to record the date, time, amount, and color of fluid collected.                                       We hope your stay on rehab has exceeded your expectations. Once again the entire Acute Rehab Staff at [facility] wish to thank you for allowing us the privilege to care for you.         A few days after you are discharged from Rehab, you will receive a survey ([employer]) in the mail or through email.  This is a nationally distributed survey sent to thousands of rehab patients throughout the nation.              It is very important to everyone on the rehab unit that we receive feedback based on your experience.          Thank you, we wish you good health always,         Acute Rehab Team      Hospital Preference:     __[facility]        Medical Diagnosis/Conditions    _______________________ (free text)    Emergency Contact:    ________________________________________Phone#________________________      Advanced Directives:    Code Status: ?  [x]  Full Code  ?  []  DNR  ? []  DNRCC  ? []  DNRCC - Arrest    (as of date of discharge:  _________)      Medical POA: ?  []   Yes ______________________________ ?  []   No                                       (Name and phone number)                     Living Will:   ?   []   Yes    ?  []   No        Insurance Information:    _______________________ (free text)      Individual Responsible  for the coordination of the discharge/follow up:    ______________________________________________________    Functional Status:    VISUAL DEFICITS:    Yes [x]  No  []       If yes, assisted device:   Wears Glasses Yes [x]  No  []  Wears Contacts  Yes []  No  []  Legally Blind Yes []  No  []    HEARING DEFICITS:    Yes []

## 2024-05-14 NOTE — PROGRESS NOTES
Patients head to toe assessment completed. Vital signs WNL. chair alarm engaged, call light within reach. Scheduled medications given per MAR. Patient complained fo abdominal pain, rates 5/10. PRN Norco given. Patient up in a chair

## 2024-05-15 LAB
ANION GAP SERPL CALCULATED.3IONS-SCNC: 7 MMOL/L (ref 3–16)
ANISOCYTOSIS BLD QL SMEAR: ABNORMAL
BASOPHILS # BLD: 0 K/UL (ref 0–0.2)
BASOPHILS NFR BLD: 0 %
BUN SERPL-MCNC: 10 MG/DL (ref 7–20)
CALCIUM SERPL-MCNC: 7.6 MG/DL (ref 8.3–10.6)
CHLORIDE SERPL-SCNC: 105 MMOL/L (ref 99–110)
CO2 SERPL-SCNC: 26 MMOL/L (ref 21–32)
CREAT SERPL-MCNC: 0.6 MG/DL (ref 0.6–1.2)
DEPRECATED RDW RBC AUTO: 17 % (ref 12.4–15.4)
EOSINOPHIL # BLD: 0 K/UL (ref 0–0.6)
EOSINOPHIL NFR BLD: 0 %
GFR SERPLBLD CREATININE-BSD FMLA CKD-EPI: >90 ML/MIN/{1.73_M2}
GLUCOSE BLD-MCNC: 91 MG/DL (ref 70–99)
GLUCOSE SERPL-MCNC: 93 MG/DL (ref 70–99)
HCT VFR BLD AUTO: 30.1 % (ref 36–48)
HGB BLD-MCNC: 9.7 G/DL (ref 12–16)
LYMPHOCYTES # BLD: 1.9 K/UL (ref 1–5.1)
LYMPHOCYTES NFR BLD: 30 %
MAGNESIUM SERPL-MCNC: 1.9 MG/DL (ref 1.8–2.4)
MCH RBC QN AUTO: 28 PG (ref 26–34)
MCHC RBC AUTO-ENTMCNC: 32.2 G/DL (ref 31–36)
MCV RBC AUTO: 86.8 FL (ref 80–100)
MONOCYTES # BLD: 0.4 K/UL (ref 0–1.3)
MONOCYTES NFR BLD: 6 %
NEUTROPHILS # BLD: 4 K/UL (ref 1.7–7.7)
NEUTROPHILS NFR BLD: 64 %
PERFORMED ON: NORMAL
PLATELET # BLD AUTO: 348 K/UL (ref 135–450)
PLATELET BLD QL SMEAR: ADEQUATE
PMV BLD AUTO: 6.4 FL (ref 5–10.5)
POLYCHROMASIA BLD QL SMEAR: ABNORMAL
POTASSIUM SERPL-SCNC: 3.4 MMOL/L (ref 3.5–5.1)
RBC # BLD AUTO: 3.46 M/UL (ref 4–5.2)
SLIDE REVIEW: ABNORMAL
SODIUM SERPL-SCNC: 138 MMOL/L (ref 136–145)
WBC # BLD AUTO: 6.2 K/UL (ref 4–11)

## 2024-05-15 PROCEDURE — 97130 THER IVNTJ EA ADDL 15 MIN: CPT

## 2024-05-15 PROCEDURE — 6370000000 HC RX 637 (ALT 250 FOR IP): Performed by: NURSE PRACTITIONER

## 2024-05-15 PROCEDURE — 97129 THER IVNTJ 1ST 15 MIN: CPT

## 2024-05-15 PROCEDURE — 97530 THERAPEUTIC ACTIVITIES: CPT

## 2024-05-15 PROCEDURE — 80048 BASIC METABOLIC PNL TOTAL CA: CPT

## 2024-05-15 PROCEDURE — 85025 COMPLETE CBC W/AUTO DIFF WBC: CPT

## 2024-05-15 PROCEDURE — 97535 SELF CARE MNGMENT TRAINING: CPT

## 2024-05-15 PROCEDURE — 6370000000 HC RX 637 (ALT 250 FOR IP): Performed by: STUDENT IN AN ORGANIZED HEALTH CARE EDUCATION/TRAINING PROGRAM

## 2024-05-15 PROCEDURE — 6360000002 HC RX W HCPCS: Performed by: STUDENT IN AN ORGANIZED HEALTH CARE EDUCATION/TRAINING PROGRAM

## 2024-05-15 PROCEDURE — 97110 THERAPEUTIC EXERCISES: CPT

## 2024-05-15 PROCEDURE — 83735 ASSAY OF MAGNESIUM: CPT

## 2024-05-15 PROCEDURE — APPNB30 APP NON BILLABLE TIME 0-30 MINS: Performed by: NURSE PRACTITIONER

## 2024-05-15 PROCEDURE — 1280000000 HC REHAB R&B

## 2024-05-15 PROCEDURE — 99024 POSTOP FOLLOW-UP VISIT: CPT | Performed by: SURGERY

## 2024-05-15 PROCEDURE — APPSS15 APP SPLIT SHARED TIME 0-15 MINUTES: Performed by: NURSE PRACTITIONER

## 2024-05-15 RX ADMIN — ONDANSETRON 4 MG: 4 TABLET, ORALLY DISINTEGRATING ORAL at 08:05

## 2024-05-15 RX ADMIN — PANTOPRAZOLE SODIUM 40 MG: 40 TABLET, DELAYED RELEASE ORAL at 06:14

## 2024-05-15 RX ADMIN — ACETAMINOPHEN 1000 MG: 500 TABLET ORAL at 06:14

## 2024-05-15 RX ADMIN — METOPROLOL TARTRATE 12.5 MG: 25 TABLET, FILM COATED ORAL at 20:39

## 2024-05-15 RX ADMIN — MIRTAZAPINE 7.5 MG: 15 TABLET, FILM COATED ORAL at 20:39

## 2024-05-15 RX ADMIN — ONDANSETRON 4 MG: 4 TABLET, ORALLY DISINTEGRATING ORAL at 20:45

## 2024-05-15 RX ADMIN — HYDROCODONE BITARTRATE AND ACETAMINOPHEN 1 TABLET: 5; 325 TABLET ORAL at 09:18

## 2024-05-15 RX ADMIN — ACETAMINOPHEN 1000 MG: 500 TABLET ORAL at 14:21

## 2024-05-15 RX ADMIN — ATORVASTATIN CALCIUM 20 MG: 20 TABLET, FILM COATED ORAL at 20:39

## 2024-05-15 RX ADMIN — THERA TABS 1 TABLET: TAB at 08:07

## 2024-05-15 RX ADMIN — Medication 400 MG: at 08:08

## 2024-05-15 RX ADMIN — ENOXAPARIN SODIUM 40 MG: 100 INJECTION SUBCUTANEOUS at 08:07

## 2024-05-15 RX ADMIN — NYSTATIN 500000 UNITS: 100000 SUSPENSION ORAL at 08:08

## 2024-05-15 RX ADMIN — MUPIROCIN: 20 OINTMENT TOPICAL at 20:37

## 2024-05-15 RX ADMIN — MUPIROCIN: 20 OINTMENT TOPICAL at 08:10

## 2024-05-15 RX ADMIN — MONTELUKAST SODIUM 10 MG: 10 TABLET, FILM COATED ORAL at 20:38

## 2024-05-15 RX ADMIN — METOPROLOL TARTRATE 12.5 MG: 25 TABLET, FILM COATED ORAL at 08:07

## 2024-05-15 RX ADMIN — PANTOPRAZOLE SODIUM 40 MG: 40 TABLET, DELAYED RELEASE ORAL at 16:54

## 2024-05-15 RX ADMIN — ACETAMINOPHEN 1000 MG: 500 TABLET ORAL at 22:18

## 2024-05-15 RX ADMIN — Medication 1 CAPSULE: at 16:54

## 2024-05-15 RX ADMIN — Medication 1 CAPSULE: at 08:08

## 2024-05-15 RX ADMIN — CETIRIZINE HYDROCHLORIDE 10 MG: 10 TABLET, FILM COATED ORAL at 20:39

## 2024-05-15 RX ADMIN — ASPIRIN 81 MG 81 MG: 81 TABLET ORAL at 08:11

## 2024-05-15 ASSESSMENT — PAIN DESCRIPTION - ORIENTATION
ORIENTATION: MID
ORIENTATION: MID;UPPER
ORIENTATION: MID
ORIENTATION: MID

## 2024-05-15 ASSESSMENT — PAIN DESCRIPTION - DESCRIPTORS
DESCRIPTORS: ACHING
DESCRIPTORS: ACHING;CRAMPING

## 2024-05-15 ASSESSMENT — PAIN - FUNCTIONAL ASSESSMENT
PAIN_FUNCTIONAL_ASSESSMENT: ACTIVITIES ARE NOT PREVENTED
PAIN_FUNCTIONAL_ASSESSMENT: ACTIVITIES ARE NOT PREVENTED

## 2024-05-15 ASSESSMENT — PAIN SCALES - GENERAL
PAINLEVEL_OUTOF10: 6
PAINLEVEL_OUTOF10: 8
PAINLEVEL_OUTOF10: 4
PAINLEVEL_OUTOF10: 5

## 2024-05-15 ASSESSMENT — PAIN DESCRIPTION - LOCATION
LOCATION: ABDOMEN

## 2024-05-15 ASSESSMENT — PAIN DESCRIPTION - PAIN TYPE: TYPE: ACUTE PAIN;SURGICAL PAIN

## 2024-05-15 NOTE — CARE COORDINATION
05/15/24 1459   IMM Letter   IMM Letter given to Patient/Family/Significant other/Guardian/POA/by: IMM given to the patient by CM   IMM Letter date given: 05/15/24   IMM Letter time given: 1500

## 2024-05-15 NOTE — PLAN OF CARE
Problem: Discharge Planning  Goal: Discharge to home or other facility with appropriate resources  5/15/2024 1034 by Dayanara Rivas RN  Outcome: Progressing  Flowsheets (Taken 5/15/2024 0815)  Discharge to home or other facility with appropriate resources:   Identify barriers to discharge with patient and caregiver   Identify discharge learning needs (meds, wound care, etc)  5/15/2024 0646 by Je Solano RN  Outcome: Progressing     Problem: Safety - Adult  Goal: Free from fall injury  5/15/2024 1034 by Dayanara Rivas RN  Outcome: Progressing  5/15/2024 0646 by Je Solano RN  Outcome: Progressing     Problem: Pain  Goal: Verbalizes/displays adequate comfort level or baseline comfort level  5/15/2024 1034 by Dayanara Rivas RN  Outcome: Progressing  Flowsheets (Taken 5/15/2024 0800)  Verbalizes/displays adequate comfort level or baseline comfort level:   Encourage patient to monitor pain and request assistance   Assess pain using appropriate pain scale   Administer analgesics based on type and severity of pain and evaluate response   Implement non-pharmacological measures as appropriate and evaluate response  5/15/2024 0646 by Je Solano RN  Outcome: Progressing     Problem: Skin/Tissue Integrity  Goal: Absence of new skin breakdown  Description: 1.  Monitor for areas of redness and/or skin breakdown  2.  Assess vascular access sites hourly  3.  Every 4-6 hours minimum:  Change oxygen saturation probe site  4.  Every 4-6 hours:  If on nasal continuous positive airway pressure, respiratory therapy assess nares and determine need for appliance change or resting period.  5/15/2024 1034 by Dayanara Rivas RN  Outcome: Progressing  5/15/2024 0646 by Je Solano RN  Outcome: Progressing     Problem: ABCDS Injury Assessment  Goal: Absence of physical injury  5/15/2024 1034 by Dayanara Rivas RN  Outcome: Progressing  5/15/2024 0646 by Je Solano RN  Outcome: Progressing

## 2024-05-15 NOTE — CARE COORDINATION
Discharge Planning.     The SW called and spoke with Abiodun with [facility] who confirmed that [facility] care in [place], [place] can accept the patient. The SW confirmed that the patient will just need PT/OT/RN at discharge.     [address] [address] [phone]         Electronically signed by ERNA Anthony on 5/15/2024 at 12:58 PM

## 2024-05-15 NOTE — PROGRESS NOTES
CALORIE COUNT and BRIEF NUTRITION NOTE    Patient Name: Sonja Mcmahan Admission Date: 5/7/2024    Calorie Count x 3 days: 5/13-5/16. Current meal & supplement consumption estimated to be less than 50% of assessed nutritional needs.      Calorie count requires complete meal documentation from 9 consecutive meals.     Current diet and supplement order   ADULT DIET; Regular; Low Fiber  ADULT ORAL NUTRITION SUPPLEMENT; Breakfast, Dinner, Lunch; Clear Liquid Oral Supplement       COMPARATIVE STANDARDS  Total Energy Requirements (kcals/day): 1793     Protein (g):         Fluid (mL/day):  1793      Nutrition Goal    Meet greater than 50% of estimated needs by mouth.    Date Consumed PO Intake Kcal %   Kcal met PO Intake grams protein %  Protein Met   Comments   5/13 251  11  X 1meal     5/14 230 13% 12 19% X 3meal     5/15 61  2  Bkf                                  Information above may not be true assessment on calorie and protein consumed d/t limited information available.      Intervention & Recommendations:   1.  Results will be posted daily as available  2.  Ensure clear BID, Ensure compact once/day    Candice Jose RD, LD  Contact Number: [phone]

## 2024-05-15 NOTE — DISCHARGE INSTR - COC
Treatments After Discharge:   - Follow up with Gen Surg as outpatient. Continue XAVIER drain until follow-up, empty PRN and monitor output.   - Follow-up with GI as outpatient.   - Follow-up with Oncology as outpatient for gastric adenocarcinoma monitoring/surveillance. No adjuvant therapy recommended at this time.   - Follow-up with PCP.   - Continue home care PT/OT/RN/HHA  - Tylenol PRN for pain. Norco 5-325 mg daily PRN (disp 7 tabs) for severe breakthrough pain.   - Continue Mirtazapine 7.5 mg nightly for depressed mood, insomnia, and poor PO intake.  - Continue PO magnesium supplementation.   - Ambulator BP monitoring. Currently stable on metoprolol only. Resume lisinopril and HCTZ as needed.      CODE: Full Code  Diet: ADULT DIET; Regular; Low Fiber  ADULT ORAL NUTRITION SUPPLEMENT; Breakfast, Dinner, Lunch; Clear Liquid Oral Supplement  ADULT DIET; Full Liquid    Physician Certification: I certify the above information and transfer of Sonja Mcmahan  is necessary for the continuing treatment of the diagnosis listed and that she requires Home Care PT/OT/RN/HHA for less 30 days.     Update Admission H&P: No change in H&P    PHYSICIAN SIGNATURE:  Electronically signed by Alex Rico MD on 5/16/24 at 8:57 AM EDT

## 2024-05-15 NOTE — PATIENT CARE CONFERENCE
medication  Consultations/Labs/X-rays: Labs MWF    Patient/Family Education provided by team:    Discharge Plan   Estimated Length of Stay:0 days  Destination: Home  Pass:No  Services at Discharge: HHOT/PT  Equipment at Discharge: RW  Factors facilitating achievement of predicted outcomes: Pleasant  Barriers to the achievement of predicted outcomes: Pain, Cognitive deficit, Limited safety awareness, Decreased endurance, Upper extremity weakness, and Lower extremity weakness    Patient Goals:  Get better, regain strength, get back home.     Interdisciplinary Individualized Plan of Care Review of Previous Week:    Medical and functional progress towards goals:  Medically the patient is doing well, labs and vitals are stable, ready for discharge today.  Pt has made good progress during therapy. Michelle for all ADL completion and functional mobility. Ongoing limitations d/t nausea, pain, and decreased activity tolerance. Recommend SUP for all ADL/IADL completion at d/c and assist required for XAVIER drain management and waterproofing for bathing.   Barriers towards progress:  Pain, cognition, weaknes  Interventions to address Barriers:  pt d/c'ing  Goals still appropriate:  Yes  Modifications to goals: None  Continue Current Plan of Care:  Yes  Modifications to Plan of Care: None    Rehab Team Members in attendance for Team Conference:  Nelson Whiting, MSW, LSW    Candice Jose, RD, LD    Radha Cevallos, OTR/L    Marguerite Nieto, PT, DPT    Mally Farr M.A., CCC-SLP    Zhao Ram, BSN, RN, CRRN (Charge RN)    MARISELA Orozco PT, DPT,     I, the Rehab Physician, led the above team conference and agree with all decisions made, and approve the established interdisciplinary plan of care as documented within the medical record of Sonja Mcmahan.    Alex Rico MD

## 2024-05-15 NOTE — PROGRESS NOTES
[facility] - Inpatient Rehabilitation Department   Phone: [phone]    Speech Therapy    [] Initial Evaluation            [x] Daily Treatment Note         [x] Discharge Summary      Patient: Sonja Mcmahan   : 1950   MRN: [id number]   Date of Service:  5/15/2024  Admitting Diagnosis: Hiatal hernia  Current Admission Summary: Sonja Mcmahan is a 73 y.o. female with PMHx notable for HTN, HLD, DM2, GERD who presented on 4/15 with dyspnea, intractable abdominal pain. Cardiology was consulted, recommended NM Stress Test which showed no reversible ischemia, low-risk. TTE stable from prior with LVEF 55-60%, grade 2 diastolic dysfunction, mild aortic stenosis, mild tricuspid regurgitation. She had EGD on , demonstrating large hiatal hernia, also had esophageal dilation performed and gastric antrum mass biopsied. On  she underwent laparoscopic hiatal hernia repair, cholecystectomy, and hemigastrectomy w/ gastrojejunostomy. 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 Oncology consulted, not recommending adjuvant treatment at this time. Hospital course complicated by: hypotension, leukocytosis, fevers, hypokalemia, PAT, intraabdominal abscess.   Past Medical History:  has a past medical history of Asthma, Diabetes mellitus (HCC), Hyperlipidemia, and Hypertension.  Past Surgical History:  has a past surgical history that includes Hysterectomy, total abdominal; Elbow surgery; Upper gastrointestinal endoscopy (N/A, 2024); Upper gastrointestinal endoscopy (N/A, 2024); Upper gastrointestinal endoscopy (N/A, 2024); hiatal hernia repair (N/A, 2024); Cholecystectomy, laparoscopic (N/A, 2024); and gastrectomy (N/A, 2024).  Precautions/Restrictions: high fall risk        Pre-Admission Information   Living Status: Pt lives alone. Has two adult daughters who live locally   Occupation/School: Pt is

## 2024-05-15 NOTE — PROGRESS NOTES
CASE SUMMARY: (STOMACH)   Standard(s): AJCC-UICC 8     SPECIMEN     Procedure   Subtotal gastrectomy (resection of the entire body of the stomach through the antrum, pylorus to the 1st section of the duodenum)     TUMOR     Tumor Site   Use the esophageal checklist if the tumor involves the EGJ and the tumor midpoint is 2 cm or less into the proximal stomach.   Mid body (per OR note)     Histologic Type   Adenocarcinoma    Adenocarcinoma Classification (based on WHO)    Tubular adenocarcinoma     Histologic Grade   G1, well differentiated     Tumor Size   Cannot be determined (explain): The adenoma polyp measures 4cm with   invasion in deep portion     Tumor Extent   Invades submucosa     Treatment Effect   No known presurgical therapy     Lymphatic and / or Vascular Invasion   Not identified     MARGINS     Margin Status for Invasive Carcinoma   All margins negative for invasive carcinoma   Closest Margin(s) to Invasive Carcinoma   At least 2 cm from the presumed proximal staple line and 16.5 cm from the   presumed distal staple line     Margin Status for Dysplasia   All margins negative for dysplasia     REGIONAL LYMPH NODES     Regional Lymph Node Status   Regional lymph nodes present    All regional lymph nodes negative for tumor    Number of Lymph Nodes Examined    Exact number (specify): 16     pTNM CLASSIFICATION (AJCC 8th Edition)   Reporting of pT, pN, and (when applicable) pM categories is based on   information available to the pathologist at the time the report is   issued. As per the AJCC (Chapter 1, 8th Ed.) it is the managing   physician's responsibility to establish the final pathologic stage based   upon all pertinent information, including but potentially not limited to   this pathology report.     pT Category   pT1b: Tumor invades the submucosa     pN Category#   # Metastatic tumor deposits in the subserosal fat adjacent to a gastric   carcinoma, without evidence of residual lymph node tissue,

## 2024-05-15 NOTE — PROGRESS NOTES
Assessment completed. Patient was asleep but easily aroused, then alert and oriented x 4 and able to make all her needs known. Transferred from bed to chair with walker and CGA x 1 and tolerated fairly well. Patient c/o nausea. PRN Zofran given. XAVIER drain in place and draining scant-small amount of brown liquid. Redness to site improving. Steri strips to abd incision sites. No drainage or s/s of infection noted. Nausea improved and patient able to tolerate crackers and muffin. Medications administered as ordered. POC and education reviewed with patient and mutually agreed upon. Safety interventions in place. All needs met. Patient working on breakfast at this time. Call light in reach. Will continue to monitor.    1650: Dressing change and XAVIER drain care/emptying demonstrated to patient and daughter and all questions answered. Both acknowledged understanding showed confidence in doing it. Patient stated that it was the third time that someone has demonstrated to her. No further questions. Will continue to monitor.

## 2024-05-15 NOTE — PROGRESS NOTES
Sonja Mcmahan  5/15/2024  [id number]    Chief Complaint: Hiatal hernia    Subjective:   No acute events overnight.     Noticing nausea with certain textures of liquid, especially with Ensure supplements and oral nystatin. Denies any abdominal pain, fevers/chills, chest pain, dyspnea.     Last BM: Stool Occurrence: 1 (05/14/24 0647)    Objective:  Patient Vitals for the past 24 hrs:   BP Temp Temp src Pulse Resp SpO2   05/15/24 0948 -- -- -- -- 16 --   05/15/24 0918 -- -- -- -- 16 --   05/15/24 0800 (!) 141/68 98.3 °F (36.8 °C) Oral 76 16 96 %   05/14/24 2030 121/66 98.5 °F (36.9 °C) Oral 81 17 95 %     Gen: No distress, pleasant.   HEENT: Normocephalic, atraumatic.   CV: Regular rate and rhythm. Extremities warm, well perfused.   Resp: No respiratory distress. CTAB.  Abd: Soft, nontender. XAVIER drain, yellow-green output.  Ext: No edema.   Neuro: Alert, oriented, appropriately interactive.     Laboratory data: Available via EMR.     Therapy progress:       PT    Supine to Sit: Independent  Sit to Supine: Independent   Sit to Stand: Independent  Chair/Bed to Chair Transfer: Independent  Car Transfer: Independent  Ambulation 10 ft: Independent  Ambulation 50 ft: Independent  Ambulation 150 ft: Independent  Stairs - 1 Step: Independent  Stairs - 4 Step: Independent  Stairs - 12 Step: Independent    OT    Eating: Independent  Oral Hygiene: Independent  Bathing: Independent  Upper Body Dressing: Independent  Lower Body Dressing: Independent  Toilet Transfer: Independent  Toilet Hygiene: Independent    Speech Therapy    Pt made progress towards/ met all cognitive goals. Pt requires min cues for completion of executive function tasks out of routine. Pt reports her daughter lives down the street from her and is able to assist/ check accuracy for med management. Suspect pt is cognitively clearing back to her baseline per accuracy with tasks and pt report. Pt with improved orientation and functional recall. Recommend

## 2024-05-15 NOTE — PROGRESS NOTES
[facility] - Inpatient Rehabilitation Department   Phone: [phone]    Physical Therapy    [] Initial Evaluation            [x] Daily Treatment Note         [x] Discharge Summary      Patient: Sonja Mcmahan   : 1950   MRN: [id number]   Date of Service:  5/15/2024  Admitting Diagnosis: Hiatal hernia  Current Admission Summary: Sonja Mcmahan is a 73 y.o. female with PMHx notable for HTN, HLD, DM2, GERD who presented on 4/15 with dyspnea, intractable abdominal pain. Cardiology was consulted, recommended NM Stress Test which showed no reversible ischemia, low-risk. TTE stable from prior with LVEF 55-60%, grade 2 diastolic dysfunction, mild aortic stenosis, mild tricuspid regurgitation. She had EGD on , demonstrating large hiatal hernia, also had esophageal dilation performed and gastric antrum mass biopsied. On  she underwent laparoscopic hiatal hernia repair, cholecystectomy, and hemigastrectomy w/ gastrojejunostomy. 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 Oncology consulted, not recommending adjuvant treatment at this time. Hospital course complicated by: hypotension, leukocytosis, fevers, hypokalemia, PAT, intraabdominal abscess.   Past Medical History:  has a past medical history of Asthma, Diabetes mellitus (HCC), Hyperlipidemia, and Hypertension.  Past Surgical History:  has a past surgical history that includes Hysterectomy, total abdominal; Elbow surgery; Upper gastrointestinal endoscopy (N/A, 2024); Upper gastrointestinal endoscopy (N/A, 2024); Upper gastrointestinal endoscopy (N/A, 2024); hiatal hernia repair (N/A, 2024); Cholecystectomy, laparoscopic (N/A, 2024); and gastrectomy (N/A, 2024).  Discharge Recommendations: home with HHPT services  DME Required For Discharge: DME to be determined pending patient progress  Precautions/Restrictions: high fall risk, Full

## 2024-05-16 VITALS
BODY MASS INDEX: 32.94 KG/M2 | WEIGHT: 179 LBS | DIASTOLIC BLOOD PRESSURE: 83 MMHG | TEMPERATURE: 98 F | RESPIRATION RATE: 18 BRPM | SYSTOLIC BLOOD PRESSURE: 128 MMHG | HEIGHT: 62 IN | HEART RATE: 76 BPM | OXYGEN SATURATION: 95 %

## 2024-05-16 LAB
GLUCOSE BLD-MCNC: 87 MG/DL (ref 70–99)
GLUCOSE BLD-MCNC: 87 MG/DL (ref 70–99)
PERFORMED ON: NORMAL
PERFORMED ON: NORMAL

## 2024-05-16 PROCEDURE — 6370000000 HC RX 637 (ALT 250 FOR IP): Performed by: STUDENT IN AN ORGANIZED HEALTH CARE EDUCATION/TRAINING PROGRAM

## 2024-05-16 PROCEDURE — 6370000000 HC RX 637 (ALT 250 FOR IP): Performed by: NURSE PRACTITIONER

## 2024-05-16 PROCEDURE — 6360000002 HC RX W HCPCS: Performed by: STUDENT IN AN ORGANIZED HEALTH CARE EDUCATION/TRAINING PROGRAM

## 2024-05-16 RX ORDER — LANOLIN ALCOHOL/MO/W.PET/CERES
400 CREAM (GRAM) TOPICAL DAILY
Qty: 30 TABLET | Refills: 0 | Status: SHIPPED | OUTPATIENT
Start: 2024-05-17

## 2024-05-16 RX ORDER — MIRTAZAPINE 15 MG/1
7.5 TABLET, FILM COATED ORAL NIGHTLY
Qty: 15 TABLET | Refills: 0 | Status: SHIPPED | OUTPATIENT
Start: 2024-05-16 | End: 2024-06-15

## 2024-05-16 RX ORDER — HYDROCODONE BITARTRATE AND ACETAMINOPHEN 5; 325 MG/1; MG/1
1 TABLET ORAL DAILY PRN
Qty: 7 TABLET | Refills: 0 | Status: SHIPPED | OUTPATIENT
Start: 2024-05-16 | End: 2024-05-23

## 2024-05-16 RX ORDER — ONDANSETRON 4 MG/1
4 TABLET, ORALLY DISINTEGRATING ORAL EVERY 8 HOURS PRN
Qty: 30 TABLET | Refills: 0 | Status: SHIPPED | OUTPATIENT
Start: 2024-05-16

## 2024-05-16 RX ORDER — PANTOPRAZOLE SODIUM 40 MG/1
40 TABLET, DELAYED RELEASE ORAL
Qty: 60 TABLET | Refills: 0 | Status: SHIPPED | OUTPATIENT
Start: 2024-05-16

## 2024-05-16 RX ORDER — POLYETHYLENE GLYCOL 3350 17 G/17G
17 POWDER, FOR SOLUTION ORAL DAILY PRN
Qty: 30 PACKET | Refills: 0 | Status: SHIPPED | OUTPATIENT
Start: 2024-05-16 | End: 2024-06-15

## 2024-05-16 RX ORDER — MIRTAZAPINE 7.5 MG/1
7.5 TABLET, FILM COATED ORAL NIGHTLY
Qty: 30 TABLET | Refills: 0 | Status: SHIPPED | OUTPATIENT
Start: 2024-05-16 | End: 2024-05-16

## 2024-05-16 RX ADMIN — ASPIRIN 81 MG 81 MG: 81 TABLET ORAL at 08:31

## 2024-05-16 RX ADMIN — ONDANSETRON 4 MG: 4 TABLET, ORALLY DISINTEGRATING ORAL at 08:30

## 2024-05-16 RX ADMIN — ENOXAPARIN SODIUM 40 MG: 100 INJECTION SUBCUTANEOUS at 08:30

## 2024-05-16 RX ADMIN — Medication 400 MG: at 08:31

## 2024-05-16 RX ADMIN — METOPROLOL TARTRATE 12.5 MG: 25 TABLET, FILM COATED ORAL at 08:31

## 2024-05-16 RX ADMIN — ACETAMINOPHEN 1000 MG: 500 TABLET ORAL at 06:21

## 2024-05-16 RX ADMIN — PANTOPRAZOLE SODIUM 40 MG: 40 TABLET, DELAYED RELEASE ORAL at 06:21

## 2024-05-16 RX ADMIN — THERA TABS 1 TABLET: TAB at 08:31

## 2024-05-16 RX ADMIN — Medication 1 CAPSULE: at 08:31

## 2024-05-16 RX ADMIN — MUPIROCIN: 20 OINTMENT TOPICAL at 08:32

## 2024-05-16 RX ADMIN — HYDROCODONE BITARTRATE AND ACETAMINOPHEN 1 TABLET: 5; 325 TABLET ORAL at 10:51

## 2024-05-16 ASSESSMENT — PAIN SCALES - GENERAL
PAINLEVEL_OUTOF10: 0
PAINLEVEL_OUTOF10: 2
PAINLEVEL_OUTOF10: 0

## 2024-05-16 ASSESSMENT — PAIN DESCRIPTION - LOCATION: LOCATION: ABDOMEN

## 2024-05-16 ASSESSMENT — PAIN DESCRIPTION - DESCRIPTORS: DESCRIPTORS: ACHING

## 2024-05-16 ASSESSMENT — PAIN - FUNCTIONAL ASSESSMENT: PAIN_FUNCTIONAL_ASSESSMENT: ACTIVITIES ARE NOT PREVENTED

## 2024-05-16 ASSESSMENT — PAIN SCALES - WONG BAKER: WONGBAKER_NUMERICALRESPONSE: NO HURT

## 2024-05-16 NOTE — PLAN OF CARE
Completed   Problem: Discharge Planning  Goal: Discharge to home or other facility with appropriate resources  5/16/2024 0854 by Giana Birmingham RN  Outcome: Progressing  Flowsheets (Taken 5/16/2024 0847)  Discharge to home or other facility with appropriate resources:   Identify barriers to discharge with patient and caregiver   Arrange for needed discharge resources and transportation as appropriate   Identify discharge learning needs (meds, wound care, etc)     Problem: Safety - Adult  Goal: Free from fall injury  5/16/2024 0854 by Giana Birmingham RN  Outcome: Progressing     Problem: Pain  Goal: Verbalizes/displays adequate comfort level or baseline comfort level  5/16/2024 0854 by Giana Birmingham RN  Outcome: Progressing  Flowsheets (Taken 5/16/2024 0823)  Verbalizes/displays adequate comfort level or baseline comfort level:   Encourage patient to monitor pain and request assistance   Assess pain using appropriate pain scale   Administer analgesics based on type and severity of pain and evaluate response   Implement non-pharmacological measures as appropriate and evaluate response     Problem: Skin/Tissue Integrity  Goal: Absence of new skin breakdown  Description: 1.  Monitor for areas of redness and/or skin breakdown  2.  Assess vascular access sites hourly  3.  Every 4-6 hours minimum:  Change oxygen saturation probe site  4.  Every 4-6 hours:  If on nasal continuous positive airway pressure, respiratory therapy assess nares and determine need for appliance change or resting period.  5/16/2024 0854 by Giana Birmingham RN  Outcome: Progressing     Problem: ABCDS Injury Assessment  Goal: Absence of physical injury  5/16/2024 0854 by Ginaa Birmingham RN  Outcome: Progressing     Problem: Nutrition Deficit:  Goal: Optimize nutritional status  5/16/2024 0854 by Giana Birmingham RN  Outcome: Progressing

## 2024-05-16 NOTE — CARE COORDINATION
SW called Cristela Sumner who works with pt's HHC agency, [employer].  She stated they have spoken to the patient and have everything they need to start home care.  No other case managemnt needs identified at this time.    Electronically signed by ERNA Ventura, LSW on 5/16/2024 at 12:43 PM

## 2024-05-16 NOTE — DISCHARGE SUMMARY
Physical Medicine & Rehabilitation  Discharge Summary     Patient Identification:  Sonja Mcmahan  : 1950  Admit date: 2024  Discharge date: 2024  Attending provider: No att. providers found        Primary care provider: Abiodun Amanda PA     Discharge Diagnoses:   Patient Active Problem List   Diagnosis    Gastroesophageal reflux disease with esophagitis    Benign essential HTN    Hyperlipidemia    Sinus tachycardia    Heart murmur, systolic    Hiatal hernia    Obesity    Diverticulosis    History of hysterectomy    Intractable abdominal pain    Gall stones    Calculus of gallbladder with chronic cholecystitis without obstruction    Gastric mass    Mild malnutrition (HCC)    SVT (supraventricular tachycardia) ([facility])    LBBB (left bundle branch block)    PAT (paroxysmal atrial tachycardia) ([facility])       Discharge Functional Status:      Physical therapy:  Supine to Sit: Independent  Sit to Supine: Independent      Sit to Stand: Independent  Chair/Bed to Chair Transfer: Independent  Car Transfer: Independent     Ambulation 10 ft: Independent  Ambulation 50 ft: Independent  Ambulation 150 ft: Independent  Stairs - 1 Step: Independent  Stairs - 4 Step: Independent  Stairs - 12 Step: Independent    Occupational therapy:  Eating: Independent  Oral Hygiene: Independent  Bathing: Independent  Upper Body Dressing: Independent  Lower Body Dressing: Independent     Toilet Transfer: Independent  Toilet Hygiene: Independent    Speech therapy:    Pt made progress towards/ met all cognitive goals. Pt requires min cues for completion of executive function tasks out of routine. Pt reports her daughter lives down the street from her and is able to assist/ check accuracy for med management. Suspect pt is cognitively clearing back to her baseline per accuracy with tasks and pt report. Pt with improved orientation and functional recall. Recommend assistance/ checks for medication management from daughter with no

## 2024-05-16 NOTE — PROGRESS NOTES
Nutrition Note    Pt with d/c order.  Po intake deemed inadequate.  Continue to encourage small, frequent meals with calorie dense foods/ low fiber, & good protein sources at each meal/snack.  Encouraged liquids & ONS between regular meals.       Electronically signed by Candice Jose RD, MCKENZIE on 5/16/24 at 10:49 AM EDT    Contact: [phone]

## 2024-05-16 NOTE — PROGRESS NOTES
Pt sitting up in bed. Alert and oriented. Gets up with x 1 assist with gait belt and walker.  Tolerates a regular diet and takes pills whole with water. Pt remains on RA, VSS. Assessment completed. Abdominal incision is MICHAELA with steri strips. XAVIER in place. Pt rates her abdominal pain at a 4 on a verbal scale. Denies any needs for medicine. Instructed pt to call out for needs. Bed alarm on. Call light in reach, will continue to monitor.

## 2024-05-16 NOTE — FLOWSHEET NOTE
Discharge instructions reviewed with pt and daughter. ABD incicion and XAVIER site management reviewed. All questions answered and verbalized understanding. Pt to get medications at [facility]. Pt discharged with belongings, STNA took to car without incident, daughter to transport to her house.

## 2024-05-16 NOTE — PLAN OF CARE
Problem: Discharge Planning  Goal: Discharge to home or other facility with appropriate resources  5/15/2024 2328 by Mag Wood RN  Outcome: Progressing  5/15/2024 1034 by Dayanara Rivas RN  Outcome: Progressing  Flowsheets (Taken 5/15/2024 0815)  Discharge to home or other facility with appropriate resources:   Identify barriers to discharge with patient and caregiver   Identify discharge learning needs (meds, wound care, etc)     Problem: Safety - Adult  Goal: Free from fall injury  5/15/2024 2328 by Mag Wood RN  Outcome: Progressing  5/15/2024 1034 by Dayanara Rivas RN  Outcome: Progressing     Problem: Pain  Goal: Verbalizes/displays adequate comfort level or baseline comfort level  5/15/2024 2328 by Mag Wood RN  Outcome: Progressing  5/15/2024 1034 by Dayanara Rivas RN  Outcome: Progressing  Flowsheets (Taken 5/15/2024 0800)  Verbalizes/displays adequate comfort level or baseline comfort level:   Encourage patient to monitor pain and request assistance   Assess pain using appropriate pain scale   Administer analgesics based on type and severity of pain and evaluate response   Implement non-pharmacological measures as appropriate and evaluate response     Problem: Skin/Tissue Integrity  Goal: Absence of new skin breakdown  Description: 1.  Monitor for areas of redness and/or skin breakdown  2.  Assess vascular access sites hourly  3.  Every 4-6 hours minimum:  Change oxygen saturation probe site  4.  Every 4-6 hours:  If on nasal continuous positive airway pressure, respiratory therapy assess nares and determine need for appliance change or resting period.  5/15/2024 2328 by Mag Wood RN  Outcome: Progressing  5/15/2024 1034 by Dayanara Rivas RN  Outcome: Progressing     Problem: ABCDS Injury Assessment  Goal: Absence of physical injury  5/15/2024 2328 by Mag Wood RN  Outcome: Progressing  5/15/2024 1034 by Dayanara Rivas RN  Outcome: Progressing     Problem:

## 2024-05-16 NOTE — PROGRESS NOTES
CLINICAL PHARMACY NOTE: MEDS TO BEDS    Total # of Prescriptions Filled: 7   The following medications were delivered to the patient:  MAGNESIUM OXIDE 400  PANTOPRAZOLE SODIUM 40MG  ONDANSETRON 4MG  METOPROLOL TARTRATE 25MG  HYDROCODONE/APAP 5  MIRTAZAPINE 15MG  POLYETHYLENE GLYCOL 3350 17GM      Additional Documentation:  Patients daughter picked up in outpatient pharmacy = signed  Barbara Gamez - Pharmacy Tech.

## 2024-05-16 NOTE — PROGRESS NOTES
ARU Discharge Assessment    Transportation  \"Has lack of transportation kept you from medical appointments, meetings, work, or from getting things needed for daily living?\"Check all that apply:  [] A.  Yes, it has kept me from medical appointments or from getting my medications  [] B.  Yes, it has kept me from non-medical meetings, appointments, work, or from getting things that I need  [x] C.  No  [] X.  Patient unable to respond  [] Y.  Patient declines to respond    Provision of Current Reconciled Medication List to Subsequent Provider at Discharge  [] No, current reconciled medication list not provided to the subsequent provider.  [x] Yes, current reconciled medication list provided to the subsequent provider. (**Select route of transmission below**)   [x] Via Electronic Health Record   [x] Via Health Information Exchange Organization  [] Verbal (e.g. in person, telephone, video conferencing)  [] Paper-based (e.g. fax, copies, printouts)   [] Other Methods (e.g. texting, email, CDs)    Provision of Current Reconciled Medication List to Patient at Discharge  [] No, current reconciled medication list not provided to the patient, family and/or caregiver.   [x] Yes, current reconciled medication list provided to the patient, family and/or caregiver.  (**Select route of transmission below**)   [x] Via Electronic Health Record (e.g., electronic access to patient portal)   [] Via Health Information Exchange Organization  [x] Verbal (e.g. in person, telephone, video conferencing)  [x] Paper-based (e.g. fax, copies, printouts)   [] Other Methods (e.g. texting, email, CDs)    Health Literacy  \"How often do you need to have someone help you when you read instructions, pamphlets, or other written material from your doctor or pharmacy?\"  [x]  0.  Never  []  1.  Rarely  []  2.  Sometimes  []  3.  Often  []  4.  Always  []  7.  Patient declines to respond  []  8.  Patient unable to respond    BIMS - **Must be completed in the

## 2024-05-20 LAB
FUNGUS SPEC CULT: NORMAL
LOEFFLER MB STN SPEC: NORMAL

## 2024-05-21 LAB
ACID FAST STN SPEC QL: NORMAL
MYCOBACTERIUM SPEC CULT: NORMAL

## 2024-05-23 ENCOUNTER — OFFICE VISIT (OUTPATIENT)
Dept: SURGERY | Age: 74
End: 2024-05-23

## 2024-05-23 VITALS — BODY MASS INDEX: 32.74 KG/M2 | DIASTOLIC BLOOD PRESSURE: 83 MMHG | SYSTOLIC BLOOD PRESSURE: 128 MMHG | WEIGHT: 179 LBS

## 2024-05-23 DIAGNOSIS — C16.2 MALIGNANT NEOPLASM OF BODY OF STOMACH (HCC): ICD-10-CM

## 2024-05-23 DIAGNOSIS — K31.89 GASTRIC MASS: Primary | ICD-10-CM

## 2024-05-23 PROCEDURE — 99024 POSTOP FOLLOW-UP VISIT: CPT | Performed by: SURGERY

## 2024-05-23 RX ORDER — LEVOFLOXACIN 750 MG/1
750 TABLET, FILM COATED ORAL DAILY
Qty: 10 TABLET | Refills: 0 | Status: SHIPPED | OUTPATIENT
Start: 2024-05-23 | End: 2024-06-02

## 2024-05-23 RX ORDER — ONDANSETRON 4 MG/1
4 TABLET, FILM COATED ORAL EVERY 4 HOURS PRN
Qty: 25 TABLET | Refills: 0 | Status: SHIPPED | OUTPATIENT
Start: 2024-05-23

## 2024-05-23 NOTE — PROGRESS NOTES
[facility] GENERAL AND LAPAROSCOPIC SURGERY          PATIENT NAME: Sonja Mcmahan     TODAY'S DATE: 5/23/2024    SUBJECTIVE:    Pt here for follow up.  Fair po / appetite, up with therapy, fatigues easily.  Drain in place.     OBJECTIVE:  VITALS:  /83   Wt 81.2 kg (179 lb)   BMI 32.74 kg/m²     CONSTITUTIONAL:  awake and alert  LUNGS:  clear to auscultation  ABDOMEN:  normal bowel sounds, soft, non-distended, non-tender, incision healed, no ellulitis or drainage  Drain: thinner drainage, malodorous, not shannon bilious anymore     Data:    PATHOLOGY  FINAL DIAGNOSIS:        A. Gallbladder, cholecystectomy:      - Cholelithiasis.      - Chronic cholecystitis.      - Negative for malignancy.        B. Soft tissue, hernia sac and contents, excision:      - Benign fibroadipose tissue.      - One benign lymph node.        C. Stomach, subtotal gastrectomy:      - Stomach with adenocarcinoma arising from adenoma- See Comment/ Case        Summary        - Resection margin negative for dysplasia or malignancy.        - No metastasis in sixteen lymph nodes (0/16).     Radiology Review:  None      ASSESSMENT AND PLAN:  S/P Paraesophageal HH repair, gastric resection for cancer, cholecystectomy  Making slow but steady progress post hospital stay and ARU  Diet reviewed, Ot / PT to continue Keep drain in place 80 - 120 dauly  Add Zofran, Peptobismol prn  Levaquin X 10 days if tolerate   Mounika CT in 2 weeks  See back in 2 weeks    David Mack MD

## 2024-05-27 LAB
FUNGUS SPEC CULT: NORMAL
LOEFFLER MB STN SPEC: NORMAL

## 2024-05-28 LAB
ACID FAST STN SPEC QL: NORMAL
MYCOBACTERIUM SPEC CULT: NORMAL

## 2024-05-29 ENCOUNTER — TELEPHONE (OUTPATIENT)
Dept: SURGERY | Age: 74
End: 2024-05-29

## 2024-05-29 NOTE — TELEPHONE ENCOUNTER
Referral for Oncology for [facility]    Dr Marcell Mederos [phone]  Fax # [phone]    Pt is requesting a referral

## 2024-06-03 LAB
FUNGUS SPEC CULT: NORMAL
LOEFFLER MB STN SPEC: NORMAL

## 2024-06-04 LAB
ACID FAST STN SPEC QL: NORMAL
MYCOBACTERIUM SPEC CULT: NORMAL

## 2024-06-05 ENCOUNTER — TELEPHONE (OUTPATIENT)
Dept: SURGERY | Age: 74
End: 2024-06-05

## 2024-06-05 NOTE — TELEPHONE ENCOUNTER
4/22 PO- LAPAROSCOPIC HERNIA HIATAL REPAIR  LAPAROSCOPIC CHOLECYSTECTOMY WITH CHOLANGIOGRAM-  HEMIGASTRECTOMY WITH GASTROJEJUNOSTOMY  Pt states is having trouble having a bowel movement since surgery. CT scan was ordered by  and has an appt today for that at [facility]. Pt has a post op appt with us tomorrow. Pt not sure if she can make CT scan appt b/c of constipation issues and also dry heaving. Should pt keep both sushma's?  Please call pt and advise.

## 2024-06-05 NOTE — TELEPHONE ENCOUNTER
Pt called back stating that she drank the contrast at home today for her CT scan scheduled at [facility]. She got there and they said they would not do the test because she is allergic to contrast.     She told them that she had a CT scan during her hospitalization at [facility] and she did not have any issues, they said that if we provide a letter stating that she did not have issues they will do the test.     I will check with Dr. Mack in the morning to see if he is ok with doing the letter and if she should keep her appointment with us tomorrow.

## 2024-06-05 NOTE — TELEPHONE ENCOUNTER
I called and talked to pt, she had a soft bm this morning, before that was 2-3 days prior. States that she is not drinking water like she was prior to surgery and she believes that this may have something to do with the slow bm's. I advised her to try to start drinking more water as this will help.   I also advised that she needs to have her CT done today and come for her appointment tomorrow with CJ as the CT scan will tell us if there is anything new going on.

## 2024-06-06 NOTE — TELEPHONE ENCOUNTER
Dr. Mack order for CT was oral contrast only, no IV. He advised that pt have her CT at [facility] and come see him after that.   I called and advised pt, gave her the [employer] Scheduling number to call and schedule the CT abd/pelvis, oral contrast only. And then to call the office to make her appt with CJ after that. Will cancel her appt for today.

## 2024-06-11 LAB
ACID FAST STN SPEC QL: NORMAL
MYCOBACTERIUM SPEC CULT: NORMAL

## 2024-06-17 ENCOUNTER — HOSPITAL ENCOUNTER (OUTPATIENT)
Dept: CT IMAGING | Age: 74
Discharge: HOME | End: 2024-06-17
Attending: SURGERY
Payer: MEDICARE

## 2024-06-17 DIAGNOSIS — K31.89 GASTRIC MASS: ICD-10-CM

## 2024-06-17 DIAGNOSIS — C16.2 MALIGNANT NEOPLASM OF BODY OF STOMACH (HCC): ICD-10-CM

## 2024-06-17 PROCEDURE — 6360000004 HC RX CONTRAST MEDICATION: Performed by: SURGERY

## 2024-06-17 PROCEDURE — 74177 CT ABD & PELVIS W/CONTRAST: CPT

## 2024-06-17 RX ADMIN — IOHEXOL 25 ML: 350 INJECTION, SOLUTION INTRAVENOUS at 15:59

## 2024-06-17 RX ADMIN — IOPAMIDOL 75 ML: 755 INJECTION, SOLUTION INTRAVENOUS at 16:00

## 2024-06-18 ENCOUNTER — OFFICE VISIT (OUTPATIENT)
Dept: SURGERY | Age: 74
End: 2024-06-18

## 2024-06-18 VITALS — DIASTOLIC BLOOD PRESSURE: 83 MMHG | SYSTOLIC BLOOD PRESSURE: 128 MMHG | BODY MASS INDEX: 32.74 KG/M2 | WEIGHT: 179 LBS

## 2024-06-18 DIAGNOSIS — K31.89 GASTRIC MASS: Primary | ICD-10-CM

## 2024-06-18 LAB
ACID FAST STN SPEC QL: NORMAL
MYCOBACTERIUM SPEC CULT: NORMAL

## 2024-06-18 PROCEDURE — 99024 POSTOP FOLLOW-UP VISIT: CPT | Performed by: SURGERY

## 2024-06-18 NOTE — PROGRESS NOTES
[facility] GENERAL AND LAPAROSCOPIC SURGERY          PATIENT NAME: Sonja Mcmahan     TODAY'S DATE: 6/18/2024    SUBJECTIVE:    Pt feeling well, no N/V. Eating pretty well, BM shaping up.  No fever.  Drain with minimal output. Few drops over days, maybe 5 ml.     OBJECTIVE:  VITALS:  /83   Wt 81.2 kg (179 lb)   BMI 32.74 kg/m²     CONSTITUTIONAL:  awake and alert  LUNGS:  clear to auscultation  ABDOMEN:  normal bowel sounds, soft, non-distended, non-tender, drain in upper abd     Data:      Radiology Review:  CT scan done 6/17/24      ASSESSMENT AND PLAN:  S/P HH repair, gastric resection for cancer, cholecystectomy    Official read of CT not done, but does look improved to me  Will remove drain today  See me again in 2 weeks  She saw Med Onc [facility], will follow up there in s few mos  Diet and activity reviewed, all questions answered    David Mack MD

## 2024-06-24 NOTE — PROGRESS NOTES
[facility]   Cardiac Follow Up     Referring Provider:  Abiodun Amanda PA     No chief complaint on file.    f/up for aortic stenosis    History of Present Illness:  Sonja Mcmahan is a 74 y.o. female her. She has a history of hypertension, hyperlipidemia, and diabetes. [facility] 10/2019 with normal coronaries. She had covid pna Dec 26  (2021), she was admitted twice total- she got up to 11L O2. She has portable O2 in case she needs it. She was vaccinated, no booster yet.    She stated she was diagnosed with a restrictive lung disease, post covid.     She was admitted 4/15/24-5/7/24.  She presented to hospital with c/o abdominal pain and dyspnea. She had been unable to tolerate PO intake. She was sent to emergency room by her GI doctor for evaluation. EGD on 4/18/2024- large hiatal hernia 4/22/2024- laparoscopic hiatal hernia repair- NGT in place.  Cardiology was consulted 4/29/24 for wide-complex tachycardia- Pt was in bed at the time, she did not have any symptoms. She came out of it on her own. Per chart review- patient had wide-complex tachycardia. CTA of chest ruled out PE. Dopplers of bilateral upper extremities were negative for DVT.   All of the episodes of wide-complex tachycardia seem to be sinus tachycardia or  atrial tachycardia with left bundle branch block. Stress test (no reversible ischemia) and echo obtained, results below. Added metoprolol tartrate 25mg BID.    She was admitted to inpatient rehabilitation on 5/7/2024 s/p Hiatal hernia.      Today she is here for 6 mos follow up.        Past Medical History:   has a past medical history of Asthma, Diabetes mellitus (HCC), Hyperlipidemia, and Hypertension.    Surgical History:   has a past surgical history that includes Hysterectomy, total abdominal; Elbow surgery; Upper gastrointestinal endoscopy (N/A, 4/18/2024); Upper gastrointestinal endoscopy (N/A, 4/18/2024); Upper gastrointestinal endoscopy (N/A, 4/18/2024); hiatal hernia 
requirement at time of acute illness.  Has Home care nurse that comes to see her. 7/21/22 CXR >  Significant improvement of pulmonary infiltrates seen on 1/4/2022 exam.    She got off oxygen in April 2023     Chronic noncardiac chest pain with flare > motrin prn , biofreeze recommended      Plan  Cardiac test and lab results personally reviewed by me during this office visit and discussed.     No medication changes  Continue risk factor modifications.   Call for any change in symptoms, call to report any changes in shortness of breath or development of chest pain with activity.    Follow up in 6 months         Patient's problem list, medications, allergies, past medical, surgical, social and family histories were reviewed and updated as appropriate.    I appreciate the opportunity of cooperating in the care of this individual.    Flavio Land M.D., [facility]    Scribe's attestation: This note was scribed in the presence of Dr. Shanda MD by Thao Lees RN  The scribe's documentation has been prepared under my direction and personally reviewed by me in its entirety. I confirm that the note above accurately reflects all work, treatment, procedures, and medical decision making performed by me.

## 2024-07-02 ENCOUNTER — OFFICE VISIT (OUTPATIENT)
Dept: CARDIOLOGY CLINIC | Age: 74
End: 2024-07-02
Payer: MEDICARE

## 2024-07-02 VITALS
OXYGEN SATURATION: 98 % | SYSTOLIC BLOOD PRESSURE: 110 MMHG | WEIGHT: 158 LBS | BODY MASS INDEX: 29.08 KG/M2 | HEART RATE: 96 BPM | HEIGHT: 62 IN | DIASTOLIC BLOOD PRESSURE: 72 MMHG

## 2024-07-02 DIAGNOSIS — E11.9 TYPE 2 DIABETES MELLITUS WITHOUT COMPLICATION, WITHOUT LONG-TERM CURRENT USE OF INSULIN (HCC): ICD-10-CM

## 2024-07-02 DIAGNOSIS — R07.2 PRECORDIAL PAIN: ICD-10-CM

## 2024-07-02 DIAGNOSIS — I10 ESSENTIAL HYPERTENSION: ICD-10-CM

## 2024-07-02 DIAGNOSIS — R06.02 SOB (SHORTNESS OF BREATH): ICD-10-CM

## 2024-07-02 DIAGNOSIS — R00.0 TACHYCARDIA: Primary | ICD-10-CM

## 2024-07-02 DIAGNOSIS — R01.1 HEART MURMUR, SYSTOLIC: ICD-10-CM

## 2024-07-02 DIAGNOSIS — E78.2 MIXED HYPERLIPIDEMIA: ICD-10-CM

## 2024-07-02 PROCEDURE — 1036F TOBACCO NON-USER: CPT | Performed by: INTERNAL MEDICINE

## 2024-07-02 PROCEDURE — G8427 DOCREV CUR MEDS BY ELIG CLIN: HCPCS | Performed by: INTERNAL MEDICINE

## 2024-07-02 PROCEDURE — 99214 OFFICE O/P EST MOD 30 MIN: CPT | Performed by: INTERNAL MEDICINE

## 2024-07-02 PROCEDURE — 1090F PRES/ABSN URINE INCON ASSESS: CPT | Performed by: INTERNAL MEDICINE

## 2024-07-02 PROCEDURE — 3074F SYST BP LT 130 MM HG: CPT | Performed by: INTERNAL MEDICINE

## 2024-07-02 PROCEDURE — 3078F DIAST BP <80 MM HG: CPT | Performed by: INTERNAL MEDICINE

## 2024-07-02 PROCEDURE — 3044F HG A1C LEVEL LT 7.0%: CPT | Performed by: INTERNAL MEDICINE

## 2024-07-02 PROCEDURE — 1123F ACP DISCUSS/DSCN MKR DOCD: CPT | Performed by: INTERNAL MEDICINE

## 2024-07-02 PROCEDURE — G8400 PT W/DXA NO RESULTS DOC: HCPCS | Performed by: INTERNAL MEDICINE

## 2024-07-02 PROCEDURE — G8417 CALC BMI ABV UP PARAM F/U: HCPCS | Performed by: INTERNAL MEDICINE

## 2024-07-02 NOTE — PATIENT INSTRUCTIONS
Elevate your feet and legs as much as possible  Increase the amount of protein in your diet    Continue risk factor modifications.   Call for any change in symptoms, call to report any changes in shortness of breath or development of chest pain with activity.    Follow up with Dr. Land in 6 months

## 2024-07-09 ENCOUNTER — OFFICE VISIT (OUTPATIENT)
Dept: SURGERY | Age: 74
End: 2024-07-09

## 2024-07-09 VITALS — DIASTOLIC BLOOD PRESSURE: 70 MMHG | BODY MASS INDEX: 28.9 KG/M2 | SYSTOLIC BLOOD PRESSURE: 130 MMHG | WEIGHT: 158 LBS

## 2024-07-09 DIAGNOSIS — K31.89 GASTRIC MASS: Primary | ICD-10-CM

## 2024-07-09 PROCEDURE — 99024 POSTOP FOLLOW-UP VISIT: CPT | Performed by: SURGERY

## 2024-07-09 NOTE — PROGRESS NOTES
[facility] GENERAL AND LAPAROSCOPIC SURGERY          PATIENT NAME: Sonja Mcmahan     TODAY'S DATE: 7/9/2024    SUBJECTIVE:    Pt feeling stronger, up walking well, feels ready to drive.  Drain removed at last visit, no issues after that.     OBJECTIVE:  VITALS:  /70   Wt 71.7 kg (158 lb)   BMI 28.90 kg/m²     CONSTITUTIONAL:  awake and alert  LUNGS:  clear to auscultation  ABDOMEN:  normal bowel sounds, soft, non-distended, non-tender, old incision and drain site look fine     Data:      Radiology Review:  None      ASSESSMENT AND PLAN:  S/P HH repair, gastric resection, gastrojejunostomy, cholecystectomy  Improving, some reflux due to small gastric pouch  Continue PPI and Mylanta prn  See me in 3 mos, sooner prn    David Mack MD

## 2024-09-25 ENCOUNTER — TELEPHONE (OUTPATIENT)
Dept: SURGERY | Age: 74
End: 2024-09-25

## 2024-10-15 ENCOUNTER — OFFICE VISIT (OUTPATIENT)
Dept: SURGERY | Age: 74
End: 2024-10-15
Payer: MEDICARE

## 2024-10-15 VITALS — BODY MASS INDEX: 27.44 KG/M2 | WEIGHT: 150 LBS | DIASTOLIC BLOOD PRESSURE: 63 MMHG | SYSTOLIC BLOOD PRESSURE: 167 MMHG

## 2024-10-15 DIAGNOSIS — C16.2 MALIGNANT NEOPLASM OF BODY OF STOMACH (HCC): Primary | ICD-10-CM

## 2024-10-15 PROCEDURE — G8427 DOCREV CUR MEDS BY ELIG CLIN: HCPCS | Performed by: SURGERY

## 2024-10-15 PROCEDURE — 1090F PRES/ABSN URINE INCON ASSESS: CPT | Performed by: SURGERY

## 2024-10-15 PROCEDURE — 3078F DIAST BP <80 MM HG: CPT | Performed by: SURGERY

## 2024-10-15 PROCEDURE — 1123F ACP DISCUSS/DSCN MKR DOCD: CPT | Performed by: SURGERY

## 2024-10-15 PROCEDURE — G8417 CALC BMI ABV UP PARAM F/U: HCPCS | Performed by: SURGERY

## 2024-10-15 PROCEDURE — G8400 PT W/DXA NO RESULTS DOC: HCPCS | Performed by: SURGERY

## 2024-10-15 PROCEDURE — 99212 OFFICE O/P EST SF 10 MIN: CPT | Performed by: SURGERY

## 2024-10-15 PROCEDURE — G8484 FLU IMMUNIZE NO ADMIN: HCPCS | Performed by: SURGERY

## 2024-10-15 PROCEDURE — 3077F SYST BP >= 140 MM HG: CPT | Performed by: SURGERY

## 2024-10-15 PROCEDURE — 1036F TOBACCO NON-USER: CPT | Performed by: SURGERY

## 2024-10-15 RX ORDER — FAMOTIDINE 20 MG/1
20 TABLET, FILM COATED ORAL 2 TIMES DAILY
COMMUNITY

## 2024-10-15 NOTE — PROGRESS NOTES
[facility] GENERAL AND LAPAROSCOPIC SURGERY          PATIENT NAME: Sonja Mcmahan     TODAY'S DATE: 10/15/2024    CC: gastric cancer    SUBJECTIVE:    Pt with overall stable weight. Has reflux. No sig abd pain. Normal BM.  No F/C.  Following up with Med Onc.     OBJECTIVE:  VITALS:  BP (!) 167/63   Wt 68 kg (150 lb)   BMI 27.44 kg/m²     CONSTITUTIONAL:  awake and alert  LUNGS:  clear to auscultation  HEART: RRR  LYMPH: neck and groin neg  ABDOMEN:  normal bowel sounds, soft, non-distended, non-tender, no mass, no hernia     Data:      Radiology Review:  None      ASSESSMENT AND PLAN:    S/P Subtotal gastrectomy.  Gastric ca history  Cont Med Onc follow up  PPI for reflux - manage with meds and diet plan  EGD and Follow up CT planned  See / call me prhung Mack MD

## 2024-10-24 DIAGNOSIS — C16.2 MALIGNANT NEOPLASM OF BODY OF STOMACH (HCC): ICD-10-CM

## 2024-10-25 RX ORDER — ONDANSETRON 4 MG/1
4 TABLET, FILM COATED ORAL EVERY 4 HOURS PRN
Qty: 25 TABLET | Refills: 0 | OUTPATIENT
Start: 2024-10-25

## 2024-12-03 ENCOUNTER — HOSPITAL ENCOUNTER (INPATIENT)
Age: 74
LOS: 3 days | Discharge: HOME | DRG: 862 | End: 2024-12-06
Attending: INTERNAL MEDICINE | Admitting: INTERNAL MEDICINE
Payer: MEDICARE

## 2024-12-03 ENCOUNTER — APPOINTMENT (OUTPATIENT)
Dept: CT IMAGING | Age: 74
DRG: 862 | End: 2024-12-03
Attending: INTERNAL MEDICINE
Payer: MEDICARE

## 2024-12-03 DIAGNOSIS — E87.8 ELECTROLYTE ABNORMALITY: Primary | ICD-10-CM

## 2024-12-03 PROBLEM — K65.1 INTRA-ABDOMINAL ABSCESS (HCC): Status: ACTIVE | Noted: 2024-12-03

## 2024-12-03 PROBLEM — T81.9XXA POSTOPERATIVE OR SURGICAL COMPLICATION, INITIAL ENCOUNTER: Status: ACTIVE | Noted: 2024-12-03

## 2024-12-03 LAB
ANION GAP SERPL CALCULATED.3IONS-SCNC: 10 MMOL/L (ref 3–16)
BASOPHILS # BLD: 0.1 K/UL (ref 0–0.2)
BASOPHILS NFR BLD: 0.6 %
BUN SERPL-MCNC: 8 MG/DL (ref 7–20)
CALCIUM SERPL-MCNC: 8.8 MG/DL (ref 8.3–10.6)
CHLORIDE SERPL-SCNC: 108 MMOL/L (ref 99–110)
CO2 SERPL-SCNC: 21 MMOL/L (ref 21–32)
CREAT SERPL-MCNC: 0.7 MG/DL (ref 0.6–1.2)
DEPRECATED RDW RBC AUTO: 14.3 % (ref 12.4–15.4)
EOSINOPHIL # BLD: 0.1 K/UL (ref 0–0.6)
EOSINOPHIL NFR BLD: 0.6 %
GFR SERPLBLD CREATININE-BSD FMLA CKD-EPI: 89 ML/MIN/{1.73_M2}
GLUCOSE SERPL-MCNC: 102 MG/DL (ref 70–99)
HCT VFR BLD AUTO: 32.2 % (ref 36–48)
HGB BLD-MCNC: 10.4 G/DL (ref 12–16)
LACTATE BLDV-SCNC: 0.7 MMOL/L (ref 0.4–2)
LYMPHOCYTES # BLD: 1.5 K/UL (ref 1–5.1)
LYMPHOCYTES NFR BLD: 15.3 %
MCH RBC QN AUTO: 29.1 PG (ref 26–34)
MCHC RBC AUTO-ENTMCNC: 32.3 G/DL (ref 31–36)
MCV RBC AUTO: 90.1 FL (ref 80–100)
MONOCYTES # BLD: 0.8 K/UL (ref 0–1.3)
MONOCYTES NFR BLD: 7.9 %
NEUTROPHILS # BLD: 7.5 K/UL (ref 1.7–7.7)
NEUTROPHILS NFR BLD: 75.6 %
PLATELET # BLD AUTO: 246 K/UL (ref 135–450)
PMV BLD AUTO: 8.6 FL (ref 5–10.5)
POTASSIUM SERPL-SCNC: 4.4 MMOL/L (ref 3.5–5.1)
RBC # BLD AUTO: 3.58 M/UL (ref 4–5.2)
SODIUM SERPL-SCNC: 139 MMOL/L (ref 136–145)
WBC # BLD AUTO: 9.9 K/UL (ref 4–11)

## 2024-12-03 PROCEDURE — 80048 BASIC METABOLIC PNL TOTAL CA: CPT

## 2024-12-03 PROCEDURE — 6360000002 HC RX W HCPCS: Performed by: INTERNAL MEDICINE

## 2024-12-03 PROCEDURE — 6360000004 HC RX CONTRAST MEDICATION: Performed by: NURSE PRACTITIONER

## 2024-12-03 PROCEDURE — 6360000004 HC RX CONTRAST MEDICATION

## 2024-12-03 PROCEDURE — 83605 ASSAY OF LACTIC ACID: CPT

## 2024-12-03 PROCEDURE — 6360000002 HC RX W HCPCS: Performed by: NURSE PRACTITIONER

## 2024-12-03 PROCEDURE — 99222 1ST HOSP IP/OBS MODERATE 55: CPT | Performed by: SURGERY

## 2024-12-03 PROCEDURE — 2580000003 HC RX 258: Performed by: NURSE PRACTITIONER

## 2024-12-03 PROCEDURE — 1200000000 HC SEMI PRIVATE

## 2024-12-03 PROCEDURE — 85025 COMPLETE CBC W/AUTO DIFF WBC: CPT

## 2024-12-03 PROCEDURE — APPNB30 APP NON BILLABLE TIME 0-30 MINS: Performed by: NURSE PRACTITIONER

## 2024-12-03 PROCEDURE — 6370000000 HC RX 637 (ALT 250 FOR IP): Performed by: INTERNAL MEDICINE

## 2024-12-03 PROCEDURE — 74177 CT ABD & PELVIS W/CONTRAST: CPT

## 2024-12-03 PROCEDURE — 36415 COLL VENOUS BLD VENIPUNCTURE: CPT

## 2024-12-03 RX ORDER — SODIUM CHLORIDE 0.9 % (FLUSH) 0.9 %
5-40 SYRINGE (ML) INJECTION EVERY 12 HOURS SCHEDULED
Status: DISCONTINUED | OUTPATIENT
Start: 2024-12-03 | End: 2024-12-06 | Stop reason: HOSPADM

## 2024-12-03 RX ORDER — SODIUM CHLORIDE 9 MG/ML
INJECTION, SOLUTION INTRAVENOUS PRN
Status: DISCONTINUED | OUTPATIENT
Start: 2024-12-03 | End: 2024-12-06 | Stop reason: HOSPADM

## 2024-12-03 RX ORDER — ACETAMINOPHEN 325 MG/1
650 TABLET ORAL EVERY 6 HOURS PRN
Status: DISCONTINUED | OUTPATIENT
Start: 2024-12-03 | End: 2024-12-06 | Stop reason: HOSPADM

## 2024-12-03 RX ORDER — DIATRIZOATE MEGLUMINE AND DIATRIZOATE SODIUM 660; 100 MG/ML; MG/ML
SOLUTION ORAL; RECTAL
Status: COMPLETED
Start: 2024-12-03 | End: 2024-12-03

## 2024-12-03 RX ORDER — MAGNESIUM SULFATE IN WATER 40 MG/ML
2000 INJECTION, SOLUTION INTRAVENOUS PRN
Status: DISCONTINUED | OUTPATIENT
Start: 2024-12-03 | End: 2024-12-06 | Stop reason: HOSPADM

## 2024-12-03 RX ORDER — METRONIDAZOLE 500 MG/100ML
500 INJECTION, SOLUTION INTRAVENOUS EVERY 8 HOURS
Status: DISCONTINUED | OUTPATIENT
Start: 2024-12-03 | End: 2024-12-05

## 2024-12-03 RX ORDER — IOPAMIDOL 755 MG/ML
75 INJECTION, SOLUTION INTRAVASCULAR
Status: COMPLETED | OUTPATIENT
Start: 2024-12-03 | End: 2024-12-03

## 2024-12-03 RX ORDER — CYANOCOBALAMIN 1000 UG/ML
1000 INJECTION, SOLUTION INTRAMUSCULAR; SUBCUTANEOUS ONCE
Status: COMPLETED | OUTPATIENT
Start: 2024-12-03 | End: 2024-12-03

## 2024-12-03 RX ORDER — PANTOPRAZOLE SODIUM 40 MG/10ML
40 INJECTION, POWDER, LYOPHILIZED, FOR SOLUTION INTRAVENOUS 2 TIMES DAILY
Status: DISCONTINUED | OUTPATIENT
Start: 2024-12-03 | End: 2024-12-04

## 2024-12-03 RX ORDER — ONDANSETRON 4 MG/1
4 TABLET, ORALLY DISINTEGRATING ORAL EVERY 8 HOURS PRN
Status: DISCONTINUED | OUTPATIENT
Start: 2024-12-03 | End: 2024-12-06 | Stop reason: HOSPADM

## 2024-12-03 RX ORDER — SODIUM CHLORIDE 0.9 % (FLUSH) 0.9 %
5-40 SYRINGE (ML) INJECTION PRN
Status: DISCONTINUED | OUTPATIENT
Start: 2024-12-03 | End: 2024-12-06 | Stop reason: HOSPADM

## 2024-12-03 RX ORDER — POTASSIUM CHLORIDE 1500 MG/1
40 TABLET, EXTENDED RELEASE ORAL PRN
Status: DISCONTINUED | OUTPATIENT
Start: 2024-12-03 | End: 2024-12-06 | Stop reason: HOSPADM

## 2024-12-03 RX ORDER — SODIUM CHLORIDE 9 MG/ML
INJECTION, SOLUTION INTRAVENOUS CONTINUOUS
Status: DISCONTINUED | OUTPATIENT
Start: 2024-12-03 | End: 2024-12-03

## 2024-12-03 RX ORDER — LIDOCAINE HYDROCHLORIDE 10 MG/ML
50 INJECTION, SOLUTION EPIDURAL; INFILTRATION; INTRACAUDAL; PERINEURAL ONCE
Status: DISCONTINUED | OUTPATIENT
Start: 2024-12-03 | End: 2024-12-06 | Stop reason: HOSPADM

## 2024-12-03 RX ORDER — POLYETHYLENE GLYCOL 3350 17 G/17G
17 POWDER, FOR SOLUTION ORAL DAILY PRN
Status: DISCONTINUED | OUTPATIENT
Start: 2024-12-03 | End: 2024-12-06 | Stop reason: HOSPADM

## 2024-12-03 RX ORDER — MORPHINE SULFATE 2 MG/ML
2 INJECTION, SOLUTION INTRAMUSCULAR; INTRAVENOUS EVERY 4 HOURS PRN
Status: DISCONTINUED | OUTPATIENT
Start: 2024-12-03 | End: 2024-12-06 | Stop reason: HOSPADM

## 2024-12-03 RX ORDER — ENOXAPARIN SODIUM 100 MG/ML
40 INJECTION SUBCUTANEOUS DAILY
Status: DISCONTINUED | OUTPATIENT
Start: 2024-12-04 | End: 2024-12-04

## 2024-12-03 RX ORDER — ACETAMINOPHEN 650 MG/1
650 SUPPOSITORY RECTAL EVERY 6 HOURS PRN
Status: DISCONTINUED | OUTPATIENT
Start: 2024-12-03 | End: 2024-12-06 | Stop reason: HOSPADM

## 2024-12-03 RX ORDER — PANTOPRAZOLE SODIUM 40 MG/10ML
40 INJECTION, POWDER, LYOPHILIZED, FOR SOLUTION INTRAVENOUS DAILY
Status: DISCONTINUED | OUTPATIENT
Start: 2024-12-03 | End: 2024-12-03

## 2024-12-03 RX ORDER — POTASSIUM CHLORIDE 7.45 MG/ML
10 INJECTION INTRAVENOUS PRN
Status: DISCONTINUED | OUTPATIENT
Start: 2024-12-03 | End: 2024-12-06 | Stop reason: HOSPADM

## 2024-12-03 RX ORDER — ONDANSETRON 2 MG/ML
4 INJECTION INTRAMUSCULAR; INTRAVENOUS EVERY 6 HOURS PRN
Status: DISCONTINUED | OUTPATIENT
Start: 2024-12-03 | End: 2024-12-06 | Stop reason: HOSPADM

## 2024-12-03 RX ORDER — ERGOCALCIFEROL 1.25 MG/1
50000 CAPSULE, LIQUID FILLED ORAL WEEKLY
Status: DISCONTINUED | OUTPATIENT
Start: 2024-12-03 | End: 2024-12-06 | Stop reason: HOSPADM

## 2024-12-03 RX ADMIN — PANTOPRAZOLE SODIUM 40 MG: 40 INJECTION, POWDER, FOR SOLUTION INTRAVENOUS at 20:32

## 2024-12-03 RX ADMIN — IOPAMIDOL 75 ML: 755 INJECTION, SOLUTION INTRAVENOUS at 16:15

## 2024-12-03 RX ADMIN — WATER 1000 MG: 1 INJECTION INTRAMUSCULAR; INTRAVENOUS; SUBCUTANEOUS at 01:38

## 2024-12-03 RX ADMIN — PANTOPRAZOLE SODIUM 40 MG: 40 INJECTION, POWDER, FOR SOLUTION INTRAVENOUS at 08:05

## 2024-12-03 RX ADMIN — MORPHINE SULFATE 2 MG: 2 INJECTION, SOLUTION INTRAMUSCULAR; INTRAVENOUS at 12:09

## 2024-12-03 RX ADMIN — SODIUM CHLORIDE, PRESERVATIVE FREE 10 ML: 5 INJECTION INTRAVENOUS at 12:14

## 2024-12-03 RX ADMIN — CYANOCOBALAMIN 1000 MCG: 1000 INJECTION INTRAMUSCULAR; SUBCUTANEOUS at 12:12

## 2024-12-03 RX ADMIN — METRONIDAZOLE 500 MG: 500 INJECTION, SOLUTION INTRAVENOUS at 08:10

## 2024-12-03 RX ADMIN — SODIUM CHLORIDE, PRESERVATIVE FREE 10 ML: 5 INJECTION INTRAVENOUS at 21:44

## 2024-12-03 RX ADMIN — MORPHINE SULFATE 2 MG: 2 INJECTION, SOLUTION INTRAMUSCULAR; INTRAVENOUS at 23:32

## 2024-12-03 RX ADMIN — SODIUM CHLORIDE, PRESERVATIVE FREE 10 ML: 5 INJECTION INTRAVENOUS at 03:55

## 2024-12-03 RX ADMIN — DIATRIZOATE MEGLUMINE AND DIATRIZOATE SODIUM 120 ML: 660; 100 LIQUID ORAL; RECTAL at 12:16

## 2024-12-03 RX ADMIN — SODIUM CHLORIDE, PRESERVATIVE FREE 10 ML: 5 INJECTION INTRAVENOUS at 20:32

## 2024-12-03 RX ADMIN — METRONIDAZOLE 500 MG: 500 INJECTION, SOLUTION INTRAVENOUS at 17:26

## 2024-12-03 RX ADMIN — SODIUM CHLORIDE, PRESERVATIVE FREE 10 ML: 5 INJECTION INTRAVENOUS at 01:38

## 2024-12-03 RX ADMIN — SODIUM CHLORIDE, PRESERVATIVE FREE 10 ML: 5 INJECTION INTRAVENOUS at 08:22

## 2024-12-03 RX ADMIN — SODIUM CHLORIDE: 9 INJECTION, SOLUTION INTRAVENOUS at 01:45

## 2024-12-03 RX ADMIN — ERGOCALCIFEROL 50000 UNITS: 1.25 CAPSULE ORAL at 12:01

## 2024-12-03 RX ADMIN — ONDANSETRON 4 MG: 2 INJECTION, SOLUTION INTRAMUSCULAR; INTRAVENOUS at 03:55

## 2024-12-03 RX ADMIN — ONDANSETRON 4 MG: 2 INJECTION, SOLUTION INTRAMUSCULAR; INTRAVENOUS at 12:07

## 2024-12-03 RX ADMIN — ONDANSETRON 4 MG: 2 INJECTION, SOLUTION INTRAMUSCULAR; INTRAVENOUS at 21:44

## 2024-12-03 RX ADMIN — METRONIDAZOLE 500 MG: 500 INJECTION, SOLUTION INTRAVENOUS at 01:46

## 2024-12-03 ASSESSMENT — PAIN SCALES - GENERAL
PAINLEVEL_OUTOF10: 6
PAINLEVEL_OUTOF10: 0
PAINLEVEL_OUTOF10: 5
PAINLEVEL_OUTOF10: 0

## 2024-12-03 ASSESSMENT — PAIN DESCRIPTION - LOCATION
LOCATION: ABDOMEN
LOCATION: ABDOMEN

## 2024-12-03 ASSESSMENT — PAIN DESCRIPTION - ORIENTATION: ORIENTATION: LEFT

## 2024-12-03 ASSESSMENT — PAIN SCALES - WONG BAKER
WONGBAKER_NUMERICALRESPONSE: NO HURT
WONGBAKER_NUMERICALRESPONSE: NO HURT

## 2024-12-03 ASSESSMENT — PAIN DESCRIPTION - FREQUENCY: FREQUENCY: CONTINUOUS

## 2024-12-03 ASSESSMENT — PAIN DESCRIPTION - DESCRIPTORS
DESCRIPTORS: ACHING
DESCRIPTORS: ACHING

## 2024-12-03 ASSESSMENT — PAIN - FUNCTIONAL ASSESSMENT: PAIN_FUNCTIONAL_ASSESSMENT: ACTIVITIES ARE NOT PREVENTED

## 2024-12-03 ASSESSMENT — PAIN DESCRIPTION - ONSET: ONSET: ON-GOING

## 2024-12-03 ASSESSMENT — PAIN DESCRIPTION - PAIN TYPE: TYPE: ACUTE PAIN

## 2024-12-03 NOTE — PROGRESS NOTES
Patient is a direct admit from [facility] to room 4475 in a stable condition. Patient is alert and oriented. Vital signs WNL, respirations easy and unlabored. Admission completed. Call light within reached. Patient not is any distress. Scheduled medication given per MAR. Patient resting in bed.

## 2024-12-03 NOTE — PROGRESS NOTES
12:20 PM  Oral CT contrast started per order, PRN zofran and morphine given for abdominal pain 5/10 and nausea. Patient has active bowel sounds. Last BM on 12/2/2024. Patient up to chair today and ambulating frequently. Shift assessment completed and charted. VSS, afebrile. A/o x4. Bed locked and in lowest position, call light within reach. SpO2 > 90% on room air. RN paged surgical team to clarify need for PICC line. Patient has working IV, fluids infusing at this time.Patient stable and denied needs when writer left room.    3:21 PM  PICC line orders can be held for tomorrow, orders placed for lack of IV access, however, this RN was able to place new piv. Surgeon stated we can evaluate tomorrow pending CT results, IV abx regimen, and IV access. Patient states her hiatal hernia \"is bothering her after 30-35 years\" and causing her to feel \"5/10 aches \" in abdomen. Patient stable and denied needs when writer left room.

## 2024-12-03 NOTE — CONSULTS
[facility] GENERAL AND LAPAROSCOPIC SURGERY                       PATIENT NAME: Sonja Mcmahan     ADMISSION DATE: 12/3/2024 12:18 AM      TODAY'S DATE: 12/3/2024    Reason for Consult:  Abd pain    Requesting Physician:  TEA Santana    HISTORY OF PRESENT ILLNESS:              The patient is a 74 y.o. female who presents with abd pain, moderate, upper abd, present on and off for 2 - 3 weeks. Pt went to ER X 2 at [facility] and was transferred to [facility]. Pt known to me from partial gastrectomy for gastric cancer in April 2024. Had GB removed at that time as well. Pt has been home and has felt well for months post op. Had CT scan 11/11/2024 showing no recurrent cancer, and no acute issues with infection / abscess, etc.  Pt has had nausea,a dn also not eating well over the past couple weeks.  Does take a PPI. Nonsmoker.  No abd surgery since 4/2014. Does follow up with her Med Onc Physician regularly.    Past Medical History:        Diagnosis Date    Asthma     Diabetes mellitus (HCC)     Hyperlipidemia     Hypertension        Past Surgical History:        Procedure Laterality Date    CHOLECYSTECTOMY, LAPAROSCOPIC N/A 4/22/2024    LAPAROSCOPIC CHOLECYSTECTOMY performed by David Mack MD at [facility] OR    ELBOW SURGERY      GASTRECTOMY N/A 4/22/2024    HEMIGASTRECTOMY WITH RETROCOLIC RETROGASTIC GASTROJEJUNOSTOMY performed by David Mack MD at [facility] OR    HIATAL HERNIA REPAIR N/A 4/22/2024    LAPAROSCOPIC HERNIA HIATAL REPAIR performed by David Mack MD at [facility] OR    HYSTERECTOMY, TOTAL ABDOMINAL (CERVIX REMOVED)      UPPER GASTROINTESTINAL ENDOSCOPY N/A 4/18/2024    ESOPHAGOGASTRODUODENOSCOPY DILATION BALLOON performed by Cortez Burrows MD at [facility] ENDOSCOPY    UPPER GASTROINTESTINAL ENDOSCOPY N/A 4/18/2024    ESOPHAGOGASTRODUODENOSCOPY BIOPSY performed by Cortez Burrows MD at [facility] ENDOSCOPY    UPPER GASTROINTESTINAL ENDOSCOPY N/A 4/18/2024     Unremarkable  ADRENALS:  Unremarkable    KIDNEYS/URETERS:  No ureteral stone or hydronephrosis. No evidence of pyelonephritis  GI TRACT:  There has been interval gastric bypass surgery performed. The above fluid collection is in close proximity to the gastrojejunostomy  VESSELS:  Unremarkable.  No aneurysm or dissection  LYMPH NODES: Unremarkable.  No enlarged lymph nodes  ABD WALL:  Unremarkable  PELVIS:  Unremarkable  BONES:  Chronic degenerative changes of the spine  OTHER:  None  Procedure Note    LongKevin MD - 12/02/2024  Formatting of this note might be different from the original.  HISTORY:   Abdominal pain, acute, nonlocalized Patient c/o abdominal pain that started today LLQ. Patient has had fever, it got up to 102.3. Today the fever was 100 when she woke up.  COMPARISON:  None  TECHNIQUE: Post IV contrast multiplanar CT images of the abdomen and pelvis  NOTE:  If there are questions about the content of this report, please contact [facility] radiology by calling [phone]    FINDINGS:  LOWER CHEST:  Unremarkable  LIVER:  There is a rim-enhancing fluid collection along the left lateral margin of the liver measuring 3.6 x 4.1 cm axial image 33.  GALLBLADDER/BILE DUCTS:  Gallbladder surgically absent.  Bile ducts unremarkable  PANCREAS:  No evidence of pancreatitis or choledocholithiasis.  SPLEEN:  Unremarkable  ADRENALS:  Unremarkable    KIDNEYS/URETERS:  No ureteral stone or hydronephrosis. No evidence of pyelonephritis  GI TRACT:  There has been interval gastric bypass surgery performed. The above fluid collection is in close proximity to the gastrojejunostomy  VESSELS:  Unremarkable.  No aneurysm or dissection  LYMPH NODES: Unremarkable.  No enlarged lymph nodes  ABD WALL:  Unremarkable  PELVIS:  Unremarkable  BONES:  Chronic degenerative changes of the spine  OTHER:  None      IMPRESSION      Findings suspicious for leak from the gastric bypass with associated abscess along the left margin  upper pole of the left kidney with caliectasis. Simple cyst in the lower pole the right kidney. A few renal parenchymal calcifications which could represent small stones.   Lymph nodes: None enlarged.   Stomach and bowel: Postsurgical change of the stomach discontinuity between the stomach and duodenum. Gastrojejunostomy noted. There is no abnormal gastric wall thickening or nodularity. No bowel obstruction.   Mesentery/peritoneum: No ascites or free intraperitoneal air. No abdominal fluid collection.   Vessels/retroperitoneum: Normal.   Body wall: No abnormality.   Urinary bladder: Normal.   Reproductive organs: Hysterectomy change noted.     Musculoskeletal: No destructive osseous abnormality.     IMPRESSION:     No convincing evidence of metastatic disease in the chest.     2.0 cm left thyroid nodule. Recommend evaluation with thyroid ultrasound if not previously performed.     Postsurgical change of the stomach.     No convincing evidence of recurrent or metastatic disease in the abdomen or pelvis.     Electronically Signed by: JAVON NÚÑEZ, 11/12/2024 8:10 AM        Exam End: 12/02/24 16:55       IMPRESSION/RECOMMENDATIONS:    Epigastric abdominal pain  CT at  with rim-enhancing fluid collection along the left lateral margin of the liver measuring 3.6 x 4.1 cm    Concern of infection / abscess there with tenderness and fever  Patient is 7 mos out from surgery, and has had normal imaging studies most recently as Nov 11, so may have marginal ulcer or PUD with perforation with associated abscess but presentation is not really consistent with \"post op leak\"     Admitted and started on IV antibiotics, PPI, and fluids.  Will repeat CT with oral and IV contrast and request IR drainage if site is amenable to drainage    Follow exam and labs  Poor IV access - PIC requested  PRN meds ordered      David Mack MD

## 2024-12-03 NOTE — PLAN OF CARE
Problem: Chronic Conditions and Co-morbidities  Goal: Patient's chronic conditions and co-morbidity symptoms are monitored and maintained or improved  Outcome: Progressing     Problem: Discharge Planning  Goal: Discharge to home or other facility with appropriate resources  Outcome: Progressing     Problem: Pain  Goal: Verbalizes/displays adequate comfort level or baseline comfort level  Outcome: Progressing     Problem: Safety - Adult  Goal: Free from fall injury  Outcome: Progressing     Problem: Gastrointestinal - Adult  Goal: Minimal or absence of nausea and vomiting  Outcome: Progressing  Goal: Maintains adequate nutritional intake  Outcome: Progressing     Problem: Metabolic/Fluid and Electrolytes - Adult  Goal: Electrolytes maintained within normal limits  Outcome: Progressing  Goal: Glucose maintained within prescribed range  Outcome: Progressing

## 2024-12-03 NOTE — PROGRESS NOTES
4 Eyes Skin Assessment     The patient is being assess for  Admission    I agree that 2 RN's have performed a thorough Head to Toe Skin Assessment on the patient. ALL assessment sites listed below have been assessed.       Areas assessed by both nurses:   [x]   Head, Face, and Ears   [x]   Shoulders, Back, and Chest  [x]   Arms, Elbows, and Hands   [x]   Coccyx, Sacrum, and IschIum  [x]   Legs, Feet, and Heels        Does the Patient have Skin Breakdown?  No         Rakesh Prevention initiated:  Yes   Wound Care Orders initiated:  No      [facility] nurse consulted for Pressure Injury (Stage 3,4, Unstageable, DTI, NWPT, and Complex wounds), New and Established Ostomies:  No      Nurse 1 eSignature: Electronically signed by Melissa Taylor RN on 12/3/24 at 4:13 AM EST    **SHARE this note so that the co-signing nurse is able to place an eSignature**    Nurse 2 eSignature: Electronically signed by FERNANDO IZAGUIRRE RN on 12/3/24 at 4:29 AM EST

## 2024-12-03 NOTE — PROGRESS NOTES
Patient seen and examined by one of my partners earlier today.    I agree with their assessment and plan.   Patient also seen and examined by me today.  Chart reviewed.    Necessary orders placed.  Will continue to follow while in the hospital.        Margo Suárez MD

## 2024-12-03 NOTE — H&P
V2.0  History and Physical      Name:  Sonja Mcmahan /Age/Sex: 1950  (74 y.o. female)   MRN & CSN:  [id number] & [id number] Encounter Date/Time: 12/3/2024 1:12 AM EST   Location:  [id number] PCP: Abiodun Amanda PA       Hospital Day: 1    Assessment and Plan:   Sonja Mcmahan is a 74 y.o. female with a pmh of grade 1 gastric adenoma status post partial gastrectomy with lymph node dissection on 24, history of abdominal abscess s/p drainage by IR on 5/3/2024, hypertension and hyperlipidemia. She presents from [facility] as a direct admit.  She presented to [facility] with complaints of intermittent fevers of 3 weeks duration and abdominal pain of 2 days duration.  CT abdomen pelvis suspicious for leak from gastric bypass with associated abscess along the left margins of the liver.  Had partial gastrectomy this past April was done by Dr. Mack.  She transferred here for further management.      Plan:  # ?  Leak from partial gastrectomy and abscess  # Hx of Grade 1 gastric adenoma S/P Partial gastrectomy with lymph node dissection on 24.   # History of abdominal abscess s/p drainage by IR on 5/3/2024.  -Continue Rocephin and Flagyl started in the ER.  -General Surgery consulted.  -Keep n.p.o.  -Pain control  -IV fluids    # Hypertension.  Keep goal SBP less than 160.  Blood pressure currently within goal.    # GERD-resume home PPI.    Holding home medications due to n.p.o. status.  Resume when appropriate.    Disposition:   Current Living situation: Lives at home alone.  Expected Disposition: Home   estimated D/C: 1 to 2 days    Diet Diet NPO   DVT Prophylaxis [x] Lovenox, []  Heparin, [] SCDs, [] Ambulation,  [] Eliquis, [] Xarelto, [] Coumadin   Code Status Full Code   Surrogate Decision Maker/ POA Self, Annette and Cira-daughter     Personally reviewed Lab Studies and Imaging     Discussed management of the case with Dr. Duarte who recommended continue current  mg IntraVENous Daily    cefTRIAXone (ROCEPHIN) IV  1,000 mg IntraVENous Q24H    metroNIDAZOLE  500 mg IntraVENous Q8H    sodium chloride flush  5-40 mL IntraVENous 2 times per day    [START ON 12/4/2024] enoxaparin  40 mg SubCUTAneous Daily      Infusions:    sodium chloride      sodium chloride       PRN Meds: morphine, 2 mg, Q4H PRN  sodium chloride flush, 5-40 mL, PRN  sodium chloride, , PRN  potassium chloride, 40 mEq, PRN   Or  potassium alternative oral replacement, 40 mEq, PRN   Or  potassium chloride, 10 mEq, PRN  magnesium sulfate, 2,000 mg, PRN  ondansetron, 4 mg, Q8H PRN   Or  ondansetron, 4 mg, Q6H PRN  polyethylene glycol, 17 g, Daily PRN  acetaminophen, 650 mg, Q6H PRN   Or  acetaminophen, 650 mg, Q6H PRN        Labs      CBC: No results for input(s): \"WBC\", \"HGB\", \"PLT\" in the last 72 hours.  BMP:  No results for input(s): \"NA\", \"K\", \"CL\", \"CO2\", \"BUN\", \"CREATININE\", \"GLUCOSE\" in the last 72 hours.  Hepatic: No results for input(s): \"AST\", \"ALT\", \"BILITOT\", \"ALKPHOS\" in the last 72 hours.    Invalid input(s): \"ALB\"  Lipids: No results found for: \"CHOL\", \"HDL\", \"TRIG\"  Hemoglobin A1C:   Lab Results   Component Value Date/Time    LABA1C 5.9 04/19/2024 05:56 AM     TSH:   Lab Results   Component Value Date/Time    TSH 1.74 05/01/2024 05:15 AM     Troponin: No results found for: \"TROPONINT\"  Lactic Acid: No results for input(s): \"LACTA\" in the last 72 hours.  BNP: No results for input(s): \"PROBNP\" in the last 72 hours.  UA:  Lab Results   Component Value Date/Time    NITRU Negative 05/02/2024 09:25 AM    COLORU DARK YELLOW 05/02/2024 09:25 AM    PHUR 5.5 05/02/2024 09:25 AM    PHUR 6.0 04/15/2024 05:56 PM    WBCUA 0-2 05/02/2024 09:25 AM    RBCUA 2 04/15/2024 05:56 PM    BACTERIA None Seen 05/02/2024 09:25 AM    CLARITYU CLOUDY 05/02/2024 09:25 AM    LEUKOCYTESUR TRACE 05/02/2024 09:25 AM    UROBILINOGEN 1.0 05/02/2024 09:25 AM    BILIRUBINUR SMALL 05/02/2024 09:25 AM    BLOODU Negative 05/02/2024 09:25  AM    GLUCOSEU Negative 05/02/2024 09:25 AM    KETUA Negative 05/02/2024 09:25 AM     Urine Cultures: No results found for: \"LABURIN\"  Blood Cultures: No results found for: \"BC\"  No results found for: \"BLOODCULT2\"  Organism:   Lab Results   Component Value Date/Time    ORG Fusobacterium nucleatum 05/03/2024 01:30 PM    ORG Escherichia coli 05/03/2024 01:30 PM    ORG Klebsiella pneumoniae 05/03/2024 01:30 PM    ORG Staphylococcus epidermidis 05/03/2024 01:30 PM    ORG Prevotella buccae 05/03/2024 01:30 PM       Imaging/Diagnostics Last 24 Hours   No results found.      Electronically signed by AVA Murray CNP on 12/3/2024 at 1:12 AM

## 2024-12-03 NOTE — PROGRESS NOTES
PICC on hold for now. Is not needed at this time but may need LT ATB which may be decided tomorrow. Patient has a working IV so has adequate access tonight. Need will be determined tomorrow for PICC placement or to discontinue order.

## 2024-12-04 ENCOUNTER — APPOINTMENT (OUTPATIENT)
Dept: INTERVENTIONAL RADIOLOGY/VASCULAR | Age: 74
DRG: 862 | End: 2024-12-04
Attending: INTERNAL MEDICINE
Payer: MEDICARE

## 2024-12-04 PROBLEM — R10.13 EPIGASTRIC ABDOMINAL PAIN: Status: ACTIVE | Noted: 2024-12-04

## 2024-12-04 LAB
ALBUMIN SERPL-MCNC: 2.7 G/DL (ref 3.4–5)
ALBUMIN/GLOB SERPL: 0.8 {RATIO} (ref 1.1–2.2)
ALP SERPL-CCNC: 104 U/L (ref 40–129)
ALT SERPL-CCNC: 25 U/L (ref 10–40)
ANION GAP SERPL CALCULATED.3IONS-SCNC: 10 MMOL/L (ref 3–16)
AST SERPL-CCNC: 28 U/L (ref 15–37)
BASOPHILS # BLD: 0 K/UL (ref 0–0.2)
BASOPHILS NFR BLD: 0.4 %
BILIRUB SERPL-MCNC: 0.3 MG/DL (ref 0–1)
BUN SERPL-MCNC: 9 MG/DL (ref 7–20)
CALCIUM SERPL-MCNC: 8.8 MG/DL (ref 8.3–10.6)
CHLORIDE SERPL-SCNC: 104 MMOL/L (ref 99–110)
CO2 SERPL-SCNC: 24 MMOL/L (ref 21–32)
CREAT SERPL-MCNC: 0.8 MG/DL (ref 0.6–1.2)
DEPRECATED RDW RBC AUTO: 13.7 % (ref 12.4–15.4)
EOSINOPHIL # BLD: 0.1 K/UL (ref 0–0.6)
EOSINOPHIL NFR BLD: 0.7 %
FERRITIN SERPL IA-MCNC: 311 NG/ML (ref 15–150)
FOLATE SERPL-MCNC: 26.3 NG/ML (ref 4.78–24.2)
GFR SERPLBLD CREATININE-BSD FMLA CKD-EPI: 76 ML/MIN/{1.73_M2}
GLUCOSE SERPL-MCNC: 88 MG/DL (ref 70–99)
HCT VFR BLD AUTO: 30.7 % (ref 36–48)
HGB BLD-MCNC: 10.4 G/DL (ref 12–16)
INR PPP: 1.26 (ref 0.85–1.15)
IRON SATN MFR SERPL: 10 % (ref 15–50)
IRON SERPL-MCNC: 16 UG/DL (ref 37–145)
LYMPHOCYTES # BLD: 1.6 K/UL (ref 1–5.1)
LYMPHOCYTES NFR BLD: 15.7 %
MAGNESIUM SERPL-MCNC: 2.06 MG/DL (ref 1.8–2.4)
MCH RBC QN AUTO: 29.9 PG (ref 26–34)
MCHC RBC AUTO-ENTMCNC: 33.8 G/DL (ref 31–36)
MCV RBC AUTO: 88.4 FL (ref 80–100)
MONOCYTES # BLD: 0.7 K/UL (ref 0–1.3)
MONOCYTES NFR BLD: 7.2 %
NEUTROPHILS # BLD: 7.7 K/UL (ref 1.7–7.7)
NEUTROPHILS NFR BLD: 76 %
PHOSPHATE SERPL-MCNC: 3.4 MG/DL (ref 2.5–4.9)
PLATELET # BLD AUTO: 260 K/UL (ref 135–450)
PMV BLD AUTO: 7.9 FL (ref 5–10.5)
POTASSIUM SERPL-SCNC: 4.2 MMOL/L (ref 3.5–5.1)
PROT SERPL-MCNC: 6 G/DL (ref 6.4–8.2)
PROTHROMBIN TIME: 16 SEC (ref 11.9–14.9)
RBC # BLD AUTO: 3.48 M/UL (ref 4–5.2)
SODIUM SERPL-SCNC: 138 MMOL/L (ref 136–145)
TIBC SERPL-MCNC: 155 UG/DL (ref 260–445)
VIT B12 SERPL-MCNC: 3267 PG/ML (ref 211–911)
WBC # BLD AUTO: 10.2 K/UL (ref 4–11)

## 2024-12-04 PROCEDURE — 82746 ASSAY OF FOLIC ACID SERUM: CPT

## 2024-12-04 PROCEDURE — 6360000004 HC RX CONTRAST MEDICATION: Performed by: INTERNAL MEDICINE

## 2024-12-04 PROCEDURE — 87015 SPECIMEN INFECT AGNT CONCNTJ: CPT

## 2024-12-04 PROCEDURE — 6360000002 HC RX W HCPCS: Performed by: INTERNAL MEDICINE

## 2024-12-04 PROCEDURE — APPSS15 APP SPLIT SHARED TIME 0-15 MINUTES: Performed by: NURSE PRACTITIONER

## 2024-12-04 PROCEDURE — 99232 SBSQ HOSP IP/OBS MODERATE 35: CPT | Performed by: SURGERY

## 2024-12-04 PROCEDURE — 85610 PROTHROMBIN TIME: CPT

## 2024-12-04 PROCEDURE — 6370000000 HC RX 637 (ALT 250 FOR IP): Performed by: NURSE PRACTITIONER

## 2024-12-04 PROCEDURE — 87075 CULTR BACTERIA EXCEPT BLOOD: CPT

## 2024-12-04 PROCEDURE — 6360000002 HC RX W HCPCS: Performed by: NURSE PRACTITIONER

## 2024-12-04 PROCEDURE — APPNB30 APP NON BILLABLE TIME 0-30 MINS: Performed by: NURSE PRACTITIONER

## 2024-12-04 PROCEDURE — 87076 CULTURE ANAEROBE IDENT EACH: CPT

## 2024-12-04 PROCEDURE — 83735 ASSAY OF MAGNESIUM: CPT

## 2024-12-04 PROCEDURE — 87205 SMEAR GRAM STAIN: CPT

## 2024-12-04 PROCEDURE — 36415 COLL VENOUS BLD VENIPUNCTURE: CPT

## 2024-12-04 PROCEDURE — 82728 ASSAY OF FERRITIN: CPT

## 2024-12-04 PROCEDURE — 10030 IMG GID FLU COLL DRG SFT TIS: CPT

## 2024-12-04 PROCEDURE — 87206 SMEAR FLUORESCENT/ACID STAI: CPT

## 2024-12-04 PROCEDURE — 80053 COMPREHEN METABOLIC PANEL: CPT

## 2024-12-04 PROCEDURE — 82607 VITAMIN B-12: CPT

## 2024-12-04 PROCEDURE — 84100 ASSAY OF PHOSPHORUS: CPT

## 2024-12-04 PROCEDURE — 99152 MOD SED SAME PHYS/QHP 5/>YRS: CPT

## 2024-12-04 PROCEDURE — 99153 MOD SED SAME PHYS/QHP EA: CPT

## 2024-12-04 PROCEDURE — 36569 INSJ PICC 5 YR+ W/O IMAGING: CPT

## 2024-12-04 PROCEDURE — 87185 SC STD ENZYME DETCJ PER NZM: CPT

## 2024-12-04 PROCEDURE — 1200000000 HC SEMI PRIVATE

## 2024-12-04 PROCEDURE — 6370000000 HC RX 637 (ALT 250 FOR IP): Performed by: INTERNAL MEDICINE

## 2024-12-04 PROCEDURE — 85025 COMPLETE CBC W/AUTO DIFF WBC: CPT

## 2024-12-04 PROCEDURE — 87116 MYCOBACTERIA CULTURE: CPT

## 2024-12-04 PROCEDURE — C1729 CATH, DRAINAGE: HCPCS

## 2024-12-04 PROCEDURE — 87070 CULTURE OTHR SPECIMN AEROBIC: CPT

## 2024-12-04 PROCEDURE — 2580000003 HC RX 258: Performed by: NURSE PRACTITIONER

## 2024-12-04 PROCEDURE — C1751 CATH, INF, PER/CENT/MIDLINE: HCPCS

## 2024-12-04 PROCEDURE — 0D9930Z DRAINAGE OF DUODENUM WITH DRAINAGE DEVICE, PERCUTANEOUS APPROACH: ICD-10-PCS | Performed by: SURGERY

## 2024-12-04 PROCEDURE — 83540 ASSAY OF IRON: CPT

## 2024-12-04 PROCEDURE — 87077 CULTURE AEROBIC IDENTIFY: CPT

## 2024-12-04 PROCEDURE — 83550 IRON BINDING TEST: CPT

## 2024-12-04 PROCEDURE — 87102 FUNGUS ISOLATION CULTURE: CPT

## 2024-12-04 PROCEDURE — 87186 SC STD MICRODIL/AGAR DIL: CPT

## 2024-12-04 PROCEDURE — 6360000002 HC RX W HCPCS: Performed by: STUDENT IN AN ORGANIZED HEALTH CARE EDUCATION/TRAINING PROGRAM

## 2024-12-04 RX ORDER — LORAZEPAM 0.5 MG/1
0.5 TABLET ORAL ONCE
Status: COMPLETED | OUTPATIENT
Start: 2024-12-04 | End: 2024-12-04

## 2024-12-04 RX ORDER — LIDOCAINE HYDROCHLORIDE 10 MG/ML
10 INJECTION, SOLUTION EPIDURAL; INFILTRATION; INTRACAUDAL; PERINEURAL ONCE
Status: COMPLETED | OUTPATIENT
Start: 2024-12-04 | End: 2024-12-04

## 2024-12-04 RX ORDER — LANOLIN ALCOHOL/MO/W.PET/CERES
400 CREAM (GRAM) TOPICAL DAILY
Status: DISCONTINUED | OUTPATIENT
Start: 2024-12-04 | End: 2024-12-06 | Stop reason: HOSPADM

## 2024-12-04 RX ORDER — ENOXAPARIN SODIUM 100 MG/ML
40 INJECTION SUBCUTANEOUS DAILY
Status: DISCONTINUED | OUTPATIENT
Start: 2024-12-05 | End: 2024-12-06 | Stop reason: HOSPADM

## 2024-12-04 RX ORDER — LORAZEPAM 0.5 MG/1
0.5 TABLET ORAL EVERY 8 HOURS PRN
Status: DISCONTINUED | OUTPATIENT
Start: 2024-12-04 | End: 2024-12-06 | Stop reason: HOSPADM

## 2024-12-04 RX ORDER — FENTANYL CITRATE 50 UG/ML
INJECTION, SOLUTION INTRAMUSCULAR; INTRAVENOUS PRN
Status: COMPLETED | OUTPATIENT
Start: 2024-12-04 | End: 2024-12-04

## 2024-12-04 RX ORDER — LORAZEPAM 0.5 MG/1
0.5 TABLET ORAL EVERY 8 HOURS PRN
Status: CANCELLED | OUTPATIENT
Start: 2024-12-04

## 2024-12-04 RX ORDER — IOPAMIDOL 755 MG/ML
100 INJECTION, SOLUTION INTRAVASCULAR
Status: COMPLETED | OUTPATIENT
Start: 2024-12-04 | End: 2024-12-04

## 2024-12-04 RX ORDER — MIDAZOLAM HYDROCHLORIDE 2 MG/2ML
INJECTION, SOLUTION INTRAMUSCULAR; INTRAVENOUS PRN
Status: COMPLETED | OUTPATIENT
Start: 2024-12-04 | End: 2024-12-04

## 2024-12-04 RX ORDER — MONTELUKAST SODIUM 10 MG/1
10 TABLET ORAL NIGHTLY
Status: DISCONTINUED | OUTPATIENT
Start: 2024-12-04 | End: 2024-12-06 | Stop reason: HOSPADM

## 2024-12-04 RX ORDER — MULTIVITAMIN WITH IRON
1 TABLET ORAL DAILY
Status: DISCONTINUED | OUTPATIENT
Start: 2024-12-04 | End: 2024-12-06 | Stop reason: HOSPADM

## 2024-12-04 RX ORDER — ATORVASTATIN CALCIUM 20 MG/1
20 TABLET, FILM COATED ORAL DAILY
Status: DISCONTINUED | OUTPATIENT
Start: 2024-12-04 | End: 2024-12-06 | Stop reason: HOSPADM

## 2024-12-04 RX ORDER — PANTOPRAZOLE SODIUM 40 MG/1
40 TABLET, DELAYED RELEASE ORAL
Status: DISCONTINUED | OUTPATIENT
Start: 2024-12-04 | End: 2024-12-06 | Stop reason: HOSPADM

## 2024-12-04 RX ORDER — ALBUTEROL SULFATE 90 UG/1
2 INHALANT RESPIRATORY (INHALATION) EVERY 4 HOURS PRN
Status: DISCONTINUED | OUTPATIENT
Start: 2024-12-04 | End: 2024-12-06 | Stop reason: HOSPADM

## 2024-12-04 RX ADMIN — SODIUM CHLORIDE, PRESERVATIVE FREE 10 ML: 5 INJECTION INTRAVENOUS at 20:09

## 2024-12-04 RX ADMIN — LORAZEPAM 0.5 MG: 0.5 TABLET ORAL at 01:27

## 2024-12-04 RX ADMIN — ONDANSETRON 4 MG: 2 INJECTION, SOLUTION INTRAMUSCULAR; INTRAVENOUS at 12:31

## 2024-12-04 RX ADMIN — METRONIDAZOLE 500 MG: 500 INJECTION, SOLUTION INTRAVENOUS at 11:02

## 2024-12-04 RX ADMIN — PANTOPRAZOLE SODIUM 40 MG: 40 INJECTION, POWDER, FOR SOLUTION INTRAVENOUS at 08:18

## 2024-12-04 RX ADMIN — MORPHINE SULFATE 2 MG: 2 INJECTION, SOLUTION INTRAMUSCULAR; INTRAVENOUS at 20:24

## 2024-12-04 RX ADMIN — MIDAZOLAM HYDROCHLORIDE 1 MG: 1 INJECTION, SOLUTION INTRAMUSCULAR; INTRAVENOUS at 10:03

## 2024-12-04 RX ADMIN — IOPAMIDOL 20 ML: 755 INJECTION, SOLUTION INTRAVENOUS at 10:53

## 2024-12-04 RX ADMIN — LIDOCAINE HYDROCHLORIDE 10 ML: 10 INJECTION, SOLUTION EPIDURAL; INFILTRATION; INTRACAUDAL; PERINEURAL at 10:53

## 2024-12-04 RX ADMIN — MONTELUKAST SODIUM 10 MG: 10 TABLET, FILM COATED ORAL at 20:10

## 2024-12-04 RX ADMIN — WATER 1000 MG: 1 INJECTION INTRAMUSCULAR; INTRAVENOUS; SUBCUTANEOUS at 01:26

## 2024-12-04 RX ADMIN — MIDAZOLAM HYDROCHLORIDE 0.5 MG: 1 INJECTION, SOLUTION INTRAMUSCULAR; INTRAVENOUS at 10:25

## 2024-12-04 RX ADMIN — FENTANYL CITRATE 25 MCG: 50 INJECTION, SOLUTION INTRAMUSCULAR; INTRAVENOUS at 10:21

## 2024-12-04 RX ADMIN — METRONIDAZOLE 500 MG: 500 INJECTION, SOLUTION INTRAVENOUS at 01:30

## 2024-12-04 RX ADMIN — ONDANSETRON 4 MG: 2 INJECTION, SOLUTION INTRAMUSCULAR; INTRAVENOUS at 20:21

## 2024-12-04 RX ADMIN — FENTANYL CITRATE 50 MCG: 50 INJECTION, SOLUTION INTRAMUSCULAR; INTRAVENOUS at 10:08

## 2024-12-04 RX ADMIN — LORAZEPAM 0.5 MG: 0.5 TABLET ORAL at 13:50

## 2024-12-04 RX ADMIN — FENTANYL CITRATE 25 MCG: 50 INJECTION, SOLUTION INTRAMUSCULAR; INTRAVENOUS at 10:25

## 2024-12-04 RX ADMIN — MIDAZOLAM HYDROCHLORIDE 1 MG: 1 INJECTION, SOLUTION INTRAMUSCULAR; INTRAVENOUS at 10:08

## 2024-12-04 RX ADMIN — SODIUM CHLORIDE, PRESERVATIVE FREE 10 ML: 5 INJECTION INTRAVENOUS at 20:10

## 2024-12-04 RX ADMIN — METRONIDAZOLE 500 MG: 500 INJECTION, SOLUTION INTRAVENOUS at 18:21

## 2024-12-04 RX ADMIN — MIDAZOLAM HYDROCHLORIDE 0.5 MG: 1 INJECTION, SOLUTION INTRAMUSCULAR; INTRAVENOUS at 10:21

## 2024-12-04 RX ADMIN — MORPHINE SULFATE 2 MG: 2 INJECTION, SOLUTION INTRAMUSCULAR; INTRAVENOUS at 12:06

## 2024-12-04 RX ADMIN — SODIUM CHLORIDE, PRESERVATIVE FREE 20 ML: 5 INJECTION INTRAVENOUS at 08:18

## 2024-12-04 ASSESSMENT — PAIN DESCRIPTION - FREQUENCY: FREQUENCY: CONTINUOUS

## 2024-12-04 ASSESSMENT — PAIN SCALES - WONG BAKER
WONGBAKER_NUMERICALRESPONSE: HURTS EVEN MORE
WONGBAKER_NUMERICALRESPONSE: HURTS EVEN MORE

## 2024-12-04 ASSESSMENT — PAIN SCALES - GENERAL
PAINLEVEL_OUTOF10: 6
PAINLEVEL_OUTOF10: 6
PAINLEVEL_OUTOF10: 5
PAINLEVEL_OUTOF10: 0
PAINLEVEL_OUTOF10: 3
PAINLEVEL_OUTOF10: 7

## 2024-12-04 ASSESSMENT — PAIN DESCRIPTION - LOCATION
LOCATION: ABDOMEN

## 2024-12-04 ASSESSMENT — PAIN DESCRIPTION - ORIENTATION: ORIENTATION: MID

## 2024-12-04 ASSESSMENT — PAIN DESCRIPTION - PAIN TYPE: TYPE: SURGICAL PAIN

## 2024-12-04 ASSESSMENT — PAIN DESCRIPTION - DESCRIPTORS
DESCRIPTORS: ACHING;DISCOMFORT
DESCRIPTORS: TENDER

## 2024-12-04 ASSESSMENT — PAIN DESCRIPTION - ONSET: ONSET: ON-GOING

## 2024-12-04 NOTE — PROGRESS NOTES
V2.0    [facility] Progress Note      Name:  Sonja Mcmahan /Age/Sex: 1950  (74 y.o. female)   MRN & CSN:  [id number] & [id number] Encounter Date/Time: 2024 11:44 AM EST   Location:  [address] [place], [place] [id number] PCP: Abiodun Amanda PA     Attending:Margo Suárez MD       Hospital Day: 2    Assessment and Recommendations   Sonja Mcmahan is a 74 y.o. female with pmh of grade 1 gastric adenoma s/p partial gastrectomy with lymph node dissection on 24, history of abdominal abscess s/p drainage by IR on 5/3/2024, HTN and HLD, presented from [facility] as a direct admit with complaints of intermittent fevers of 3 weeks duration and abdominal pain of 2 days duration. CT abdomen pelvis suspicious for leak from gastric bypass with associated abscess along the left margins of the liver.         Plan:    Epigastric abdominal pain with intra-abdominal fluid collection:  Hx of Grade 1 gastric adenoma S/P Partial gastrectomy with lymph node dissection on 24.   History of abdominal abscess s/p drainage by IR on 5/3/2024.  General Surgery consulted: Recommended IR drainage with cultures for intraabdominal fluid collection.  Continue Rocephin and Flagyl.  Started on clear liquid diet; advance as tolerated per surgery  Continue IVF, pain control.    B12 and vitamin D deficiency: Continue replacement.     GERD: Continue PPI.     Mild anemia: Follow-up workup.    Diet ADULT DIET; Clear Liquid   DVT Prophylaxis [x] Lovenox, []  Heparin, [] SCDs, [] Ambulation,  [] Eliquis, [] Xarelto  [] Coumadin   Code Status Full Code   Disposition From: Home  Expected Disposition: Home  Estimated Date of Discharge:   Patient requires continued admission due to intra-abdominal abscess   Surrogate Decision Maker/ Natalie Teran (Child)  [phone]     Personally reviewed Lab Studies and Imaging     Subjective:     Chief Complaint: Abdominal pain with fevers    Patient seen and examined.   No interval events. Denies

## 2024-12-04 NOTE — PROCEDURES
PROCEDURE NOTE  Date: 12/4/2024   Name: Sonja Mcmahan  YOB: 1950    Procedures PICC placement    PICC insertion attempted in Right basilic. Vessel obtained easily with good blood return. Wire would only thread to level of shoulder then stopped and patient complained of pain. Attempted  access x 2. Attempt aborted.

## 2024-12-04 NOTE — RT PROTOCOL NOTE
RT Inhaler-Nebulizer Bronchodilator Protocol Note    There is a bronchodilator order in the chart from a provider indicating to follow the RT Bronchodilator Protocol and there is an “Initiate RT Inhaler-Nebulizer Bronchodilator Protocol” order as well (see protocol at bottom of note).    CXR Findings:  No results found.    The findings from the last RT Protocol Assessment were as follows:   History Pulmonary Disease: None or smoker <15 pack years  Respiratory Pattern: Regular pattern and RR 12-20 bpm  Breath Sounds: Slightly diminished and/or crackles  Cough: Strong, spontaneous, non-productive  Indication for Bronchodilator Therapy: On home bronchodilators  Bronchodilator Assessment Score: 2    Aerosolized bronchodilator medication orders have been revised according to the RT Inhaler-Nebulizer Bronchodilator Protocol below.    Respiratory Therapist to perform RT Therapy Protocol Assessment initially then follow the protocol.  Repeat RT Therapy Protocol Assessment PRN for score 0-3 or on second treatment, BID, and PRN for scores above 3.    No Indications - adjust the frequency to every 6 hours PRN wheezing or bronchospasm, if no treatments needed after 48 hours then discontinue using Per Protocol order mode.     If indication present, adjust the RT bronchodilator orders based on the Bronchodilator Assessment Score as indicated below.  Use Inhaler orders unless patient has one or more of the following: on home nebulizer, not able to hold breath for 10 seconds, is not alert and oriented, cannot activate and use MDI correctly, or respiratory rate 25 breaths per minute or more, then use the equivalent nebulizer order(s) with same Frequency and PRN reasons based on the score.  If a patient is on this medication at home then do not decrease Frequency below that used at home.    0-3 - enter or revise RT bronchodilator order(s) to equivalent RT Bronchodilator order with Frequency of every 4 hours PRN for wheezing or  increased work of breathing using Per Protocol order mode.        4-6 - enter or revise RT Bronchodilator order(s) to two equivalent RT bronchodilator orders with one order with BID Frequency and one order with Frequency of every 4 hours PRN wheezing or increased work of breathing using Per Protocol order mode.        7-10 - enter or revise RT Bronchodilator order(s) to two equivalent RT bronchodilator orders with one order with TID Frequency and one order with Frequency of every 4 hours PRN wheezing or increased work of breathing using Per Protocol order mode.       11-13 - enter or revise RT Bronchodilator order(s) to one equivalent RT bronchodilator order with QID Frequency and an Albuterol order with Frequency of every 4 hours PRN wheezing or increased work of breathing using Per Protocol order mode.      Greater than 13 - enter or revise RT Bronchodilator order(s) to one equivalent RT bronchodilator order with every 4 hours Frequency and an Albuterol order with Frequency of every 2 hours PRN wheezing or increased work of breathing using Per Protocol order mode.     RT to enter RT Home Evaluation for COPD & MDI Assessment order using Per Protocol order mode.    Electronically signed by Merle Arciniega RCP on 12/4/2024 at 3:01 PM

## 2024-12-04 NOTE — PLAN OF CARE
Problem: Chronic Conditions and Co-morbidities  Goal: Patient's chronic conditions and co-morbidity symptoms are monitored and maintained or improved  12/4/2024 1112 by Edith Marvin RN  Outcome: Progressing     Problem: Discharge Planning  Goal: Discharge to home or other facility with appropriate resources  12/4/2024 1112 by Edith Marvin RN  Outcome: Progressing     Problem: Pain  Goal: Verbalizes/displays adequate comfort level or baseline comfort level  12/4/2024 1112 by Edith Marvin RN  Outcome: Progressing

## 2024-12-04 NOTE — PROGRESS NOTES
8:24 AM  Patient has now had x3 PIV's placed in last 24 hours, per surgical team patient is candidate for picc line. RN obtained consent and picc questionnaire completed. RN notified picc team.     1:26 PM  PICC team unable to insert picc line, hospitalist notified. Patient has 24g right hand IV that is currently working at this time. Patient requesting ativan that she takes at home for panic attacks, RN spoke with MD to have it ordered.

## 2024-12-04 NOTE — PROGRESS NOTES
culture  No results found for: \"LABURIN\"    Pathology:  \"Relevant pathology documented under results section     Imaging:  I have personally reviewed the following films:    CT ABDOMEN PELVIS W IV CONTRAST Additional Contrast? Oral (Water-soluble contrast)    Result Date: 12/3/2024  EXAMINATION: CT OF THE ABDOMEN AND PELVIS WITH CONTRAST 12/3/2024 4:15 pm TECHNIQUE: CT of the abdomen and pelvis was performed with the administration of intravenous contrast. Multiplanar reformatted images are provided for review. Automated exposure control, iterative reconstruction, and/or weight based adjustment of the mA/kV was utilized to reduce the radiation dose to as low as reasonably achievable. COMPARISON: 06/17/2024 HISTORY: ORDERING SYSTEM PROVIDED HISTORY: Evaluate for leak from the gastric bypass with associated abscess TECHNOLOGIST PROVIDED HISTORY: Reason for exam:->Evaluate for leak from the gastric bypass with associated abscess Additional Contrast?->Oral Reason for Exam: Evaluate for leak from the gastric bypass with associated abscess FINDINGS: Lower Chest: Minimal linear opacities in the lung bases compatible subsegmental atelectasis or scarring. Organs: Organized fluid collection along the margin of the liver is noted measuring 5.5 x 3.5 cm.  This appears to communicate with a fluid channel extending towards the duodenal staple line.  This is in the previously seen site of surgical drain near the liver and tract of gas previously noted.  No new findings otherwise appreciated within the liver parenchyma. Cholecystectomy.  Pancreas, spleen, adrenals and kidneys reveal no new findings.  Scarring in the superior pole of the left kidney. GI/Bowel: Oral contrast present within the distal small bowel and proximal colon.  No extraluminal contrast identified.  No evidence for bowel obstruction.  Status post partial gastrectomy and gastrojejunostomy.  Distal colonic diverticulosis. Pelvis: No acute findings.  lymph nodes  Hypertension   GERD      Plan:  1. Pain controlled, mild epigastric region tenderness only on exam, no further nausea or vomiting, feels hungry, vitals/labs stable, CT imaging reviewed; continued supportive care, PPI, IR drainage with cultures for intraabdominal fluid collection--cultures ordered  2. NPO, may started clear liquid diet as tolerated following procedure; monitor bowel function  3. IV hydration until PO intake is adequate; monitor and correct electrolytes  4. Antibiotics, follow cultures  5. Activity as tolerated  6. PRN analgesics and antiemetics--minimizing narcotics as tolerated  7. DVT prophylaxis with  Lovenox (hold today)  8. Management of medical comorbid etiologies per primary team and consulting services    EDUCATION:  Educated patient on plan of care and disease process--all questions answered.    Plans discussed with patient and nursing.  Reviewed and discussed with Dr. Mack.      Signed:  AVA Pacheco - CNP  12/4/2024 10:14 AM    Surgery Staff:     I have personally performed a face to face diagnostic evaluation on this patient. I have interviewed and examined the patient and I agree with the assessment above. Time was spent reviewing patient chart (including but not limited to notes, labs, imaging and other testing), interviewing and counseling patient and present family members, performing physical exam, documentation of my findings and subsequent follow up of ordered medication and testing, placing referrals and communication with patient care providers, coordinating future care as well as documentation in the EHR. This encompassed more than 50% of the total encounter time. In summary, my findings and plan are the following:   Pt clinically feeling better  No fever, less pain, WBC normal, has appetite, no emesis  Abd soft, mild tenderness in upper abd  Will ask IR to place drain in abscess collection today, send cultures    David Mack MD

## 2024-12-04 NOTE — BRIEF OP NOTE
Interventional Radiology  Brief Postoperative Note    Patient: Sonja Mcmahan  YOB: 1950  MRN: [id number]      Pre-operative Diagnosis: Upper abdominal fluid collection    Post-operative Diagnosis: Same    Procedure: Image guided percutaneous drain placement    Anesthesia: Local and Moderate Sedation    Surgeons/Assistants: Magdaleno Rowley DO    Estimated Blood Loss: less than 50     Complications: None    Infection Present At Time Of Surgery (PATOS) (choose all levels that have infection present):  - Deep Infection (muscle/fascia) present as evidenced by purulent fluid    Specimens: Was Obtained: 10 mL of blood tinged tan purulent fluid obtained    Findings:   Successful image guided percutaneous drainage catheter placement into a central upper abdominal fluid collection.  Patient currently has a 10-F locking pigtail drainage catheter in place via a subxiphoid approach, placed to bulb suction.   There was immediate return of blood tinged purulent fluid, 10 mL of aspirated fluid was sent for culture.       Magdaleno Rowley DO  12/4/2024, 10:42 AM  Interventional Radiology  [phone] ([phone])

## 2024-12-04 NOTE — PRE SEDATION
Sedation Pre-Procedure Note    Patient Name: Sonja Mcmahan   YOB: 1950  Room/Bed: [facility]-4475/4475-01  Medical Record Number: [id number]  Date: 12/4/2024   Time: 9:53 AM       Indication:  Recurrent upper abdominal fluid collection. Image guided drainage catheter placement requested.     Consent: I have discussed with the patient and/or the patient representative the indication, alternatives, and the possible risks and/or complications of the planned procedure and the anesthesia methods. The patient and/or patient representative appear to understand and agree to proceed.    Vital Signs:   Vitals:    12/04/24 0807   BP: 130/86   Pulse: 67   Resp: 16   Temp: 98.9 °F (37.2 °C)   SpO2: 94%       Past Medical History:   has a past medical history of Asthma, Diabetes mellitus (HCC), Hyperlipidemia, and Hypertension.    Past Surgical History:   has a past surgical history that includes Hysterectomy, total abdominal; Elbow surgery; Upper gastrointestinal endoscopy (N/A, 4/18/2024); Upper gastrointestinal endoscopy (N/A, 4/18/2024); Upper gastrointestinal endoscopy (N/A, 4/18/2024); hiatal hernia repair (N/A, 4/22/2024); Cholecystectomy, laparoscopic (N/A, 4/22/2024); and gastrectomy (N/A, 4/22/2024).    Medications:   Scheduled Meds:    [START ON 12/5/2024] enoxaparin  40 mg SubCUTAneous Daily    cefTRIAXone (ROCEPHIN) IV  1,000 mg IntraVENous Q24H    metroNIDAZOLE  500 mg IntraVENous Q8H    sodium chloride flush  5-40 mL IntraVENous 2 times per day    pantoprazole  40 mg IntraVENous BID    sodium chloride flush  5-40 mL IntraVENous 2 times per day    lidocaine 1 % injection  50 mg IntraDERmal Once    vitamin D  50,000 Units Oral Weekly     Continuous Infusions:    sodium chloride      sodium chloride       PRN Meds: morphine, sodium chloride flush, sodium chloride, potassium chloride **OR** potassium alternative oral replacement **OR** potassium chloride, magnesium sulfate, ondansetron **OR** ondansetron,  polyethylene glycol, acetaminophen **OR** acetaminophen, sodium chloride flush, sodium chloride  Home Meds:   Prior to Admission medications    Medication Sig Start Date End Date Taking? Authorizing Provider   famotidine (PEPCID) 20 MG tablet Take 1 tablet by mouth 2 times daily   Yes Meenakshi Martinez MD   magnesium oxide (MAG-OX) 400 (240 Mg) MG tablet Take 1 tablet by mouth daily 5/17/24  Yes Alex Rico MD   pantoprazole (PROTONIX) 40 MG tablet Take 1 tablet by mouth 2 times daily (before meals) 5/16/24  Yes Alex Rico MD   BIOTIN PO Take by mouth   Yes Meenakshi Martinez MD   Multiple Vitamins-Minerals (STRESS B-COMPLEX/C/ZINC) TABS TAKE 1 TABLET BY MOUTH ONCE DAILY 5/1/22  Yes Meenakshi Martinez MD   albuterol sulfate HFA (PROVENTIL;VENTOLIN;PROAIR) 108 (90 Base) MCG/ACT inhaler Inhale 2 puffs into the lungs every 4 hours as needed for Shortness of Breath 1/1/19  Yes Meenakshi Martinez MD   aspirin 81 MG chewable tablet Take 1 tablet by mouth daily   Yes Meenakshi Martinez MD   ondansetron (ZOFRAN) 4 MG tablet Take 1 tablet by mouth every 4 hours as needed for Nausea 5/23/24   David Mack MD   mirtazapine (REMERON) 15 MG tablet Take 0.5 tablets by mouth nightly 5/16/24 7/2/24  Alex Rico MD   acetaminophen (TYLENOL) 325 MG tablet Take 2 tablets by mouth every 6 hours as needed for Pain    Meenakshi Martinez MD   atorvastatin (LIPITOR) 20 MG tablet Take 1 tablet by mouth daily 8/24/19   Meenakshi Martinez MD   LORazepam (ATIVAN) 0.5 MG tablet Take 1 tablet by mouth every 8 hours as needed. 7/24/18   Meenakshi Martinez MD   montelukast (SINGULAIR) 10 MG tablet Take 1 tablet by mouth nightly    Meenakshi Martinez MD     Coumadin Use Last 7 Days:  no  Antiplatelet drug therapy use last 7 days: no  Other anticoagulant use last 7 days: no  Additional Medication Information:  N/A      Pre-Sedation Documentation and Exam:   I have reviewed the patient's history

## 2024-12-04 NOTE — PROGRESS NOTES
0813 am- Shift Assessment completed. A/O X4. Care plan and education have been reviewed and mutually agreed upon with the patient. Bed is in the lowest position and call light within reach. Standard precautions are in place.      0910- Went to IR for abscess drain.     1231- Patient stated she feels nauseous. IV Zofran given.

## 2024-12-04 NOTE — PROGRESS NOTES
Patients head to toe assessment completed. Vital signs WNL. call light within reach. Scheduled medications given per MAR. Patient denies any pain at the moment. Patient resting in bed. Family at bedside.

## 2024-12-04 NOTE — CARE COORDINATION
Discharge Planning Note:    Chart reviewed and it appears that patient has minimal needs for discharge at this time. Risk Score 14 %     Primary Care Physician is MEGAN POP   Primary insurance is Medicare    Patient is from home alone. However, is independent at home and has a daughter that is a nurse.    Please notify case management if any discharge needs are identified.      Case management will continue to follow progress and update discharge plan as needed.    Electronically signed by MELI Valente on 12/4/2024 at 4:26 PM

## 2024-12-05 LAB
ACID FAST STN SPEC QL: NORMAL
ALBUMIN SERPL-MCNC: 2.6 G/DL (ref 3.4–5)
ALBUMIN/GLOB SERPL: 0.8 {RATIO} (ref 1.1–2.2)
ALP SERPL-CCNC: 101 U/L (ref 40–129)
ALT SERPL-CCNC: 17 U/L (ref 10–40)
ANION GAP SERPL CALCULATED.3IONS-SCNC: 14 MMOL/L (ref 3–16)
AST SERPL-CCNC: 21 U/L (ref 15–37)
BASOPHILS # BLD: 0 K/UL (ref 0–0.2)
BASOPHILS NFR BLD: 0.3 %
BILIRUB SERPL-MCNC: 0.3 MG/DL (ref 0–1)
BUN SERPL-MCNC: 10 MG/DL (ref 7–20)
CALCIUM SERPL-MCNC: 8.5 MG/DL (ref 8.3–10.6)
CHLORIDE SERPL-SCNC: 101 MMOL/L (ref 99–110)
CO2 SERPL-SCNC: 21 MMOL/L (ref 21–32)
CREAT SERPL-MCNC: 0.7 MG/DL (ref 0.6–1.2)
DEPRECATED RDW RBC AUTO: 13.6 % (ref 12.4–15.4)
EOSINOPHIL # BLD: 0 K/UL (ref 0–0.6)
EOSINOPHIL NFR BLD: 0 %
GFR SERPLBLD CREATININE-BSD FMLA CKD-EPI: 89 ML/MIN/{1.73_M2}
GLUCOSE SERPL-MCNC: 97 MG/DL (ref 70–99)
HCT VFR BLD AUTO: 32 % (ref 36–48)
HGB BLD-MCNC: 10.9 G/DL (ref 12–16)
LOEFFLER MB STN SPEC: NORMAL
LYMPHOCYTES # BLD: 1.4 K/UL (ref 1–5.1)
LYMPHOCYTES NFR BLD: 13 %
MAGNESIUM SERPL-MCNC: 1.85 MG/DL (ref 1.8–2.4)
MCH RBC QN AUTO: 29.6 PG (ref 26–34)
MCHC RBC AUTO-ENTMCNC: 34.1 G/DL (ref 31–36)
MCV RBC AUTO: 86.9 FL (ref 80–100)
MONOCYTES # BLD: 0.5 K/UL (ref 0–1.3)
MONOCYTES NFR BLD: 4.3 %
NEUTROPHILS # BLD: 9.1 K/UL (ref 1.7–7.7)
NEUTROPHILS NFR BLD: 82.4 %
PHOSPHATE SERPL-MCNC: 3.1 MG/DL (ref 2.5–4.9)
PLATELET # BLD AUTO: 292 K/UL (ref 135–450)
PMV BLD AUTO: 7.3 FL (ref 5–10.5)
POTASSIUM SERPL-SCNC: 3.8 MMOL/L (ref 3.5–5.1)
PROT SERPL-MCNC: 6 G/DL (ref 6.4–8.2)
RBC # BLD AUTO: 3.68 M/UL (ref 4–5.2)
SODIUM SERPL-SCNC: 136 MMOL/L (ref 136–145)
WBC # BLD AUTO: 11 K/UL (ref 4–11)

## 2024-12-05 PROCEDURE — 80053 COMPREHEN METABOLIC PANEL: CPT

## 2024-12-05 PROCEDURE — 83735 ASSAY OF MAGNESIUM: CPT

## 2024-12-05 PROCEDURE — 6360000002 HC RX W HCPCS: Performed by: NURSE PRACTITIONER

## 2024-12-05 PROCEDURE — 6370000000 HC RX 637 (ALT 250 FOR IP): Performed by: NURSE PRACTITIONER

## 2024-12-05 PROCEDURE — 6370000000 HC RX 637 (ALT 250 FOR IP): Performed by: INTERNAL MEDICINE

## 2024-12-05 PROCEDURE — 6360000002 HC RX W HCPCS: Performed by: INTERNAL MEDICINE

## 2024-12-05 PROCEDURE — 99232 SBSQ HOSP IP/OBS MODERATE 35: CPT | Performed by: SURGERY

## 2024-12-05 PROCEDURE — 2580000003 HC RX 258: Performed by: NURSE PRACTITIONER

## 2024-12-05 PROCEDURE — 6370000000 HC RX 637 (ALT 250 FOR IP): Performed by: SURGERY

## 2024-12-05 PROCEDURE — 2580000003 HC RX 258: Performed by: INTERNAL MEDICINE

## 2024-12-05 PROCEDURE — 1200000000 HC SEMI PRIVATE

## 2024-12-05 PROCEDURE — 84100 ASSAY OF PHOSPHORUS: CPT

## 2024-12-05 PROCEDURE — 85025 COMPLETE CBC W/AUTO DIFF WBC: CPT

## 2024-12-05 RX ORDER — SIMETHICONE 80 MG
80 TABLET,CHEWABLE ORAL EVERY 6 HOURS PRN
Status: DISCONTINUED | OUTPATIENT
Start: 2024-12-05 | End: 2024-12-06 | Stop reason: HOSPADM

## 2024-12-05 RX ORDER — LEVOFLOXACIN 750 MG/1
750 TABLET, FILM COATED ORAL DAILY
Status: DISCONTINUED | OUTPATIENT
Start: 2024-12-05 | End: 2024-12-06 | Stop reason: HOSPADM

## 2024-12-05 RX ADMIN — SODIUM CHLORIDE 125 MG: 9 INJECTION, SOLUTION INTRAVENOUS at 11:14

## 2024-12-05 RX ADMIN — ENOXAPARIN SODIUM 40 MG: 100 INJECTION SUBCUTANEOUS at 08:00

## 2024-12-05 RX ADMIN — ONDANSETRON 4 MG: 4 TABLET, ORALLY DISINTEGRATING ORAL at 11:17

## 2024-12-05 RX ADMIN — LEVOFLOXACIN 750 MG: 750 TABLET, FILM COATED ORAL at 11:06

## 2024-12-05 RX ADMIN — LORAZEPAM 0.5 MG: 0.5 TABLET ORAL at 22:49

## 2024-12-05 RX ADMIN — METRONIDAZOLE 500 MG: 500 INJECTION, SOLUTION INTRAVENOUS at 02:29

## 2024-12-05 RX ADMIN — SODIUM CHLORIDE, PRESERVATIVE FREE 10 ML: 5 INJECTION INTRAVENOUS at 20:17

## 2024-12-05 RX ADMIN — THERA TABS 1 TABLET: TAB at 07:58

## 2024-12-05 RX ADMIN — MONTELUKAST SODIUM 10 MG: 10 TABLET, FILM COATED ORAL at 20:19

## 2024-12-05 RX ADMIN — Medication 400 MG: at 07:58

## 2024-12-05 RX ADMIN — PANTOPRAZOLE SODIUM 40 MG: 40 TABLET, DELAYED RELEASE ORAL at 06:00

## 2024-12-05 RX ADMIN — SODIUM CHLORIDE, PRESERVATIVE FREE 0.5 MG: 5 INJECTION INTRAVENOUS at 16:10

## 2024-12-05 RX ADMIN — ACETAMINOPHEN 650 MG: 325 TABLET ORAL at 20:19

## 2024-12-05 RX ADMIN — WATER 1000 MG: 1 INJECTION INTRAMUSCULAR; INTRAVENOUS; SUBCUTANEOUS at 02:34

## 2024-12-05 RX ADMIN — ACETAMINOPHEN 650 MG: 325 TABLET ORAL at 07:58

## 2024-12-05 RX ADMIN — SODIUM CHLORIDE, PRESERVATIVE FREE 10 ML: 5 INJECTION INTRAVENOUS at 07:57

## 2024-12-05 RX ADMIN — ATORVASTATIN CALCIUM 20 MG: 20 TABLET, FILM COATED ORAL at 07:58

## 2024-12-05 RX ADMIN — PANTOPRAZOLE SODIUM 40 MG: 40 TABLET, DELAYED RELEASE ORAL at 16:09

## 2024-12-05 ASSESSMENT — PAIN SCALES - GENERAL
PAINLEVEL_OUTOF10: 5
PAINLEVEL_OUTOF10: 0
PAINLEVEL_OUTOF10: 6

## 2024-12-05 ASSESSMENT — PAIN DESCRIPTION - LOCATION: LOCATION: ABDOMEN

## 2024-12-05 ASSESSMENT — PAIN DESCRIPTION - DESCRIPTORS: DESCRIPTORS: ACHING

## 2024-12-05 NOTE — PROGRESS NOTES
V2.[place]    [facility] [place]      Name:  Sonja Mcmahan /Age/Sex: 1950  (74 y.o. female)   MRN & CSN:  [id number] & [id number] Encounter Date/Time: 2024 11:44 AM EST   Location:  [facility]4475/4475-01 PCP: Abiodun Amanda PA     Attending:Margo Suárez MD       Hospital Day: 3    Assessment and Recommendations   Sonja Mcmahan is a 74 y.o. 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 CT abdomen pelvis suspicious for leak from gastric bypass with associated abscess along the left margins of the liver.         Plan:    Epigastric abdominal pain with intra-abdominal fluid collection:  Hx of Grade 1 gastric adenoma S/P Partial gastrectomy with lymph node dissection on 24.   History of abdominal abscess s/p drainage by IR on 5/3/2024.  General Surgery consulted: s/p IR drainage .    Cultures pending  Continue Levaquin.  Diet advanced to regular  Pain control prn.    B12 and vitamin D deficiency: Continue replacement.     GERD: Continue PPI.     Mild anemia: TAYA noted on work up. Started on IV Iron.      Diet ADULT DIET; Regular   DVT Prophylaxis [x] Lovenox, []  Heparin, [] SCDs, [] Ambulation,  [] Eliquis, [] Xarelto  [] Coumadin   Code Status Full Code   Disposition From: Home  Expected Disposition: Home  Estimated Date of Discharge:   Patient requires continued admission due to intra-abdominal abscess   Surrogate Decision Maker/ Natalie Teran (Child)  [phone]     Personally reviewed Lab Studies and Imaging     Subjective:     Chief Complaint: Abdominal pain with fevers    Patient seen and examined.   No interval events. Denies any complaints.   No pain, fever or chills.      Review of Systems:   OF THE ABDOMEN AND PELVIS WITH CONTRAST 12/3/2024 4:15 pm TECHNIQUE: CT of the abdomen and pelvis was performed with the administration of intravenous contrast. Multiplanar reformatted images are provided for review. Automated exposure control, iterative reconstruction, and/or weight based adjustment of the mA/kV was utilized to reduce the radiation dose to as low as reasonably achievable. COMPARISON: 06/17/2024 HISTORY: ORDERING SYSTEM PROVIDED HISTORY: Evaluate for leak from the gastric bypass with associated abscess TECHNOLOGIST PROVIDED HISTORY: Reason for exam:->Evaluate for leak from the gastric bypass with associated abscess Additional Contrast?->Oral Reason for Exam: Evaluate for leak from the gastric bypass with associated abscess FINDINGS: Lower Chest: Minimal linear opacities in the lung bases compatible subsegmental atelectasis or scarring. Organs: Organized fluid collection along the margin of the liver is noted measuring 5.5 x 3.5 cm.  This appears to communicate with a fluid channel extending towards the duodenal staple line.  This is in the previously seen site of surgical drain near the liver and tract of gas previously noted.  No new findings otherwise appreciated within the liver parenchyma. Cholecystectomy.  Pancreas, spleen, adrenals and kidneys reveal no new findings.  Scarring in the superior pole of the left kidney. GI/Bowel: Oral contrast present within the distal small bowel and proximal colon.  No extraluminal contrast identified.  No evidence for bowel obstruction.  Status post partial gastrectomy and gastrojejunostomy.  Distal colonic diverticulosis. Pelvis: No acute findings. Peritoneum/Retroperitoneum: No free air.  Fat stranding and fluid in the left upper quadrant adjacent to the left hepatic lobe, as above.  Trace pelvic free fluid. Bones/Soft Tissues: Postoperative findings in the abdominal wall.  No acute soft tissue abnormality or acute osseous abnormality otherwise appreciated.  Date/Time    ORG Fusobacterium nucleatum 05/03/2024 01:30 PM    ORG Escherichia coli 05/03/2024 01:30 PM    ORG Klebsiella pneumoniae 05/03/2024 01:30 PM    ORG Staphylococcus epidermidis 05/03/2024 01:30 PM    ORG Prevotecristiane eastcae 05/03/2024 01:30 PM         Electronically signed by Margo Suárez MD on 12/5/2024 at 10:30 AM

## 2024-12-05 NOTE — PROGRESS NOTES
Shift assessment completed. VSS. AM medications administered as ordered. Alert and oriented. PRN Zofran given for nausea.     1145: PIV to R hand leaking. Attempt to place PIV to L anterior FA unsuccessful. PICC RN called to place PIV d/t fragile veins. Iron infusion restarted. New PIV placed to L arm.     1400: Pt dry heaving and spitting, no emesis at this time. Pt appears to have discomfort w/ gas. Contacted MD for PRN Simethicone.

## 2024-12-05 NOTE — PROGRESS NOTES
Attempted to call RN and floor desk regarding unsuccessful PICC attempt yesterday and If the patient would allow another attempt today in left arm but could not get through to either phone. Please call  [phone] if PICC insertion is to proceed or cancel order if not. Thank you.

## 2024-12-05 NOTE — PROGRESS NOTES
[facility]        Progress Note    Patient Name: Sonja Mcmahan  MRN: [id number]  YOB: 1950  Date of Evaluation: 2024    Chief Complaint: Abdominal pain    Subjective:  No acute events overnight  Pain controlled, mild  No further nausea or vomiting, feels hungry  Resting in bed at this time  Had drain placed      Vital Signs:  Patient Vitals for the past 24 hrs:   BP Temp Temp src Pulse Resp SpO2   24 0730 132/71 98.8 °F (37.1 °C) Oral 88 16 94 %   24 0445 124/76 99 °F (37.2 °C) Oral 89 16 93 %   24 -- -- -- -- 17 --   24 -- -- -- -- 17 --   24 2000 124/72 98.6 °F (37 °C) Axillary 98 18 92 %   24 1600 (!) 147/72 100.1 °F (37.8 °C) Axillary 91 18 90 %   24 1236 -- -- -- -- 17 --   24 1205 (!) 148/73 -- -- -- -- --   24 1055 -- -- -- -- 17 --   24 1051 -- -- -- -- 18 --   24 1045 (!) 142/62 98.6 °F (37 °C) Oral 79 20 92 %   24 1035 (!) 148/66 -- -- 82 22 94 %   24 1030 (!) 149/65 -- -- 77 18 96 %   24 1025 (!) 158/66 -- -- 79 20 97 %   24 1024 -- -- -- 83 -- --   24 1020 (!) 160/63 -- -- 78 20 98 %   24 1015 (!) 154/62 -- -- 69 19 95 %   24 1010 (!) 157/61 -- -- 76 17 96 %   24 1005 125/76 -- -- 86 23 97 %   24 1003 (!) 158/64 -- -- 73 16 97 %      TEMPERATURE HISTORY 24H: Temp (24hrs), Av °F (37.2 °C), Min:98.6 °F (37 °C), Max:100.1 °F (37.8 °C)    BLOOD PRESSURE HISTORY: Systolic (36hrs), Av , Min:120 , Max:160    Diastolic (36hrs), Av, Min:54, Max:86      Intake/Output:  I/O last 3 completed shifts:  In: 245 [P.O.:120; I.V.:125]  Out: 70 [Drains:70]  No intake/output data recorded.  Drain/tube Output:  Closed/Suction Drain Midline Abdomen Bulb-Output (ml): 10 ml    Physical Exam:  General: awake, alert, oriented to person, place, time  Cardiovascular:  regular rate and rhythm and no murmur noted  Lungs: clear to

## 2024-12-05 NOTE — PROGRESS NOTES
Nutrition Note    RECOMMENDATIONS  PO Diet: Continue current diet  ONS: Pt denied   Nursing: RD ordered wt. Please obtain wt of the pt and document wt method    ASSESSMENT   Pt triggered for positive nutrition screen. Hx of gastric adenocarcinoma, s/p lap hiatal hernia repair with primary closure, lap antonio, open hemigastrectomy with gastrojejunostomy and closure of duodenal stump and overlying omental flap 4/22. Admitted with intraabdominal fluid collection near duodenal stump, IR drain placed yesterday. Upon visiting, pt reported 5 lb unintentional wt loss since surgery in April; eating well for the most part except for a few days prior to current admission. If admission wt of 147 lb is accurate (no wt method documented), wt hx indicates 36 lb (20%) wt loss since last discharge 5/7, which is considered significant. Drinks [place] Instant Breakfast at home, does not wish to receive any in house ONS at this time. RD will monitor for adequate po intake.     Malnutrition Status: Insufficient data  Chronic Illness  Findings of the 6 clinical characteristics of malnutrition:  Energy Intake:  Mild decrease in energy intake  Weight Loss:  Unable to assess (need wt method to confirm 36 lb wt loss in 7 months)     Body Fat Loss:  No body fat loss     Muscle Mass Loss:  No muscle mass loss    Fluid Accumulation:  No fluid accumulation     Strength:  Not Performed      NUTRITION DIAGNOSIS   Inadequate oral intake related to altered GI function as evidenced by poor intake prior to admission, weight loss    Goals: PO intake 50% or greater, by next RD assessment     NUTRITION RELATED FINDINGS  Objective: Labs reviewed. LBM pta. No edema noted.  Wounds: None    CURRENT NUTRITION THERAPIES  ADULT DIET; Regular       PO Intake: Unable to assess (no documented meal intakes)   PO Supplement Intake:None Ordered    ANTHROPOMETRICS  Current Height: 160 cm (5' 3\")  Current Weight - Scale: 67 kg (147 lb 11.3 oz)    Ideal Body Weight

## 2024-12-05 NOTE — PROGRESS NOTES
Prn Zofran for nausea PRN Morphine for abd pain. Effective.  Patient assessment as documented, VSS, pm meds given as ordered. No further concerns expressed. Call light within reach.    MARK LANDRY RN

## 2024-12-06 VITALS
HEIGHT: 63 IN | WEIGHT: 147.71 LBS | HEART RATE: 85 BPM | SYSTOLIC BLOOD PRESSURE: 126 MMHG | RESPIRATION RATE: 16 BRPM | BODY MASS INDEX: 26.17 KG/M2 | DIASTOLIC BLOOD PRESSURE: 72 MMHG | TEMPERATURE: 98.6 F | OXYGEN SATURATION: 95 %

## 2024-12-06 PROBLEM — E87.8 ELECTROLYTE ABNORMALITY: Status: ACTIVE | Noted: 2024-12-06

## 2024-12-06 PROBLEM — E61.1 IRON DEFICIENCY: Status: ACTIVE | Noted: 2024-12-06

## 2024-12-06 LAB
ALBUMIN SERPL-MCNC: 2.5 G/DL (ref 3.4–5)
ALBUMIN/GLOB SERPL: 0.7 {RATIO} (ref 1.1–2.2)
ALP SERPL-CCNC: 92 U/L (ref 40–129)
ALT SERPL-CCNC: 13 U/L (ref 10–40)
ANION GAP SERPL CALCULATED.3IONS-SCNC: 11 MMOL/L (ref 3–16)
AST SERPL-CCNC: 22 U/L (ref 15–37)
BILIRUB SERPL-MCNC: 0.3 MG/DL (ref 0–1)
BUN SERPL-MCNC: 8 MG/DL (ref 7–20)
CALCIUM SERPL-MCNC: 8.8 MG/DL (ref 8.3–10.6)
CHLORIDE SERPL-SCNC: 101 MMOL/L (ref 99–110)
CO2 SERPL-SCNC: 23 MMOL/L (ref 21–32)
CREAT SERPL-MCNC: 0.6 MG/DL (ref 0.6–1.2)
DEPRECATED RDW RBC AUTO: 13.8 % (ref 12.4–15.4)
GFR SERPLBLD CREATININE-BSD FMLA CKD-EPI: >90 ML/MIN/{1.73_M2}
GLUCOSE SERPL-MCNC: 110 MG/DL (ref 70–99)
HCT VFR BLD AUTO: 32 % (ref 36–48)
HGB BLD-MCNC: 10.8 G/DL (ref 12–16)
MAGNESIUM SERPL-MCNC: 1.95 MG/DL (ref 1.8–2.4)
MCH RBC QN AUTO: 29.3 PG (ref 26–34)
MCHC RBC AUTO-ENTMCNC: 33.6 G/DL (ref 31–36)
MCV RBC AUTO: 87 FL (ref 80–100)
PHOSPHATE SERPL-MCNC: 1.7 MG/DL (ref 2.5–4.9)
PLATELET # BLD AUTO: 309 K/UL (ref 135–450)
PMV BLD AUTO: 7.7 FL (ref 5–10.5)
POTASSIUM SERPL-SCNC: 3.3 MMOL/L (ref 3.5–5.1)
PROT SERPL-MCNC: 6 G/DL (ref 6.4–8.2)
RBC # BLD AUTO: 3.68 M/UL (ref 4–5.2)
SODIUM SERPL-SCNC: 135 MMOL/L (ref 136–145)
WBC # BLD AUTO: 11.5 K/UL (ref 4–11)

## 2024-12-06 PROCEDURE — 2580000003 HC RX 258: Performed by: INTERNAL MEDICINE

## 2024-12-06 PROCEDURE — 6370000000 HC RX 637 (ALT 250 FOR IP): Performed by: INTERNAL MEDICINE

## 2024-12-06 PROCEDURE — 99232 SBSQ HOSP IP/OBS MODERATE 35: CPT | Performed by: SURGERY

## 2024-12-06 PROCEDURE — 80053 COMPREHEN METABOLIC PANEL: CPT

## 2024-12-06 PROCEDURE — 83735 ASSAY OF MAGNESIUM: CPT

## 2024-12-06 PROCEDURE — 2500000003 HC RX 250 WO HCPCS: Performed by: INTERNAL MEDICINE

## 2024-12-06 PROCEDURE — 2580000003 HC RX 258: Performed by: NURSE PRACTITIONER

## 2024-12-06 PROCEDURE — 6370000000 HC RX 637 (ALT 250 FOR IP): Performed by: SURGERY

## 2024-12-06 PROCEDURE — 6360000002 HC RX W HCPCS: Performed by: NURSE PRACTITIONER

## 2024-12-06 PROCEDURE — 84100 ASSAY OF PHOSPHORUS: CPT

## 2024-12-06 PROCEDURE — 85027 COMPLETE CBC AUTOMATED: CPT

## 2024-12-06 PROCEDURE — 6370000000 HC RX 637 (ALT 250 FOR IP): Performed by: NURSE PRACTITIONER

## 2024-12-06 RX ORDER — POTASSIUM CHLORIDE 1500 MG/1
40 TABLET, EXTENDED RELEASE ORAL EVERY 4 HOURS
Status: COMPLETED | OUTPATIENT
Start: 2024-12-06 | End: 2024-12-06

## 2024-12-06 RX ORDER — LEVOFLOXACIN 750 MG/1
750 TABLET, FILM COATED ORAL DAILY
Qty: 14 TABLET | Refills: 0 | Status: SHIPPED | OUTPATIENT
Start: 2024-12-07 | End: 2024-12-21

## 2024-12-06 RX ORDER — SODIUM PHOSPHATE, DIBASIC, ANHYDROUS, POTASSIUM PHOSPHATE, MONOBASIC, AND SODIUM PHOSPHATE, MONOBASIC, MONOHYDRATE 852; 155; 130 MG/1; MG/1; MG/1
1 TABLET, COATED ORAL 2 TIMES DAILY
Qty: 6 TABLET | Refills: 0 | Status: SHIPPED | OUTPATIENT
Start: 2024-12-06 | End: 2024-12-09

## 2024-12-06 RX ORDER — FERROUS SULFATE 325(65) MG
325 TABLET ORAL
Qty: 30 TABLET | Refills: 5 | Status: SHIPPED | OUTPATIENT
Start: 2024-12-06

## 2024-12-06 RX ADMIN — ONDANSETRON 4 MG: 4 TABLET, ORALLY DISINTEGRATING ORAL at 04:51

## 2024-12-06 RX ADMIN — Medication 500 MG: at 10:37

## 2024-12-06 RX ADMIN — SODIUM CHLORIDE: 9 INJECTION, SOLUTION INTRAVENOUS at 09:13

## 2024-12-06 RX ADMIN — POTASSIUM CHLORIDE 40 MEQ: 1500 TABLET, EXTENDED RELEASE ORAL at 10:37

## 2024-12-06 RX ADMIN — POTASSIUM PHOSPHATE, MONOBASIC POTASSIUM PHOSPHATE, DIBASIC 30 MMOL: 224; 236 INJECTION, SOLUTION, CONCENTRATE INTRAVENOUS at 09:06

## 2024-12-06 RX ADMIN — SODIUM CHLORIDE, PRESERVATIVE FREE 10 ML: 5 INJECTION INTRAVENOUS at 09:15

## 2024-12-06 RX ADMIN — ENOXAPARIN SODIUM 40 MG: 100 INJECTION SUBCUTANEOUS at 09:16

## 2024-12-06 RX ADMIN — ACETAMINOPHEN 650 MG: 325 TABLET ORAL at 02:51

## 2024-12-06 RX ADMIN — Medication 500 MG: at 12:47

## 2024-12-06 RX ADMIN — LEVOFLOXACIN 750 MG: 750 TABLET, FILM COATED ORAL at 09:15

## 2024-12-06 RX ADMIN — ATORVASTATIN CALCIUM 20 MG: 20 TABLET, FILM COATED ORAL at 09:03

## 2024-12-06 RX ADMIN — PANTOPRAZOLE SODIUM 40 MG: 40 TABLET, DELAYED RELEASE ORAL at 06:22

## 2024-12-06 RX ADMIN — Medication 400 MG: at 09:03

## 2024-12-06 RX ADMIN — ONDANSETRON 4 MG: 4 TABLET, ORALLY DISINTEGRATING ORAL at 12:47

## 2024-12-06 RX ADMIN — THERA TABS 1 TABLET: TAB at 09:03

## 2024-12-06 RX ADMIN — POTASSIUM CHLORIDE 40 MEQ: 1500 TABLET, EXTENDED RELEASE ORAL at 07:04

## 2024-12-06 ASSESSMENT — PAIN SCALES - GENERAL: PAINLEVEL_OUTOF10: 6

## 2024-12-06 ASSESSMENT — PAIN DESCRIPTION - DESCRIPTORS: DESCRIPTORS: ACHING

## 2024-12-06 ASSESSMENT — PAIN DESCRIPTION - LOCATION: LOCATION: ABDOMEN

## 2024-12-06 NOTE — PLAN OF CARE
Problem: Chronic Conditions and Co-morbidities  Goal: Patient's chronic conditions and co-morbidity symptoms are monitored and maintained or improved  Outcome: Progressing     Problem: Discharge Planning  Goal: Discharge to home or other facility with appropriate resources  Outcome: Progressing     Problem: Pain  Goal: Verbalizes/displays adequate comfort level or baseline comfort level  Outcome: Progressing     Problem: Safety - Adult  Goal: Free from fall injury  Outcome: Progressing     Problem: Gastrointestinal - Adult  Goal: Minimal or absence of nausea and vomiting  Outcome: Progressing  Goal: Maintains adequate nutritional intake  Outcome: Progressing     Problem: Metabolic/Fluid and Electrolytes - Adult  Goal: Electrolytes maintained within normal limits  Outcome: Progressing  Goal: Glucose maintained within prescribed range  Outcome: Progressing     Problem: Nutrition Deficit:  Goal: Optimize nutritional status  Outcome: Progressing

## 2024-12-06 NOTE — PROGRESS NOTES
CLINICAL PHARMACY NOTE: MEDS TO BEDS    Total # of Prescriptions Filled: 2   The following medications were delivered to the patient:  Levofloxacin 750  Ferrous sulfate    Additional Documentation:  Kphos neutral not in [facility] per [facility] request    Patient picked up

## 2024-12-06 NOTE — PROGRESS NOTES
Morning assessment complete. Medications given per MAR. VS stable. A&O X4. Pt denies pain at this time. Patient clean and dry. Ambulates independently in room. Breakfast tray set up. Bed side table, call light and belongings within reach. Bed locked and in lowest position. All questions and concerns addressed, no further needs at this time.

## 2024-12-06 NOTE — DISCHARGE INSTR - COC
Continuity of Care Form    Patient Name: Sonja Mcmahan   :  1950  MRN:  [id number]    Admit date:  12/3/2024  Discharge date:  ***    Code Status Order: Full Code   Advance Directives:   Advance Care Flowsheet Documentation             Admitting Physician:  Antonio Duarte DO  PCP: Abiodun Amanda PA    Discharging Nurse: ***  Discharging Hospital Unit/Room#: 4TN-4475/4475-01  Discharging Unit Phone Number: ***    Emergency Contact:   Extended Emergency Contact Information  Primary Emergency Contact: Natalie Carrasco  Home Phone: [phone]  Relation: Child  Secondary Emergency Contact: Annette Mcmahan  Home Phone: [phone]  Relation: Child    Past Surgical History:  Past Surgical History:   Procedure Laterality Date    CHOLECYSTECTOMY, LAPAROSCOPIC N/A 2024    LAPAROSCOPIC CHOLECYSTECTOMY performed by David Mack MD at [facility] OR    ELBOW SURGERY      GASTRECTOMY N/A 2024    HEMIGASTRECTOMY WITH RETROCOLIC RETROGASTIC GASTROJEJUNOSTOMY performed by David Mack MD at [facility] OR    HIATAL HERNIA REPAIR N/A 2024    LAPAROSCOPIC HERNIA HIATAL REPAIR performed by David Mack MD at [facility] OR    HYSTERECTOMY, TOTAL ABDOMINAL (CERVIX REMOVED)      UPPER GASTROINTESTINAL ENDOSCOPY N/A 2024    ESOPHAGOGASTRODUODENOSCOPY DILATION BALLOON performed by Cortez Burrows MD at [facility] ENDOSCOPY    UPPER GASTROINTESTINAL ENDOSCOPY N/A 2024    ESOPHAGOGASTRODUODENOSCOPY BIOPSY performed by Cortez Burrows MD at [facility] ENDOSCOPY    UPPER GASTROINTESTINAL ENDOSCOPY N/A 2024    ESOPHAGOGASTRODUODENOSCOPY POLYP SNARE performed by Cortez Burrows MD at [facility] ENDOSCOPY       Immunization History:     There is no immunization history on file for this patient.    Active Problems:  Patient Active Problem List   Diagnosis Code    Gastroesophageal reflux disease with esophagitis K21.00    Benign essential HTN I10    Hyperlipidemia E78.5    Sinus tachycardia  filed at 2024 0915  Gross per 24 hour   Intake 111.24 ml   Output 7.5 ml   Net 103.74 ml     I/O last 3 completed shifts:  In: 101.2 [IV Piggyback:101.2]  Out: 17.5 [Drains:17.5]    Safety Concerns:     { BRIGHT Safety Concerns:655584785}    Impairments/Disabilities:      {[facility] Impairments/Disabilities:569308459}    Nutrition Therapy:  Current Nutrition Therapy:   { BRIGHT Diet List:357955186}    Routes of Feeding: {[facility] D[facility] Other Feedings:835773419}  Liquids: {Slp liquid thickness:34253}  Daily Fluid Restriction: {[facility] DME Yes amt example:445778746}  Last Modified Barium Swallow with Video (Video Swallowing Test): {Done Not Done Date:}    Treatments at the Time of Hospital Discharge:   Respiratory Treatments: ***  Oxygen Therapy:  {Therapy; copd oxygen:29764}  Ventilator:    { CC Vent List:953713327}    Rehab Therapies: {THERAPEUTIC INTERVENTION:5207564438}  Weight Bearing Status/Restrictions: {[facility] Weight Bearin}  Other Medical Equipment (for information only, NOT a DME order):  {EQUIPMENT:345352587}  Other Treatments: ***    Patient's personal belongings (please select all that are sent with patient):  {[facility] DME Belongings:725458821}    RN SIGNATURE:  {Esignature:505901723}    CASE MANAGEMENT/SOCIAL WORK SECTION    Inpatient Status Date: ***    Readmission Risk Assessment Score:  Readmission Risk              Risk of Unplanned Readmission:  21           Discharging to Facility/ Agency   Name:   Address:  Phone:  Fax:    Dialysis Facility (if applicable)   Name:  Address:  Dialysis Schedule:  Phone:  Fax:    / signature: {Esignature:753293133}    PHYSICIAN SECTION    Prognosis: Fair    Condition at Discharge: Stable    Rehab Potential (if transferring to Rehab): Fair    Recommended Labs or Other Treatments After Discharge:   Repeat renal panel and magnesium level in 1 week.  Maintain percutaneous drain and monitor output.  Outpatient follow-up with general surgery

## 2024-12-06 NOTE — DISCHARGE SUMMARY
V2.0  Discharge Summary    Name:  Sonja Mcmahan /Age/Sex: 1950 (74 y.o. female)   Admit Date: 12/3/2024  Discharge Date: 24    MRN & CSN:  [id number] & [id number] Encounter Date and Time 24 12:30 PM EST    Attending:  Margo Suárez MD Discharging Provider: Margo Suárez MD       Hospital Course:     Brief HPI: Sonja Mcmahan is a 74 y.o. 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 CT abdomen pelvis suspicious for leak from gastric bypass with associated abscess along the left margins of the liver.       Epigastric abdominal pain with intra-abdominal fluid collection:  Hx of Grade 1 gastric adenoma S/P Partial gastrectomy with lymph node dissection on 24.   History of abdominal abscess s/p drainage by IR on 5/3/2024.  General Surgery consulted: s/p IR drainage .    Cultures positive for E. coli and Streptococcus sensitive to Levaquin.    Started on IV Rocephin and Flagyl but transitioned to oral Levaquin per general surgery.  Tolerating regular diet.  Patient will be discharged home with drain and follow-up with Dr. Poole in 1 to 2 weeks.    B12 and vitamin D deficiency: Continue replacement.      GERD: Continue PPI.     Mild anemia: TAYA noted on work up.  Discharged on FeSO4.    Multiple electrolyte abnormality: Replaced. Repeat potassium, magnesium and Phos level outpatient.      The patient expressed appropriate understanding of, and agreement with the discharge recommendations, medications, and plan.     Consults this admission:  IP CONSULT TO GENERAL SURGERY    Discharge Diagnosis:   Intra-abdominal abscess (HCC)      Discharge Instruction:   Follow up appointments: General surgery  Primary care  bowel obstruction.  Status post partial gastrectomy and gastrojejunostomy.  Distal colonic diverticulosis. Pelvis: No acute findings. Peritoneum/Retroperitoneum: No free air.  Fat stranding and fluid in the left upper quadrant adjacent to the left hepatic lobe, as above.  Trace pelvic free fluid. Bones/Soft Tissues: Postoperative findings in the abdominal wall.  No acute soft tissue abnormality or acute osseous abnormality otherwise appreciated. Degenerative grade 1 anterolisthesis of L5.     5.5 x 3.5 cm organized fluid collection along the margin of the liver, which appears to communicate with a small fluid-filled tract extending towards the duodenal staple line.  This is concerning for abscess and/or contained leak. No extraluminal contrast seen regarding the gastrectomy site.       CBC:   Recent Labs     12/04/24 0426 12/05/24 0441 12/06/24 0518   WBC 10.2 11.0 11.5*   HGB 10.4* 10.9* 10.8*    292 309     BMP:    Recent Labs     12/04/24 0426 12/05/24 0441 12/06/24 0518    136 135*   K 4.2 3.8 3.3*    101 101   CO2 24 21 23   BUN 9 10 8   CREATININE 0.8 0.7 0.6   GLUCOSE 88 97 110*     Hepatic:   Recent Labs     12/04/24 0426 12/05/24 0441 12/06/24  0518   AST 28 21 22   ALT 25 17 13   BILITOT 0.3 0.3 0.3   ALKPHOS 104 101 92     Lipids: No results found for: \"CHOL\", \"HDL\", \"TRIG\"  Hemoglobin A1C:   Lab Results   Component Value Date/Time    LABA1C 5.9 04/19/2024 05:56 AM     TSH:   Lab Results   Component Value Date/Time    TSH 1.74 05/01/2024 05:15 AM     Troponin: No results found for: \"TROPONINT\"  Lactic Acid: No results for input(s): \"LACTA\" in the last 72 hours.  BNP: No results for input(s): \"PROBNP\" in the last 72 hours.  UA:  Lab Results   Component Value Date/Time    NITRU Negative 05/02/2024 09:25 AM    COLORU DARK YELLOW 05/02/2024 09:25 AM    PHUR 5.5 05/02/2024 09:25 AM    PHUR 6.0 04/15/2024 05:56 PM    WBCUA 0-2 05/02/2024 09:25 AM    RBCUA 2 04/15/2024 05:56 PM

## 2024-12-06 NOTE — CARE COORDINATION
12/06/24 1218   IMM Letter   IMM Letter given to Patient/Family/Significant other/Guardian/POA/by: MELI Valente   IMM Letter date given: 12/06/24   IMM Letter time given: 1215

## 2024-12-06 NOTE — PLAN OF CARE
Problem: Chronic Conditions and Co-morbidities  Goal: Patient's chronic conditions and co-morbidity symptoms are monitored and maintained or improved  12/6/2024 1428 by Xochilt Wooten RN  Outcome: Adequate for Discharge  Flowsheets (Taken 12/6/2024 0900)  Care Plan - Patient's Chronic Conditions and Co-Morbidity Symptoms are Monitored and Maintained or Improved: Monitor and assess patient's chronic conditions and comorbid symptoms for stability, deterioration, or improvement  12/6/2024 0814 by Xochilt Wooten RN  Outcome: Progressing     Problem: Discharge Planning  Goal: Discharge to home or other facility with appropriate resources  12/6/2024 1428 by Xochilt Wooten RN  Outcome: Adequate for Discharge  Flowsheets (Taken 12/6/2024 0900)  Discharge to home or other facility with appropriate resources: Identify barriers to discharge with patient and caregiver  12/6/2024 0814 by Xochilt Wooten RN  Outcome: Progressing     Problem: Pain  Goal: Verbalizes/displays adequate comfort level or baseline comfort level  12/6/2024 1428 by Xochilt Wooten RN  Outcome: Adequate for Discharge  Flowsheets (Taken 12/6/2024 0900)  Verbalizes/displays adequate comfort level or baseline comfort level: Encourage patient to monitor pain and request assistance  12/6/2024 0814 by Xochilt Wooten RN  Outcome: Progressing     Problem: Safety - Adult  Goal: Free from fall injury  12/6/2024 1428 by Xochilt Wooten RN  Outcome: Adequate for Discharge  12/6/2024 0814 by Xochilt Wooten RN  Outcome: Progressing     Problem: Gastrointestinal - Adult  Goal: Minimal or absence of nausea and vomiting  12/6/2024 1428 by Xochilt Wooten RN  Outcome: Adequate for Discharge  Flowsheets (Taken 12/6/2024 0900)  Minimal or absence of nausea and vomiting: Administer IV fluids as ordered to ensure adequate hydration  12/6/2024 0814 by Xochilt Wooten RN  Outcome: Progressing  Goal: Maintains adequate nutritional intake  12/6/2024 1428  by Xochilt Wooten RN  Outcome: Adequate for Discharge  Flowsheets (Taken 12/6/2024 0900)  Maintains adequate nutritional intake: Monitor percentage of each meal consumed  12/6/2024 0814 by Xochilt Wooten RN  Outcome: Progressing     Problem: Metabolic/Fluid and Electrolytes - Adult  Goal: Electrolytes maintained within normal limits  12/6/2024 1428 by Xochilt Wooten RN  Outcome: Adequate for Discharge  Flowsheets (Taken 12/6/2024 0900)  Electrolytes maintained within normal limits: Monitor labs and assess patient for signs and symptoms of electrolyte imbalances  12/6/2024 0814 by Xochilt Wooten RN  Outcome: Progressing  Goal: Glucose maintained within prescribed range  12/6/2024 1428 by Xochilt Wooten RN  Outcome: Adequate for Discharge  Flowsheets (Taken 12/6/2024 0900)  Glucose maintained within prescribed range: Monitor blood glucose as ordered  12/6/2024 0814 by Xochilt Wooten RN  Outcome: Progressing     Problem: Nutrition Deficit:  Goal: Optimize nutritional status  12/6/2024 1428 by Xochilt Wooten RN  Outcome: Adequate for Discharge  12/6/2024 0814 by Xochilt Wooten RN  Outcome: Progressing

## 2024-12-06 NOTE — PLAN OF CARE
Problem: Chronic Conditions and Co-morbidities  Goal: Patient's chronic conditions and co-morbidity symptoms are monitored and maintained or improved  12/6/2024 0814 by Xochilt Wooten RN  Outcome: Progressing  12/5/2024 2311 by Walt Mackenzie RN  Outcome: Progressing     Problem: Discharge Planning  Goal: Discharge to home or other facility with appropriate resources  12/6/2024 0814 by Xochilt Wooten RN  Outcome: Progressing  12/5/2024 2311 by Walt Mackenzie RN  Outcome: Progressing     Problem: Pain  Goal: Verbalizes/displays adequate comfort level or baseline comfort level  12/6/2024 0814 by Xochilt Wooten RN  Outcome: Progressing  12/5/2024 2311 by Walt Mackenzie RN  Outcome: Progressing

## 2024-12-06 NOTE — CARE COORDINATION
Case Management Assessment  Initial Evaluation    Date/Time of Evaluation: 12/6/2024 12:27 PM  Assessment Completed by: Oanh Bennett RN    If patient is discharged prior to next notation, then this note serves as note for discharge by case management.    Patient Name: Sonja Mcmahan                   YOB: 1950  Diagnosis: Intra-abdominal abscess (HCC) [K65.1]  Postoperative or surgical complication, initial encounter [T81.9XXA]                   Date / Time: 12/3/2024 12:18 AM    Patient Admission Status: Inpatient   Readmission Risk (Low < 19, Mod (19-27), High > 27): Readmission Risk Score: 14.4    Current PCP: Abiodun Amanda PA  PCP verified by CM? Yes    Chart Reviewed: Yes      History Provided by: Patient  Patient Orientation: Alert and Oriented, Person, Place, Situation    Patient Cognition: Alert    Hospitalization in the last 30 days (Readmission):  No    If yes, Readmission Assessment in  Navigator will be completed.    Advance Directives:      Code Status: Full Code   Patient's Primary Decision Maker is: Legal Next of Kin      Discharge Planning:    Patient lives with: Alone Type of Home: Apartment  Primary Care Giver: Self  Patient Support Systems include: Children   Current Financial resources: Medicare  Current community resources: None  Current services prior to admission: None            Current DME:              Type of Home Care services:  None    ADLS  Prior functional level: Independent in ADLs/IADLs  Current functional level: Independent in ADLs/IADLs    PT AM-PAC:   /24  OT AM-PAC:   /24    Family can provide assistance at DC: Yes (children, if needed)  Would you like Case Management to discuss the discharge plan with any other family members/significant others, and if so, who? No  Plans to Return to Present Housing: Yes  Other Identified Issues/Barriers to RETURNING to current housing: none at this time  Potential Assistance needed at discharge: N/A             Potential DME:    Patient expects to discharge to: Apartment  Plan for transportation at discharge:      Financial    Payor: MEDICARE / Plan: MEDICARE PART A AND B / Product Type: *No Product type* /     Does insurance require precert for SNF: No    Potential assistance Purchasing Medications:    Meds-to-Beds request:        [id number] - [place], [place] - [address] - P [phone] - F [phone]  [address]  [place] [address]  Phone: [phone] Fax: [phone]      Notes:    Factors facilitating achievement of predicted outcomes: Family support, Cooperative, Pleasant, and Good insight into deficits    Barriers to discharge: Medical complications    Additional Case Management Notes: CM met with patient bedside. Patient lives alone in a multi-level house. Patient states she has everything she needs. Her son and daughter help her if needed, and patient states she drives everyday. Patient will discharge to home. Daughter will transport.    The Plan for Transition of Care is related to the following treatment goals of Intra-abdominal abscess (HCC) [K65.1]  Postoperative or surgical complication, initial encounter [T81.9XXA]    IF APPLICABLE: The Patient and/or patient representative Sonja and her family were provided with a choice of provider and agrees with the discharge plan. Freedom of choice list with basic dialogue that supports the patient's individualized plan of care/goals and shares the quality data associated with the providers was provided to:     Patient Representative Name:       The Patient and/or Patient Representative Agree with the Discharge Plan?      Electronically signed by MELI Valente on 12/6/2024 at 12:28 PM   Case Management Department  Ph: [phone]

## 2024-12-06 NOTE — PROGRESS NOTES
[facility]        Progress Note    Patient Name: Sojna Mcmahan  MRN: [id number]  YOB: 1950  Date of Evaluation: 2024    Chief Complaint: Abdominal pain    Subjective:  No acute events overnight  Pain controlled, mild  Tolerating diet    Vital Signs:  Patient Vitals for the past 24 hrs:   BP Temp Temp src Pulse Resp SpO2 Height   24 0900 126/72 98.6 °F (37 °C) Oral 85 16 95 % --   24 0255 133/73 98.4 °F (36.9 °C) Oral 95 18 96 % --   24 0010 -- -- -- -- 16 -- --   24 2249 115/65 98.2 °F (36.8 °C) Oral 83 18 95 % --   24 2014 (!) 144/71 99.1 °F (37.3 °C) Oral 99 18 91 % --   24 1600 (!) 144/70 98.2 °F (36.8 °C) Oral 93 16 96 % --   24 1045 -- -- -- -- -- -- 1.6 m (5' 3\")   24 1033 118/74 -- -- 80 18 94 % --      TEMPERATURE HISTORY 24H: Temp (24hrs), Av.5 °F (36.9 °C), Min:98.2 °F (36.8 °C), Max:99.1 °F (37.3 °C)    BLOOD PRESSURE HISTORY: Systolic (36hrs), Av , Min:115 , Max:144    Diastolic (36hrs), Av, Min:65, Max:76      Intake/Output:  I/O last 3 completed shifts:  In: 101.2 [IV Piggyback:101.2]  Out: 17.5 [Drains:17.5]  I/O this shift:  In: 10 [I.V.:10]  Out: -   Drain/tube Output:  Closed/Suction Drain Midline Abdomen Bulb-Output (ml): 7.5 ml    Physical Exam:  General: awake, alert, no acute distress  Cardiovascular:  regular rate and rhythm and no murmur noted  Lungs: clear to auscultation  Abdomen: soft, non-distended, non-tender, drain in place with small greenish output    Labs:  CBC:    Recent Labs     24   WBC 10.2 11.0 11.5*   HGB 10.4* 10.9* 10.8*   HCT 30.7* 32.0* 32.0*    292 309     BMP:    Recent Labs     24    136 135*   K 4.2 3.8 3.3*    101 101   CO2 24 21 23   BUN 9 10 8   CREATININE 0.8 0.7 0.6   GLUCOSE 88 97 110*     Hepatic:    Recent Labs     24  12/05/24  0441 12/06/24  0518   AST 28 21 22   ALT 25 17 13   BILITOT 0.3 0.3 0.3   ALKPHOS 104 101 92     Amylase:  No results found for: \"AMYLASE\"  Lipase:    Lab Results   Component Value Date/Time    LIPASE 18.0 04/23/2024 05:59 AM    LIPASE 59.0 04/15/2024 05:56 PM      Mag:    Lab Results   Component Value Date/Time    MG 1.95 12/06/2024 05:18 AM    MG 1.85 12/05/2024 04:41 AM     Phos:     Lab Results   Component Value Date/Time    PHOS 1.7 12/06/2024 05:18 AM    PHOS 3.1 12/05/2024 04:41 AM      Coags:   Lab Results   Component Value Date/Time    PROTIME 16.0 12/04/2024 08:53 AM    INR 1.26 12/04/2024 08:53 AM       Cultures:  Anaerobic culture  Lab Results   Component Value Date/Time    LABANAE Anaerobic culture further report to follow 12/04/2024 10:30 AM     Fungus stain  Results in Past 30 Days  Result Component Current Result Ref Range Previous Result Ref Range   Fungus Stain No Fungal elements seen (12/4/2024)  Not in Time Range      Gram stain  Results in Past 30 Days  Result Component Current Result Ref Range Previous Result Ref Range   Gram Stain Result 4+ WBC's (Polymorphonuclear)  1+ WBC's (Mononuclear)  3+ Gram positive cocci  1+ Gram positive rods   (A) (12/4/2024)  Not in Time Range      Organism  Lab Results   Component Value Date/Time    ORG Streptococcus constellatus (A) 12/04/2024 10:30 AM    ORG Escherichia coli (A) 12/04/2024 10:30 AM     Surgical culture  No results found for: \"CXSURG\"  Blood culture 1  No results found for requested labs within last 30 days.     Blood culture 2  No results found for requested labs within last 30 days.     Fecal occult  No results found for requested labs within last 30 days.     GI bacterial pathogens by PCR  No results found for requested labs within last 30 days.     C. difficile  No results found for requested labs within last 30 days.     Urine culture  No results found for: \"LABURIN\"    Pathology:  \"Relevant pathology documented under results  hepatic lobe, as above.  Trace pelvic free fluid. Bones/Soft Tissues: Postoperative findings in the abdominal wall.  No acute soft tissue abnormality or acute osseous abnormality otherwise appreciated. Degenerative grade 1 anterolisthesis of L5.     5.5 x 3.5 cm organized fluid collection along the margin of the liver, which appears to communicate with a small fluid-filled tract extending towards the duodenal staple line.  This is concerning for abscess and/or contained leak. No extraluminal contrast seen regarding the gastrectomy site.      Scheduled Meds:   potassium chloride  40 mEq Oral Q4H    potassium phosphate (monobasic)  500 mg Oral TID    ferric gluconate (FERRLECIT) 125 mg in sodium chloride 0.9 % 100 mL IVPB  125 mg IntraVENous Q24H    levoFLOXacin  750 mg Oral Daily    enoxaparin  40 mg SubCUTAneous Daily    atorvastatin  20 mg Oral Daily    magnesium oxide  400 mg Oral Daily    montelukast  10 mg Oral Nightly    multivitamin  1 tablet Oral Daily    pantoprazole  40 mg Oral BID AC    sodium chloride flush  5-40 mL IntraVENous 2 times per day    sodium chloride flush  5-40 mL IntraVENous 2 times per day    lidocaine 1 % injection  50 mg IntraDERmal Once    vitamin D  50,000 Units Oral Weekly     Continuous Infusions:   sodium chloride      sodium chloride 25 mL/hr at 12/06/24 0913     PRN Meds:.simethicone, promethazine (PHENERGAN) 25 mg in sodium chloride 0.9 % 50 mL IVPB, albuterol sulfate HFA, LORazepam, morphine, sodium chloride flush, sodium chloride, potassium chloride **OR** potassium alternative oral replacement **OR** potassium chloride, magnesium sulfate, ondansetron **OR** ondansetron, polyethylene glycol, acetaminophen **OR** acetaminophen, sodium chloride flush, sodium chloride      Assessment:  Epigastric abdominal pain  Intraabdominal fluid collection, near duodenal stump  Procedure Date 4/22/2024, laparoscopic hiatal hernia repair with primary closure and [employer] mesh reinforcement,

## 2024-12-06 NOTE — PROGRESS NOTES
RN discharge summary from 4T to home.     This patient has had a discharge order placed. They are returning home and being picked up in the lobby. Discharge paperwork has been printed, highlighted, and gone over with the patient by this RN. Patient understands teaching and has no further questions at this time. VSS. IV has been removed with no complications. Telemetry has been removed. Pt has all belongings present.

## 2024-12-07 LAB
BACTERIA SPEC AEROBE CULT: ABNORMAL
BACTERIA SPEC AEROBE CULT: ABNORMAL
BACTERIA SPEC ANAEROBE CULT: ABNORMAL
GRAM STN SPEC: ABNORMAL
ORGANISM: ABNORMAL

## 2024-12-09 ENCOUNTER — TELEPHONE (OUTPATIENT)
Dept: SURGERY | Age: 74
End: 2024-12-09

## 2024-12-09 NOTE — TELEPHONE ENCOUNTER
Pt states she had bad hallucinations on when she took the Levaquin.  Spoke to Dr Blackmon regarding the SE and he would like for her to try the medication again because if she is unable to take this she may have to go back in to the hospital. Pt understands and will try again and let us know.

## 2024-12-09 NOTE — TELEPHONE ENCOUNTER
Pt had a reaction  to levoFLOXacin (LEVAQUIN) 750 MG tablet [[id number]].  PT called family  and they referred her to call here. She has a Zepac and wants to know if that's strong enough to take.  She stated she know she needs an antibiotic because the pad had what looked like infection on it greenish in color.

## 2024-12-10 NOTE — TELEPHONE ENCOUNTER
Pt states she doesn't think the hallucinations were caused by the medication. She would like to give an update about what the nurse gave her. Please call [phone]

## 2024-12-10 NOTE — TELEPHONE ENCOUNTER
Pt states she took Levaquin again and didn't have any problems, she thinks it was because she was given all of her meds at the same time when she was d/c'ed from the hospital.  She also stated she was supposed to have home care and there was no referral done.  Referral made to [facility] in [place] per pt request.

## 2024-12-16 ENCOUNTER — TELEPHONE (OUTPATIENT)
Dept: SURGERY | Age: 74
End: 2024-12-16

## 2024-12-16 LAB
FUNGUS SPEC CULT: NORMAL
LOEFFLER MB STN SPEC: NORMAL

## 2024-12-16 NOTE — TELEPHONE ENCOUNTER
Hermelindo from [facility] called making us away pt was admitted to a skilled facility for care   [place] [phone]

## 2024-12-17 ENCOUNTER — OFFICE VISIT (OUTPATIENT)
Dept: SURGERY | Age: 74
End: 2024-12-17
Payer: MEDICARE

## 2024-12-17 VITALS — WEIGHT: 143 LBS | BODY MASS INDEX: 25.33 KG/M2 | DIASTOLIC BLOOD PRESSURE: 72 MMHG | SYSTOLIC BLOOD PRESSURE: 126 MMHG

## 2024-12-17 DIAGNOSIS — K65.1 INTRA-ABDOMINAL ABSCESS (HCC): Primary | ICD-10-CM

## 2024-12-17 LAB
ACID FAST STN SPEC QL: NORMAL
MYCOBACTERIUM SPEC CULT: NORMAL

## 2024-12-17 PROCEDURE — 1159F MED LIST DOCD IN RCRD: CPT | Performed by: SURGERY

## 2024-12-17 PROCEDURE — 1036F TOBACCO NON-USER: CPT | Performed by: SURGERY

## 2024-12-17 PROCEDURE — 3078F DIAST BP <80 MM HG: CPT | Performed by: SURGERY

## 2024-12-17 PROCEDURE — 1123F ACP DISCUSS/DSCN MKR DOCD: CPT | Performed by: SURGERY

## 2024-12-17 PROCEDURE — 1125F AMNT PAIN NOTED PAIN PRSNT: CPT | Performed by: SURGERY

## 2024-12-17 PROCEDURE — G8427 DOCREV CUR MEDS BY ELIG CLIN: HCPCS | Performed by: SURGERY

## 2024-12-17 PROCEDURE — G8417 CALC BMI ABV UP PARAM F/U: HCPCS | Performed by: SURGERY

## 2024-12-17 PROCEDURE — 1090F PRES/ABSN URINE INCON ASSESS: CPT | Performed by: SURGERY

## 2024-12-17 PROCEDURE — G8400 PT W/DXA NO RESULTS DOC: HCPCS | Performed by: SURGERY

## 2024-12-17 PROCEDURE — 3074F SYST BP LT 130 MM HG: CPT | Performed by: SURGERY

## 2024-12-17 PROCEDURE — 1111F DSCHRG MED/CURRENT MED MERGE: CPT | Performed by: SURGERY

## 2024-12-17 PROCEDURE — G8484 FLU IMMUNIZE NO ADMIN: HCPCS | Performed by: SURGERY

## 2024-12-17 PROCEDURE — 99213 OFFICE O/P EST LOW 20 MIN: CPT | Performed by: SURGERY

## 2024-12-17 NOTE — PROGRESS NOTES
[facility] GENERAL AND LAPAROSCOPIC SURGERY          PATIENT NAME: Sonja Mcmahan     TODAY'S DATE: 12/17/2024    CC: abd pain  SUBJECTIVE:    Pt has had resolution of pain, epigastric area, none with pressure, pt has not had emesis or fever. Drain in place in the upper abdomen. Pretty good appetite, stable weight.  No CP or SOB.     OBJECTIVE:  VITALS:  Wt 64.9 kg (143 lb)   BMI 25.33 kg/m²     CONSTITUTIONAL:  awake and alert  LUNGS:  clear to auscultation  HEART: RRR  ABDOMEN:  normal bowel sounds, soft, non-distended, non-tender     Data:      Radiology Review:  CT      ASSESSMENT AND PLAN:  Intraabdominal abscess  Drain with thinning output, minimal some drainage at cath site  Drain irrigated and dressing replaced  Mounika next week for repeat CT or cath removal  Finish up oral antibiotics    David Mack MD

## 2024-12-23 LAB
FUNGUS SPEC CULT: NORMAL
LOEFFLER MB STN SPEC: NORMAL

## 2024-12-24 LAB
ACID FAST STN SPEC QL: NORMAL
MYCOBACTERIUM SPEC CULT: NORMAL

## 2024-12-26 ENCOUNTER — OFFICE VISIT (OUTPATIENT)
Dept: SURGERY | Age: 74
End: 2024-12-26
Payer: MEDICARE

## 2024-12-26 VITALS — SYSTOLIC BLOOD PRESSURE: 122 MMHG | WEIGHT: 146 LBS | BODY MASS INDEX: 25.86 KG/M2 | DIASTOLIC BLOOD PRESSURE: 64 MMHG

## 2024-12-26 DIAGNOSIS — K65.1 INTRA-ABDOMINAL ABSCESS (HCC): Primary | ICD-10-CM

## 2024-12-26 PROCEDURE — 1036F TOBACCO NON-USER: CPT | Performed by: SURGERY

## 2024-12-26 PROCEDURE — G8417 CALC BMI ABV UP PARAM F/U: HCPCS | Performed by: SURGERY

## 2024-12-26 PROCEDURE — 3074F SYST BP LT 130 MM HG: CPT | Performed by: SURGERY

## 2024-12-26 PROCEDURE — 1159F MED LIST DOCD IN RCRD: CPT | Performed by: SURGERY

## 2024-12-26 PROCEDURE — 3078F DIAST BP <80 MM HG: CPT | Performed by: SURGERY

## 2024-12-26 PROCEDURE — 1111F DSCHRG MED/CURRENT MED MERGE: CPT | Performed by: SURGERY

## 2024-12-26 PROCEDURE — G8400 PT W/DXA NO RESULTS DOC: HCPCS | Performed by: SURGERY

## 2024-12-26 PROCEDURE — G8427 DOCREV CUR MEDS BY ELIG CLIN: HCPCS | Performed by: SURGERY

## 2024-12-26 PROCEDURE — 99213 OFFICE O/P EST LOW 20 MIN: CPT | Performed by: SURGERY

## 2024-12-26 PROCEDURE — G8484 FLU IMMUNIZE NO ADMIN: HCPCS | Performed by: SURGERY

## 2024-12-26 PROCEDURE — 1123F ACP DISCUSS/DSCN MKR DOCD: CPT | Performed by: SURGERY

## 2024-12-26 PROCEDURE — 1090F PRES/ABSN URINE INCON ASSESS: CPT | Performed by: SURGERY

## 2024-12-26 PROCEDURE — 1125F AMNT PAIN NOTED PAIN PRSNT: CPT | Performed by: SURGERY

## 2024-12-26 NOTE — PROGRESS NOTES
[facility] GENERAL AND LAPAROSCOPIC SURGERY              PATIENT NAME: Sonja Mcmahan      TODAY'S DATE: 12/26/2024     CC: abd pain    SUBJECTIVE:    Pt has had resolution of pain, epigastric area, none with pressure, pt has not had emesis or fever. Drain in place in the upper abdomen. Good appetite, stable weight.  No CP or SOB.  Negligible drain output.     OBJECTIVE:  VITALS:  Wt 64.9 kg (143 lb)   BMI 25.33 kg/m²      CONSTITUTIONAL:  awake and alert  LUNGS:  clear to auscultation  HEART: RRR  ABDOMEN:  normal bowel sounds, soft, non-distended, non-tender, drain in place, no drainage around site   SKIN: no rash     Data:        Radiology Review:  CT        ASSESSMENT AND PLAN:  Intraabdominal abscess  Drain output nil  Drain removed today  Oral antibiotics finished  Call in a week or so for progress     David Mack MD

## 2024-12-31 LAB
ACID FAST STN SPEC QL: NORMAL
MYCOBACTERIUM SPEC CULT: NORMAL

## 2025-01-03 ENCOUNTER — TELEPHONE (OUTPATIENT)
Dept: SURGERY | Age: 75
End: 2025-01-03

## 2025-01-04 LAB
FUNGUS SPEC CULT: NORMAL
LOEFFLER MB STN SPEC: NORMAL

## 2025-01-07 LAB
ACID FAST STN SPEC QL: NORMAL
MYCOBACTERIUM SPEC CULT: NORMAL

## 2025-01-14 LAB
ACID FAST STN SPEC QL: NORMAL
MYCOBACTERIUM SPEC CULT: NORMAL

## 2025-01-21 LAB
ACID FAST STN SPEC QL: NORMAL
MYCOBACTERIUM SPEC CULT: NORMAL

## 2025-02-21 NOTE — PROGRESS NOTES
coli and Streptococcus sensitive to Levaquin.  Started on IV Rocephin and Flagyl but transitioned to oral Levaquin per general surgery. Patient will be discharged home with drain.     Today, Sonja is here for a 6 month follow up. 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 She had surgery done at [facility]. Dr Mack was her surgeon.  Dr Love is her oncologist. She is feeling good.  Denies CP, SOB.  She plans to go to therapy. She also has treadmill at her home.    Past Medical History:   has a past medical history of Asthma, Diabetes mellitus (HCC), Hyperlipidemia, and Hypertension.    Surgical History:   has a past surgical history that includes Hysterectomy, total abdominal; Elbow surgery; Upper gastrointestinal endoscopy (N/A, 04/18/2024); Upper gastrointestinal endoscopy (N/A, 04/18/2024); Upper gastrointestinal endoscopy (N/A, 04/18/2024); hiatal hernia repair (N/A, 04/22/2024); Cholecystectomy, laparoscopic (N/A, 04/22/2024); gastrectomy (N/A, 04/22/2024); and Abscess Drainage (12/2024).     Social History:   reports that she has never smoked. She has never been exposed to tobacco smoke. She has never used smokeless tobacco. She reports that she does not drink alcohol and does not use drugs.     Family History:  family history includes Heart Disease in her brother and father.     Home Medications:  Prior to Admission medications    Medication Sig Start Date End Date Taking? Authorizing Provider   omeprazole (PRILOSEC) 40 MG delayed release capsule Take 1 capsule by mouth 2 times daily 1/14/25 1/14/26 Yes Meenakshi Martinez MD   vitamin D (ERGOCALCIFEROL) 1.25 MG (72771 UT) CAPS capsule Take 1 capsule by mouth Once a week at 5 PM   Yes Meenakshi Martinez MD   ferrous sulfate (IRON 325) 325 (65 Fe) MG tablet Take 1 tablet by mouth daily (with breakfast) 12/6/24  Yes Margo Suárez MD   ondansetron (ZOFRAN) 4 MG tablet Take 1 tablet by mouth every 4 hours as needed for Nausea 5/23/24  Yes

## 2025-02-24 ENCOUNTER — OFFICE VISIT (OUTPATIENT)
Dept: CARDIOLOGY CLINIC | Age: 75
End: 2025-02-24

## 2025-02-24 VITALS
OXYGEN SATURATION: 98 % | DIASTOLIC BLOOD PRESSURE: 70 MMHG | HEART RATE: 78 BPM | WEIGHT: 146.5 LBS | SYSTOLIC BLOOD PRESSURE: 136 MMHG | HEIGHT: 63 IN | BODY MASS INDEX: 25.96 KG/M2

## 2025-02-24 DIAGNOSIS — R01.1 HEART MURMUR, SYSTOLIC: ICD-10-CM

## 2025-02-24 DIAGNOSIS — I35.0 NONRHEUMATIC AORTIC VALVE STENOSIS: ICD-10-CM

## 2025-02-24 DIAGNOSIS — E78.2 MIXED HYPERLIPIDEMIA: ICD-10-CM

## 2025-02-24 DIAGNOSIS — I10 ESSENTIAL HYPERTENSION: ICD-10-CM

## 2025-02-24 DIAGNOSIS — R00.0 TACHYCARDIA: Primary | ICD-10-CM

## 2025-02-24 DIAGNOSIS — L02.91 ABSCESS: ICD-10-CM

## 2025-02-24 DIAGNOSIS — R06.02 SOB (SHORTNESS OF BREATH): ICD-10-CM

## 2025-02-24 DIAGNOSIS — R07.2 PRECORDIAL PAIN: ICD-10-CM

## 2025-02-24 DIAGNOSIS — E11.9 TYPE 2 DIABETES MELLITUS WITHOUT COMPLICATION, WITHOUT LONG-TERM CURRENT USE OF INSULIN (HCC): ICD-10-CM

## 2025-02-24 RX ORDER — ERGOCALCIFEROL 1.25 MG/1
50000 CAPSULE, LIQUID FILLED ORAL WEEKLY
COMMUNITY

## 2025-02-24 RX ORDER — OMEPRAZOLE 40 MG/1
40 CAPSULE, DELAYED RELEASE ORAL 2 TIMES DAILY
COMMUNITY
Start: 2025-01-14 | End: 2026-01-14

## 2025-02-24 NOTE — PATIENT INSTRUCTIONS
No medication changes  Continue risk factor modifications.   Call for any change in symptoms, call to report any changes in shortness of breath or development of chest pain with activity.    Follow up in 6 mos with echocardiogram

## 2025-05-27 ENCOUNTER — OFFICE VISIT (OUTPATIENT)
Dept: SURGERY | Age: 75
End: 2025-05-27
Payer: MEDICARE

## 2025-05-27 VITALS — BODY MASS INDEX: 25.97 KG/M2 | DIASTOLIC BLOOD PRESSURE: 70 MMHG | WEIGHT: 146.6 LBS | SYSTOLIC BLOOD PRESSURE: 168 MMHG

## 2025-05-27 DIAGNOSIS — K31.89 GASTRIC MASS: Primary | ICD-10-CM

## 2025-05-27 PROCEDURE — 1036F TOBACCO NON-USER: CPT | Performed by: SURGERY

## 2025-05-27 PROCEDURE — G8400 PT W/DXA NO RESULTS DOC: HCPCS | Performed by: SURGERY

## 2025-05-27 PROCEDURE — 1159F MED LIST DOCD IN RCRD: CPT | Performed by: SURGERY

## 2025-05-27 PROCEDURE — 3078F DIAST BP <80 MM HG: CPT | Performed by: SURGERY

## 2025-05-27 PROCEDURE — 1090F PRES/ABSN URINE INCON ASSESS: CPT | Performed by: SURGERY

## 2025-05-27 PROCEDURE — 1123F ACP DISCUSS/DSCN MKR DOCD: CPT | Performed by: SURGERY

## 2025-05-27 PROCEDURE — 99213 OFFICE O/P EST LOW 20 MIN: CPT | Performed by: SURGERY

## 2025-05-27 PROCEDURE — 1126F AMNT PAIN NOTED NONE PRSNT: CPT | Performed by: SURGERY

## 2025-05-27 PROCEDURE — G8427 DOCREV CUR MEDS BY ELIG CLIN: HCPCS | Performed by: SURGERY

## 2025-05-27 PROCEDURE — 3077F SYST BP >= 140 MM HG: CPT | Performed by: SURGERY

## 2025-05-27 PROCEDURE — G8417 CALC BMI ABV UP PARAM F/U: HCPCS | Performed by: SURGERY

## 2025-05-27 NOTE — PROGRESS NOTES
[facility] GENERAL AND LAPAROSCOPIC SURGERY          PATIENT NAME: Sonja Mcmahan     TODAY'S DATE: 5/27/2025    CC: Reflux  SUBJECTIVE:    Pt has had sig GERD, reflux on meds bothering her daily. No recent operation, following up with Oncology regularly. No bleeding noted. Having BM.  Had recent CT scan.  No pain, no fever  Stable weight     OBJECTIVE:  VITALS:  BP (!) 168/70   Wt 66.5 kg (146 lb 9.6 oz)   BMI 25.97 kg/m²     CONSTITUTIONAL:  awake and alert  LUNGS:  clear to auscultation  HEART: RRR  ABDOMEN:  normal bowel sounds, soft, non-distended, non-tender, prior incision healed     Data:      Radiology Review:   1.  No acute process is identified in the abdomen and pelvis.  2.  Punctate nonobstructing bilateral nephrolithiasis.  Narrative    HISTORY:   previous gastrectomy, pain consistent with previous leaks  COMPARISON:  12/2/2024 CT abdomen pelvis  TECHNIQUE: Post IV contrast multiplanar CT images of the abdomen and pelvis  NOTE:  If there are questions about the content of this report, please contact [facility] radiology by calling [phone]    FINDINGS:  LOWER CHEST:  Unremarkable  LIVER:  Unremarkable  GALLBLADDER/BILE DUCTS:  Gallbladder surgically absent.  Bile ducts unremarkable  PANCREAS:  Unremarkable.  No mass or duct dilation  SPLEEN:  Unremarkable  ADRENALS:  Unremarkable    KIDNEYS/URETERS:  Punctate nonobstructing bilateral nephrolithiasis. Scarring in the superior left kidney. Inferior right renal cyst. No significant hydronephrosis, bilaterally.  GI TRACT:  Gastrojejunostomy. No bowel obstruction. No significant edema is identified suggesting marginal ulcer. No significant bowel wall thickening. Sequela of fat necrosis is noted in the left upper quadrant adjacent to the  gastrectomy/gastrojejunostomy.  VESSELS:  Mild atherosclerotic calcifications without aneurysm or dissection  LYMPH NODES: Unremarkable.  No enlarged lymph nodes  ABD WALL:  Unremarkable  PELVIS:  Uterus is

## 2025-07-24 ENCOUNTER — TELEPHONE (OUTPATIENT)
Dept: SURGERY | Age: 75
End: 2025-07-24

## 2025-07-24 NOTE — TELEPHONE ENCOUNTER
Pt is calling asking for Dr Mack to call her. Pt is stating that when she came into the office she wasn't feeling the best and wasn't the nicest person and says owes him an apology.   Please call and advise [phone]

## 2025-08-04 ENCOUNTER — HOSPITAL ENCOUNTER (OUTPATIENT)
Dept: PHYSICAL THERAPY | Age: 75
Setting detail: THERAPIES SERIES
Discharge: HOME | End: 2025-08-04
Payer: MEDICARE

## 2025-08-04 DIAGNOSIS — R29.898 WEAKNESS OF LEFT SHOULDER: ICD-10-CM

## 2025-08-04 DIAGNOSIS — M25.512 ACUTE PAIN OF LEFT SHOULDER: Primary | ICD-10-CM

## 2025-08-04 DIAGNOSIS — M25.612 DECREASED RANGE OF MOTION OF LEFT SHOULDER: ICD-10-CM

## 2025-08-04 PROCEDURE — 97162 PT EVAL MOD COMPLEX 30 MIN: CPT

## 2025-08-04 PROCEDURE — 97110 THERAPEUTIC EXERCISES: CPT

## 2025-08-08 ENCOUNTER — HOSPITAL ENCOUNTER (OUTPATIENT)
Dept: PHYSICAL THERAPY | Age: 75
Setting detail: THERAPIES SERIES
Discharge: HOME | End: 2025-08-08
Payer: MEDICARE

## 2025-08-08 PROCEDURE — 97110 THERAPEUTIC EXERCISES: CPT

## 2025-08-08 PROCEDURE — 97140 MANUAL THERAPY 1/> REGIONS: CPT

## 2025-08-11 ENCOUNTER — HOSPITAL ENCOUNTER (OUTPATIENT)
Dept: PHYSICAL THERAPY | Age: 75
Setting detail: THERAPIES SERIES
Discharge: HOME | End: 2025-08-11
Payer: MEDICARE

## 2025-08-11 PROCEDURE — 97110 THERAPEUTIC EXERCISES: CPT

## 2025-08-12 ENCOUNTER — HOSPITAL ENCOUNTER (OUTPATIENT)
Dept: PHYSICAL THERAPY | Age: 75
Setting detail: THERAPIES SERIES
End: 2025-08-12
Payer: MEDICARE

## 2025-08-15 ENCOUNTER — HOSPITAL ENCOUNTER (OUTPATIENT)
Dept: PHYSICAL THERAPY | Age: 75
Setting detail: THERAPIES SERIES
Discharge: HOME | End: 2025-08-15
Payer: MEDICARE

## 2025-08-15 PROCEDURE — 97110 THERAPEUTIC EXERCISES: CPT

## 2025-08-19 ENCOUNTER — APPOINTMENT (OUTPATIENT)
Dept: PHYSICAL THERAPY | Age: 75
End: 2025-08-19
Payer: MEDICARE

## 2025-08-22 ENCOUNTER — HOSPITAL ENCOUNTER (OUTPATIENT)
Dept: PHYSICAL THERAPY | Age: 75
Setting detail: THERAPIES SERIES
Discharge: HOME | End: 2025-08-22
Payer: MEDICARE

## 2025-08-22 PROCEDURE — 97110 THERAPEUTIC EXERCISES: CPT

## 2025-08-26 ENCOUNTER — APPOINTMENT (OUTPATIENT)
Dept: PHYSICAL THERAPY | Age: 75
End: 2025-08-26
Payer: MEDICARE

## 2025-08-27 ENCOUNTER — APPOINTMENT (OUTPATIENT)
Dept: PHYSICAL THERAPY | Age: 75
End: 2025-08-27
Payer: MEDICARE

## (undated) DEVICE — SUTURE VICRYL + SZ 0 L27IN ABSRB VLT L26MM UR-6 5/8 CIR VCP603H

## (undated) DEVICE — LAPAROSCOPIC TROCAR SLEEVE/SINGLE USE: Brand: KII® LOW PROFILE OPTICAL ACCESS SYSTEM

## (undated) DEVICE — SHEET,DRAPE,53X77,STERILE: Brand: MEDLINE

## (undated) DEVICE — BAG RETRIEVAL SPECIMEN SUPERBAG 10 MEDIUM NYLON ITRODUCER

## (undated) DEVICE — TK® TI-KNOT® DEVICE: Brand: TK® TI-KNOT®

## (undated) DEVICE — PAD THRM M SPL TORSO

## (undated) DEVICE — COVER LT HNDL BLU PLAS

## (undated) DEVICE — GOWN SIRUS NONREIN LG W/TWL: Brand: MEDLINE INDUSTRIES, INC.

## (undated) DEVICE — MOUTHPIECE ENDOSCP L CTRL OPN AND SIDE PORTS DISP

## (undated) DEVICE — BW-412T DISP COMBO CLEANING BRUSH: Brand: SINGLE USE COMBINATION CLEANING BRUSH

## (undated) DEVICE — CORD ES L10FT MPLR LAP

## (undated) DEVICE — SUTURE MONOCRYL + SZ 4-0 L27IN ABSRB UD L19MM PS-2 3/8 CIR MCP426H

## (undated) DEVICE — C-ARM: Brand: UNBRANDED

## (undated) DEVICE — GLOVE SURG SZ 6.5 L11.2IN FNGR THK9.8MIL STRW LTX POLYMER

## (undated) DEVICE — SUTURE ABSORBABLE MONOFILAMENT 0 CTX 60 IN VIO PDS + PDP990G

## (undated) DEVICE — SOLUTION IRRIG 1000ML 0.9% SOD CHL USP POUR PLAS BTL

## (undated) DEVICE — STAPLER SKIN H3.9MM WIRE DIA0.58MM CRWN 6.9MM 35 STPL ROT

## (undated) DEVICE — DEVICE SUT 0 L48IN GRN POLY BRAID LD UNIT DISP SURGDAC

## (undated) DEVICE — LAPAROSCOPY PACK: Brand: MEDLINE INDUSTRIES, INC.

## (undated) DEVICE — BLADE ES ELASTOMERIC COAT INSUL DURABLE BEND UPTO 90DEG

## (undated) DEVICE — APPLICATOR MEDICATED 26 CC SOLUTION HI LT ORNG CHLORAPREP

## (undated) DEVICE — 1LYRTR 16FR10ML 100%SILI SNAP: Brand: MEDLINE INDUSTRIES, INC.

## (undated) DEVICE — Device

## (undated) DEVICE — SYRINGE MED 30ML STD CLR PLAS LUERLOCK TIP N CTRL DISP

## (undated) DEVICE — PLUMEPORT ACTIV LAPAROSCOPIC SMOKE FILTRATION DEVICE: Brand: PLUMEPORT ACTIVE

## (undated) DEVICE — RELOAD STPL H4.1X2MM DIA60MM THCK TISS GRN 6 ROW PWR GST B

## (undated) DEVICE — GLOVE SURG UNDERGLOVE 6.5 PF BLU BIOGEL PI MIC LF

## (undated) DEVICE — SYRINGE, LUER LOCK, 10ML: Brand: MEDLINE

## (undated) DEVICE — PICO 7 10CM X 30CM: Brand: PICO™ 7

## (undated) DEVICE — STAPLER 60MM POWERED ECHELON 3000  SHORT 340MM

## (undated) DEVICE — GAUZE,SPONGE,4"X4",8PLY,STRL,LF,10/TRAY: Brand: MEDLINE

## (undated) DEVICE — SPONGE LAP W18XL18IN WHT COT 4 PLY FLD STRUNG RADPQ DISP ST 2 PER PACK

## (undated) DEVICE — ESOPHAGEAL BALLOON DILATATION CATHETER: Brand: CRE FIXED WIRE

## (undated) DEVICE — PENCIL SMK EVAC TELSCP 3 M TBNG

## (undated) DEVICE — SUTURE VICRYL + SZ 3-0 L27IN ABSRB UD L26MM SH 1/2 CIR VCP416H

## (undated) DEVICE — APPLIER CLP M/L SHFT DIA5MM 15 LIG LIGAMAX 5

## (undated) DEVICE — LEGGINGS, PAIR, 31X48, STERILE: Brand: MEDLINE

## (undated) DEVICE — BLANKET WRM W29.9XL79.1IN UP BODY FORC AIR MISTRAL-AIR

## (undated) DEVICE — ENDOSCOPIC KIT 6X3/16 FT COLON W/ 1.1 OZ 2 GWN W/O BRSH

## (undated) DEVICE — TK® QUICK LOAD® UNIT: Brand: TK® QUICK LOAD®

## (undated) DEVICE — SYRINGE INFL 60ML DISP ALLIANCE II

## (undated) DEVICE — SHEARS LAP L45CM DIA5MM ULTRASONIC CRV TIP ADV HEMSTAS HARM

## (undated) DEVICE — NDLCTR: FOAM/MAG 40CT 64/CS: Brand: MEDICAL ACTION INDUSTRIES

## (undated) DEVICE — SUTURE ABSORBABLE MONOFILAMENT 2-0 SH 6 IN STRATAFIX SPRL SXPP1B415

## (undated) DEVICE — DRAPE,LAP,CHOLE,W/TROUGHS,STERILE: Brand: MEDLINE

## (undated) DEVICE — TROCAR SLEEVE: Brand: KII ® LOW PROFILE SLEEVE

## (undated) DEVICE — HYPODERMIC SAFETY NEEDLE: Brand: MAGELLAN

## (undated) DEVICE — TUBING, SUCTION, 1/4" X 10', STRAIGHT: Brand: MEDLINE

## (undated) DEVICE — REPLAY HEMOSTASIS CLIP, 16MM SPAN: Brand: REPLAY

## (undated) DEVICE — SOLUTION IV IRRIG WATER 500ML POUR BRL ST 2F7113

## (undated) DEVICE — GOWN AURORA NONREINF LG: Brand: MEDLINE INDUSTRIES, INC.

## (undated) DEVICE — FORCEPS BX L240CM WRK CHN 2.8MM STD CAP W/ NDL MIC MESH

## (undated) DEVICE — SHEET, T, LAPAROTOMY, STERILE: Brand: MEDLINE

## (undated) DEVICE — INDICATED FOR USE DURING OPEN AND LAPAROSCOPIC CHOLECYSTECTOMY PROCEDURES TO INJECT RADIOPAQUE MEDIA THROUGH THE CYSTIC DUCT INTO THE BILIARY TREE.: Brand: AEROSTAT®

## (undated) DEVICE — ELECTRODE PT RET AD L9FT HI MOIST COND ADH HYDRGEL CORDED

## (undated) DEVICE — 3M™ TEGADERM™ TRANSPARENT FILM DRESSING FRAME STYLE, 1628, 6 IN X 8 IN (15 CM X 20 CM), 10/CT 8CT/CASE: Brand: 3M™ TEGADERM™

## (undated) DEVICE — 30978 SEE SHARP - ENHANCED INTRAOPERATIVE LAPAROSCOPE CLEANING & DEFOGGING: Brand: 30978 SEE SHARP - ENHANCED INTRAOPERATIVE LAPAROSCOPE CLEANING & DEFOGGING

## (undated) DEVICE — SUTURE VICRYL SZ 3-0 L18IN ABSRB UD L26MM SH 1/2 CIR J864D

## (undated) DEVICE — DEVICE SUT SHFT L34CM DIA 10MM 2 JAW LD UNIT ENDOSTCH

## (undated) DEVICE — SUTURE PERMAHAND SZ 3-0 L18IN NONABSORBABLE BLK L26MM SH C013D

## (undated) DEVICE — SINGLE USE AIR/WATER, SUCTION AND BIOPSY VALVES SET: Brand: ORCAPOD™

## (undated) DEVICE — SYRINGE MED 10ML TRNSLUC BRL PLUNG BLK MRK POLYPR CTRL

## (undated) DEVICE — [HIGH FLOW INSUFFLATOR,  DO NOT USE IF PACKAGE IS DAMAGED,  KEEP DRY,  KEEP AWAY FROM SUNLIGHT,  PROTECT FROM HEAT AND RADIOACTIVE SOURCES.]: Brand: PNEUMOSURE

## (undated) DEVICE — LIQUIBAND RAPID ADHESIVE 36/CS 0.8ML: Brand: MEDLINE

## (undated) DEVICE — KIT,ANTI FOG,W/SPONGE & FLUID,SOFT PACK: Brand: MEDLINE

## (undated) DEVICE — MAJOR SET UP PK

## (undated) DEVICE — MERCY FAIRFIELD TURNOVER KIT: Brand: MEDLINE INDUSTRIES, INC.

## (undated) DEVICE — Device: Brand: DISPOSABLE ELECTROSURGICAL SNARE

## (undated) DEVICE — TROCAR: Brand: KII FIOS FIRST ENTRY

## (undated) DEVICE — SEALER LAP L37CM MARYLAND JAW OPN NANO COAT MULTIFUNCTIONAL

## (undated) DEVICE — AIR/WATER CLEANING ADAPTER FOR OLYMPUS® GI ENDOSCOPE: Brand: BULLDOG®

## (undated) DEVICE — GAUZE,SPONGE,4"X4",16PLY,XRAY,STRL,LF: Brand: MEDLINE